# Patient Record
Sex: MALE | Race: WHITE | NOT HISPANIC OR LATINO | URBAN - METROPOLITAN AREA
[De-identification: names, ages, dates, MRNs, and addresses within clinical notes are randomized per-mention and may not be internally consistent; named-entity substitution may affect disease eponyms.]

---

## 2017-07-18 ENCOUNTER — EMERGENCY (EMERGENCY)
Facility: HOSPITAL | Age: 82
LOS: 1 days | Discharge: HOME | End: 2017-07-18
Attending: EMERGENCY MEDICINE
Payer: MEDICARE

## 2017-07-18 VITALS
HEIGHT: 64 IN | HEART RATE: 71 BPM | TEMPERATURE: 99 F | DIASTOLIC BLOOD PRESSURE: 83 MMHG | RESPIRATION RATE: 18 BRPM | WEIGHT: 145.06 LBS | SYSTOLIC BLOOD PRESSURE: 153 MMHG

## 2017-07-18 VITALS — TEMPERATURE: 100 F

## 2017-07-18 DIAGNOSIS — F17.210 NICOTINE DEPENDENCE, CIGARETTES, UNCOMPLICATED: ICD-10-CM

## 2017-07-18 DIAGNOSIS — R06.2 WHEEZING: ICD-10-CM

## 2017-07-18 DIAGNOSIS — M17.12 UNILATERAL PRIMARY OSTEOARTHRITIS, LEFT KNEE: ICD-10-CM

## 2017-07-18 LAB
ALBUMIN SERPL ELPH-MCNC: 3.3 G/DL — LOW (ref 3.5–5)
ALP SERPL-CCNC: 82 U/L — SIGNIFICANT CHANGE UP (ref 40–120)
ALT FLD-CCNC: 21 U/L DA — SIGNIFICANT CHANGE UP (ref 10–60)
ANION GAP SERPL CALC-SCNC: 10 MMOL/L — SIGNIFICANT CHANGE UP (ref 5–17)
APPEARANCE UR: CLEAR — SIGNIFICANT CHANGE UP
AST SERPL-CCNC: 22 U/L — SIGNIFICANT CHANGE UP (ref 10–40)
BILIRUB SERPL-MCNC: 0.7 MG/DL — SIGNIFICANT CHANGE UP (ref 0.2–1.2)
BILIRUB UR-MCNC: NEGATIVE — SIGNIFICANT CHANGE UP
BUN SERPL-MCNC: 33 MG/DL — HIGH (ref 7–18)
CALCIUM SERPL-MCNC: 8.4 MG/DL — SIGNIFICANT CHANGE UP (ref 8.4–10.5)
CHLORIDE SERPL-SCNC: 102 MMOL/L — SIGNIFICANT CHANGE UP (ref 96–108)
CO2 SERPL-SCNC: 25 MMOL/L — SIGNIFICANT CHANGE UP (ref 22–31)
COLOR SPEC: YELLOW — SIGNIFICANT CHANGE UP
CREAT SERPL-MCNC: 1.47 MG/DL — HIGH (ref 0.5–1.3)
CRP SERPL-MCNC: 14.6 MG/DL — HIGH (ref 0–0.4)
DIFF PNL FLD: NEGATIVE — SIGNIFICANT CHANGE UP
ERYTHROCYTE [SEDIMENTATION RATE] IN BLOOD: 76 MM/HR — HIGH (ref 0–20)
GLUCOSE SERPL-MCNC: 106 MG/DL — HIGH (ref 70–99)
GLUCOSE UR QL: NEGATIVE — SIGNIFICANT CHANGE UP
HCT VFR BLD CALC: 38.1 % — LOW (ref 39–50)
HGB BLD-MCNC: 12.3 G/DL — LOW (ref 13–17)
KETONES UR-MCNC: NEGATIVE — SIGNIFICANT CHANGE UP
LEUKOCYTE ESTERASE UR-ACNC: NEGATIVE — SIGNIFICANT CHANGE UP
LYMPHOCYTES # BLD AUTO: 11 % — LOW (ref 13–44)
MCHC RBC-ENTMCNC: 29.8 PG — SIGNIFICANT CHANGE UP (ref 27–34)
MCHC RBC-ENTMCNC: 32.2 GM/DL — SIGNIFICANT CHANGE UP (ref 32–36)
MCV RBC AUTO: 92.8 FL — SIGNIFICANT CHANGE UP (ref 80–100)
MONOCYTES NFR BLD AUTO: 3 % — SIGNIFICANT CHANGE UP (ref 2–14)
NEUTROPHILS NFR BLD AUTO: 78 % — HIGH (ref 43–77)
NITRITE UR-MCNC: NEGATIVE — SIGNIFICANT CHANGE UP
PH UR: 5 — SIGNIFICANT CHANGE UP (ref 5–8)
PLATELET # BLD AUTO: 195 K/UL — SIGNIFICANT CHANGE UP (ref 150–400)
POTASSIUM SERPL-MCNC: 4.4 MMOL/L — SIGNIFICANT CHANGE UP (ref 3.5–5.3)
POTASSIUM SERPL-SCNC: 4.4 MMOL/L — SIGNIFICANT CHANGE UP (ref 3.5–5.3)
PROT SERPL-MCNC: 7.8 G/DL — SIGNIFICANT CHANGE UP (ref 6–8.3)
PROT UR-MCNC: 100
RBC # BLD: 4.11 M/UL — LOW (ref 4.2–5.8)
RBC # FLD: 13.1 % — SIGNIFICANT CHANGE UP (ref 10.3–14.5)
SODIUM SERPL-SCNC: 137 MMOL/L — SIGNIFICANT CHANGE UP (ref 135–145)
SP GR SPEC: 1.02 — SIGNIFICANT CHANGE UP (ref 1.01–1.02)
UROBILINOGEN FLD QL: NEGATIVE — SIGNIFICANT CHANGE UP
WBC # BLD: 10.2 K/UL — SIGNIFICANT CHANGE UP (ref 3.8–10.5)
WBC # FLD AUTO: 10.2 K/UL — SIGNIFICANT CHANGE UP (ref 3.8–10.5)

## 2017-07-18 PROCEDURE — 85027 COMPLETE CBC AUTOMATED: CPT

## 2017-07-18 PROCEDURE — 73562 X-RAY EXAM OF KNEE 3: CPT

## 2017-07-18 PROCEDURE — 20610 DRAIN/INJ JOINT/BURSA W/O US: CPT | Mod: LT

## 2017-07-18 PROCEDURE — 99284 EMERGENCY DEPT VISIT MOD MDM: CPT | Mod: 25

## 2017-07-18 PROCEDURE — 73562 X-RAY EXAM OF KNEE 3: CPT | Mod: 26,LT

## 2017-07-18 PROCEDURE — 86140 C-REACTIVE PROTEIN: CPT

## 2017-07-18 PROCEDURE — 85652 RBC SED RATE AUTOMATED: CPT

## 2017-07-18 PROCEDURE — 81001 URINALYSIS AUTO W/SCOPE: CPT

## 2017-07-18 PROCEDURE — 71010: CPT | Mod: 26

## 2017-07-18 PROCEDURE — 99285 EMERGENCY DEPT VISIT HI MDM: CPT

## 2017-07-18 PROCEDURE — 94640 AIRWAY INHALATION TREATMENT: CPT

## 2017-07-18 PROCEDURE — 80053 COMPREHEN METABOLIC PANEL: CPT

## 2017-07-18 PROCEDURE — 93005 ELECTROCARDIOGRAM TRACING: CPT

## 2017-07-18 PROCEDURE — 71045 X-RAY EXAM CHEST 1 VIEW: CPT

## 2017-07-18 RX ORDER — IBUPROFEN 200 MG
600 TABLET ORAL ONCE
Qty: 0 | Refills: 0 | Status: COMPLETED | OUTPATIENT
Start: 2017-07-18 | End: 2017-07-18

## 2017-07-18 RX ORDER — OXYCODONE AND ACETAMINOPHEN 5; 325 MG/1; MG/1
1 TABLET ORAL ONCE
Qty: 0 | Refills: 0 | Status: DISCONTINUED | OUTPATIENT
Start: 2017-07-18 | End: 2017-07-18

## 2017-07-18 RX ORDER — ACETAMINOPHEN 500 MG
650 TABLET ORAL ONCE
Qty: 0 | Refills: 0 | Status: DISCONTINUED | OUTPATIENT
Start: 2017-07-18 | End: 2017-07-18

## 2017-07-18 RX ORDER — IPRATROPIUM/ALBUTEROL SULFATE 18-103MCG
3 AEROSOL WITH ADAPTER (GRAM) INHALATION ONCE
Qty: 0 | Refills: 0 | Status: COMPLETED | OUTPATIENT
Start: 2017-07-18 | End: 2017-07-18

## 2017-07-18 RX ORDER — MEDROXYPROGESTERONE ACETATE 150 MG/ML
150 INJECTION, SUSPENSION, EXTENDED RELEASE INTRAMUSCULAR ONCE
Qty: 0 | Refills: 0 | Status: DISCONTINUED | OUTPATIENT
Start: 2017-07-18 | End: 2017-07-18

## 2017-07-18 RX ADMIN — Medication 3 MILLILITER(S): at 12:19

## 2017-07-18 RX ADMIN — OXYCODONE AND ACETAMINOPHEN 1 TABLET(S): 5; 325 TABLET ORAL at 13:11

## 2017-07-18 RX ADMIN — Medication 600 MILLIGRAM(S): at 13:11

## 2017-07-18 RX ADMIN — OXYCODONE AND ACETAMINOPHEN 1 TABLET(S): 5; 325 TABLET ORAL at 12:19

## 2017-07-18 RX ADMIN — Medication 600 MILLIGRAM(S): at 12:19

## 2017-07-18 NOTE — ED PROVIDER NOTE - MEDICAL DECISION MAKING DETAILS
89 y/o M pt w/ multiple joint pain. Likely acute on chronic. Will treat pain, check labs, reassess. 89 y/o M pt w/ multiple joint pain. Likely acute on chronic, pt seen previously for joint pain in the past, seen by ortho who did an arthrocentesis, rec f/u as outpatient. Will treat pain, check labs, reassess. Pt on reassessment at 3:50pm requesting to go home, happy to leave, instructed to f/u with orthopedics. Pt noted to be borderline febrile, no leukocytosis, normal wbc, pt did not give urine, rectal temp <38.0, will send urine, and send prescription to pharmacy if needed.

## 2017-07-18 NOTE — ED PROVIDER NOTE - CARE PLAN
Principal Discharge DX:	Arthritis  Secondary Diagnosis:	Osteoarthritis of multiple joints, unspecified osteoarthritis type

## 2017-07-18 NOTE — ED PROVIDER NOTE - OBJECTIVE STATEMENT
89 y/o M pt w/ PMHx of degenerative joint disease and arthritis presents to ED c/o L knee pain, R elbow pain, and R shoulder pain for several days. Pt notes his pain is worse w/ movement. Pt denies fever, chills, or any other complaints. Pt reports no history of endocarditis or rheumatic heart disease. NKDA.

## 2017-07-18 NOTE — ED PROVIDER NOTE - PROGRESS NOTE DETAILS
pt seen by ortho who did steroid injection of knee and removed fluid consistent w/ gout pt feeling much better, discussed with patient regarding inpatient vs outpatient given improvement in pain.

## 2017-07-18 NOTE — ED PROVIDER NOTE - MUSCULOSKELETAL, MLM
swelling L knee, pain w/ active ROM of R elbow and R shoulder, no erythema but positive swelling, not warm

## 2017-07-19 NOTE — CONSULT NOTE ADULT - SUBJECTIVE AND OBJECTIVE BOX
HPI: Patient is a 89 y/o M pt w/ PMHx of degenerative joint disease and arthritis presents to ED c/o L knee pain and swelling for several days. Pt notes his pain is worse w/ movement. Pt denies fever, chills, or any other complaints. 	    PAST MEDICAL & SURGICAL HISTORY:  Arthritis  Degenerative joint disease  No significant past surgical history    Review of systems: Non Contributory    MEDICATIONS  (STANDING):  methylPREDNISolone acetate Injectable 80 milliGRAM(s) IntraArticular Once    Allergies: No known Allergies    Vital Signs Last 24 Hrs  T(C): 37.9 (2017 12:32), Max: 37.9 (2017 12:32)  T(F): 100.3 (2017 12:32), Max: 100.3 (2017 12:32)  HR: 71 (2017 10:42) (71 - 71)  BP: 153/83 (2017 10:42) (153/83 - 153/83)  BP(mean): --  RR: 18 (2017 10:42) (18 - 18)  SpO2: --    Physical Examination: Musculoskeletal: Left knee: positive for 3+ effusion, pain to palpation of joint line, decreased range of motion.     Neurovascularly Intact    LABS:                        12.3   10.2  )-----------( 195      ( 2017 12:15 )             38.1     07-18    137  |  102  |  33<H>  ----------------------------<  106<H>  4.4   |  25  |  1.47<H>    Ca    8.4      2017 12:15    TPro  7.8  /  Alb  3.3<L>  /  TBili  0.7  /  DBili  x   /  AST  22  /  ALT  21  /  AlkPhos  82  07-18      Urinalysis Basic - ( 2017 16:16 )    Color: Yellow / Appearance: Clear / S.020 / pH: x  Gluc: x / Ketone: Negative  / Bili: Negative / Urobili: Negative   Blood: x / Protein: 100 / Nitrite: Negative   Leuk Esterase: Negative / RBC: 0-2 /HPF / WBC 0-2 /HPF   Sq Epi: x / Non Sq Epi: Negative / Bacteria: Moderate /HPF        RADIOLOGY & ADDITIONAL STUDIES: Left Knee : positive for DJD    ASSESSMENT: Left knee DJD,  swelling     PLAN/RECOMMENDATION: Under sterile condition, left knee was aspirated and about 40 CC of yellowish fluid was obtained, and 80 mg of Depomedrol and 5 CC of lidocaine was injected.     FOLLOW UP: with office in 1-2 weeks

## 2017-07-24 ENCOUNTER — INPATIENT (INPATIENT)
Facility: HOSPITAL | Age: 82
LOS: 15 days | Discharge: HOME HEALTH | DRG: 291 | End: 2017-08-09
Attending: GENERAL PRACTICE | Admitting: GENERAL PRACTICE
Payer: MEDICARE

## 2017-07-24 VITALS
HEART RATE: 71 BPM | DIASTOLIC BLOOD PRESSURE: 62 MMHG | SYSTOLIC BLOOD PRESSURE: 152 MMHG | WEIGHT: 123.9 LBS | RESPIRATION RATE: 22 BRPM | OXYGEN SATURATION: 77 % | TEMPERATURE: 99 F

## 2017-07-24 DIAGNOSIS — J81.0 ACUTE PULMONARY EDEMA: ICD-10-CM

## 2017-07-24 DIAGNOSIS — C32.9 MALIGNANT NEOPLASM OF LARYNX, UNSPECIFIED: ICD-10-CM

## 2017-07-24 DIAGNOSIS — R74.8 ABNORMAL LEVELS OF OTHER SERUM ENZYMES: ICD-10-CM

## 2017-07-24 DIAGNOSIS — I25.10 ATHEROSCLEROTIC HEART DISEASE OF NATIVE CORONARY ARTERY WITHOUT ANGINA PECTORIS: ICD-10-CM

## 2017-07-24 DIAGNOSIS — J96.01 ACUTE RESPIRATORY FAILURE WITH HYPOXIA: ICD-10-CM

## 2017-07-24 DIAGNOSIS — I10 ESSENTIAL (PRIMARY) HYPERTENSION: ICD-10-CM

## 2017-07-24 LAB
ALBUMIN SERPL ELPH-MCNC: 3.2 G/DL — LOW (ref 3.5–5)
ALP SERPL-CCNC: 80 U/L — SIGNIFICANT CHANGE UP (ref 40–120)
ALT FLD-CCNC: 30 U/L DA — SIGNIFICANT CHANGE UP (ref 10–60)
ANION GAP SERPL CALC-SCNC: 11 MMOL/L — SIGNIFICANT CHANGE UP (ref 5–17)
APTT BLD: 29.5 SEC — SIGNIFICANT CHANGE UP (ref 27.5–37.4)
APTT BLD: 40 SEC — HIGH (ref 27.5–37.4)
AST SERPL-CCNC: 17 U/L — SIGNIFICANT CHANGE UP (ref 10–40)
BASE EXCESS BLDA CALC-SCNC: 0.2 MMOL/L — SIGNIFICANT CHANGE UP (ref -2–2)
BASOPHILS # BLD AUTO: 0 K/UL — SIGNIFICANT CHANGE UP (ref 0–0.2)
BASOPHILS NFR BLD AUTO: 0.4 % — SIGNIFICANT CHANGE UP (ref 0–2)
BILIRUB SERPL-MCNC: 0.9 MG/DL — SIGNIFICANT CHANGE UP (ref 0.2–1.2)
BLOOD GAS COMMENTS ARTERIAL: SIGNIFICANT CHANGE UP
BUN SERPL-MCNC: 54 MG/DL — HIGH (ref 7–18)
CALCIUM SERPL-MCNC: 8.4 MG/DL — SIGNIFICANT CHANGE UP (ref 8.4–10.5)
CHLORIDE SERPL-SCNC: 103 MMOL/L — SIGNIFICANT CHANGE UP (ref 96–108)
CK MB BLD-MCNC: 2.6 % — SIGNIFICANT CHANGE UP (ref 0–3.5)
CK MB CFR SERPL CALC: 2 NG/ML — SIGNIFICANT CHANGE UP (ref 0–3.6)
CK SERPL-CCNC: 78 U/L — SIGNIFICANT CHANGE UP (ref 35–232)
CK SERPL-CCNC: 85 U/L — SIGNIFICANT CHANGE UP (ref 35–232)
CO2 SERPL-SCNC: 24 MMOL/L — SIGNIFICANT CHANGE UP (ref 22–31)
CREAT SERPL-MCNC: 1.49 MG/DL — HIGH (ref 0.5–1.3)
EOSINOPHIL # BLD AUTO: 0 K/UL — SIGNIFICANT CHANGE UP (ref 0–0.5)
EOSINOPHIL NFR BLD AUTO: 0.1 % — SIGNIFICANT CHANGE UP (ref 0–6)
GLUCOSE SERPL-MCNC: 116 MG/DL — HIGH (ref 70–99)
HCO3 BLDA-SCNC: 23 MMOL/L — SIGNIFICANT CHANGE UP (ref 23–27)
HCT VFR BLD CALC: 36.4 % — LOW (ref 39–50)
HCT VFR BLD CALC: 36.7 % — LOW (ref 39–50)
HGB BLD-MCNC: 11.8 G/DL — LOW (ref 13–17)
HGB BLD-MCNC: 11.9 G/DL — LOW (ref 13–17)
HOROWITZ INDEX BLDA+IHG-RTO: 60 — SIGNIFICANT CHANGE UP
INR BLD: 1.2 RATIO — HIGH (ref 0.88–1.16)
LACTATE SERPL-SCNC: 2 MMOL/L — SIGNIFICANT CHANGE UP (ref 0.7–2)
LYMPHOCYTES # BLD AUTO: 1.4 K/UL — SIGNIFICANT CHANGE UP (ref 1–3.3)
LYMPHOCYTES # BLD AUTO: 11.1 % — LOW (ref 13–44)
MCHC RBC-ENTMCNC: 29.6 PG — SIGNIFICANT CHANGE UP (ref 27–34)
MCHC RBC-ENTMCNC: 29.9 PG — SIGNIFICANT CHANGE UP (ref 27–34)
MCHC RBC-ENTMCNC: 32.5 GM/DL — SIGNIFICANT CHANGE UP (ref 32–36)
MCHC RBC-ENTMCNC: 32.5 GM/DL — SIGNIFICANT CHANGE UP (ref 32–36)
MCV RBC AUTO: 91 FL — SIGNIFICANT CHANGE UP (ref 80–100)
MCV RBC AUTO: 91.8 FL — SIGNIFICANT CHANGE UP (ref 80–100)
MONOCYTES # BLD AUTO: 0.7 K/UL — SIGNIFICANT CHANGE UP (ref 0–0.9)
MONOCYTES NFR BLD AUTO: 5.4 % — SIGNIFICANT CHANGE UP (ref 2–14)
NEUTROPHILS # BLD AUTO: 10.2 K/UL — HIGH (ref 1.8–7.4)
NEUTROPHILS NFR BLD AUTO: 83.1 % — HIGH (ref 43–77)
NT-PROBNP SERPL-SCNC: HIGH PG/ML (ref 0–450)
PCO2 BLDA: 34 MMHG — SIGNIFICANT CHANGE UP (ref 32–46)
PH BLDA: 7.45 — SIGNIFICANT CHANGE UP (ref 7.35–7.45)
PLATELET # BLD AUTO: 245 K/UL — SIGNIFICANT CHANGE UP (ref 150–400)
PLATELET # BLD AUTO: 281 K/UL — SIGNIFICANT CHANGE UP (ref 150–400)
PO2 BLDA: 70 MMHG — LOW (ref 74–108)
POTASSIUM SERPL-MCNC: 4.8 MMOL/L — SIGNIFICANT CHANGE UP (ref 3.5–5.3)
POTASSIUM SERPL-SCNC: 4.8 MMOL/L — SIGNIFICANT CHANGE UP (ref 3.5–5.3)
PROT SERPL-MCNC: 7.8 G/DL — SIGNIFICANT CHANGE UP (ref 6–8.3)
PROTHROM AB SERPL-ACNC: 13.1 SEC — HIGH (ref 9.8–12.7)
RBC # BLD: 4 M/UL — LOW (ref 4.2–5.8)
RBC # BLD: 4 M/UL — LOW (ref 4.2–5.8)
RBC # FLD: 13.3 % — SIGNIFICANT CHANGE UP (ref 10.3–14.5)
RBC # FLD: 13.4 % — SIGNIFICANT CHANGE UP (ref 10.3–14.5)
SAO2 % BLDA: 93 % — SIGNIFICANT CHANGE UP (ref 92–96)
SODIUM SERPL-SCNC: 138 MMOL/L — SIGNIFICANT CHANGE UP (ref 135–145)
TROPONIN I SERPL-MCNC: 0.06 NG/ML — HIGH (ref 0–0.04)
TROPONIN I SERPL-MCNC: 0.09 NG/ML — HIGH (ref 0–0.04)
WBC # BLD: 10 K/UL — SIGNIFICANT CHANGE UP (ref 3.8–10.5)
WBC # BLD: 12.3 K/UL — HIGH (ref 3.8–10.5)
WBC # FLD AUTO: 10 K/UL — SIGNIFICANT CHANGE UP (ref 3.8–10.5)
WBC # FLD AUTO: 12.3 K/UL — HIGH (ref 3.8–10.5)

## 2017-07-24 PROCEDURE — 99291 CRITICAL CARE FIRST HOUR: CPT

## 2017-07-24 PROCEDURE — 71010: CPT | Mod: 26

## 2017-07-24 RX ORDER — HEPARIN SODIUM 5000 [USP'U]/ML
INJECTION INTRAVENOUS; SUBCUTANEOUS
Qty: 25000 | Refills: 0 | Status: DISCONTINUED | OUTPATIENT
Start: 2017-07-24 | End: 2017-07-24

## 2017-07-24 RX ORDER — IPRATROPIUM/ALBUTEROL SULFATE 18-103MCG
3 AEROSOL WITH ADAPTER (GRAM) INHALATION EVERY 6 HOURS
Qty: 0 | Refills: 0 | Status: DISCONTINUED | OUTPATIENT
Start: 2017-07-24 | End: 2017-08-09

## 2017-07-24 RX ORDER — DOXAZOSIN MESYLATE 4 MG
4 TABLET ORAL AT BEDTIME
Qty: 0 | Refills: 0 | Status: DISCONTINUED | OUTPATIENT
Start: 2017-07-24 | End: 2017-07-24

## 2017-07-24 RX ORDER — HYDRALAZINE HCL 50 MG
50 TABLET ORAL THREE TIMES A DAY
Qty: 0 | Refills: 0 | Status: DISCONTINUED | OUTPATIENT
Start: 2017-07-24 | End: 2017-07-24

## 2017-07-24 RX ORDER — IPRATROPIUM/ALBUTEROL SULFATE 18-103MCG
3 AEROSOL WITH ADAPTER (GRAM) INHALATION ONCE
Qty: 0 | Refills: 0 | Status: COMPLETED | OUTPATIENT
Start: 2017-07-24 | End: 2017-07-24

## 2017-07-24 RX ORDER — GABAPENTIN 400 MG/1
300 CAPSULE ORAL
Qty: 0 | Refills: 0 | Status: DISCONTINUED | OUTPATIENT
Start: 2017-07-24 | End: 2017-07-24

## 2017-07-24 RX ORDER — HEPARIN SODIUM 5000 [USP'U]/ML
3500 INJECTION INTRAVENOUS; SUBCUTANEOUS EVERY 6 HOURS
Qty: 0 | Refills: 0 | Status: DISCONTINUED | OUTPATIENT
Start: 2017-07-24 | End: 2017-07-24

## 2017-07-24 RX ORDER — ACETAMINOPHEN 500 MG
650 TABLET ORAL ONCE
Qty: 0 | Refills: 0 | Status: DISCONTINUED | OUTPATIENT
Start: 2017-07-24 | End: 2017-07-24

## 2017-07-24 RX ORDER — LABETALOL HCL 100 MG
200 TABLET ORAL
Qty: 0 | Refills: 0 | Status: DISCONTINUED | OUTPATIENT
Start: 2017-07-24 | End: 2017-07-27

## 2017-07-24 RX ORDER — ASPIRIN/CALCIUM CARB/MAGNESIUM 324 MG
81 TABLET ORAL DAILY
Qty: 0 | Refills: 0 | Status: DISCONTINUED | OUTPATIENT
Start: 2017-07-24 | End: 2017-08-09

## 2017-07-24 RX ORDER — DOXAZOSIN MESYLATE 4 MG
4 TABLET ORAL AT BEDTIME
Qty: 0 | Refills: 0 | Status: DISCONTINUED | OUTPATIENT
Start: 2017-07-24 | End: 2017-08-09

## 2017-07-24 RX ORDER — AMLODIPINE BESYLATE 2.5 MG/1
10 TABLET ORAL DAILY
Qty: 0 | Refills: 0 | Status: DISCONTINUED | OUTPATIENT
Start: 2017-07-24 | End: 2017-07-24

## 2017-07-24 RX ORDER — ATORVASTATIN CALCIUM 80 MG/1
40 TABLET, FILM COATED ORAL AT BEDTIME
Qty: 0 | Refills: 0 | Status: DISCONTINUED | OUTPATIENT
Start: 2017-07-24 | End: 2017-08-09

## 2017-07-24 RX ORDER — FUROSEMIDE 40 MG
40 TABLET ORAL EVERY 12 HOURS
Qty: 0 | Refills: 0 | Status: DISCONTINUED | OUTPATIENT
Start: 2017-07-24 | End: 2017-07-26

## 2017-07-24 RX ORDER — GABAPENTIN 400 MG/1
300 CAPSULE ORAL
Qty: 0 | Refills: 0 | Status: DISCONTINUED | OUTPATIENT
Start: 2017-07-24 | End: 2017-08-02

## 2017-07-24 RX ORDER — FUROSEMIDE 40 MG
80 TABLET ORAL ONCE
Qty: 0 | Refills: 0 | Status: COMPLETED | OUTPATIENT
Start: 2017-07-24 | End: 2017-07-24

## 2017-07-24 RX ORDER — FUROSEMIDE 40 MG
40 TABLET ORAL ONCE
Qty: 0 | Refills: 0 | Status: COMPLETED | OUTPATIENT
Start: 2017-07-24 | End: 2017-07-24

## 2017-07-24 RX ORDER — LABETALOL HCL 100 MG
200 TABLET ORAL
Qty: 0 | Refills: 0 | Status: DISCONTINUED | OUTPATIENT
Start: 2017-07-24 | End: 2017-07-24

## 2017-07-24 RX ORDER — PANTOPRAZOLE SODIUM 20 MG/1
40 TABLET, DELAYED RELEASE ORAL DAILY
Qty: 0 | Refills: 0 | Status: DISCONTINUED | OUTPATIENT
Start: 2017-07-24 | End: 2017-07-25

## 2017-07-24 RX ORDER — SODIUM CHLORIDE 9 MG/ML
3 INJECTION INTRAMUSCULAR; INTRAVENOUS; SUBCUTANEOUS ONCE
Qty: 0 | Refills: 0 | Status: COMPLETED | OUTPATIENT
Start: 2017-07-24 | End: 2017-07-24

## 2017-07-24 RX ORDER — AMLODIPINE BESYLATE 2.5 MG/1
10 TABLET ORAL DAILY
Qty: 0 | Refills: 0 | Status: DISCONTINUED | OUTPATIENT
Start: 2017-07-24 | End: 2017-07-27

## 2017-07-24 RX ORDER — ACETAMINOPHEN 500 MG
650 TABLET ORAL EVERY 6 HOURS
Qty: 0 | Refills: 0 | Status: DISCONTINUED | OUTPATIENT
Start: 2017-07-24 | End: 2017-08-09

## 2017-07-24 RX ORDER — HYDRALAZINE HCL 50 MG
50 TABLET ORAL THREE TIMES A DAY
Qty: 0 | Refills: 0 | Status: DISCONTINUED | OUTPATIENT
Start: 2017-07-24 | End: 2017-07-27

## 2017-07-24 RX ORDER — ASPIRIN/CALCIUM CARB/MAGNESIUM 324 MG
162 TABLET ORAL DAILY
Qty: 0 | Refills: 0 | Status: DISCONTINUED | OUTPATIENT
Start: 2017-07-24 | End: 2017-07-24

## 2017-07-24 RX ORDER — HEPARIN SODIUM 5000 [USP'U]/ML
3500 INJECTION INTRAVENOUS; SUBCUTANEOUS ONCE
Qty: 0 | Refills: 0 | Status: COMPLETED | OUTPATIENT
Start: 2017-07-24 | End: 2017-07-24

## 2017-07-24 RX ADMIN — Medication 162 MILLIGRAM(S): at 12:24

## 2017-07-24 RX ADMIN — ATORVASTATIN CALCIUM 40 MILLIGRAM(S): 80 TABLET, FILM COATED ORAL at 21:30

## 2017-07-24 RX ADMIN — Medication 40 MILLIGRAM(S): at 12:24

## 2017-07-24 RX ADMIN — Medication 4 MILLIGRAM(S): at 21:31

## 2017-07-24 RX ADMIN — Medication 40 MILLIGRAM(S): at 23:07

## 2017-07-24 RX ADMIN — Medication 125 MILLIGRAM(S): at 10:20

## 2017-07-24 RX ADMIN — Medication 50 MILLIGRAM(S): at 21:31

## 2017-07-24 RX ADMIN — AMLODIPINE BESYLATE 10 MILLIGRAM(S): 2.5 TABLET ORAL at 15:37

## 2017-07-24 RX ADMIN — HEPARIN SODIUM 3500 UNIT(S): 5000 INJECTION INTRAVENOUS; SUBCUTANEOUS at 12:28

## 2017-07-24 RX ADMIN — Medication 3 MILLILITER(S): at 20:52

## 2017-07-24 RX ADMIN — SODIUM CHLORIDE 3 MILLILITER(S): 9 INJECTION INTRAMUSCULAR; INTRAVENOUS; SUBCUTANEOUS at 10:20

## 2017-07-24 RX ADMIN — Medication 20 MILLIGRAM(S): at 18:00

## 2017-07-24 RX ADMIN — HEPARIN SODIUM 700 UNIT(S)/HR: 5000 INJECTION INTRAVENOUS; SUBCUTANEOUS at 12:28

## 2017-07-24 RX ADMIN — Medication 200 MILLIGRAM(S): at 18:00

## 2017-07-24 RX ADMIN — Medication 81 MILLIGRAM(S): at 15:37

## 2017-07-24 RX ADMIN — HEPARIN SODIUM 800 UNIT(S)/HR: 5000 INJECTION INTRAVENOUS; SUBCUTANEOUS at 18:51

## 2017-07-24 RX ADMIN — Medication 3 MILLILITER(S): at 10:20

## 2017-07-24 RX ADMIN — GABAPENTIN 300 MILLIGRAM(S): 400 CAPSULE ORAL at 18:01

## 2017-07-24 RX ADMIN — PANTOPRAZOLE SODIUM 40 MILLIGRAM(S): 20 TABLET, DELAYED RELEASE ORAL at 15:37

## 2017-07-24 NOTE — ED PROVIDER NOTE - CARE PLAN
Principal Discharge DX:	Acute pulmonary edema  Secondary Diagnosis:	NSTEMI (non-ST elevated myocardial infarction)

## 2017-07-24 NOTE — H&P ADULT - HISTORY OF PRESENT ILLNESS
89yo South African-speaking male from home, lives with wife, PMH HTN, asthma, depression p/w acute shortness of breath.  Pat 89yo Swiss-speaking male from home, former smoker, lives with wife, PMH laryngeal cancer (diagnosed 2015, s/p XRT, no evidence disease), CAD (medical management), HTN, asthma, depression p/w acute shortness of breath.  History obtained from daughter-in-law at bedside with Swiss  (daughter in law is Angela, [phone]).  Patient was in usual health when this morning ~ 9am, patient endorsed palpitations and was acutely short of breath.  911 called, EMS placed patient on 100% non-rebreather and gave nebulizer for wheezing. Denies fever, chills, chest pain, changes in vision, cough, sore throat, vomiting, abdominal pain, diarrhea, dysuria, leg swelling, rash.     On BIPAP in ED for work of breathing 89yo Malaysian-speaking male from home, former smoker, lives with wife, PMH laryngeal cancer (diagnosed 2015, s/p XRT, no evidence disease), COPD, CAD (medical management), HTN, asthma, depression, anxiety p/w acute shortness of breath.  History obtained from daughter-in-law at bedside with Malaysian  (daughter in law is Angela, [phone]).  Patient was in usual health when this morning ~ 9am, patient endorsed palpitations and was acutely short of breath.  911 called, EMS placed patient on 100% non-rebreather and gave nebulizer for wheezing. Denies fever, chills, chest pain, changes in vision, cough, sore throat, vomiting, abdominal pain, diarrhea, dysuria, leg swelling, rash.     On BIPAP in ED for hypoxia  CXR: bilateral congestion  s/p Lasix 40mg, solumedrol and duoneb  BNP 10, 750; T1 0.059, EKG sinus rhythm without acute ischemic changes 89yo [place]-speaking male from home, former smoker, lives with wife, PMH laryngeal cancer (diagnosed 2015, s/p XRT, no evidence disease), COPD, CAD s/p stents x 3, s/p left carotid stent, HTN, asthma, depression, anxiety p/w acute shortness of breath.  History obtained from daughter-in-law at bedside with [place]  (daughter in law is Angela, [phone]).  Patient was in usual health when this morning ~ 9am, patient endorsed palpitations and was acutely short of breath.  911 called, EMS placed patient on 100% non-rebreather and gave nebulizer for wheezing. Denies fever, chills, chest pain, changes in vision, cough, sore throat, vomiting, abdominal pain, diarrhea, dysuria, leg swelling, rash.     On BIPAP in ED for hypoxia  CXR: bilateral congestion  s/p Lasix 40mg, solumedrol and duoneb  BNP 10, 750; T1 0.059, EKG sinus rhythm without acute ischemic changes

## 2017-07-24 NOTE — ED PROVIDER NOTE - ATTENDING CONTRIBUTION TO CARE
Nemes - 89yo M w SOB today. Wheezes/crackles diffuse, O2 sat 78% on RA. WIll get BiPAP, will admit. COPD/asthma vs APE

## 2017-07-24 NOTE — ED PROVIDER NOTE - CRITICAL CARE PROVIDED
additional history taking/consultation with other physicians/interpretation of diagnostic studies/direct patient care (not related to procedure)/consult w/ pt's family directly relating to pts condition

## 2017-07-24 NOTE — H&P ADULT - ATTENDING COMMENTS
89 male with hx of laryngeal cancer, COPD, CAD, left carotid stent, HTN, asthma, depression, anxiety, presented to ED with acute respiratory failure requiring bipap and NSTEMI.  Given CXR findings, elevated BNP, and exam findings, seems like CHF exacerbation.  Plan for BIPAP, lasix, ASA, statin, Heparin drip.  Obtain 2D echo and cardiology consultation.  Total CC time 35 min.

## 2017-07-24 NOTE — H&P ADULT - PMH
Arthritis    Asthma    Coronary artery disease involving native coronary artery of native heart without angina pectoris    Degenerative joint disease    Essential hypertension    Laryngeal cancer

## 2017-07-24 NOTE — H&P ADULT - ASSESSMENT
89yo Austrian-speaking male from home, former smoker, lives with wife, PMH laryngeal cancer (diagnosed 2015, s/p XRT, no evidence disease), COPD, CAD (medical management), HTN, asthma, depression, anxiety p/w acute shortness of breath 2/2 new onset CHF

## 2017-07-24 NOTE — H&P ADULT - PROBLEM SELECTOR PLAN 1
2/2 new onset CHF.  wheezing and bilateral rales on exam, congestion noted on CXR, elevated BNP consistent with CHF. No evidence of hypertensive emergency. ACS less likely, no chest pain, no ischemic changes noted on EKG.  - lasix 40 q12, daily weights, strict ins and outs  - trend troponins, TTE, ASA, statin  - heparin gtt; if T2 downtrending, dc heparin gtt  - Dr Marquez consulted

## 2017-07-24 NOTE — H&P ADULT - PROBLEM SELECTOR PLAN 3
as per PMD, no stent,  medical management for cad  - c/w ASA, statin  - hold plavix as on heparin gtt

## 2017-07-24 NOTE — ED PROVIDER NOTE - MEDICAL DECISION MAKING DETAILS
89 y/o male hx asthma, BIBA respiratory distress, placed on bipap, +BNP/trop, afebrile, will admit to ICU. [place]  #[id number]

## 2017-07-24 NOTE — ED PROVIDER NOTE - OBJECTIVE STATEMENT
89 y/o male, PMhx asthma, HTN BIBA s/p dyspnea/SOB this morning. Pt used albuterol inhaler with no relief, received x2 duonebs via EMS. Denies cp, palpitations, peripheral edema, diaphoresis, blurred vision, N/V, cough, fever/chills or any other concerns. No hx CHF.

## 2017-07-24 NOTE — H&P ADULT - PROBLEM SELECTOR PLAN 2
likely 2/2 demand ischemic as no chest pain or ischemic changes noted on EKG  - admit for acs rule out - ASA, statin, trend troponins, TTE  - heparin gtt.  DC heparin gtt if T2 downtrending  - Dr Marquez consulted

## 2017-07-25 DIAGNOSIS — J44.9 CHRONIC OBSTRUCTIVE PULMONARY DISEASE, UNSPECIFIED: ICD-10-CM

## 2017-07-25 DIAGNOSIS — I27.2 OTHER SECONDARY PULMONARY HYPERTENSION: ICD-10-CM

## 2017-07-25 DIAGNOSIS — J45.909 UNSPECIFIED ASTHMA, UNCOMPLICATED: ICD-10-CM

## 2017-07-25 DIAGNOSIS — I21.4 NON-ST ELEVATION (NSTEMI) MYOCARDIAL INFARCTION: ICD-10-CM

## 2017-07-25 LAB
ALBUMIN SERPL ELPH-MCNC: 2.8 G/DL — LOW (ref 3.5–5)
ALP SERPL-CCNC: 68 U/L — SIGNIFICANT CHANGE UP (ref 40–120)
ALT FLD-CCNC: 30 U/L DA — SIGNIFICANT CHANGE UP (ref 10–60)
ANION GAP SERPL CALC-SCNC: 10 MMOL/L — SIGNIFICANT CHANGE UP (ref 5–17)
ANION GAP SERPL CALC-SCNC: 14 MMOL/L — SIGNIFICANT CHANGE UP (ref 5–17)
APTT BLD: 30.5 SEC — SIGNIFICANT CHANGE UP (ref 27.5–37.4)
AST SERPL-CCNC: 25 U/L — SIGNIFICANT CHANGE UP (ref 10–40)
BASOPHILS # BLD AUTO: 0 K/UL — SIGNIFICANT CHANGE UP (ref 0–0.2)
BASOPHILS NFR BLD AUTO: 0.2 % — SIGNIFICANT CHANGE UP (ref 0–2)
BILIRUB SERPL-MCNC: 1.2 MG/DL — SIGNIFICANT CHANGE UP (ref 0.2–1.2)
BUN SERPL-MCNC: 66 MG/DL — HIGH (ref 7–18)
BUN SERPL-MCNC: 76 MG/DL — HIGH (ref 7–18)
CALCIUM SERPL-MCNC: 8.3 MG/DL — LOW (ref 8.4–10.5)
CALCIUM SERPL-MCNC: 8.5 MG/DL — SIGNIFICANT CHANGE UP (ref 8.4–10.5)
CHLORIDE SERPL-SCNC: 101 MMOL/L — SIGNIFICANT CHANGE UP (ref 96–108)
CHLORIDE SERPL-SCNC: 99 MMOL/L — SIGNIFICANT CHANGE UP (ref 96–108)
CHOLEST SERPL-MCNC: 150 MG/DL — SIGNIFICANT CHANGE UP (ref 10–199)
CO2 SERPL-SCNC: 24 MMOL/L — SIGNIFICANT CHANGE UP (ref 22–31)
CO2 SERPL-SCNC: 25 MMOL/L — SIGNIFICANT CHANGE UP (ref 22–31)
CREAT SERPL-MCNC: 1.48 MG/DL — HIGH (ref 0.5–1.3)
CREAT SERPL-MCNC: 1.71 MG/DL — HIGH (ref 0.5–1.3)
EOSINOPHIL # BLD AUTO: 0 K/UL — SIGNIFICANT CHANGE UP (ref 0–0.5)
EOSINOPHIL NFR BLD AUTO: 0 % — SIGNIFICANT CHANGE UP (ref 0–6)
GLUCOSE SERPL-MCNC: 156 MG/DL — HIGH (ref 70–99)
GLUCOSE SERPL-MCNC: 190 MG/DL — HIGH (ref 70–99)
HBA1C BLD-MCNC: 5.7 % — HIGH (ref 4–5.6)
HCT VFR BLD CALC: 31.9 % — LOW (ref 39–50)
HDLC SERPL-MCNC: 31 MG/DL — LOW (ref 40–125)
HGB BLD-MCNC: 10.6 G/DL — LOW (ref 13–17)
LIPID PNL WITH DIRECT LDL SERPL: 102 MG/DL — SIGNIFICANT CHANGE UP
LYMPHOCYTES # BLD AUTO: 0.7 K/UL — LOW (ref 1–3.3)
LYMPHOCYTES # BLD AUTO: 6.6 % — LOW (ref 13–44)
MAGNESIUM SERPL-MCNC: 2.3 MG/DL — SIGNIFICANT CHANGE UP (ref 1.6–2.6)
MCHC RBC-ENTMCNC: 29.6 PG — SIGNIFICANT CHANGE UP (ref 27–34)
MCHC RBC-ENTMCNC: 33.1 GM/DL — SIGNIFICANT CHANGE UP (ref 32–36)
MCV RBC AUTO: 89.4 FL — SIGNIFICANT CHANGE UP (ref 80–100)
MONOCYTES # BLD AUTO: 0.3 K/UL — SIGNIFICANT CHANGE UP (ref 0–0.9)
MONOCYTES NFR BLD AUTO: 2.9 % — SIGNIFICANT CHANGE UP (ref 2–14)
NEUTROPHILS # BLD AUTO: 9.2 K/UL — HIGH (ref 1.8–7.4)
NEUTROPHILS NFR BLD AUTO: 90.3 % — HIGH (ref 43–77)
PHOSPHATE SERPL-MCNC: 5.4 MG/DL — HIGH (ref 2.5–4.5)
PLATELET # BLD AUTO: 258 K/UL — SIGNIFICANT CHANGE UP (ref 150–400)
POTASSIUM SERPL-MCNC: 3.7 MMOL/L — SIGNIFICANT CHANGE UP (ref 3.5–5.3)
POTASSIUM SERPL-MCNC: 3.8 MMOL/L — SIGNIFICANT CHANGE UP (ref 3.5–5.3)
POTASSIUM SERPL-SCNC: 3.7 MMOL/L — SIGNIFICANT CHANGE UP (ref 3.5–5.3)
POTASSIUM SERPL-SCNC: 3.8 MMOL/L — SIGNIFICANT CHANGE UP (ref 3.5–5.3)
PROT SERPL-MCNC: 6.8 G/DL — SIGNIFICANT CHANGE UP (ref 6–8.3)
RBC # BLD: 3.57 M/UL — LOW (ref 4.2–5.8)
RBC # FLD: 13 % — SIGNIFICANT CHANGE UP (ref 10.3–14.5)
SODIUM SERPL-SCNC: 136 MMOL/L — SIGNIFICANT CHANGE UP (ref 135–145)
SODIUM SERPL-SCNC: 137 MMOL/L — SIGNIFICANT CHANGE UP (ref 135–145)
TOTAL CHOLESTEROL/HDL RATIO MEASUREMENT: 4.8 RATIO — SIGNIFICANT CHANGE UP (ref 3.4–9.6)
TRIGL SERPL-MCNC: 83 MG/DL — SIGNIFICANT CHANGE UP (ref 10–149)
TSH SERPL-MCNC: 0.32 UU/ML — LOW (ref 0.34–4.82)
WBC # BLD: 10.2 K/UL — SIGNIFICANT CHANGE UP (ref 3.8–10.5)
WBC # FLD AUTO: 10.2 K/UL — SIGNIFICANT CHANGE UP (ref 3.8–10.5)

## 2017-07-25 PROCEDURE — 71010: CPT | Mod: 26

## 2017-07-25 RX ORDER — PANTOPRAZOLE SODIUM 20 MG/1
40 TABLET, DELAYED RELEASE ORAL
Qty: 0 | Refills: 0 | Status: DISCONTINUED | OUTPATIENT
Start: 2017-07-25 | End: 2017-08-02

## 2017-07-25 RX ORDER — CLOPIDOGREL BISULFATE 75 MG/1
75 TABLET, FILM COATED ORAL DAILY
Qty: 0 | Refills: 0 | Status: DISCONTINUED | OUTPATIENT
Start: 2017-07-25 | End: 2017-08-09

## 2017-07-25 RX ORDER — FUROSEMIDE 40 MG
160 TABLET ORAL ONCE
Qty: 0 | Refills: 0 | Status: COMPLETED | OUTPATIENT
Start: 2017-07-25 | End: 2017-07-25

## 2017-07-25 RX ADMIN — Medication 40 MILLIGRAM(S): at 12:27

## 2017-07-25 RX ADMIN — Medication 99 MILLIGRAM(S): at 03:10

## 2017-07-25 RX ADMIN — Medication 3 MILLILITER(S): at 14:20

## 2017-07-25 RX ADMIN — Medication 116 MILLIGRAM(S): at 00:25

## 2017-07-25 RX ADMIN — PANTOPRAZOLE SODIUM 40 MILLIGRAM(S): 20 TABLET, DELAYED RELEASE ORAL at 12:27

## 2017-07-25 RX ADMIN — Medication 20 MILLIGRAM(S): at 15:27

## 2017-07-25 RX ADMIN — GABAPENTIN 300 MILLIGRAM(S): 400 CAPSULE ORAL at 18:01

## 2017-07-25 RX ADMIN — Medication 50 MILLIGRAM(S): at 06:15

## 2017-07-25 RX ADMIN — AMLODIPINE BESYLATE 10 MILLIGRAM(S): 2.5 TABLET ORAL at 06:20

## 2017-07-25 RX ADMIN — CLOPIDOGREL BISULFATE 75 MILLIGRAM(S): 75 TABLET, FILM COATED ORAL at 12:27

## 2017-07-25 RX ADMIN — Medication 200 MILLIGRAM(S): at 18:01

## 2017-07-25 RX ADMIN — Medication 50 MILLIGRAM(S): at 15:27

## 2017-07-25 RX ADMIN — Medication 3 MILLILITER(S): at 21:09

## 2017-07-25 RX ADMIN — GABAPENTIN 300 MILLIGRAM(S): 400 CAPSULE ORAL at 06:15

## 2017-07-25 RX ADMIN — Medication 4 MILLIGRAM(S): at 21:16

## 2017-07-25 RX ADMIN — Medication 40 MILLIGRAM(S): at 21:22

## 2017-07-25 RX ADMIN — Medication 200 MILLIGRAM(S): at 06:15

## 2017-07-25 RX ADMIN — ATORVASTATIN CALCIUM 40 MILLIGRAM(S): 80 TABLET, FILM COATED ORAL at 21:16

## 2017-07-25 RX ADMIN — Medication 81 MILLIGRAM(S): at 12:27

## 2017-07-25 RX ADMIN — Medication 3 MILLILITER(S): at 08:30

## 2017-07-25 RX ADMIN — Medication 3 MILLILITER(S): at 02:02

## 2017-07-25 RX ADMIN — Medication 50 MILLIGRAM(S): at 21:16

## 2017-07-25 NOTE — PROGRESS NOTE ADULT - ASSESSMENT
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    1) Acute hypoxic respiratory failure likely d/t new onset CHF  -Lasix 40 q12, daily weights, strict ins and outs  -S/p BiPAP; C/W Nasal Cannula oxygen supplementation   -Monitor BMP for electrolyte changes due to overnight diuresis     2) Elevated troponin likely d/t demand ischemic as no chest pain or ischemic changes noted on EKG  -Resolved  -C/W ASA, statin, and plavix  -Echo results from 2015 indicate EF of 62% with mild concentric LVH and no diastolic dysfunction  -Dr Marquez consulted.     3) Coronary artery disease  -C/W ASA, Statin, and Plavix      4) Essential Hypertension  -Controlled BP  -C/W CCB, hydralazine, labetalol, doxazosin.     5) Laryngeal cancer s/p XRT   -Radiation oncology provider note from 2015, Esequiel Staples MD, indicated that the patient had no evidence of disease

## 2017-07-25 NOTE — CONSULT NOTE ADULT - SUBJECTIVE AND OBJECTIVE BOX
PULMONARY CONSULT NOTE      ORTIZ GUPTA  MRN-[id number]    CHIEF COMPLAINT: Patient is a 89y old  Male who presents with a chief complaint of shortness of breath.      HPI:89 yr old  Ecuadorean-speaking male from home, former smoker, lives with wife, PMH laryngeal cancer (diagnosed 2015, s/p XRT, no evidence disease), COPD, CAD s/p stents x 3, s/p left carotid stent, HTN, asthma, depression, anxiety p/w acute shortness of breath.  History obtained from daughter-in-law  with Ecuadorean  (daughter in law is Angela, [phone]).  Patient was in usual health when this morning ~ 9am, patient endorsed palpitations and was acutely short of breath.  911 called, EMS placed patient on 100% non-rebreather and gave nebulizer for wheezing. Denies fever, chills, chest pain, changes in vision, cough, sore throat, vomiting, abdominal pain, diarrhea, dysuria, leg swelling, rash.   On BIPAP in ED for hypoxia,CXR: bilateral congestion, s/p Lasix 40mg, solumedrol and duoneb.  His CXR shows bilateral patchy air space disease with bibasilar congestion.      HISTORY OF PRESENT ILLNESS:  As above    MEDICATIONS  (STANDING):  doxazosin 4 milliGRAM(s) Oral at bedtime  PARoxetine 20 milliGRAM(s) Oral daily  gabapentin 300 milliGRAM(s) Oral two times a day  aspirin enteric coated 81 milliGRAM(s) Oral daily  labetalol 200 milliGRAM(s) Oral two times a day  amLODIPine   Tablet 10 milliGRAM(s) Oral daily  hydrALAZINE 50 milliGRAM(s) Oral three times a day  furosemide   Injectable 40 milliGRAM(s) IV Push every 12 hours  ALBUTerol/ipratropium for Nebulization 3 milliLiter(s) Nebulizer every 6 hours  atorvastatin 40 milliGRAM(s) Oral at bedtime  clopidogrel Tablet 75 milliGRAM(s) Oral daily  pantoprazole    Tablet 40 milliGRAM(s) Oral before breakfast      MEDICATIONS  (PRN):  acetaminophen   Tablet. 650 milliGRAM(s) Oral every 6 hours PRN Mild Pain (1 - 3)      Allergies    No Known Allergies    Intolerances        PAST MEDICAL & SURGICAL HISTORY:  Coronary artery disease involving native coronary artery of native heart without angina pectoris  Laryngeal cancer  Essential hypertension  Asthma  Arthritis  Degenerative joint disease  No significant past surgical history      FAMILY HISTORY:      SOCIAL HISTORY  Smoking History:     REVIEW OF SYSTEMS:    CONSTITUTIONAL:  No fevers, chills, sweats    HEENT:  Eyes:  No diplopia or blurred vision. ENT:  No earache, sore throat or runny nose.    CARDIOVASCULAR:  No pressure, squeezing, tightness, or heaviness about the chest; no palpitations.    RESPIRATORY:  Per HPI    GASTROINTESTINAL:  No abdominal pain, nausea, vomiting or diarrhea.    GENITOURINARY:  No dysuria, frequency or urgency.    NEUROLOGIC:  No paresthesias, fasciculations, seizures or weakness.    PSYCHIATRIC:  No disorder of thought or mood.    Vital Signs Last 24 Hrs  T(C): 35.7 (25 Jul 2017 07:30), Max: 36.8 (24 Jul 2017 14:00)  T(F): 96.2 (25 Jul 2017 07:30), Max: 98.3 (24 Jul 2017 14:00)  HR: 61 (25 Jul 2017 09:00) (54 - 124)  BP: 122/52 (25 Jul 2017 09:00) (107/49 - 148/50)  BP(mean): 69 (25 Jul 2017 09:00) (60 - 80)  RR: 13 (25 Jul 2017 09:00) (10 - 24)  SpO2: 90% (25 Jul 2017 09:00) (87% - 99%)  I&O's Detail    24 Jul 2017 07:01  -  25 Jul 2017 07:00  --------------------------------------------------------  IN:    heparin  Infusion.: 49 mL    IV PiggyBack: 100 mL  Total IN: 149 mL    OUT:    Indwelling Catheter - Urethral: 2695 mL  Total OUT: 2695 mL    Total NET: -2546 mL      25 Jul 2017 07:01  -  25 Jul 2017 10:17  --------------------------------------------------------  IN:  Total IN: 0 mL    OUT:    Indwelling Catheter - Urethral: 150 mL  Total OUT: 150 mL    Total NET: -150 mL          PHYSICAL EXAMINATION:    GENERAL: The patient is a well-developed, well-nourished _____in no apparent distress.     HEENT: Head is normocephalic and atraumatic. Extraocular muscles are intact. Mucous membranes are moist.     NECK: Supple.     LUNGS: Clear to auscultation without wheezing, rales, or rhonchi. Respirations unlabored    HEART: Regular rate and rhythm without murmur.    ABDOMEN: Soft, nontender, and nondistended.  No hepatosplenomegaly is noted.    EXTREMITIES: Without any cyanosis, clubbing, rash, lesions or edema.    NEUROLOGIC: Grossly intact.      LABS:                        10.6   10.2  )-----------( 258      ( 25 Jul 2017 04:26 )             31.9     07-25    136  |  101  |  66<H>  ----------------------------<  156<H>  3.8   |  25  |  1.48<H>    Ca    8.3<L>      25 Jul 2017 04:26  Phos  5.4     07-25  Mg     2.3     07-25    TPro  6.8  /  Alb  2.8<L>  /  TBili  1.2  /  DBili  x   /  AST  25  /  ALT  30  /  AlkPhos  68  07-25    PT/INR - ( 24 Jul 2017 10:41 )   PT: 13.1 sec;   INR: 1.20 ratio         PTT - ( 25 Jul 2017 01:40 )  PTT:30.5 sec    ABG - ( 24 Jul 2017 11:28 )  pH: 7.45  /  pCO2: 34    /  pO2: 70    / HCO3: 23    / Base Excess: 0.2   /  SaO2: 93                CARDIAC MARKERS ( 24 Jul 2017 18:17 )  0.090 ng/mL / x     / 78 U/L / x     / 2.0 ng/mL  CARDIAC MARKERS ( 24 Jul 2017 10:41 )  0.059 ng/mL / x     / 85 U/L / x     / x            Serum Pro-Brain Natriuretic Peptide: 34144 pg/mL (07-24-17 @ 10:41)    Lactate, Blood: 2.0 mmol/L (07-24-17 @ 10:41)        MICROBIOLOGY:    RADIOLOGY & ADDITIONAL STUDIES:    CXR:  < from: Xray Chest 1 View AP -PORTABLE-Routine (07.25.17 @ 08:41) >    EXAM:  CHEST PORTABLE ROUTINE                            PROCEDURE DATE:  07/25/2017          INTERPRETATION:  PORTABLE CHEST X-RAY    HISTORY: CHF Exacerbation.    COMPARISON: 7/24/2017.    FINDINGS:  Patchy bilateral airspace disease in the mid to lower lungs again seen.   Bibasilar congestion. Small bilateral pleural effusions. No pneumothorax.    The cardiac silhouette is not accurately assessed by AP technique. Aortic   calcifications.    IMPRESSION:  No significant change.    < end of copied text >      Ct scan chest:    ekg;    echo:  < from: Transthoracic Echocardiogram (07.24.17 @ 14:24) >  PROCEDURE: Transthoracic echocardiogram with 2-D, M-Mode  and complete spectral and color flow Doppler.  INDICATION:Unspecified diastolic (congestive) heart  failure (I50.30)  ------------------------------------------------------------------------  DIMENSIONS:  Dimensions:     Normal Values:  LA:     4.2 cm    2.0 - 4.0 cm  Ao:     3.0 cm    2.0 - 3.8 cm  SEPTUM:1.0 cm    0.6 - 1.2 cm  PWT:    1.0 cm    0.6 - 1.1 cm  LVIDd:  4.4 cm    3.0 - 5.6 cm  LVIDs:  2.8 cm    1.8 - 4.0 cm      Derived Variables:  LVMI: 90 g/m2  RWT: 0.45  Ejection Fraction Visual Estimate: 55 %    ------------------------------------------------------------------------  OBSERVATIONS:  Mitral Valve: Normal mitral valve. Moderate to severe  mitral regurgitation.  Aortic Root: Normal aortic root.  Aortic Valve: Calcified trileaflet aortic valve with normal  opening. Mild aortic insufficiency.  Left Atrium: Moderate left atrial enlargement.  Left Ventricle: Normal Left Ventricular Systolic Function,  (EF = 55%) Normal left ventricular internal dimensions and  wall thicknesses. Grade II diastolic dysfunction  Right Heart: Normal right atrium. Normal right ventricular  size and function. There is mild tricuspid regurgitation.  There is mild pulmonic regurgitation.  Pericardium/PleuraNormal pericardium with no pericardial  effusion.  Hemodynamic: RV systolic pressure is severely increased  (PASP 65mm Hg).  ------------------------------------------------------------------------  CONCLUSIONS:  1. Normal mitral valve. Moderate to severe mitral  regurgitation.  2. Calcified trileaflet aortic valve with normal opening.  Mild aortic insufficiency.  3. Normal aortic root.  4. Moderate left atrial enlargement.  5. Normal left ventricular internal dimensions and wall  thicknesses.  6. Normal Left Ventricular Systolic Function,  (EF = 55%)  7. Grade II diastolic dysfunction  8. Normal right atrium.  9. Normal right ventricular size and function.  10. RV systolic pressure is severely increased (PASP 65mm  Hg).  11. There is mild tricuspid regurgitation.  12. There is mild pulmonic regurgitation.  13. Normal pericardium with no pericardial effusion.    ------------------------------------------------------------------------  Confirmed on  7/25/2017 - 07:04:51 by Nafisa Xiao MD  ------------------------------------------------------------------------ PULMONARY CONSULT NOTE      ORTIZ GUPTA  MRN-[id number]    CHIEF COMPLAINT: Patient is a 89y old  Male who presents with a chief complaint of shortness of breath.      HPI:89 yr old  Spanish-speaking male from home, former smoker, lives with wife, PMH laryngeal cancer (diagnosed 2015, s/p XRT, no evidence disease), COPD, CAD s/p stents x 3, s/p left carotid stent, HTN, asthma, depression, anxiety p/w acute shortness of breath.  History obtained from daughter-in-law  with Spanish  (daughter in law is Angela, [phone]).  Patient was in usual health when this morning ~ 9am, patient endorsed palpitations and was acutely short of breath.  911 called, EMS placed patient on 100% non-rebreather and gave nebulizer for wheezing. Denies fever, chills, chest pain, changes in vision, cough, sore throat, vomiting, abdominal pain, diarrhea, dysuria, leg swelling, rash.   On BIPAP in ED for hypoxia,CXR: bilateral congestion, s/p Lasix 40mg, solumedrol and duoneb.  His CXR shows bilateral patchy air space disease with bibasilar congestion.      HISTORY OF PRESENT ILLNESS:  As above    MEDICATIONS  (STANDING):  doxazosin 4 milliGRAM(s) Oral at bedtime  PARoxetine 20 milliGRAM(s) Oral daily  gabapentin 300 milliGRAM(s) Oral two times a day  aspirin enteric coated 81 milliGRAM(s) Oral daily  labetalol 200 milliGRAM(s) Oral two times a day  amLODIPine   Tablet 10 milliGRAM(s) Oral daily  hydrALAZINE 50 milliGRAM(s) Oral three times a day  furosemide   Injectable 40 milliGRAM(s) IV Push every 12 hours  ALBUTerol/ipratropium for Nebulization 3 milliLiter(s) Nebulizer every 6 hours  atorvastatin 40 milliGRAM(s) Oral at bedtime  clopidogrel Tablet 75 milliGRAM(s) Oral daily  pantoprazole    Tablet 40 milliGRAM(s) Oral before breakfast      MEDICATIONS  (PRN):  acetaminophen   Tablet. 650 milliGRAM(s) Oral every 6 hours PRN Mild Pain (1 - 3)      Allergies    No Known Allergies    Intolerances        PAST MEDICAL & SURGICAL HISTORY:  Coronary artery disease involving native coronary artery of native heart without angina pectoris  Laryngeal cancer  Essential hypertension  Asthma  Arthritis  Degenerative joint disease  No significant past surgical history      FAMILY HISTORY:      SOCIAL HISTORY  Smoking History:     REVIEW OF SYSTEMS:    CONSTITUTIONAL:  No fevers, chills, sweats    HEENT:  Eyes:  No diplopia or blurred vision. ENT:  No earache, sore throat or runny nose.    CARDIOVASCULAR:  No pressure, squeezing, tightness, or heaviness about the chest; no palpitations.    RESPIRATORY:  Per HPI    GASTROINTESTINAL:  No abdominal pain, nausea, vomiting or diarrhea.    GENITOURINARY:  No dysuria, frequency or urgency.    NEUROLOGIC:  No paresthesias, fasciculations, seizures or weakness.    PSYCHIATRIC:  No disorder of thought or mood.    Vital Signs Last 24 Hrs  T(C): 35.7 (25 Jul 2017 07:30), Max: 36.8 (24 Jul 2017 14:00)  T(F): 96.2 (25 Jul 2017 07:30), Max: 98.3 (24 Jul 2017 14:00)  HR: 61 (25 Jul 2017 09:00) (54 - 124)  BP: 122/52 (25 Jul 2017 09:00) (107/49 - 148/50)  BP(mean): 69 (25 Jul 2017 09:00) (60 - 80)  RR: 13 (25 Jul 2017 09:00) (10 - 24)  SpO2: 90% (25 Jul 2017 09:00) (87% - 99%)  I&O's Detail    24 Jul 2017 07:01  -  25 Jul 2017 07:00  --------------------------------------------------------  IN:    heparin  Infusion.: 49 mL    IV PiggyBack: 100 mL  Total IN: 149 mL    OUT:    Indwelling Catheter - Urethral: 2695 mL  Total OUT: 2695 mL    Total NET: -2546 mL      25 Jul 2017 07:01  -  25 Jul 2017 10:17  --------------------------------------------------------  IN:  Total IN: 0 mL    OUT:    Indwelling Catheter - Urethral: 150 mL  Total OUT: 150 mL    Total NET: -150 mL          PHYSICAL EXAMINATION:    GENERAL: The patient is a well-developed, well-nourished _____in no apparent distress.     HEENT: Head is normocephalic and atraumatic. Extraocular muscles are intact. Mucous membranes are moist.     NECK: Supple.     LUNGS: Rales bilaterally    HEART: Regular rate and rhythm without murmur.    ABDOMEN: Soft, nontender, and nondistended.  No hepatosplenomegaly is noted.    EXTREMITIES: Without any cyanosis, clubbing, rash, lesions or edema.    NEUROLOGIC: Grossly intact.      LABS:                        10.6   10.2  )-----------( 258      ( 25 Jul 2017 04:26 )             31.9     07-25    136  |  101  |  66<H>  ----------------------------<  156<H>  3.8   |  25  |  1.48<H>    Ca    8.3<L>      25 Jul 2017 04:26  Phos  5.4     07-25  Mg     2.3     07-25    TPro  6.8  /  Alb  2.8<L>  /  TBili  1.2  /  DBili  x   /  AST  25  /  ALT  30  /  AlkPhos  68  07-25    PT/INR - ( 24 Jul 2017 10:41 )   PT: 13.1 sec;   INR: 1.20 ratio         PTT - ( 25 Jul 2017 01:40 )  PTT:30.5 sec    ABG - ( 24 Jul 2017 11:28 )  pH: 7.45  /  pCO2: 34    /  pO2: 70    / HCO3: 23    / Base Excess: 0.2   /  SaO2: 93                CARDIAC MARKERS ( 24 Jul 2017 18:17 )  0.090 ng/mL / x     / 78 U/L / x     / 2.0 ng/mL  CARDIAC MARKERS ( 24 Jul 2017 10:41 )  0.059 ng/mL / x     / 85 U/L / x     / x            Serum Pro-Brain Natriuretic Peptide: 41247 pg/mL (07-24-17 @ 10:41)    Lactate, Blood: 2.0 mmol/L (07-24-17 @ 10:41)        MICROBIOLOGY:    RADIOLOGY & ADDITIONAL STUDIES:    CXR:  < from: Xray Chest 1 View AP -PORTABLE-Routine (07.25.17 @ 08:41) >    EXAM:  CHEST PORTABLE ROUTINE                            PROCEDURE DATE:  07/25/2017          INTERPRETATION:  PORTABLE CHEST X-RAY    HISTORY: CHF Exacerbation.    COMPARISON: 7/24/2017.    FINDINGS:  Patchy bilateral airspace disease in the mid to lower lungs again seen.   Bibasilar congestion. Small bilateral pleural effusions. No pneumothorax.    The cardiac silhouette is not accurately assessed by AP technique. Aortic   calcifications.    IMPRESSION:  No significant change.    < end of copied text >      Ct scan chest:    ekg;    echo:  < from: Transthoracic Echocardiogram (07.24.17 @ 14:24) >  PROCEDURE: Transthoracic echocardiogram with 2-D, M-Mode  and complete spectral and color flow Doppler.  INDICATION:Unspecified diastolic (congestive) heart  failure (I50.30)  ------------------------------------------------------------------------  DIMENSIONS:  Dimensions:     Normal Values:  LA:     4.2 cm    2.0 - 4.0 cm  Ao:     3.0 cm    2.0 - 3.8 cm  SEPTUM:1.0 cm    0.6 - 1.2 cm  PWT:    1.0 cm    0.6 - 1.1 cm  LVIDd:  4.4 cm    3.0 - 5.6 cm  LVIDs:  2.8 cm    1.8 - 4.0 cm      Derived Variables:  LVMI: 90 g/m2  RWT: 0.45  Ejection Fraction Visual Estimate: 55 %    ------------------------------------------------------------------------  OBSERVATIONS:  Mitral Valve: Normal mitral valve. Moderate to severe  mitral regurgitation.  Aortic Root: Normal aortic root.  Aortic Valve: Calcified trileaflet aortic valve with normal  opening. Mild aortic insufficiency.  Left Atrium: Moderate left atrial enlargement.  Left Ventricle: Normal Left Ventricular Systolic Function,  (EF = 55%) Normal left ventricular internal dimensions and  wall thicknesses. Grade II diastolic dysfunction  Right Heart: Normal right atrium. Normal right ventricular  size and function. There is mild tricuspid regurgitation.  There is mild pulmonic regurgitation.  Pericardium/PleuraNormal pericardium with no pericardial  effusion.  Hemodynamic: RV systolic pressure is severely increased  (PASP 65mm Hg).  ------------------------------------------------------------------------  CONCLUSIONS:  1. Normal mitral valve. Moderate to severe mitral  regurgitation.  2. Calcified trileaflet aortic valve with normal opening.  Mild aortic insufficiency.  3. Normal aortic root.  4. Moderate left atrial enlargement.  5. Normal left ventricular internal dimensions and wall  thicknesses.  6. Normal Left Ventricular Systolic Function,  (EF = 55%)  7. Grade II diastolic dysfunction  8. Normal right atrium.  9. Normal right ventricular size and function.  10. RV systolic pressure is severely increased (PASP 65mm  Hg).  11. There is mild tricuspid regurgitation.  12. There is mild pulmonic regurgitation.  13. Normal pericardium with no pericardial effusion.    ------------------------------------------------------------------------  Confirmed on  7/25/2017 - 07:04:51 by Nafisa Xiao MD  ------------------------------------------------------------------------

## 2017-07-25 NOTE — ADVANCED PRACTICE NURSE CONSULT - RECOMMEDATIONS
-Continue to elevate/float the patients heels using heel protectors and reposition the patient Q 2hrs using wedges or pillows

## 2017-07-25 NOTE — DIETITIAN INITIAL EVALUATION ADULT. - OTHER INFO
per daughter, appears to have lost some weight (?amount/ ?time frame). Daughter reports pt picky eater, does not want to eat, does not want food brought from home. Few food preferences obtained. Reports pt will reject usual Ensure liquid, but seemed aggeable to Ensure clears, which PGY 1 added TID.

## 2017-07-25 NOTE — CONSULT NOTE ADULT - SUBJECTIVE AND OBJECTIVE BOX
CHIEF COMPLAINT:Patient is a 89y old  Male who presents with a chief complaint of shortness of breath.      HPI:89 yr old  Lithuanian-speaking male from home, former smoker, lives with wife, PMH laryngeal cancer (diagnosed 2015, s/p XRT, no evidence disease), COPD, CAD s/p stents x 3, s/p left carotid stent, HTN, asthma, depression, anxiety p/w acute shortness of breath.  History obtained from daughter-in-law  with Lithuanian  (daughter in law is Angela, [phone]).  Patient was in usual health when this morning ~ 9am, patient endorsed palpitations and was acutely short of breath.  911 called, EMS placed patient on 100% non-rebreather and gave nebulizer for wheezing. Denies fever, chills, chest pain, changes in vision, cough, sore throat, vomiting, abdominal pain, diarrhea, dysuria, leg swelling, rash.   On BIPAP in ED for hypoxia,CXR: bilateral congestion, s/p Lasix 40mg, solumedrol and duoneb.    PAST MEDICAL & SURGICAL HISTORY:  Coronary artery disease involving native coronary artery of native heart without angina pectoris  Laryngeal cancer-s/p RT  Essential hypertension  Asthma  Arthritis  Degenerative joint disease    MEDICATIONS  (STANDING):  doxazosin 4 milliGRAM(s) Oral at bedtime  PARoxetine 20 milliGRAM(s) Oral daily  gabapentin 300 milliGRAM(s) Oral two times a day  aspirin enteric coated 81 milliGRAM(s) Oral daily  labetalol 200 milliGRAM(s) Oral two times a day  amLODIPine   Tablet 10 milliGRAM(s) Oral daily  hydrALAZINE 50 milliGRAM(s) Oral three times a day  furosemide   Injectable 40 milliGRAM(s) IV Push every 12 hours  ALBUTerol/ipratropium for Nebulization 3 milliLiter(s) Nebulizer every 6 hours  atorvastatin 40 milliGRAM(s) Oral at bedtime  clopidogrel Tablet 75 milliGRAM(s) Oral daily  pantoprazole    Tablet 40 milliGRAM(s) Oral before breakfast    MEDICATIONS  (PRN):  acetaminophen   Tablet. 650 milliGRAM(s) Oral every 6 hours PRN Mild Pain (1 - 3)      FAMILY HISTORY:Non-contributory      SOCIAL HISTORY:    [ X] Non-smoker-ex smoker    [X ] Alcohol-denies    Allergies    No Known Allergies      REVIEW OF SYSTEMS:  CONSTITUTIONAL: No fever, weight loss, or fatigue  EYES: No eye pain, visual disturbances, or discharge  ENT:  No difficulty hearing, tinnitus, vertigo; No sinus or throat pain  NECK: No pain or stiffness  RESPIRATORY: No cough, wheezing, chills or hemoptysis; + Shortness of Breath  CARDIOVASCULAR: No chest pain, palpitations, passing out, dizziness, or leg swelling  GASTROINTESTINAL: No abdominal or epigastric pain. No nausea, vomiting, or hematemesis; No diarrhea or constipation. No melena or hematochezia.  GENITOURINARY: No dysuria, frequency, hematuria, or incontinence  NEUROLOGICAL: No headaches, memory loss, loss of strength, numbness, or tremors  SKIN: No itching, burning, rashes, or lesions   LYMPH Nodes: No enlarged glands  ENDOCRINE: No heat or cold intolerance; No hair loss  MUSCULOSKELETAL: No joint pain or swelling; No muscle, back, or extremity pain  PSYCHIATRIC: No depression, anxiety, mood swings, or difficulty sleeping  HEME/LYMPH: No easy bruising, or bleeding gums  ALLERGY AND IMMUNOLOGIC: No hives or eczema	        PHYSICAL EXAM:  T(C): 35.7 (07-25-17 @ 07:30), Max: 36.8 (07-24-17 @ 14:00)  HR: 61 (07-25-17 @ 09:00) (54 - 124)  BP: 122/52 (07-25-17 @ 09:00) (107/49 - 148/50)  RR: 13 (07-25-17 @ 09:00) (10 - 24)  SpO2: 90% (07-25-17 @ 09:00) (87% - 99%)  Wt(kg): --  I&O's Summary    24 Jul 2017 07:01  -  25 Jul 2017 07:00  --------------------------------------------------------  IN: 149 mL / OUT: 2695 mL / NET: -2546 mL    25 Jul 2017 07:01  -  25 Jul 2017 10:02  --------------------------------------------------------  IN: 0 mL / OUT: 150 mL / NET: -150 mL        Appearance: Normal	  HEENT:   Normal oral mucosa, PERRL, EOMI	  Lymphatic: No lymphadenopathy  Cardiovascular: Normal S1 S2, No JVD, No murmurs, No edema  Respiratory: Lungs clear to auscultation	  Psychiatry: A & O x 3, Mood & affect appropriate  Gastrointestinal:  Soft, Non-tender, + BS	  Skin: No rashes, No ecchymoses, No cyanosis	  Neurologic: Non-focal  Extremities: Normal range of motion, No clubbing, cyanosis or edema      EKG-not in chart.	    	  LABS:	 	    CARDIAC MARKERS:  CARDIAC MARKERS ( 24 Jul 2017 18:17 )  0.090 ng/mL / x     / 78 U/L / x     / 2.0 ng/mL  CARDIAC MARKERS ( 24 Jul 2017 10:41 )  0.059 ng/mL / x     / 85 U/L / x     / x                             10.6   10.2  )-----------( 258      ( 25 Jul 2017 04:26 )             31.9     07-25    136  |  101  |  66<H>  ----------------------------<  156<H>  3.8   |  25  |  1.48<H>    Ca    8.3<L>      25 Jul 2017 04:26  Phos  5.4     07-25  Mg     2.3     07-25    TPro  6.8  /  Alb  2.8<L>  /  TBili  1.2  /  DBili  x   /  AST  25  /  ALT  30  /  AlkPhos  68  07-25    proBNP: Serum Pro-Brain Natriuretic Peptide: 78427 pg/mL (07-24 @ 10:41)    Lipid Profile: Cholesterol 150    HDL 31  TG 83       TSH: Thyroid Stimulating Hormone, Serum: 0.32 uU/mL (07-25 @ 04:26)         Echocardiogram:  CONCLUSIONS:  1. Normal mitral valve. Moderate to severe mitral  regurgitation.  2. Calcified trileaflet aortic valve with normal opening.  Mild aortic insufficiency.  3. Normal aortic root.  4. Moderate left atrial enlargement.  5. Normal left ventricular internal dimensions and wall  thicknesses.  6. Normal Left Ventricular Systolic Function,  (EF = 55%)  7. Grade II diastolic dysfunction  8. Normal right atrium.  9. Normal right ventricular size and function.  10. RV systolic pressure is severely increased (PASP 65mm  Hg).  11. There is mild tricuspid regurgitation.  12. There is mild pulmonic regurgitation.  13. Normal pericardium with no pericardial effusion.    EXAM:  CHEST PORTABLE ROUTINE                            PROCEDURE DATE:  07/25/2017          INTERPRETATION:  PORTABLE CHEST X-RAY    HISTORY: CHF Exacerbation.    COMPARISON: 7/24/2017.    FINDINGS:  Patchy bilateral airspace disease in the mid to lower lungs again seen.   Bibasilar congestion. Small bilateral pleural effusions. No pneumothorax.    The cardiac silhouette is not accurately assessed by AP technique. Aortic   calcifications.    IMPRESSION:  No significant change. CHIEF COMPLAINT:Patient is a 89y old  Male who presents with a chief complaint of shortness of breath.      HPI:89 yr old  Lao-speaking male from home, former smoker, lives with wife, PMH laryngeal cancer (diagnosed 2015, s/p XRT, no evidence disease), COPD, CAD s/p stents x 3, s/p left carotid stent, HTN, asthma, depression, anxiety p/w acute shortness of breath.  History obtained from daughter-in-law  with Lao  (daughter in law is Angela, [phone]).  Patient was in usual health when this morning ~ 9am, patient endorsed palpitations and was acutely short of breath.  911 called, EMS placed patient on 100% non-rebreather and gave nebulizer for wheezing. Denies fever, chills, chest pain, changes in vision, cough, sore throat, vomiting, abdominal pain, diarrhea, dysuria, leg swelling, rash.   On BIPAP in ED for hypoxia,CXR: bilateral congestion, s/p Lasix 40mg, solumedrol and duoneb.    PAST MEDICAL & SURGICAL HISTORY:  Coronary artery disease involving native coronary artery of native heart without angina pectoris  Laryngeal cancer-s/p RT  Essential hypertension  Asthma  Arthritis  Degenerative joint disease    MEDICATIONS  (STANDING):  doxazosin 4 milliGRAM(s) Oral at bedtime  PARoxetine 20 milliGRAM(s) Oral daily  gabapentin 300 milliGRAM(s) Oral two times a day  aspirin enteric coated 81 milliGRAM(s) Oral daily  labetalol 200 milliGRAM(s) Oral two times a day  amLODIPine   Tablet 10 milliGRAM(s) Oral daily  hydrALAZINE 50 milliGRAM(s) Oral three times a day  furosemide   Injectable 40 milliGRAM(s) IV Push every 12 hours  ALBUTerol/ipratropium for Nebulization 3 milliLiter(s) Nebulizer every 6 hours  atorvastatin 40 milliGRAM(s) Oral at bedtime  clopidogrel Tablet 75 milliGRAM(s) Oral daily  pantoprazole    Tablet 40 milliGRAM(s) Oral before breakfast    MEDICATIONS  (PRN):  acetaminophen   Tablet. 650 milliGRAM(s) Oral every 6 hours PRN Mild Pain (1 - 3)      FAMILY HISTORY:Non-contributory      SOCIAL HISTORY:    [ X] Non-smoker-ex smoker    [X ] Alcohol-denies    Allergies    No Known Allergies      REVIEW OF SYSTEMS:  CONSTITUTIONAL: No fever, weight loss, or fatigue  EYES: No eye pain, visual disturbances, or discharge  ENT:  No difficulty hearing, tinnitus, vertigo; No sinus or throat pain  NECK: No pain or stiffness  RESPIRATORY: No cough, wheezing, chills or hemoptysis; + Shortness of Breath  CARDIOVASCULAR: No chest pain, palpitations, passing out, dizziness, or leg swelling  GASTROINTESTINAL: No abdominal or epigastric pain. No nausea, vomiting, or hematemesis; No diarrhea or constipation. No melena or hematochezia.  GENITOURINARY: No dysuria, frequency, hematuria, or incontinence  NEUROLOGICAL: No headaches, memory loss, loss of strength, numbness, or tremors  SKIN: No itching, burning, rashes, or lesions   LYMPH Nodes: No enlarged glands  ENDOCRINE: No heat or cold intolerance; No hair loss  MUSCULOSKELETAL: No joint pain or swelling; No muscle, back, or extremity pain  PSYCHIATRIC: No depression, anxiety, mood swings, or difficulty sleeping  HEME/LYMPH: No easy bruising, or bleeding gums  ALLERGY AND IMMUNOLOGIC: No hives or eczema	        PHYSICAL EXAM:  T(C): 35.7 (07-25-17 @ 07:30), Max: 36.8 (07-24-17 @ 14:00)  HR: 61 (07-25-17 @ 09:00) (54 - 124)  BP: 122/52 (07-25-17 @ 09:00) (107/49 - 148/50)  RR: 13 (07-25-17 @ 09:00) (10 - 24)  SpO2: 90% (07-25-17 @ 09:00) (87% - 99%)  Wt(kg): --  I&O's Summary    24 Jul 2017 07:01  -  25 Jul 2017 07:00  --------------------------------------------------------  IN: 149 mL / OUT: 2695 mL / NET: -2546 mL    25 Jul 2017 07:01  -  25 Jul 2017 10:02  --------------------------------------------------------  IN: 0 mL / OUT: 150 mL / NET: -150 mL        Appearance: Normal	  HEENT:   Normal oral mucosa, PERRL, EOMI	  Lymphatic: No lymphadenopathy  Cardiovascular: Normal S1 S2, + JVD, 2/6 sm  Respiratory: Dec BS at bases  Psychiatry: A & O x 3, Mood & affect appropriate  Gastrointestinal:  Soft, Non-tender, + BS	  Skin: No rashes, No ecchymoses, No cyanosis	  Neurologic: Non-focal  Extremities: Normal range of motion, No clubbing, cyanosis or edema      EKG-not in chart.	    	  LABS:	 	    CARDIAC MARKERS:  CARDIAC MARKERS ( 24 Jul 2017 18:17 )  0.090 ng/mL / x     / 78 U/L / x     / 2.0 ng/mL  CARDIAC MARKERS ( 24 Jul 2017 10:41 )  0.059 ng/mL / x     / 85 U/L / x     / x                             10.6   10.2  )-----------( 258      ( 25 Jul 2017 04:26 )             31.9     07-25    136  |  101  |  66<H>  ----------------------------<  156<H>  3.8   |  25  |  1.48<H>    Ca    8.3<L>      25 Jul 2017 04:26  Phos  5.4     07-25  Mg     2.3     07-25    TPro  6.8  /  Alb  2.8<L>  /  TBili  1.2  /  DBili  x   /  AST  25  /  ALT  30  /  AlkPhos  68  07-25    proBNP: Serum Pro-Brain Natriuretic Peptide: 01568 pg/mL (07-24 @ 10:41)    Lipid Profile: Cholesterol 150    HDL 31  TG 83       TSH: Thyroid Stimulating Hormone, Serum: 0.32 uU/mL (07-25 @ 04:26)         Echocardiogram:  CONCLUSIONS:  1. Normal mitral valve. Moderate to severe mitral  regurgitation.  2. Calcified trileaflet aortic valve with normal opening.  Mild aortic insufficiency.  3. Normal aortic root.  4. Moderate left atrial enlargement.  5. Normal left ventricular internal dimensions and wall  thicknesses.  6. Normal Left Ventricular Systolic Function,  (EF = 55%)  7. Grade II diastolic dysfunction  8. Normal right atrium.  9. Normal right ventricular size and function.  10. RV systolic pressure is severely increased (PASP 65mm  Hg).  11. There is mild tricuspid regurgitation.  12. There is mild pulmonic regurgitation.  13. Normal pericardium with no pericardial effusion.    EXAM:  CHEST PORTABLE ROUTINE                            PROCEDURE DATE:  07/25/2017          INTERPRETATION:  PORTABLE CHEST X-RAY    HISTORY: CHF Exacerbation.    COMPARISON: 7/24/2017.    FINDINGS:  Patchy bilateral airspace disease in the mid to lower lungs again seen.   Bibasilar congestion. Small bilateral pleural effusions. No pneumothorax.    The cardiac silhouette is not accurately assessed by AP technique. Aortic   calcifications.    IMPRESSION:  No significant change.

## 2017-07-25 NOTE — CHART NOTE - NSCHARTNOTEFT_GEN_A_CORE
Transfer Note    HPI: 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 disease), COPD, CAD (s/p stent x 3 and ASA, Plavix medical management), HTN, asthma, depression, anxiety p/w acute shortness of breath with wheezing, bilateral crackles, and elevated BNP > 10,000. Patient found to be hypoxic, placed on BiPAP, and admitted to ICU for acute hypoxic respiratory failure secondary to new-onset CHF. Echocardiogram was 2015, performed by PMD, revealed an EF of 62% without any diastolic dysfunction.  In ICU, patient's respiratory status improved on BiPAP. He was given duoneb treatments and placed on Lasix 40mg two-times daily, and was also given a two 80mg boluses with metolazone, after which he diuresed a total of 2L of fluid. He was able to successfully remain off BiPAP during his second day in the ICU and tolerated 3L oxygen supplementation with nasal cannula well.      Patient is currently stable and ready for transfer in level of care under the supervision of  (Attending). Report given to  (Attending) and (Resident).     Problem List    1) Acute hypoxic respiratory failure likely d/t new onset CHF  -Lasix 40 q12, daily weights, strict ins and outs  -S/p BiPAP; C/W Nasal Cannula oxygen supplementation   -Monitor BMP for electrolyte changes due to overnight diuresis     2) Elevated troponin likely d/t demand ischemic as no chest pain or ischemic changes noted on EKG  -Resolved  -C/W ASA, statin, and plavix  -Echo results from 2015 indicate EF of 62% with mild concentric LVH and no diastolic dysfunction  -Dr Marquez consulted.     3) Coronary artery disease  -C/W ASA, Statin, and Plavix      4) Essential Hypertension  -Controlled BP  -C/W CCB, hydralazine, labetalol, doxazosin.     5) Laryngeal cancer s/p XRT   -Radiation oncology provider note from 2015, Esequiel Staples MD, indicated that the patient had no evidence of disease    6) Prophylaxis  -Heparin and SCDs for DVT  -Protonix for peptic ulcers Transfer Note    HPI: 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 disease), COPD, pulmonary HTN, CAD (s/p stent x 3 and ASA, Plavix medical management), HTN, asthma, depression, anxiety p/w acute shortness of breath with wheezing, bilateral crackles, and elevated BNP > 10,000. Patient found to be hypoxic, placed on BiPAP, and admitted to ICU for acute hypoxic respiratory failure secondary to new-onset CHF. Echocardiogram was 2015, performed by PMD, revealed an EF of 62% without any diastolic dysfunction.  In ICU, patient's respiratory status improved on BiPAP. He was given duoneb treatments and placed on Lasix 40mg two-times daily, and was also given a two 80mg boluses with metolazone, after which he diuresed a total of 2L of fluid. He was able to successfully remain off BiPAP during his second day in the ICU and tolerated 3L oxygen supplementation with nasal cannula well.  Nephrology was consulted for a mildly increased Creatinine, and recommended to decrease Lasix dosage to one time daily, in addition to following up with patient's PCP to see if any nephrotoxic drugs were recently taken. Most recent medication list does not have any medications that are known to cause acute tubular necrosis.       Patient is currently stable and ready for transfer in level of care under the supervision of Dr. Rodriguez. Report given to Dr. Rodriguez and (Resident).     Problem List    1) Acute hypoxic respiratory failure likely d/t new onset CHF  -Lasix 40mg daily, daily weights, strict ins and outs  -S/p BiPAP; C/W Nasal Cannula oxygen supplementation     2) Elevated troponin likely d/t demand ischemic as no chest pain or ischemic changes noted on EKG  -Resolved  -C/W ASA, statin, and plavix  -Dr Marquez consulted.     3) Acute Kidney Injury likely due to diuretics   -Baseline Creatinine from August 2016 was 0.8  -Lasix decreased to 40mg IV daily  -C/W BMP monitoring  -Avoid nephrotoxic medications    4) Pulmonary Hypertension  -Echo showed RV systolic pressure is severely increased (PASP 65mmHg).  -Continue oxygen   -C/W Bronchodilators  -C/W CCB     5) Coronary artery disease  -Echo showed Normal LV systolic function (EF 55%) and Grade II Diastolic Dysfunction  -C/W ASA, Statin, and Plavix      6) Essential Hypertension  -Controlled BP  -C/W CCB, hydralazine, labetalol, doxazosin.     7) Laryngeal cancer s/p XRT   -Radiation oncology provider note from 2015, Esequiel Staples MD, indicated that the patient had no evidence of disease    8) Prophylaxis  -Heparin and SCDs for DVT  -Protonix for peptic ulcers Transfer Note    HPI: 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 disease), COPD, pulmonary HTN, CAD (s/p stent x 3 and ASA, Plavix medical management), HTN, asthma, depression, anxiety p/w acute shortness of breath with wheezing, bilateral crackles, and elevated BNP > 10,000. Patient found to be hypoxic, placed on BiPAP, and admitted to ICU for acute hypoxic respiratory failure secondary to new-onset CHF. Echocardiogram was 2015, performed by PMD, revealed an EF of 62% without any diastolic dysfunction.  In ICU, patient's respiratory status improved on BiPAP. He was given duoneb treatments and placed on Lasix 40mg two-times daily, and was also given a two 80mg boluses with metolazone, after which he diuresed a total of 2L of fluid. He was able to successfully remain off BiPAP during his second day in the ICU and tolerated 3L oxygen supplementation with nasal cannula well.  Nephrology was consulted for a mildly increased Creatinine, and recommended to decrease Lasix dosage to one time daily, in addition to following up with patient's PCP to see if any nephrotoxic drugs were recently taken. Most recent medication list does not have any medications that are known to cause acute tubular necrosis.       Patient is currently stable and ready for transfer in level of care under the supervision of Dr. Rodriguez. Report given to Dr. Rodriguez and (Resident).     Problem List    1) Acute hypoxic respiratory failure likely d/t new onset CHF with pleural effusions  -S/p BiPAP; C/W Nasal Cannula oxygen supplementation   -CXR shows resolved pleural effusions  -Lasix 40mg IV BID switched to 40mg PO dialy  -Continue daily weights, strict ins and outs    2) Elevated troponin likely d/t demand ischemic as no chest pain or ischemic changes noted on EKG  -Resolved  -C/W ASA, statin, and plavix  -Dr Marquez consulted.     3) Acute Kidney Injury likely due to diuresis    -Baseline Creatinine from August 2016 was 0.8  -Creatinine increasing, most recently 1.82; likely pre-renal azotemia secondary to excessive diuresis   -Lasix decreased to 40mg PO  -C/W CMP monitoring  -Avoid nephrotoxic medications    4) Pulmonary Hypertension  -Echo showed RV systolic pressure is severely increased (PASP 65mmHg).  -Continue oxygen   -C/W Bronchodilators  -C/W CCB     5) Coronary artery disease  -Echo showed Normal LV systolic function (EF 55%) and Grade II Diastolic Dysfunction  -C/W ASA, Statin, and Plavix      6) Essential Hypertension  -Controlled BP  -C/W CCB, hydralazine, labetalol, doxazosin.     7) Laryngeal cancer s/p XRT   -Radiation oncology provider note from 2015, Esequiel Staples MD, indicated that the patient had no evidence of disease    8) Prophylaxis  -Heparin and SCDs for DVT  -Protonix for peptic ulcers Transfer Note    HPI: 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 disease), COPD, pulmonary HTN, CAD (s/p stent x 3 and ASA, Plavix medical management), HTN, asthma, depression, anxiety p/w acute shortness of breath with wheezing, bilateral crackles, and elevated BNP > 10,000. Patient found to be hypoxic, placed on BiPAP, and admitted to ICU for acute hypoxic respiratory failure secondary to new-onset CHF. Echocardiogram was 2015, performed by PMD, revealed an EF of 62% without any diastolic dysfunction.  In ICU, patient's respiratory status improved on BiPAP. He was given duoneb treatments and placed on Lasix 40mg two-times daily, and was also given a two 80mg boluses with metolazone, after which he diuresed a total of 2L of fluid. He was able to successfully remain off BiPAP during his second day in the ICU and tolerated 3L oxygen supplementation with nasal cannula well.  Nephrology was consulted for a mildly increased Creatinine, and recommended to decrease Lasix dosage to one time daily, in addition to following up with patient's PCP to see if any nephrotoxic drugs were recently taken. Most recent medication list does not have any medications that are known to cause acute tubular necrosis.       Patient is currently stable and ready for transfer in level of care under the supervision of Dr. Rodriguez. Report given to Dr. Rodriguez and Dr. Mann.    Problem List    1) Acute hypoxic respiratory failure likely d/t new onset CHF with pleural effusions  -S/p BiPAP; C/W Nasal Cannula oxygen supplementation   -CXR shows resolved pleural effusions  -Lasix 40mg IV BID switched to 40mg PO dialy  -Continue daily weights, strict ins and outs    2) Elevated troponin likely d/t demand ischemic as no chest pain or ischemic changes noted on EKG  -Resolved  -C/W ASA, statin, and plavix  -Dr Marquez consulted.     3) Acute Kidney Injury likely due to diuresis    -Baseline Creatinine from August 2016 was 0.8  -Creatinine increasing, most recently 1.82; likely pre-renal azotemia secondary to excessive diuresis   -Lasix decreased to 40mg PO  -C/W CMP monitoring  -Avoid nephrotoxic medications    4) Pulmonary Hypertension  -Echo showed RV systolic pressure is severely increased (PASP 65mmHg).  -Continue oxygen   -C/W Bronchodilators  -C/W CCB     5) Coronary artery disease  -Echo showed Normal LV systolic function (EF 55%) and Grade II Diastolic Dysfunction  -C/W ASA, Statin, and Plavix      6) Essential Hypertension  -Controlled BP  -C/W CCB, hydralazine, labetalol, doxazosin.     7) Laryngeal cancer s/p XRT   -Radiation oncology provider note from 2015, Esequiel Staples MD, indicated that the patient had no evidence of disease    8) Prophylaxis  -Heparin and SCDs for DVT  -Protonix for peptic ulcers

## 2017-07-25 NOTE — PHYSICAL THERAPY INITIAL EVALUATION ADULT - GENERAL OBSERVATIONS, REHAB EVAL
awake, alert, NAD; currently on O2@3L/min via NC; + indwelling urethral catheter; IV access on left antecubital area

## 2017-07-25 NOTE — ADVANCED PRACTICE NURSE CONSULT - ASSESSMENT
This is a 89yr old male patient admitted for Acute Pulmonary Edema, to which upon assessment, the patients skin is intact

## 2017-07-25 NOTE — CONSULT NOTE ADULT - PROBLEM SELECTOR RECOMMENDATION 5
Oxygen   Bronchodilators  Peak flow  Incentive spirometery Oxygen   Bronchodilators  Peak flow  inhaled steroids  Incentive spirometery

## 2017-07-25 NOTE — CONSULT NOTE ADULT - SUBJECTIVE AND OBJECTIVE BOX
to follow ---___---___---___---___---___---___ ---___---___---___---___---___---___---___---___---                  M E D I C A L   A T T E N D I N G    C O N S U L T A T I O N   N O T E  ---___---___---___---___---___---___ ---___---___---___---___---___---___---___---___---         Pt seen and examined by me approximately thirty minutes ago.  ++CHIEF COMPLAINT:   Patient is a 89y old  man who presented with a chief complaint of shortness of breath, with respiratory failure, post Bipap in ICU (24 Jul 2017 14:08)    ++HPI:  HPI:  89yo [place]-speaking man from home, former smoker, lives with wife, PMH laryngeal cancer (diagnosed 2015, s/p XRT, no evidence disease), COPD, CAD s/p stents x 3, s/p left carotid stent, HTN, asthma, depression, anxiety p/w acute shortness of breath.    Patient was reportedly in usual health when endorsed palpitations and was acutely short of breath.  911 called, EMS placed patient on 100% non-rebreather and gave nebulizer for wheezing.   Pt now post diuresis, post BIPAP doing better overall    ---___---___---___---___---___---  PAST MEDICAL / Surgical  HISTORY:  Coronary artery disease involving native coronary artery of native heart without angina pectoris  Laryngeal cancer  Essential hypertension  Asthma  Arthritis  Degenerative joint disease    No significant past surgical history  ---___---___---___---___---___---  FAMILY HISTORY:  N/C  ---___---___---___---___---___---  SOCIAL HISTORY:  Alcohol: None reported  Smoking: past smoker    ---___---___---___---___---___---  ALLERGIES:     No Known Allergies  ---___---___---___---___---___---  MEDICATIONS:    MEDICATIONS  (STANDING):  doxazosin 4 milliGRAM(s) Oral at bedtime  PARoxetine 20 milliGRAM(s) Oral daily  gabapentin 300 milliGRAM(s) Oral two times a day  aspirin enteric coated 81 milliGRAM(s) Oral daily  labetalol 200 milliGRAM(s) Oral two times a day  amLODIPine   Tablet 10 milliGRAM(s) Oral daily  hydrALAZINE 50 milliGRAM(s) Oral three times a day  furosemide   Injectable 40 milliGRAM(s) IV Push every 12 hours  ALBUTerol/ipratropium for Nebulization 3 milliLiter(s) Nebulizer every 6 hours  atorvastatin 40 milliGRAM(s) Oral at bedtime  clopidogrel Tablet 75 milliGRAM(s) Oral daily  pantoprazole    Tablet 40 milliGRAM(s) Oral before breakfast    MEDICATIONS  (PRN):  acetaminophen   Tablet. 650 milliGRAM(s) Oral every 6 hours PRN Mild Pain (1 - 3)    ---___---___---___---___---___---  REVIEW OF SYSTEM:    GEN: no fever, no chills, no pain  RESP: no SOB now, no cough, no sputum  CVS: no chest pain, no palpitations, no edema  GI: no abdominal pain, no nausea, no vomiting, no constipation, no diarrhea  Neuro: no headache, no dizziness  PSYCH: no anxiety, no depression  Derm : no itching, no rash    ---___---___---___---___---___---  VITAL SIGNS:    T(C): 35.7 (07-25-17 @ 07:30), Max: 36.5 (07-24-17 @ 16:23)  HR: 64 (07-25-17 @ 14:00) (54 - 66)  BP: 121/46 (07-25-17 @ 13:06) (107/49 - 143/49)  RR: 18 (07-25-17 @ 14:00) (10 - 20)  SpO2: 96% (07-25-17 @ 14:00) (87% - 99%)    I&O's Summary    24 Jul 2017 07:01  -  25 Jul 2017 07:00  --------------------------------------------------------  IN: 149 mL / OUT: 2695 mL / NET: -2546 mL    25 Jul 2017 07:01  -  25 Jul 2017 14:41  --------------------------------------------------------  IN: 0 mL / OUT: 150 mL / NET: -150 mL    ---___---___---___---___---___---  PHYSICAL EXAM:    GEN: Alert and oriented, pleasant, comfprtable, NAD  HEENT: NCAT, PERRL, MMM, hearing intact, + nasal canula O2  Neck: supple , no JVD  CVS: S1S2 , regular , No M/R/G appreciated  PULM: CTA B/L,  no W/R/R appreciated  ABD.: soft. non tender, non distended,  bowel sounds present  Extrem: intact pulses , no edema      + sandoval with yellow urine  Derm: No rash , no ecchymoses  PSYCH : normal mood,  no delusion not anxious     ---___---___---___---___---___---            LAB AND IMAGING:                        10.6   10.2  )-----------( 258      ( 25 Jul 2017 04:26 )             31.9        07-25    137  |  99  |  76<H>  ----------------------------<  190<H>  3.7   |  24  |  1.71<H>    Ca    8.5      25 Jul 2017 10:36  Phos  5.4     07-25  Mg     2.3     07-25    TPro  6.8  /  Alb  2.8<L>  /  TBili  1.2  /  DBili  x   /  AST  25  /  ALT  30  /  AlkPhos  68  07-25    Creatinine:  1.71   (07-25 @ 10:36)  Creatinine:  1.48   (07-25 @ 04:26)  Creatinine:  1.49   (07-24 @ 10:41)  PT/INR - ( 24 Jul 2017 10:41 )   PT: 13.1 sec;   INR: 1.20 ratio         PTT - ( 25 Jul 2017 01:40 )  PTT:30.5 sec          ABG - ( 24 Jul 2017 11:28 )  pH: 7.45  /  pCO2: 34    /  pO2: 70    / HCO3: 23    / Base Excess: 0.2   /  SaO2: 93        CARDIAC MARKERS ( 24 Jul 2017 18:17 )  0.090 ng/mL / x     / 78 U/L / x     / 2.0 ng/mL  CARDIAC MARKERS ( 24 Jul 2017 10:41 )  0.059 ng/mL / x     / 85 U/L / x     / x           TSH:      0.32   (07-25-17)           Lipid profile:  (07-25-17)     Total: 150     LDL  : 102     HDL  :31     TG   :83     HgA1C: 5.7  (07-25-17)            < from: Transthoracic Echocardiogram (07.24.17 @ 14:24) >  CONCLUSIONS:  1. Normal mitral valve. Moderate to severe mitral regurgitation.  2. Calcified trileaflet aortic valve with normal opening. Mild aortic insufficiency.  3. Normal aortic root.  4. Moderate left atrial enlargement.  5. Normal left ventricular internal dimensions and wall thicknesses.  6. Normal Left Ventricular Systolic Function,  (EF = 55%)  7. Grade II diastolic dysfunction  8. Normal right atrium.  9. Normal right ventricular size and function.  10. RV systolic pressure is severely increased (PASP 65mm Hg).  11. There is mild tricuspid regurgitation.  12. There is mild pulmonic regurgitation.  13. Normal pericardium with no pericardial effusion.  < end of copied text >     ---___---___---___---___---___---___ ---___---___---___---___---                         A S S E S S M E N T   A N D   P L A N :    **Acute hypoxemic respiratory failure, post bipap, multifactorial, given MR, PAH, COPD, and caitlin acute exacerbation of diastolic heart failure  ---cardio, pulm consult appreciated, ICU care appreciated  ---wean off O2 as able  ---nebulizer treatment  --PT/ OOB as able    **Hypertension, CAD  ---Continue with current medications as above  ---DASH diet   ---close monitoring of vitals    **BPH  --- Continue Current medications and monitor.     -GI/DVT Prophylaxis.  -trial of void when OOB  --------------------------------------------  Case discussed with pt, aid, residents, cardio.  Education given on deep breathing, plan of care  ___________________________  Will follow with you.  Thank you,  RIGO Rich D.O.  Pager: [phone]

## 2017-07-25 NOTE — PHYSICAL THERAPY INITIAL EVALUATION ADULT - LIVES WITH, PROFILE
in an apartment with ramp and elevator access; has HHA services for him and his wife 4 hours /7 days/spouse

## 2017-07-25 NOTE — PROGRESS NOTE ADULT - SUBJECTIVE AND OBJECTIVE BOX
INTERVAL HPI/OVERNIGHT EVENTS: Patient was given a total of 180mg of Lasix in addition to Metalazone and diuresed 350mL urine/hr.     PRESSORS: [] YES [X] NO    Cardiovascular:  doxazosin 4 milliGRAM(s) Oral at bedtime  labetalol 200 milliGRAM(s) Oral two times a day  amLODIPine   Tablet 10 milliGRAM(s) Oral daily  hydrALAZINE 50 milliGRAM(s) Oral three times a day  furosemide   Injectable 40 milliGRAM(s) IV Push every 12 hours    Pulmonary:  ALBUTerol/ipratropium for Nebulization 3 milliLiter(s) Nebulizer every 6 hours    Hematalogic:  aspirin enteric coated 81 milliGRAM(s) Oral daily    Other:  PARoxetine 20 milliGRAM(s) Oral daily  gabapentin 300 milliGRAM(s) Oral two times a day  pantoprazole  Injectable 40 milliGRAM(s) IV Push daily  acetaminophen   Tablet. 650 milliGRAM(s) Oral every 6 hours PRN  atorvastatin 40 milliGRAM(s) Oral at bedtime    doxazosin 4 milliGRAM(s) Oral at bedtime  PARoxetine 20 milliGRAM(s) Oral daily  gabapentin 300 milliGRAM(s) Oral two times a day  aspirin enteric coated 81 milliGRAM(s) Oral daily  labetalol 200 milliGRAM(s) Oral two times a day  amLODIPine   Tablet 10 milliGRAM(s) Oral daily  hydrALAZINE 50 milliGRAM(s) Oral three times a day  pantoprazole  Injectable 40 milliGRAM(s) IV Push daily  acetaminophen   Tablet. 650 milliGRAM(s) Oral every 6 hours PRN  furosemide   Injectable 40 milliGRAM(s) IV Push every 12 hours  ALBUTerol/ipratropium for Nebulization 3 milliLiter(s) Nebulizer every 6 hours  atorvastatin 40 milliGRAM(s) Oral at bedtime    Drug Dosing Weight  Height (cm): 162.56 (18 Jul 2017 10:42)  Weight (kg): 56.2 (24 Jul 2017 10:00)  BMI (kg/m2): 21.3 (24 Jul 2017 10:00)  BSA (m2): 1.6 (24 Jul 2017 10:00)    CENTRAL LINE: [] YES [X] NO  LOCATION:   DATE INSERTED:  REMOVE: [X] YES [X] NO  EXPLAIN:    SANDOVAL: [X] YES [] NO    DATE INSERTED: 7/24  REMOVE: [] YES [X] NO  EXPLAIN: urine output monitoring     PMH -reviewed admission note, no change since admission    ICU Vital Signs Last 24 Hrs  T(C): 35.6 (25 Jul 2017 06:00), Max: 37.3 (24 Jul 2017 10:00)  T(F): 96 (25 Jul 2017 06:00), Max: 99.2 (24 Jul 2017 10:00)  HR: 54 (25 Jul 2017 07:00) (54 - 124)  BP: 107/49 (25 Jul 2017 07:00) (107/49 - 152/62)  BP(mean): 64 (25 Jul 2017 07:00) (60 - 80)  ABP: --  ABP(mean): --  RR: 12 (25 Jul 2017 07:00) (10 - 24)  SpO2: 95% (25 Jul 2017 07:00) (77% - 99%)      ABG - ( 24 Jul 2017 11:28 )  pH: 7.45  /  pCO2: 34    /  pO2: 70    / HCO3: 23    / Base Excess: 0.2   /  SaO2: 93                    07-24 @ 07:01  -  07-25 @ 07:00  --------------------------------------------------------  IN: 149 mL / OUT: 2545 mL / NET: -2396 mL            PHYSICAL EXAM:    GENERAL: lying in bed, in no acute distress  NECK: supple, no JVD, no thyromegaly     SKIN: warm, dry   CHEST/LUNG: BiPAP in place. Bilateral breath sounds present with no rales, ronchi, or wheezing   HEART: RRR, no m/r/g   ABDOMEN: soft, nontender, nondistended; bowel sounds present.  : sandoval catheter.  EXTREMITIES: +1 non-pitting edema, no cyanosis, no clubbing.    LABS:  CBC Full  -  ( 25 Jul 2017 04:26 )  WBC Count : 10.2 K/uL  Hemoglobin : 10.6 g/dL  Hematocrit : 31.9 %  Platelet Count - Automated : 258 K/uL  Mean Cell Volume : 89.4 fl  Mean Cell Hemoglobin : 29.6 pg  Mean Cell Hemoglobin Concentration : 33.1 gm/dL  Auto Neutrophil # : 9.2 K/uL  Auto Lymphocyte # : 0.7 K/uL  Auto Monocyte # : 0.3 K/uL  Auto Eosinophil # : 0.0 K/uL  Auto Basophil # : 0.0 K/uL  Auto Neutrophil % : 90.3 %  Auto Lymphocyte % : 6.6 %  Auto Monocyte % : 2.9 %  Auto Eosinophil % : 0.0 %  Auto Basophil % : 0.2 %    07-25    136  |  101  |  66<H>  ----------------------------<  156<H>  3.8   |  25  |  1.48<H>    Ca    8.3<L>      25 Jul 2017 04:26  Phos  5.4     07-25  Mg     2.3     07-25    TPro  6.8  /  Alb  2.8<L>  /  TBili  1.2  /  DBili  x   /  AST  25  /  ALT  30  /  AlkPhos  68  07-25    PT/INR - ( 24 Jul 2017 10:41 )   PT: 13.1 sec;   INR: 1.20 ratio         PTT - ( 25 Jul 2017 01:40 )  PTT:30.5 sec    [X]GOALS OF CARE DISCUSSION WITH PATIENT/FAMILY/PROXY:    Assessment and Plan    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 disease), COPD, CAD (s/p stent x 3 and ASA, Plavix medical management), HTN, asthma, depression, anxiety p/w acute shortness of breath with wheezing, bilateral crackles, and elevated BNP.    1) Acute hypoxic respiratory failure likely d/t new onset CHF  -Lasix 40 q12, daily weights, strict ins and outs  -C/W BiPAP  -Monitor BMP for electrolyte changes due to overnight diuresis     2) Elevated troponin likely d/t demand ischemic as no chest pain or ischemic changes noted on EKG  -Resolved  -C/W ASA, statin, and plavix  -F/U TTE  -Dr Marquez consulted.     3) Coronary artery disease  -C/W ASA, Statin, and Plavix      4) Essential Hypertension  -Controlled BP  -C/W CCB, hydralazine, labetalol, doxazosin.     5) Laryngeal cancer s/p XRT with no evidence of disease  -Awaiting clinic notes to be faxed    6) Prophylaxis  -Heparin and SCDs for DVT  -Protonix for peptic ulcers INTERVAL HPI/OVERNIGHT EVENTS: Patient was given a total of 180mg of Lasix in addition to Metalazone and diuresed 350mL urine/hr.     PRESSORS: [] YES [X] NO    Cardiovascular:  doxazosin 4 milliGRAM(s) Oral at bedtime  labetalol 200 milliGRAM(s) Oral two times a day  amLODIPine   Tablet 10 milliGRAM(s) Oral daily  hydrALAZINE 50 milliGRAM(s) Oral three times a day  furosemide   Injectable 40 milliGRAM(s) IV Push every 12 hours    Pulmonary:  ALBUTerol/ipratropium for Nebulization 3 milliLiter(s) Nebulizer every 6 hours    Hematalogic:  aspirin enteric coated 81 milliGRAM(s) Oral daily    Other:  PARoxetine 20 milliGRAM(s) Oral daily  gabapentin 300 milliGRAM(s) Oral two times a day  pantoprazole  Injectable 40 milliGRAM(s) IV Push daily  acetaminophen   Tablet. 650 milliGRAM(s) Oral every 6 hours PRN  atorvastatin 40 milliGRAM(s) Oral at bedtime    doxazosin 4 milliGRAM(s) Oral at bedtime  PARoxetine 20 milliGRAM(s) Oral daily  gabapentin 300 milliGRAM(s) Oral two times a day  aspirin enteric coated 81 milliGRAM(s) Oral daily  labetalol 200 milliGRAM(s) Oral two times a day  amLODIPine   Tablet 10 milliGRAM(s) Oral daily  hydrALAZINE 50 milliGRAM(s) Oral three times a day  pantoprazole  Injectable 40 milliGRAM(s) IV Push daily  acetaminophen   Tablet. 650 milliGRAM(s) Oral every 6 hours PRN  furosemide   Injectable 40 milliGRAM(s) IV Push every 12 hours  ALBUTerol/ipratropium for Nebulization 3 milliLiter(s) Nebulizer every 6 hours  atorvastatin 40 milliGRAM(s) Oral at bedtime    Drug Dosing Weight  Height (cm): 162.56 (18 Jul 2017 10:42)  Weight (kg): 56.2 (24 Jul 2017 10:00)  BMI (kg/m2): 21.3 (24 Jul 2017 10:00)  BSA (m2): 1.6 (24 Jul 2017 10:00)    CENTRAL LINE: [] YES [X] NO  LOCATION:   DATE INSERTED:  REMOVE: [X] YES [X] NO  EXPLAIN:    SANDOVAL: [X] YES [] NO    DATE INSERTED: 7/24  REMOVE: [] YES [X] NO  EXPLAIN: urine output monitoring     PMH -reviewed admission note, no change since admission    ICU Vital Signs Last 24 Hrs  T(C): 35.6 (25 Jul 2017 06:00), Max: 37.3 (24 Jul 2017 10:00)  T(F): 96 (25 Jul 2017 06:00), Max: 99.2 (24 Jul 2017 10:00)  HR: 54 (25 Jul 2017 07:00) (54 - 124)  BP: 107/49 (25 Jul 2017 07:00) (107/49 - 152/62)  BP(mean): 64 (25 Jul 2017 07:00) (60 - 80)  ABP: --  ABP(mean): --  RR: 12 (25 Jul 2017 07:00) (10 - 24)  SpO2: 95% (25 Jul 2017 07:00) (77% - 99%)      ABG - ( 24 Jul 2017 11:28 )  pH: 7.45  /  pCO2: 34    /  pO2: 70    / HCO3: 23    / Base Excess: 0.2   /  SaO2: 93                    07-24 @ 07:01  -  07-25 @ 07:00  --------------------------------------------------------  IN: 149 mL / OUT: 2545 mL / NET: -2396 mL            PHYSICAL EXAM:    GENERAL: lying in bed, in no acute distress  NECK: supple, no JVD, no thyromegaly     SKIN: warm, dry   CHEST/LUNG: Nasal cannula in place. Bilateral breath sounds present with no rales, ronchi, or wheezing   HEART: RRR, no m/r/g   ABDOMEN: soft, nontender, nondistended; bowel sounds present.  : sandoval catheter.  EXTREMITIES: +1 non-pitting edema, no cyanosis, no clubbing.    LABS:  CBC Full  -  ( 25 Jul 2017 04:26 )  WBC Count : 10.2 K/uL  Hemoglobin : 10.6 g/dL  Hematocrit : 31.9 %  Platelet Count - Automated : 258 K/uL  Mean Cell Volume : 89.4 fl  Mean Cell Hemoglobin : 29.6 pg  Mean Cell Hemoglobin Concentration : 33.1 gm/dL  Auto Neutrophil # : 9.2 K/uL  Auto Lymphocyte # : 0.7 K/uL  Auto Monocyte # : 0.3 K/uL  Auto Eosinophil # : 0.0 K/uL  Auto Basophil # : 0.0 K/uL  Auto Neutrophil % : 90.3 %  Auto Lymphocyte % : 6.6 %  Auto Monocyte % : 2.9 %  Auto Eosinophil % : 0.0 %  Auto Basophil % : 0.2 %    07-25    136  |  101  |  66<H>  ----------------------------<  156<H>  3.8   |  25  |  1.48<H>    Ca    8.3<L>      25 Jul 2017 04:26  Phos  5.4     07-25  Mg     2.3     07-25    TPro  6.8  /  Alb  2.8<L>  /  TBili  1.2  /  DBili  x   /  AST  25  /  ALT  30  /  AlkPhos  68  07-25    PT/INR - ( 24 Jul 2017 10:41 )   PT: 13.1 sec;   INR: 1.20 ratio         PTT - ( 25 Jul 2017 01:40 )  PTT:30.5 sec    [X]GOALS OF CARE DISCUSSION WITH PATIENT/FAMILY/PROXY:    Assessment and Plan    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 disease), COPD, CAD (s/p stent x 3 and ASA, Plavix medical management), HTN, asthma, depression, anxiety p/w acute shortness of breath with wheezing, bilateral crackles, and elevated BNP.    1) Acute hypoxic respiratory failure likely d/t new onset CHF  -Lasix 40 q12, daily weights, strict ins and outs  -C/W BiPAP  -Monitor BMP for electrolyte changes due to overnight diuresis     2) Elevated troponin likely d/t demand ischemic as no chest pain or ischemic changes noted on EKG  -Resolved  -C/W ASA, statin, and plavix  -F/U TTE  -Dr Marquez consulted.     3) Coronary artery disease  -C/W ASA, Statin, and Plavix      4) Essential Hypertension  -Controlled BP  -C/W CCB, hydralazine, labetalol, doxazosin.     5) Laryngeal cancer s/p XRT with no evidence of disease  -Awaiting clinic notes to be faxed    6) Prophylaxis  -Heparin and SCDs for DVT  -Protonix for peptic ulcers INTERVAL HPI/OVERNIGHT EVENTS: Patient was given a total of 180mg of Lasix in addition to Metalazone and diuresed 350mL urine/hr.     PRESSORS: [] YES [X] NO    Cardiovascular:  doxazosin 4 milliGRAM(s) Oral at bedtime  labetalol 200 milliGRAM(s) Oral two times a day  amLODIPine   Tablet 10 milliGRAM(s) Oral daily  hydrALAZINE 50 milliGRAM(s) Oral three times a day  furosemide   Injectable 40 milliGRAM(s) IV Push every 12 hours    Pulmonary:  ALBUTerol/ipratropium for Nebulization 3 milliLiter(s) Nebulizer every 6 hours    Hematalogic:  aspirin enteric coated 81 milliGRAM(s) Oral daily    Other:  PARoxetine 20 milliGRAM(s) Oral daily  gabapentin 300 milliGRAM(s) Oral two times a day  pantoprazole  Injectable 40 milliGRAM(s) IV Push daily  acetaminophen   Tablet. 650 milliGRAM(s) Oral every 6 hours PRN  atorvastatin 40 milliGRAM(s) Oral at bedtime    doxazosin 4 milliGRAM(s) Oral at bedtime  PARoxetine 20 milliGRAM(s) Oral daily  gabapentin 300 milliGRAM(s) Oral two times a day  aspirin enteric coated 81 milliGRAM(s) Oral daily  labetalol 200 milliGRAM(s) Oral two times a day  amLODIPine   Tablet 10 milliGRAM(s) Oral daily  hydrALAZINE 50 milliGRAM(s) Oral three times a day  pantoprazole  Injectable 40 milliGRAM(s) IV Push daily  acetaminophen   Tablet. 650 milliGRAM(s) Oral every 6 hours PRN  furosemide   Injectable 40 milliGRAM(s) IV Push every 12 hours  ALBUTerol/ipratropium for Nebulization 3 milliLiter(s) Nebulizer every 6 hours  atorvastatin 40 milliGRAM(s) Oral at bedtime    Drug Dosing Weight  Height (cm): 162.56 (18 Jul 2017 10:42)  Weight (kg): 56.2 (24 Jul 2017 10:00)  BMI (kg/m2): 21.3 (24 Jul 2017 10:00)  BSA (m2): 1.6 (24 Jul 2017 10:00)    CENTRAL LINE: [] YES [X] NO  LOCATION:   DATE INSERTED:  REMOVE: [X] YES [X] NO  EXPLAIN:    SANDOVAL: [X] YES [] NO    DATE INSERTED: 7/24  REMOVE: [] YES [X] NO  EXPLAIN: urine output monitoring     PMH -reviewed admission note, no change since admission    ICU Vital Signs Last 24 Hrs  T(C): 35.6 (25 Jul 2017 06:00), Max: 37.3 (24 Jul 2017 10:00)  T(F): 96 (25 Jul 2017 06:00), Max: 99.2 (24 Jul 2017 10:00)  HR: 54 (25 Jul 2017 07:00) (54 - 124)  BP: 107/49 (25 Jul 2017 07:00) (107/49 - 152/62)  BP(mean): 64 (25 Jul 2017 07:00) (60 - 80)  ABP: --  ABP(mean): --  RR: 12 (25 Jul 2017 07:00) (10 - 24)  SpO2: 95% (25 Jul 2017 07:00) (77% - 99%)      ABG - ( 24 Jul 2017 11:28 )  pH: 7.45  /  pCO2: 34    /  pO2: 70    / HCO3: 23    / Base Excess: 0.2   /  SaO2: 93                    07-24 @ 07:01  -  07-25 @ 07:00  --------------------------------------------------------  IN: 149 mL / OUT: 2545 mL / NET: -2396 mL            PHYSICAL EXAM:    GENERAL: lying in bed, in no acute distress  NECK: supple, no JVD, no thyromegaly     SKIN: warm, dry   CHEST/LUNG: Nasal cannula in place. Bilateral breath sounds present with no rales, ronchi, or wheezing   HEART: RRR, no m/r/g   ABDOMEN: soft, nontender, nondistended; bowel sounds present.  : sandoval catheter.  EXTREMITIES: +1 non-pitting edema, no cyanosis, no clubbing.    LABS:  CBC Full  -  ( 25 Jul 2017 04:26 )  WBC Count : 10.2 K/uL  Hemoglobin : 10.6 g/dL  Hematocrit : 31.9 %  Platelet Count - Automated : 258 K/uL  Mean Cell Volume : 89.4 fl  Mean Cell Hemoglobin : 29.6 pg  Mean Cell Hemoglobin Concentration : 33.1 gm/dL  Auto Neutrophil # : 9.2 K/uL  Auto Lymphocyte # : 0.7 K/uL  Auto Monocyte # : 0.3 K/uL  Auto Eosinophil # : 0.0 K/uL  Auto Basophil # : 0.0 K/uL  Auto Neutrophil % : 90.3 %  Auto Lymphocyte % : 6.6 %  Auto Monocyte % : 2.9 %  Auto Eosinophil % : 0.0 %  Auto Basophil % : 0.2 %    07-25    136  |  101  |  66<H>  ----------------------------<  156<H>  3.8   |  25  |  1.48<H>    Ca    8.3<L>      25 Jul 2017 04:26  Phos  5.4     07-25  Mg     2.3     07-25    TPro  6.8  /  Alb  2.8<L>  /  TBili  1.2  /  DBili  x   /  AST  25  /  ALT  30  /  AlkPhos  68  07-25    PT/INR - ( 24 Jul 2017 10:41 )   PT: 13.1 sec;   INR: 1.20 ratio         PTT - ( 25 Jul 2017 01:40 )  PTT:30.5 sec    [X]GOALS OF CARE DISCUSSION WITH PATIENT/FAMILY/PROXY:    Assessment and Plan    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 disease), COPD, CAD (s/p stent x 3 and ASA, Plavix medical management), HTN, asthma, depression, anxiety p/w acute shortness of breath with wheezing, bilateral crackles, and elevated BNP.    1) Acute hypoxic respiratory failure likely d/t new onset CHF  -Lasix 40 q12, daily weights, strict ins and outs  -S/p BiPAP; C/W Nasal Cannula oxygen supplementation   -Monitor BMP for electrolyte changes due to overnight diuresis     2) Elevated troponin likely d/t demand ischemic as no chest pain or ischemic changes noted on EKG  -Resolved  -C/W ASA, statin, and plavix  -Echo results from 2015 indicate EF of 62% with mild concentric LVH and no diastolic dysfunction  -Dr Marquez consulted.     3) Coronary artery disease  -C/W ASA, Statin, and Plavix      4) Essential Hypertension  -Controlled BP  -C/W CCB, hydralazine, labetalol, doxazosin.     5) Laryngeal cancer s/p XRT   -Radiation oncology provider note from 2015, Esequiel Staples MD, indicated that the patient had no evidence of disease    6) Prophylaxis  -Heparin and SCDs for DVT  -Protonix for peptic ulcers INTERVAL HPI/OVERNIGHT EVENTS: Patient was given a total of 180mg of Lasix in addition to Metalazone and diuresed 350mL urine/hr.     PRESSORS: [] YES [X] NO    Cardiovascular:  doxazosin 4 milliGRAM(s) Oral at bedtime  labetalol 200 milliGRAM(s) Oral two times a day  amLODIPine   Tablet 10 milliGRAM(s) Oral daily  hydrALAZINE 50 milliGRAM(s) Oral three times a day  furosemide   Injectable 40 milliGRAM(s) IV Push every 12 hours    Pulmonary:  ALBUTerol/ipratropium for Nebulization 3 milliLiter(s) Nebulizer every 6 hours    Hematalogic:  aspirin enteric coated 81 milliGRAM(s) Oral daily    Other:  PARoxetine 20 milliGRAM(s) Oral daily  gabapentin 300 milliGRAM(s) Oral two times a day  pantoprazole  Injectable 40 milliGRAM(s) IV Push daily  acetaminophen   Tablet. 650 milliGRAM(s) Oral every 6 hours PRN  atorvastatin 40 milliGRAM(s) Oral at bedtime    doxazosin 4 milliGRAM(s) Oral at bedtime  PARoxetine 20 milliGRAM(s) Oral daily  gabapentin 300 milliGRAM(s) Oral two times a day  aspirin enteric coated 81 milliGRAM(s) Oral daily  labetalol 200 milliGRAM(s) Oral two times a day  amLODIPine   Tablet 10 milliGRAM(s) Oral daily  hydrALAZINE 50 milliGRAM(s) Oral three times a day  pantoprazole  Injectable 40 milliGRAM(s) IV Push daily  acetaminophen   Tablet. 650 milliGRAM(s) Oral every 6 hours PRN  furosemide   Injectable 40 milliGRAM(s) IV Push every 12 hours  ALBUTerol/ipratropium for Nebulization 3 milliLiter(s) Nebulizer every 6 hours  atorvastatin 40 milliGRAM(s) Oral at bedtime    Drug Dosing Weight  Height (cm): 162.56 (18 Jul 2017 10:42)  Weight (kg): 56.2 (24 Jul 2017 10:00)  BMI (kg/m2): 21.3 (24 Jul 2017 10:00)  BSA (m2): 1.6 (24 Jul 2017 10:00)    CENTRAL LINE: [] YES [X] NO  LOCATION:   DATE INSERTED:  REMOVE: [X] YES [X] NO  EXPLAIN:    SANDOVAL: [X] YES [] NO    DATE INSERTED: 7/24  REMOVE: [] YES [X] NO  EXPLAIN: urine output monitoring     PMH -reviewed admission note, no change since admission    ICU Vital Signs Last 24 Hrs  T(C): 35.6 (25 Jul 2017 06:00), Max: 37.3 (24 Jul 2017 10:00)  T(F): 96 (25 Jul 2017 06:00), Max: 99.2 (24 Jul 2017 10:00)  HR: 54 (25 Jul 2017 07:00) (54 - 124)  BP: 107/49 (25 Jul 2017 07:00) (107/49 - 152/62)  BP(mean): 64 (25 Jul 2017 07:00) (60 - 80)  ABP: --  ABP(mean): --  RR: 12 (25 Jul 2017 07:00) (10 - 24)  SpO2: 95% (25 Jul 2017 07:00) (77% - 99%)      ABG - ( 24 Jul 2017 11:28 )  pH: 7.45  /  pCO2: 34    /  pO2: 70    / HCO3: 23    / Base Excess: 0.2   /  SaO2: 93                    07-24 @ 07:01  -  07-25 @ 07:00  --------------------------------------------------------  IN: 149 mL / OUT: 2545 mL / NET: -2396 mL            PHYSICAL EXAM:    GENERAL: lying in bed, in no acute distress  NECK: supple, no JVD, no thyromegaly     SKIN: warm, dry   CHEST/LUNG: Nasal cannula in place. Bilateral breath sounds present with no rales, ronchi, or wheezing   HEART: RRR, no m/r/g   ABDOMEN: soft, nontender, nondistended; bowel sounds present.  : sandoval catheter.  EXTREMITIES: +1 non-pitting edema, no cyanosis, no clubbing.    LABS:  CBC Full  -  ( 25 Jul 2017 04:26 )  WBC Count : 10.2 K/uL  Hemoglobin : 10.6 g/dL  Hematocrit : 31.9 %  Platelet Count - Automated : 258 K/uL  Mean Cell Volume : 89.4 fl  Mean Cell Hemoglobin : 29.6 pg  Mean Cell Hemoglobin Concentration : 33.1 gm/dL  Auto Neutrophil # : 9.2 K/uL  Auto Lymphocyte # : 0.7 K/uL  Auto Monocyte # : 0.3 K/uL  Auto Eosinophil # : 0.0 K/uL  Auto Basophil # : 0.0 K/uL  Auto Neutrophil % : 90.3 %  Auto Lymphocyte % : 6.6 %  Auto Monocyte % : 2.9 %  Auto Eosinophil % : 0.0 %  Auto Basophil % : 0.2 %    07-25    136  |  101  |  66<H>  ----------------------------<  156<H>  3.8   |  25  |  1.48<H>    Ca    8.3<L>      25 Jul 2017 04:26  Phos  5.4     07-25  Mg     2.3     07-25    TPro  6.8  /  Alb  2.8<L>  /  TBili  1.2  /  DBili  x   /  AST  25  /  ALT  30  /  AlkPhos  68  07-25    PT/INR - ( 24 Jul 2017 10:41 )   PT: 13.1 sec;   INR: 1.20 ratio         PTT - ( 25 Jul 2017 01:40 )  PTT:30.5 sec    [X]GOALS OF CARE DISCUSSION WITH PATIENT/FAMILY/PROXY:    Assessment and Plan    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 disease), COPD, CAD (s/p stent x 3 and ASA, Plavix medical management), HTN, asthma, depression, anxiety p/w acute shortness of breath with wheezing, bilateral crackles, and elevated BNP.    1) Acute hypoxic respiratory failure likely d/t new onset CHF  -Lasix 40 q12, daily weights, strict ins and outs  -S/p BiPAP; C/W Nasal Cannula oxygen supplementation   -Monitor BMP for electrolyte changes due to overnight diuresis     2) Elevated troponin likely d/t demand ischemic as no chest pain or ischemic changes noted on EKG  -Resolved  -C/W ASA, statin, and plavix  -Echo results from 2015 indicate EF of 62% with mild concentric LVH and no diastolic dysfunction  -Dr Marquez consulted.     3) Coronary artery disease  -C/W ASA, Statin, and Plavix      4) Essential Hypertension  -Controlled BP  -C/W CCB, hydralazine, labetalol, doxazosin.     5) Laryngeal cancer s/p XRT   -Radiation oncology provider note from 2015, Esequiel Staples MD, indicated that the patient had no evidence of disease    6) Prophylaxis  -SCDs for DVT  -Protonix for peptic ulcers

## 2017-07-26 DIAGNOSIS — J90 PLEURAL EFFUSION, NOT ELSEWHERE CLASSIFIED: ICD-10-CM

## 2017-07-26 LAB
ALBUMIN SERPL ELPH-MCNC: 3 G/DL — LOW (ref 3.5–5)
ALP SERPL-CCNC: 74 U/L — SIGNIFICANT CHANGE UP (ref 40–120)
ALT FLD-CCNC: 40 U/L DA — SIGNIFICANT CHANGE UP (ref 10–60)
ANION GAP SERPL CALC-SCNC: 12 MMOL/L — SIGNIFICANT CHANGE UP (ref 5–17)
AST SERPL-CCNC: 39 U/L — SIGNIFICANT CHANGE UP (ref 10–40)
BASOPHILS # BLD AUTO: 0.1 K/UL — SIGNIFICANT CHANGE UP (ref 0–0.2)
BASOPHILS NFR BLD AUTO: 0.4 % — SIGNIFICANT CHANGE UP (ref 0–2)
BILIRUB SERPL-MCNC: 0.8 MG/DL — SIGNIFICANT CHANGE UP (ref 0.2–1.2)
BUN SERPL-MCNC: 86 MG/DL — HIGH (ref 7–18)
CALCIUM SERPL-MCNC: 8.7 MG/DL — SIGNIFICANT CHANGE UP (ref 8.4–10.5)
CHLORIDE SERPL-SCNC: 96 MMOL/L — SIGNIFICANT CHANGE UP (ref 96–108)
CO2 SERPL-SCNC: 28 MMOL/L — SIGNIFICANT CHANGE UP (ref 22–31)
CREAT SERPL-MCNC: 1.82 MG/DL — HIGH (ref 0.5–1.3)
EOSINOPHIL # BLD AUTO: 0 K/UL — SIGNIFICANT CHANGE UP (ref 0–0.5)
EOSINOPHIL NFR BLD AUTO: 0 % — SIGNIFICANT CHANGE UP (ref 0–6)
GLUCOSE SERPL-MCNC: 124 MG/DL — HIGH (ref 70–99)
HCT VFR BLD CALC: 36.1 % — LOW (ref 39–50)
HGB BLD-MCNC: 12 G/DL — LOW (ref 13–17)
LYMPHOCYTES # BLD AUTO: 0.9 K/UL — LOW (ref 1–3.3)
LYMPHOCYTES # BLD AUTO: 7.5 % — LOW (ref 13–44)
MAGNESIUM SERPL-MCNC: 2.2 MG/DL — SIGNIFICANT CHANGE UP (ref 1.6–2.6)
MCHC RBC-ENTMCNC: 29.8 PG — SIGNIFICANT CHANGE UP (ref 27–34)
MCHC RBC-ENTMCNC: 33.2 GM/DL — SIGNIFICANT CHANGE UP (ref 32–36)
MCV RBC AUTO: 89.8 FL — SIGNIFICANT CHANGE UP (ref 80–100)
MONOCYTES # BLD AUTO: 0.7 K/UL — SIGNIFICANT CHANGE UP (ref 0–0.9)
MONOCYTES NFR BLD AUTO: 5.7 % — SIGNIFICANT CHANGE UP (ref 2–14)
NEUTROPHILS # BLD AUTO: 10.8 K/UL — HIGH (ref 1.8–7.4)
NEUTROPHILS NFR BLD AUTO: 86.3 % — HIGH (ref 43–77)
PHOSPHATE SERPL-MCNC: 5.4 MG/DL — HIGH (ref 2.5–4.5)
PLATELET # BLD AUTO: 247 K/UL — SIGNIFICANT CHANGE UP (ref 150–400)
POTASSIUM SERPL-MCNC: 3.5 MMOL/L — SIGNIFICANT CHANGE UP (ref 3.5–5.3)
POTASSIUM SERPL-SCNC: 3.5 MMOL/L — SIGNIFICANT CHANGE UP (ref 3.5–5.3)
PROT SERPL-MCNC: 7.2 G/DL — SIGNIFICANT CHANGE UP (ref 6–8.3)
RBC # BLD: 4.02 M/UL — LOW (ref 4.2–5.8)
RBC # FLD: 12.8 % — SIGNIFICANT CHANGE UP (ref 10.3–14.5)
SODIUM SERPL-SCNC: 136 MMOL/L — SIGNIFICANT CHANGE UP (ref 135–145)
T3FREE SERPL-MCNC: <0.98 PG/ML — LOW (ref 1.8–4.6)
T4 FREE SERPL-MCNC: 1.2 NG/DL — SIGNIFICANT CHANGE UP (ref 0.9–1.8)
WBC # BLD: 12.5 K/UL — HIGH (ref 3.8–10.5)
WBC # FLD AUTO: 12.5 K/UL — HIGH (ref 3.8–10.5)

## 2017-07-26 RX ORDER — FUROSEMIDE 40 MG
40 TABLET ORAL
Qty: 0 | Refills: 0 | Status: DISCONTINUED | OUTPATIENT
Start: 2017-07-26 | End: 2017-07-26

## 2017-07-26 RX ORDER — FUROSEMIDE 40 MG
40 TABLET ORAL DAILY
Qty: 0 | Refills: 0 | Status: DISCONTINUED | OUTPATIENT
Start: 2017-07-26 | End: 2017-07-27

## 2017-07-26 RX ORDER — FUROSEMIDE 40 MG
40 TABLET ORAL DAILY
Qty: 0 | Refills: 0 | Status: DISCONTINUED | OUTPATIENT
Start: 2017-07-26 | End: 2017-07-26

## 2017-07-26 RX ADMIN — Medication 50 MILLIGRAM(S): at 06:19

## 2017-07-26 RX ADMIN — GABAPENTIN 300 MILLIGRAM(S): 400 CAPSULE ORAL at 06:19

## 2017-07-26 RX ADMIN — Medication 3 MILLILITER(S): at 21:18

## 2017-07-26 RX ADMIN — Medication 3 MILLILITER(S): at 14:07

## 2017-07-26 RX ADMIN — PANTOPRAZOLE SODIUM 40 MILLIGRAM(S): 20 TABLET, DELAYED RELEASE ORAL at 06:19

## 2017-07-26 RX ADMIN — Medication 20 MILLIGRAM(S): at 12:59

## 2017-07-26 RX ADMIN — Medication 50 MILLIGRAM(S): at 21:44

## 2017-07-26 RX ADMIN — Medication 4 MILLIGRAM(S): at 21:44

## 2017-07-26 RX ADMIN — Medication 50 MILLIGRAM(S): at 13:44

## 2017-07-26 RX ADMIN — GABAPENTIN 300 MILLIGRAM(S): 400 CAPSULE ORAL at 18:26

## 2017-07-26 RX ADMIN — Medication 3 MILLILITER(S): at 08:46

## 2017-07-26 RX ADMIN — Medication 200 MILLIGRAM(S): at 18:26

## 2017-07-26 RX ADMIN — CLOPIDOGREL BISULFATE 75 MILLIGRAM(S): 75 TABLET, FILM COATED ORAL at 12:59

## 2017-07-26 RX ADMIN — Medication 81 MILLIGRAM(S): at 12:59

## 2017-07-26 RX ADMIN — ATORVASTATIN CALCIUM 40 MILLIGRAM(S): 80 TABLET, FILM COATED ORAL at 21:44

## 2017-07-26 RX ADMIN — Medication 200 MILLIGRAM(S): at 06:19

## 2017-07-26 RX ADMIN — AMLODIPINE BESYLATE 10 MILLIGRAM(S): 2.5 TABLET ORAL at 06:19

## 2017-07-26 RX ADMIN — Medication 3 MILLILITER(S): at 02:02

## 2017-07-26 RX ADMIN — Medication 40 MILLIGRAM(S): at 15:54

## 2017-07-26 NOTE — PROGRESS NOTE ADULT - SUBJECTIVE AND OBJECTIVE BOX
CHIEF COMPLAINT: Patient is a 89y old  Male who presents with a chief complaint of shortness of breath. Pt appears comfortable    	  REVIEW OF SYSTEMS:  CONSTITUTIONAL: No fever, weight loss, or fatigue  EYES: No eye pain, visual disturbances, or discharge  ENT:  No difficulty hearing, tinnitus, vertigo; No sinus or throat pain  NECK: No pain or stiffness  RESPIRATORY: No cough, wheezing, chills or hemoptysis; No Shortness of Breath  CARDIOVASCULAR: No chest pain, palpitations, passing out, dizziness, or leg swelling  GASTROINTESTINAL: No abdominal or epigastric pain. No nausea, vomiting, or hematemesis; No diarrhea or constipation. No melena or hematochezia.  GENITOURINARY: No dysuria, frequency, hematuria, or incontinence  NEUROLOGICAL: No headaches, memory loss, loss of strength, numbness, or tremors  SKIN: No itching, burning, rashes, or lesions   LYMPH Nodes: No enlarged glands  ENDOCRINE: No heat or cold intolerance; No hair loss  MUSCULOSKELETAL: No joint pain or swelling; No muscle, back, or extremity pain  PSYCHIATRIC: No depression, anxiety, mood swings, or difficulty sleeping  HEME/LYMPH: No easy bruising, or bleeding gums  ALLERGY AND IMMUNOLOGIC: No hives or eczema	      PHYSICAL EXAM:  T(C): 36.4 (07-26-17 @ 04:00), Max: 36.4 (07-26-17 @ 04:00)  HR: 65 (07-26-17 @ 12:00) (55 - 65)  BP: 117/45 (07-26-17 @ 12:00) (101/44 - 135/52)  RR: 16 (07-26-17 @ 12:00) (9 - 19)  SpO2: 88% (07-26-17 @ 12:00) (88% - 96%)    I&O's Summary    25 Jul 2017 07:01  -  26 Jul 2017 07:00  --------------------------------------------------------  IN: 440 mL / OUT: 3175 mL / NET: -2735 mL        Appearance: Normal	  HEENT:   Normal oral mucosa, PERRL, EOMI	  Lymphatic: No lymphadenopathy  Cardiovascular: Normal S1 S2, No JVD, No murmurs, No edema  Respiratory: Lungs clear to auscultation	  Psychiatry: A & O x 3, Mood & affect appropriate  Gastrointestinal:  Soft, Non-tender, + BS	  Skin: No rashes, No ecchymoses, No cyanosis	  Neurologic: Non-focal  Extremities: Normal range of motion, No clubbing, cyanosis or edema  Vascular: Peripheral pulses palpable 2+ bilaterally    MEDICATIONS  (STANDING):  doxazosin 4 milliGRAM(s) Oral at bedtime  PARoxetine 20 milliGRAM(s) Oral daily  gabapentin 300 milliGRAM(s) Oral two times a day  aspirin enteric coated 81 milliGRAM(s) Oral daily  labetalol 200 milliGRAM(s) Oral two times a day  amLODIPine   Tablet 10 milliGRAM(s) Oral daily  hydrALAZINE 50 milliGRAM(s) Oral three times a day  ALBUTerol/ipratropium for Nebulization 3 milliLiter(s) Nebulizer every 6 hours  atorvastatin 40 milliGRAM(s) Oral at bedtime  clopidogrel Tablet 75 milliGRAM(s) Oral daily  pantoprazole    Tablet 40 milliGRAM(s) Oral before breakfast  furosemide    Tablet 40 milliGRAM(s) Oral two times a day        	  LABS:	 	    CARDIAC MARKERS:  CARDIAC MARKERS ( 24 Jul 2017 18:17 )  0.090 ng/mL / x     / 78 U/L / x     / 2.0 ng/mL                          12.0   12.5  )-----------( 247      ( 26 Jul 2017 03:57 )             36.1     07-26    136  |  96  |  86<H>  ----------------------------<  124<H>  3.5   |  28  |  1.82<H>    Ca    8.7      26 Jul 2017 03:57  Phos  5.4     07-26  Mg     2.2     07-26    TPro  7.2  /  Alb  3.0<L>  /  TBili  0.8  /  DBili  x   /  AST  39  /  ALT  40  /  AlkPhos  74  07-26    proBNP: Serum Pro-Brain Natriuretic Peptide: 92063 pg/mL (07-24 @ 10:41)    Lipid Profile: Cholesterol 150    HDL 31  TG 83    HgA1c: Hemoglobin A1C, Whole Blood: 5.7 % (07-25 @ 09:08)    TSH: Thyroid Stimulating Hormone, Serum: 0.32 uU/mL (07-25 @ 04:26)      Echocardiogram 	  CONCLUSIONS:  1. Normal mitral valve. Moderate to severe mitral  regurgitation.  2. Calcified trileaflet aortic valve with normal opening.  Mild aortic insufficiency.  3. Normal aortic root.  4. Moderate left atrial enlargement.  5. Normal left ventricular internal dimensions and wall  thicknesses.  6. Normal Left Ventricular Systolic Function,  (EF = 55%)  7. Grade II diastolic dysfunction  8. Normal right atrium.  9. Normal right ventricular size and function.  10. RV systolic pressure is severely increased (PASP 65mm  Hg).  11. There is mild tricuspid regurgitation.  12. There is mild pulmonic regurgitation.  13. Normal pericardium with no pericardial effusion. CHIEF COMPLAINT: Patient is a 89y old  Male who presents with a chief complaint of shortness of breath. Pt appears comfortable    	  REVIEW OF SYSTEMS:  CONSTITUTIONAL: No fever, weight loss, or fatigue  EYES: No eye pain, visual disturbances, or discharge  ENT:  No difficulty hearing, tinnitus, vertigo; No sinus or throat pain  NECK: No pain or stiffness  RESPIRATORY: No cough, wheezing, chills or hemoptysis; No Shortness of Breath  CARDIOVASCULAR: No chest pain, palpitations, passing out, dizziness, or leg swelling  GASTROINTESTINAL: No abdominal or epigastric pain. No nausea, vomiting, or hematemesis; No diarrhea or constipation. No melena or hematochezia.  GENITOURINARY: No dysuria, frequency, hematuria, or incontinence  NEUROLOGICAL: No headaches, memory loss, loss of strength, numbness, or tremors  SKIN: No itching, burning, rashes, or lesions   LYMPH Nodes: No enlarged glands  ENDOCRINE: No heat or cold intolerance; No hair loss  MUSCULOSKELETAL: No joint pain or swelling; No muscle, back, or extremity pain  PSYCHIATRIC: No depression, anxiety, mood swings, or difficulty sleeping  HEME/LYMPH: No easy bruising, or bleeding gums  ALLERGY AND IMMUNOLOGIC: No hives or eczema	      PHYSICAL EXAM:  T(C): 36.4 (07-26-17 @ 04:00), Max: 36.4 (07-26-17 @ 04:00)  HR: 65 (07-26-17 @ 12:00) (55 - 65)  BP: 117/45 (07-26-17 @ 12:00) (101/44 - 135/52)  RR: 16 (07-26-17 @ 12:00) (9 - 19)  SpO2: 88% (07-26-17 @ 12:00) (88% - 96%)    I&O's Summary    25 Jul 2017 07:01  -  26 Jul 2017 07:00  --------------------------------------------------------  IN: 440 mL / OUT: 3175 mL / NET: -2735 mL        Appearance: Normal	  HEENT:   Normal oral mucosa, PERRL, EOMI	  Lymphatic: No lymphadenopathy  Cardiovascular: Normal S1 S2, + JVD  Respiratory: Dec BS at bases  Psychiatry: A & O x 3, Mood & affect appropriate  Gastrointestinal:  Soft, Non-tender, + BS	  Skin: No rashes, No ecchymoses, No cyanosis	  Neurologic: Non-focal  Extremities: Normal range of motion, No clubbing, cyanosis or edema  Vascular: Peripheral pulses palpable 2+ bilaterally    MEDICATIONS  (STANDING):  doxazosin 4 milliGRAM(s) Oral at bedtime  PARoxetine 20 milliGRAM(s) Oral daily  gabapentin 300 milliGRAM(s) Oral two times a day  aspirin enteric coated 81 milliGRAM(s) Oral daily  labetalol 200 milliGRAM(s) Oral two times a day  amLODIPine   Tablet 10 milliGRAM(s) Oral daily  hydrALAZINE 50 milliGRAM(s) Oral three times a day  ALBUTerol/ipratropium for Nebulization 3 milliLiter(s) Nebulizer every 6 hours  atorvastatin 40 milliGRAM(s) Oral at bedtime  clopidogrel Tablet 75 milliGRAM(s) Oral daily  pantoprazole    Tablet 40 milliGRAM(s) Oral before breakfast  furosemide    Tablet 40 milliGRAM(s) Oral two times a day        	  LABS:	 	    CARDIAC MARKERS:  CARDIAC MARKERS ( 24 Jul 2017 18:17 )  0.090 ng/mL / x     / 78 U/L / x     / 2.0 ng/mL                          12.0   12.5  )-----------( 247      ( 26 Jul 2017 03:57 )             36.1     07-26    136  |  96  |  86<H>  ----------------------------<  124<H>  3.5   |  28  |  1.82<H>    Ca    8.7      26 Jul 2017 03:57  Phos  5.4     07-26  Mg     2.2     07-26    TPro  7.2  /  Alb  3.0<L>  /  TBili  0.8  /  DBili  x   /  AST  39  /  ALT  40  /  AlkPhos  74  07-26    proBNP: Serum Pro-Brain Natriuretic Peptide: 32414 pg/mL (07-24 @ 10:41)    Lipid Profile: Cholesterol 150    HDL 31  TG 83    HgA1c: Hemoglobin A1C, Whole Blood: 5.7 % (07-25 @ 09:08)    TSH: Thyroid Stimulating Hormone, Serum: 0.32 uU/mL (07-25 @ 04:26)      Echocardiogram 	  CONCLUSIONS:  1. Normal mitral valve. Moderate to severe mitral  regurgitation.  2. Calcified trileaflet aortic valve with normal opening.  Mild aortic insufficiency.  3. Normal aortic root.  4. Moderate left atrial enlargement.  5. Normal left ventricular internal dimensions and wall  thicknesses.  6. Normal Left Ventricular Systolic Function,  (EF = 55%)  7. Grade II diastolic dysfunction  8. Normal right atrium.  9. Normal right ventricular size and function.  10. RV systolic pressure is severely increased (PASP 65mm  Hg).  11. There is mild tricuspid regurgitation.  12. There is mild pulmonic regurgitation.  13. Normal pericardium with no pericardial effusion.

## 2017-07-26 NOTE — PROGRESS NOTE ADULT - PROBLEM SELECTOR PLAN 3
Oxygen   Bronchodilators  Peak flow  inhaled steroids  Incentive spirometery Oxygen   Bronchodilators  Peak flow  inhaled steroids  Incentive spirometery  [facility] as outpatient

## 2017-07-26 NOTE — CONSULT NOTE ADULT - SUBJECTIVE AND OBJECTIVE BOX
Patient is a 89y Male whom presented   89yo [place]-speaking man from home, former smoker, lives with wife, PMH laryngeal cancer (diagnosed 2015, s/p XRT, no evidence disease), COPD, CAD s/p stents x 3, s/p left carotid stent, HTN, asthma, depression, anxiety p/w acute shortness of breath.    Patient was reportedly in usual health when endorsed palpitations and was acutely short of breath.  911 called, EMS placed patient on 100% non-rebreather and gave nebulizer for wheezing.   Pt now post diuresis, post BIPAP doing better overall. In the ER he was found to be in CHF and was treated for it with diuretics with clinical improvement.  He was in the ER for shoulder pain on 07/17/2017 the and xr showed no CHF.   Patient was placed in the ICU on diuretics with good response - urine output.    Admission /62.HR 71    Renal consult was called for Increased in BUN/creatinine .  Non oliguria    Patient's family denies the use of NSAID  They are not aware of Renal failure.    Creatinine, Serum: 1.82 mg/dL (07.26.17 @ 03:57)  Blood Urea Nitrogen, Serum: 86 mg/dL (07.26.17 @ 03:57)    Creatinine, Serum: 1.47 mg/dL (07.18.17 @ 12:15)  Blood Urea Nitrogen, Serum: 33 mg/dL (07.18.17 @ 12:15)      PAST MEDICAL & SURGICAL HISTORY:  Coronary artery disease involving native coronary artery of native heart without angina pectoris  Laryngeal cancer  Essential hypertension  Asthma  Arthritis  Degenerative joint disease  No significant past surgical history      Home Medications Reviewed    Hospital Medications:   MEDICATIONS  (STANDING):  doxazosin 4 milliGRAM(s) Oral at bedtime  PARoxetine 20 milliGRAM(s) Oral daily  gabapentin 300 milliGRAM(s) Oral two times a day  aspirin enteric coated 81 milliGRAM(s) Oral daily  labetalol 200 milliGRAM(s) Oral two times a day  amLODIPine   Tablet 10 milliGRAM(s) Oral daily  hydrALAZINE 50 milliGRAM(s) Oral three times a day  ALBUTerol/ipratropium for Nebulization 3 milliLiter(s) Nebulizer every 6 hours  atorvastatin 40 milliGRAM(s) Oral at bedtime  clopidogrel Tablet 75 milliGRAM(s) Oral daily  pantoprazole    Tablet 40 milliGRAM(s) Oral before breakfast  furosemide    Tablet 40 milliGRAM(s) Oral daily    MEDICATIONS  (PRN):  acetaminophen   Tablet. 650 milliGRAM(s) Oral every 6 hours PRN Mild Pain (1 - 3)      Allergies    No Known Allergies    Intolerances    INDINGS:  Patchy bilateral airspace disease in the mid to lower lungs again seen.   Bibasilar congestion. Small bilateral pleural effusions. No pneumothorax.    The cardiac silhouette is not accurately assessed by AP technique. Aortic   calcifications.    IMPRESSION:  No significant change.    Echo:  Mitral Valve: Normal mitral valve. Moderate to severe  mitral regurgitation.  Aortic Root: Normal aortic root.  Aortic Valve: Calcified trileaflet aortic valve with normal  opening. Mild aortic insufficiency.  Left Atrium: Moderate left atrial enlargement.  Left Ventricle: Normal Left Ventricular Systolic Function,  (EF = 55%) Normal left ventricular internal dimensions and  wall thicknesses. Grade II diastolic dysfunction  Right Heart: Normal right atrium. Normal right ventricular  size and function. There is mild tricuspid regurgitation.  There is mild pulmonic regurgitation.  Pericardium/PleuraNormal pericardium with no pericardial  effusion.  Hemodynamic: RV systolic pressure is severely increased  (PASP 65mm Hg).  ------------------------------------------------------------------------  CONCLUSIONS:  1. Normal mitral valve. Moderate to severe mitral  regurgitation.  2. Calcified trileaflet aortic valve with normal opening.  Mild aortic insufficiency.  3. Normal aortic root.  4. Moderate left atrial enlargement.  5. Normal left ventricular internal dimensions and wall                            12.0   12.5  )-----------( 247      ( 26 Jul 2017 03:57 )             36.1     07-26    136  |  96  |  86<H>  ----------------------------<  124<H>  3.5   |  28  |  1.82<H>    Ca    8.7      26 Jul 2017 03:57  Phos  5.4     07-26  Mg     2.2     07-26    TPro  7.2  /  Alb  3.0<L>  /  TBili  0.8  /  DBili  x   /  AST  39  /  ALT  40  /  AlkPhos  74  07-26    PTT - ( 25 Jul 2017 01:40 )  PTT:30.5 sec          RADIOLOGY & ADDITIONAL STUDIES:    SOCIAL HISTORY: Denies ETOh,Smoking,     FAMILY HISTORY:      REVIEW OF SYSTEMS:  CONSTITUTIONAL:  No fatigue, No fevers or chill   NECK: No pain or stiffness  RESPIRATORY: No shortness of breath  CARDIOVASCULAR:  No edema  GASTROINTESTINAL: No abdominal or epigastric pain. No nausea, vomiting, or hematemesis; No diarrhea.  GENITOURINARY: sandoval cathetr  NEUROLOGICAL: No numbness or weakness, No tremor  VASCULAR: No claudication  Musculoskeletal: no arthralgia, no myalgia      VITALS:  Vital Signs Last 24 Hrs  T(C): 36.7 (26 Jul 2017 13:00), Max: 36.7 (26 Jul 2017 13:00)  T(F): 98.1 (26 Jul 2017 13:00), Max: 98.1 (26 Jul 2017 13:00)  HR: 62 (26 Jul 2017 13:00) (55 - 65)  BP: 122/48 (26 Jul 2017 13:00) (101/44 - 135/52)  BP(mean): 67 (26 Jul 2017 13:00) (56 - 74)  RR: 14 (26 Jul 2017 13:00) (9 - 19)  SpO2: 93% (26 Jul 2017 13:00) (88% - 96%)    07-25 @ 07:01 - 07-26 @ 07:00  --------------------------------------------------------  IN: 440 mL / OUT: 3225 mL / NET: -2785 mL    07-26 @ 07:01 - 07-26 @ 13:49  --------------------------------------------------------  IN: 0 mL / OUT: 300 mL / NET: -300 mL      PHYSICAL EXAM:  Constitutional: NAD  HEENT: anicteric sclera, oropharynx clear, MMM  Neck: No JVD  Respiratory: bilateral crepitations in lower 1/3 bas bilateral   Cardiovascular: S1, S2, RRR, no pericardial rub, no murmur  Gastrointestinal: BS+, soft, no tenderness, no distension, no bruit  Pelvis: bladder non-distended, no CVA tenderness sandoval catheter  Extremities: No cyanosis or clubbing. No peripheral edema  Neurological: A/O x 3, no focal deficits  Psychiatric: Normal mood, normal affect  : No CVA tenderness. No sandoval.

## 2017-07-26 NOTE — PROGRESS NOTE ADULT - ASSESSMENT
89 yr old  [place]-speaking male from home, former smoker, lives with wife, PMH laryngeal cancer (diagnosed 2015, s/p XRT, no evidence disease), COPD, CAD s/p stents x 3, s/p left carotid stent, HTN, asthma, depression, anxiety p/w acute shortness of breath due to diastolic HF and mod-severe MR.  1.IV lasix.  2.Severe pulm HTN-Norvasc.  3.CAD-asa,b blocker,statin.  4.COPD-nebs.  5.HTN-Cont BP medication.  6.GI and DVT prophylaxis.  7.Renal eval.

## 2017-07-26 NOTE — PROGRESS NOTE ADULT - SUBJECTIVE AND OBJECTIVE BOX
Patient is a 89y old  Male who presents with a chief complaint of shortness of breath (24 Jul 2017 14:08)  HPI:89 yr old  [place]-speaking male from home, former smoker, lives with wife, PMH laryngeal cancer (diagnosed 2015, s/p XRT, no evidence disease), COPD, CAD s/p stents x 3, s/p left carotid stent, HTN, asthma, depression, anxiety p/w acute shortness of breath.  History obtained from daughter-in-law  with [place]  (daughter in law is Angela, [phone]).  Patient was in usual health when this morning ~ 9am, patient endorsed palpitations and was acutely short of breath.  911 called, EMS placed patient on 100% non-rebreather and gave nebulizer for wheezing. Denies fever, chills, chest pain, changes in vision, cough, sore throat, vomiting, abdominal pain, diarrhea, dysuria, leg swelling, rash.   On BIPAP in ED for hypoxia,CXR: bilateral congestion, s/p Lasix 40mg, solumedrol and duoneb.      Patient is being followed for SOB, COPD, bibasilar congestion and bilateral patchy air space on CXR.  Patient today is awake, alert and comfortable.     INTERVAL HPI/OVERNIGHT EVENTS:      VITAL SIGNS:  T(F): 97.5 (07-26-17 @ 04:00)  HR: 57 (07-26-17 @ 08:00)  BP: 112/40 (07-26-17 @ 08:00)  RR: 11 (07-26-17 @ 08:00)  SpO2: 94% (07-26-17 @ 08:00)  Wt(kg): --  I&O's Detail    25 Jul 2017 07:01  -  26 Jul 2017 07:00  --------------------------------------------------------  IN:    Oral Fluid: 440 mL  Total IN: 440 mL    OUT:    Indwelling Catheter - Urethral: 3175 mL  Total OUT: 3175 mL    Total NET: -2735 mL              REVIEW OF SYSTEMS:    CONSTITUTIONAL:  No fevers, chills, sweats    HEENT:  Eyes:  No diplopia or blurred vision. ENT:  No earache, sore throat or runny nose.    CARDIOVASCULAR:  No pressure, squeezing, tightness, or heaviness about the chest; no palpitations.    RESPIRATORY:  Per HPI    GASTROINTESTINAL:  No abdominal pain, nausea, vomiting or diarrhea.    GENITOURINARY:  No dysuria, frequency or urgency.    NEUROLOGIC:  No paresthesias, fasciculations, seizures or weakness.    PSYCHIATRIC:  No disorder of thought or mood.      PHYSICAL EXAM:    Constitutional: Well developed and nourished  Eyes:Perrla  ENMT: normal  Neck:supple  Respiratory: good air entry  Cardiovascular: S1 S2 regular  Gastrointestinal: Soft, Non tender  Extremities: No edema  Vascular:normal  Neurological:Awake, alert,Ox3  Musculoskeletal:Normal      MEDICATIONS  (STANDING):  doxazosin 4 milliGRAM(s) Oral at bedtime  PARoxetine 20 milliGRAM(s) Oral daily  gabapentin 300 milliGRAM(s) Oral two times a day  aspirin enteric coated 81 milliGRAM(s) Oral daily  labetalol 200 milliGRAM(s) Oral two times a day  amLODIPine   Tablet 10 milliGRAM(s) Oral daily  hydrALAZINE 50 milliGRAM(s) Oral three times a day  furosemide   Injectable 40 milliGRAM(s) IV Push every 12 hours  ALBUTerol/ipratropium for Nebulization 3 milliLiter(s) Nebulizer every 6 hours  atorvastatin 40 milliGRAM(s) Oral at bedtime  clopidogrel Tablet 75 milliGRAM(s) Oral daily  pantoprazole    Tablet 40 milliGRAM(s) Oral before breakfast    MEDICATIONS  (PRN):  acetaminophen   Tablet. 650 milliGRAM(s) Oral every 6 hours PRN Mild Pain (1 - 3)      Allergies    No Known Allergies    Intolerances        LABS:                        12.0   12.5  )-----------( 247      ( 26 Jul 2017 03:57 )             36.1     07-26    136  |  96  |  86<H>  ----------------------------<  124<H>  3.5   |  28  |  1.82<H>    Ca    8.7      26 Jul 2017 03:57  Phos  5.4     07-26  Mg     2.2     07-26    TPro  7.2  /  Alb  3.0<L>  /  TBili  0.8  /  DBili  x   /  AST  39  /  ALT  40  /  AlkPhos  74  07-26    PT/INR - ( 24 Jul 2017 10:41 )   PT: 13.1 sec;   INR: 1.20 ratio         PTT - ( 25 Jul 2017 01:40 )  PTT:30.5 sec    ABG - ( 24 Jul 2017 11:28 )  pH: 7.45  /  pCO2: 34    /  pO2: 70    / HCO3: 23    / Base Excess: 0.2   /  SaO2: 93                CARDIAC MARKERS ( 24 Jul 2017 18:17 )  0.090 ng/mL / x     / 78 U/L / x     / 2.0 ng/mL  CARDIAC MARKERS ( 24 Jul 2017 10:41 )  0.059 ng/mL / x     / 85 U/L / x     / x          CAPILLARY BLOOD GLUCOSE        pro-bnp [id number] 07-24 @ 10:41     d-dimer --  07-24 @ 10:41      RADIOLOGY & ADDITIONAL TESTS:    CXR:  Xray Chest 1 View AP -PORTABLE-Routine (07.25.17 @ 08:41)   FINDINGS:  Patchy bilateral airspace disease in the mid to lower lungs again seen.   Bibasilar congestion. Small bilateral pleural effusions. No pneumothorax.    The cardiac silhouette is not accurately assessed by AP technique. Aortic   calcifications.    Ct scan chest:    ekg;    echo: Patient is a 89y old  Male who presents with a chief complaint of shortness of breath (24 Jul 2017 14:08)  HPI:89 yr old  [place]-speaking male from home, former smoker, lives with wife, PMH laryngeal cancer (diagnosed 2015, s/p XRT, no evidence disease), COPD, CAD s/p stents x 3, s/p left carotid stent, HTN, asthma, depression, anxiety p/w acute shortness of breath.  History obtained from daughter-in-law  with [place]  (daughter in law is Angela, [phone]).  Patient was in usual health when this morning ~ 9am, patient endorsed palpitations and was acutely short of breath.  911 called, EMS placed patient on 100% non-rebreather and gave nebulizer for wheezing. Denies fever, chills, chest pain, changes in vision, cough, sore throat, vomiting, abdominal pain, diarrhea, dysuria, leg swelling, rash.   On BIPAP in ED for hypoxia,CXR: bilateral congestion, s/p Lasix 40mg, solumedrol and duoneb.      Patient is being followed for SOB, COPD, bibasilar congestion and bilateral patchy air space on CXR.  Patient today is resting comfortable in bed in NAD     INTERVAL HPI/OVERNIGHT EVENTS:      VITAL SIGNS:  T(F): 97.5 (07-26-17 @ 04:00)  HR: 57 (07-26-17 @ 08:00)  BP: 112/40 (07-26-17 @ 08:00)  RR: 11 (07-26-17 @ 08:00)  SpO2: 94% (07-26-17 @ 08:00)  Wt(kg): --  I&O's Detail    25 Jul 2017 07:01  -  26 Jul 2017 07:00  --------------------------------------------------------  IN:    Oral Fluid: 440 mL  Total IN: 440 mL    OUT:    Indwelling Catheter - Urethral: 3175 mL  Total OUT: 3175 mL    Total NET: -2735 mL              REVIEW OF SYSTEMS:    CONSTITUTIONAL:  No fevers, chills, sweats    HEENT:  Eyes:  No diplopia or blurred vision. ENT:  No earache, sore throat or runny nose.    CARDIOVASCULAR:  No pressure, squeezing, tightness, or heaviness about the chest; no palpitations.    RESPIRATORY:  Per HPI    GASTROINTESTINAL:  No abdominal pain, nausea, vomiting or diarrhea.    GENITOURINARY:  No dysuria, frequency or urgency.    NEUROLOGIC:  No paresthesias, fasciculations, seizures or weakness.    PSYCHIATRIC:  No disorder of thought or mood.      PHYSICAL EXAM:    Constitutional: Well developed and nourished  Eyes:Perrla  ENMT: normal  Neck:supple  Respiratory: good air entry  Cardiovascular: S1 S2 regular  Gastrointestinal: Soft, Non tender  Extremities: No edema  Vascular:normal  Neurological:Awake, alert,Ox3  Musculoskeletal:Normal      MEDICATIONS  (STANDING):  doxazosin 4 milliGRAM(s) Oral at bedtime  PARoxetine 20 milliGRAM(s) Oral daily  gabapentin 300 milliGRAM(s) Oral two times a day  aspirin enteric coated 81 milliGRAM(s) Oral daily  labetalol 200 milliGRAM(s) Oral two times a day  amLODIPine   Tablet 10 milliGRAM(s) Oral daily  hydrALAZINE 50 milliGRAM(s) Oral three times a day  furosemide   Injectable 40 milliGRAM(s) IV Push every 12 hours  ALBUTerol/ipratropium for Nebulization 3 milliLiter(s) Nebulizer every 6 hours  atorvastatin 40 milliGRAM(s) Oral at bedtime  clopidogrel Tablet 75 milliGRAM(s) Oral daily  pantoprazole    Tablet 40 milliGRAM(s) Oral before breakfast    MEDICATIONS  (PRN):  acetaminophen   Tablet. 650 milliGRAM(s) Oral every 6 hours PRN Mild Pain (1 - 3)      Allergies    No Known Allergies    Intolerances        LABS:                        12.0   12.5  )-----------( 247      ( 26 Jul 2017 03:57 )             36.1     07-26    136  |  96  |  86<H>  ----------------------------<  124<H>  3.5   |  28  |  1.82<H>    Ca    8.7      26 Jul 2017 03:57  Phos  5.4     07-26  Mg     2.2     07-26    TPro  7.2  /  Alb  3.0<L>  /  TBili  0.8  /  DBili  x   /  AST  39  /  ALT  40  /  AlkPhos  74  07-26    PT/INR - ( 24 Jul 2017 10:41 )   PT: 13.1 sec;   INR: 1.20 ratio         PTT - ( 25 Jul 2017 01:40 )  PTT:30.5 sec    ABG - ( 24 Jul 2017 11:28 )  pH: 7.45  /  pCO2: 34    /  pO2: 70    / HCO3: 23    / Base Excess: 0.2   /  SaO2: 93                CARDIAC MARKERS ( 24 Jul 2017 18:17 )  0.090 ng/mL / x     / 78 U/L / x     / 2.0 ng/mL  CARDIAC MARKERS ( 24 Jul 2017 10:41 )  0.059 ng/mL / x     / 85 U/L / x     / x          CAPILLARY BLOOD GLUCOSE        pro-bnp [id number] 07-24 @ 10:41     d-dimer --  07-24 @ 10:41      RADIOLOGY & ADDITIONAL TESTS:    CXR:  Xray Chest 1 View AP -PORTABLE-Routine (07.25.17 @ 08:41)   FINDINGS:  Patchy bilateral airspace disease in the mid to lower lungs again seen.   Bibasilar congestion. Small bilateral pleural effusions. No pneumothorax.    The cardiac silhouette is not accurately assessed by AP technique. Aortic   calcifications.    Ct scan chest:    ekg;    echo:

## 2017-07-26 NOTE — PROGRESS NOTE ADULT - PROBLEM SELECTOR PLAN 1
-IV Lasix ,   daily weights, strict ins and outs  -S/p BiPAP; C/W Nasal Cannula oxygen supplementation   -Monitor BMP for electrolyte changes.

## 2017-07-26 NOTE — PROGRESS NOTE ADULT - SUBJECTIVE AND OBJECTIVE BOX
INTERVAL HPI/OVERNIGHT EVENTS: No overnight events; patient has been off BiPAP for past two days and saturating well on nasal cannula oxygen supplementation.    PRESSORS: [] YES [X] NO    Cardiovascular:  doxazosin 4 milliGRAM(s) Oral at bedtime  labetalol 200 milliGRAM(s) Oral two times a day  amLODIPine   Tablet 10 milliGRAM(s) Oral daily  hydrALAZINE 50 milliGRAM(s) Oral three times a day  furosemide   Injectable 40 milliGRAM(s) IV Push every 12 hours    Pulmonary:  ALBUTerol/ipratropium for Nebulization 3 milliLiter(s) Nebulizer every 6 hours    Hematalogic:  aspirin enteric coated 81 milliGRAM(s) Oral daily  clopidogrel Tablet 75 milliGRAM(s) Oral daily    Other:  PARoxetine 20 milliGRAM(s) Oral daily  gabapentin 300 milliGRAM(s) Oral two times a day  acetaminophen   Tablet. 650 milliGRAM(s) Oral every 6 hours PRN  atorvastatin 40 milliGRAM(s) Oral at bedtime  pantoprazole    Tablet 40 milliGRAM(s) Oral before breakfast    doxazosin 4 milliGRAM(s) Oral at bedtime  PARoxetine 20 milliGRAM(s) Oral daily  gabapentin 300 milliGRAM(s) Oral two times a day  aspirin enteric coated 81 milliGRAM(s) Oral daily  labetalol 200 milliGRAM(s) Oral two times a day  amLODIPine   Tablet 10 milliGRAM(s) Oral daily  hydrALAZINE 50 milliGRAM(s) Oral three times a day  acetaminophen   Tablet. 650 milliGRAM(s) Oral every 6 hours PRN  furosemide   Injectable 40 milliGRAM(s) IV Push every 12 hours  ALBUTerol/ipratropium for Nebulization 3 milliLiter(s) Nebulizer every 6 hours  atorvastatin 40 milliGRAM(s) Oral at bedtime  clopidogrel Tablet 75 milliGRAM(s) Oral daily  pantoprazole    Tablet 40 milliGRAM(s) Oral before breakfast    Drug Dosing Weight  Height (cm): 162.56 (18 Jul 2017 10:42)  Weight (kg): 56.2 (24 Jul 2017 10:00)  BMI (kg/m2): 21.3 (24 Jul 2017 10:00)  BSA (m2): 1.6 (24 Jul 2017 10:00)    CENTRAL LINE: [] YES [X] NO      SANDOVAL: [X] YES [] NO    DATE INSERTED: 7/24  REMOVE: [] YES [X] NO  EXPLAIN: urine output monitoring     A-Line: [] YES [X] NO      PMH -reviewed admission note, no change since admission    ICU Vital Signs Last 24 Hrs  T(C): 36.4 (26 Jul 2017 04:00), Max: 36.4 (26 Jul 2017 04:00)  T(F): 97.5 (26 Jul 2017 04:00), Max: 97.5 (26 Jul 2017 04:00)  HR: 55 (26 Jul 2017 07:00) (55 - 66)  BP: 114/45 (26 Jul 2017 07:00) (101/44 - 135/52)  BP(mean): 62 (26 Jul 2017 07:00) (56 - 75)  ABP: --  ABP(mean): --  RR: 13 (26 Jul 2017 07:00) (9 - 26)  SpO2: 90% (26 Jul 2017 07:00) (88% - 96%)      ABG - ( 24 Jul 2017 11:28 )  pH: 7.45  /  pCO2: 34    /  pO2: 70    / HCO3: 23    / Base Excess: 0.2   /  SaO2: 93                    07-25 @ 07:01  -  07-26 @ 07:00  --------------------------------------------------------  IN: 440 mL / OUT: 3175 mL / NET: -2735 mL            PHYSICAL EXAM:    GENERAL: sitting in bed, upright, in no acute distress  NECK: supple, no JVD, no thyromegaly     SKIN: warm, dry   CHEST/LUNG: Bilateral breath sounds present with no rales, ronchi, or wheezing   HEART: RRR, no m/r/g   ABDOMEN: soft, nontender, nondistended; bowel sounds present.  : sandoval catheter.  EXTREMITIES: +1 non-pitting edema, no cyanosis, no clubbing.    LABS:  CBC Full  -  ( 26 Jul 2017 03:57 )  WBC Count : 12.5 K/uL  Hemoglobin : 12.0 g/dL  Hematocrit : 36.1 %  Platelet Count - Automated : 247 K/uL  Mean Cell Volume : 89.8 fl  Mean Cell Hemoglobin : 29.8 pg  Mean Cell Hemoglobin Concentration : 33.2 gm/dL  Auto Neutrophil # : 10.8 K/uL  Auto Lymphocyte # : 0.9 K/uL  Auto Monocyte # : 0.7 K/uL  Auto Eosinophil # : 0.0 K/uL  Auto Basophil # : 0.1 K/uL  Auto Neutrophil % : 86.3 %  Auto Lymphocyte % : 7.5 %  Auto Monocyte % : 5.7 %  Auto Eosinophil % : 0.0 %  Auto Basophil % : 0.4 %    07-26    136  |  96  |  86<H>  ----------------------------<  124<H>  3.5   |  28  |  1.82<H>    Ca    8.7      26 Jul 2017 03:57  Phos  5.4     07-26  Mg     2.2     07-26    TPro  7.2  /  Alb  3.0<L>  /  TBili  0.8  /  DBili  x   /  AST  39  /  ALT  40  /  AlkPhos  74  07-26    PT/INR - ( 24 Jul 2017 10:41 )   PT: 13.1 sec;   INR: 1.20 ratio         PTT - ( 25 Jul 2017 01:40 )  PTT:30.5 sec    RADIOLOGY & ADDITIONAL STUDIES REVIEWED:      [X]GOALS OF CARE DISCUSSION WITH PATIENT/FAMILY/PROXY:    Assessment and Plan    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 disease), COPD, CAD (s/p stent x 3 and ASA, Plavix medical management), HTN, asthma, depression, anxiety p/w acute shortness of breath with wheezing, bilateral crackles, and elevated BNP.    1) Acute hypoxic respiratory failure likely d/t new onset CHF  -Lasix 40mg q12, daily weights, strict ins and outs  -C/W Nasal Cannula oxygen supplementation      2) Elevated troponin likely d/t demand ischemic as no chest pain or ischemic changes noted on EKG  -Resolved  -C/W ASA, statin, and plavix  -Echo results from 2015 indicate EF of 62% with mild concentric LVH and no diastolic dysfunction    3) Coronary artery disease  -C/W ASA, Statin, and Plavix      4) Essential Hypertension  -Controlled BP  -C/W CCB, hydralazine, labetalol, doxazosin.     5) Laryngeal cancer s/p XRT   -Radiation oncology provider note from 2015, Esequiel Staples MD, indicated that the patient had no evidence of disease    6) Prophylaxis  -SCDs for DVT  -Protonix for peptic ulcers INTERVAL HPI/OVERNIGHT EVENTS: No overnight events; patient has been off BiPAP for past two days and saturating well on nasal cannula oxygen supplementation.    PRESSORS: [] YES [X] NO    Cardiovascular:  doxazosin 4 milliGRAM(s) Oral at bedtime  labetalol 200 milliGRAM(s) Oral two times a day  amLODIPine   Tablet 10 milliGRAM(s) Oral daily  hydrALAZINE 50 milliGRAM(s) Oral three times a day  furosemide   Injectable 40 milliGRAM(s) IV Push every 12 hours    Pulmonary:  ALBUTerol/ipratropium for Nebulization 3 milliLiter(s) Nebulizer every 6 hours    Hematalogic:  aspirin enteric coated 81 milliGRAM(s) Oral daily  clopidogrel Tablet 75 milliGRAM(s) Oral daily    Other:  PARoxetine 20 milliGRAM(s) Oral daily  gabapentin 300 milliGRAM(s) Oral two times a day  acetaminophen   Tablet. 650 milliGRAM(s) Oral every 6 hours PRN  atorvastatin 40 milliGRAM(s) Oral at bedtime  pantoprazole    Tablet 40 milliGRAM(s) Oral before breakfast    doxazosin 4 milliGRAM(s) Oral at bedtime  PARoxetine 20 milliGRAM(s) Oral daily  gabapentin 300 milliGRAM(s) Oral two times a day  aspirin enteric coated 81 milliGRAM(s) Oral daily  labetalol 200 milliGRAM(s) Oral two times a day  amLODIPine   Tablet 10 milliGRAM(s) Oral daily  hydrALAZINE 50 milliGRAM(s) Oral three times a day  acetaminophen   Tablet. 650 milliGRAM(s) Oral every 6 hours PRN  furosemide   Injectable 40 milliGRAM(s) IV Push every 12 hours  ALBUTerol/ipratropium for Nebulization 3 milliLiter(s) Nebulizer every 6 hours  atorvastatin 40 milliGRAM(s) Oral at bedtime  clopidogrel Tablet 75 milliGRAM(s) Oral daily  pantoprazole    Tablet 40 milliGRAM(s) Oral before breakfast    Drug Dosing Weight  Height (cm): 162.56 (18 Jul 2017 10:42)  Weight (kg): 56.2 (24 Jul 2017 10:00)  BMI (kg/m2): 21.3 (24 Jul 2017 10:00)  BSA (m2): 1.6 (24 Jul 2017 10:00)    CENTRAL LINE: [] YES [X] NO      SANDOVAL: [X] YES [] NO    DATE INSERTED: 7/24  REMOVE: [] YES [X] NO  EXPLAIN: urine output monitoring     A-Line: [] YES [X] NO      PMH -reviewed admission note, no change since admission    ICU Vital Signs Last 24 Hrs  T(C): 36.4 (26 Jul 2017 04:00), Max: 36.4 (26 Jul 2017 04:00)  T(F): 97.5 (26 Jul 2017 04:00), Max: 97.5 (26 Jul 2017 04:00)  HR: 55 (26 Jul 2017 07:00) (55 - 66)  BP: 114/45 (26 Jul 2017 07:00) (101/44 - 135/52)  BP(mean): 62 (26 Jul 2017 07:00) (56 - 75)  ABP: --  ABP(mean): --  RR: 13 (26 Jul 2017 07:00) (9 - 26)  SpO2: 90% (26 Jul 2017 07:00) (88% - 96%)      ABG - ( 24 Jul 2017 11:28 )  pH: 7.45  /  pCO2: 34    /  pO2: 70    / HCO3: 23    / Base Excess: 0.2   /  SaO2: 93                    07-25 @ 07:01  -  07-26 @ 07:00  --------------------------------------------------------  IN: 440 mL / OUT: 3175 mL / NET: -2735 mL            PHYSICAL EXAM:    GENERAL: sitting in bed, upright, in no acute distress  NECK: supple, no JVD, no thyromegaly     SKIN: warm, dry   CHEST/LUNG: Bilateral breath sounds present with no rales, ronchi, or wheezing   HEART: RRR, no m/r/g   ABDOMEN: soft, nontender, nondistended; bowel sounds present.  : snadoval catheter.  EXTREMITIES: +1 non-pitting edema, no cyanosis, no clubbing.    LABS:  CBC Full  -  ( 26 Jul 2017 03:57 )  WBC Count : 12.5 K/uL  Hemoglobin : 12.0 g/dL  Hematocrit : 36.1 %  Platelet Count - Automated : 247 K/uL  Mean Cell Volume : 89.8 fl  Mean Cell Hemoglobin : 29.8 pg  Mean Cell Hemoglobin Concentration : 33.2 gm/dL  Auto Neutrophil # : 10.8 K/uL  Auto Lymphocyte # : 0.9 K/uL  Auto Monocyte # : 0.7 K/uL  Auto Eosinophil # : 0.0 K/uL  Auto Basophil # : 0.1 K/uL  Auto Neutrophil % : 86.3 %  Auto Lymphocyte % : 7.5 %  Auto Monocyte % : 5.7 %  Auto Eosinophil % : 0.0 %  Auto Basophil % : 0.4 %    07-26    136  |  96  |  86<H>  ----------------------------<  124<H>  3.5   |  28  |  1.82<H>    Ca    8.7      26 Jul 2017 03:57  Phos  5.4     07-26  Mg     2.2     07-26    TPro  7.2  /  Alb  3.0<L>  /  TBili  0.8  /  DBili  x   /  AST  39  /  ALT  40  /  AlkPhos  74  07-26    PT/INR - ( 24 Jul 2017 10:41 )   PT: 13.1 sec;   INR: 1.20 ratio         PTT - ( 25 Jul 2017 01:40 )  PTT:30.5 sec    RADIOLOGY & ADDITIONAL STUDIES REVIEWED:      [X]GOALS OF CARE DISCUSSION WITH PATIENT/FAMILY/PROXY:    Assessment and Plan    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 disease), COPD, Pulmonary HTN, CAD (s/p stent x 3 and ASA, Plavix medical management), HTN, asthma, depression, anxiety p/w acute shortness of breath with wheezing, bilateral crackles, and elevated BNP.    1) Acute hypoxic respiratory failure likely d/t new onset CHF  -Lasix 40mg daily, daily weights, strict ins and outs  -S/p BiPAP; C/W Nasal Cannula oxygen supplementation     2) Elevated troponin likely d/t demand ischemic as no chest pain or ischemic changes noted on EKG  -Resolved  -C/W ASA, statin, and plavix  -Dr Marquez consulted.     3) Acute Kidney Injury likely due to diuretics   -Baseline Creatinine from August 2016 was 0.8  -Lasix decreased to 40mg IV daily  -C/W CMP monitoring  -Avoid nephrotoxic medications    4) Pulmonary Hypertension  -Echo showed RV systolic pressure is severely increased (PASP 65mmHg).  -Continue oxygen   -C/W Bronchodilators  -C/W CCB     5) Coronary artery disease  -Echo showed Normal LV systolic function (EF 55%) and Grade II Diastolic Dysfunction  -C/W ASA, Statin, and Plavix      6) Essential Hypertension  -Controlled BP  -C/W CCB, hydralazine, labetalol, doxazosin.     7) Laryngeal cancer s/p XRT   -Radiation oncology provider note from 2015, Esequiel Staples MD, indicated that the patient had no evidence of disease    8) Prophylaxis  -Heparin and SCDs for DVT  -Protonix for peptic ulcersTransfer Note

## 2017-07-26 NOTE — PROGRESS NOTE ADULT - PROBLEM SELECTOR PLAN 6
Lasix prn  Oxygen supp  Cardia follow up  Ace vs ARBs  F/u CXR Lasix prn  Oxygen supp  Cardiac follow up  Ace vs ARBs  F/u CXR

## 2017-07-26 NOTE — PROGRESS NOTE ADULT - SUBJECTIVE AND OBJECTIVE BOX
_____________________________________________________________________________  ========>>  M E D I C A L   A T T E N D I N G    F O L L O W  U P  N O T E  <<=========  ---------------------------------------------------------------------------------------------------------------------------------------    - Patient seen and examined by me approximately thirty minutes ago.  - In summary, patient is a 89y year old man who originally presented with respiratory failure, post Bipap.  - Patient today overall doing ok, comfortable, eating OK. reamins stable in the ICU    ==================>> MEDICATIONS <<====================    MEDICATIONS  (STANDING):  doxazosin 4 milliGRAM(s) Oral at bedtime  PARoxetine 20 milliGRAM(s) Oral daily  gabapentin 300 milliGRAM(s) Oral two times a day  aspirin enteric coated 81 milliGRAM(s) Oral daily  labetalol 200 milliGRAM(s) Oral two times a day  amLODIPine   Tablet 10 milliGRAM(s) Oral daily  hydrALAZINE 50 milliGRAM(s) Oral three times a day  ALBUTerol/ipratropium for Nebulization 3 milliLiter(s) Nebulizer every 6 hours  atorvastatin 40 milliGRAM(s) Oral at bedtime  clopidogrel Tablet 75 milliGRAM(s) Oral daily  pantoprazole    Tablet 40 milliGRAM(s) Oral before breakfast  furosemide   Injectable 40 milliGRAM(s) IV Push daily    MEDICATIONS  (PRN):  acetaminophen   Tablet. 650 milliGRAM(s) Oral every 6 hours PRN Mild Pain (1 - 3)      ==================>> REVIEW OF SYSTEM <<=================    GEN: no fever, no chills, no pain  RESP: no SOB now, no cough, no sputum  CVS: no chest pain, no palpitations, no edema  GI: no abdominal pain, no nausea, no vomiting, no constipation, no diarrhea  Neuro: no headache, no dizziness  PSYCH: no anxiety, no depression  Derm : no itching, no rash    ==================>> VITAL SIGNS <<==================    T(C): 36.7 (07-26-17 @ 13:00), Max: 36.7 (07-26-17 @ 13:00)  HR: 62 (07-26-17 @ 18:00) (55 - 65)  BP: 127/42 (07-26-17 @ 18:00) (101/44 - 127/42)  RR: 18 (07-26-17 @ 18:00) (9 - 19)  SpO2: 94% (07-26-17 @ 18:00) (88% - 97%)  Wt(kg): --   CAPILLARY BLOOD GLUCOSE      I&O's Summary    25 Jul 2017 07:01  -  26 Jul 2017 07:00  --------------------------------------------------------  IN: 440 mL / OUT: 3225 mL / NET: -2785 mL    26 Jul 2017 07:01  -  26 Jul 2017 18:41  --------------------------------------------------------  IN: 0 mL / OUT: 450 mL / NET: -450 mL    ==================>> PHYSICAL EXAM <<=================    GEN: Alert and oriented, pleasant, comfprtable, NAD  HEENT: NCAT, PERRL, MMM, hearing intact, + nasal canula O2  Neck: supple , no JVD  CVS: S1S2 , regular , No M/R/G appreciated  PULM: CTA B/L,  no W/R/R appreciated  ABD.: soft. non tender, non distended,  bowel sounds present  Extrem: intact pulses , no edema      + sandoval with yellow urine  Derm: No rash , no ecchymoses  PSYCH : normal mood,  no delusion not anxious     ==================>> LAB AND IMAGING <<==================                        12.0   12.5  )-----------( 247      ( 26 Jul 2017 03:57 )             36.1        WBC:  12.5    (07-26-17 @ 03:57)  WBC:  10.2    (07-25-17 @ 04:26)  WBC:  10.0    (07-24-17 @ 18:17)  WBC:  12.3    (07-24-17 @ 10:41)      136  |  96  |  86<H>  ----------------------------<  124<H>  3.5   |  28  |  1.82<H>    Ca    8.7      26 Jul 2017 03:57  Phos  5.4     07-26  Mg     2.2     07-26    TPro  7.2  /  Alb  3.0<L>  /  TBili  0.8  /  DBili  x   /  AST  39  /  ALT  40  /  AlkPhos  74  07-26    Creatinine:  1.82   (07-26 @ 03:57)  Creatinine:  1.71   (07-25 @ 10:36)  Creatinine:  1.48   (07-25 @ 04:26)  Creatinine:  1.49   (07-24 @ 10:41)  PTT - ( 25 Jul 2017 01:40 )  PTT:30.5 sec                 TSH:      0.32   (07-25-17)           Lipid profile:  (07-25-17)     Total: 150     LDL  : 102     HDL  :31     TG   :83     HgA1C: 5.7  (07-25-17)            < from: Transthoracic Echocardiogram (07.24.17 @ 14:24) >  CONCLUSIONS:  1. Normal mitral valve. Moderate to severe mitral  regurgitation.  2. Calcified trileaflet aortic valve with normal opening.  Mild aortic insufficiency.  3. Normal aortic root.  4. Moderate left atrial enlargement.  5. Normal left ventricular internal dimensions and wall  thicknesses.  6. Normal Left Ventricular Systolic Function,  (EF = 55%)  7. Grade II diastolic dysfunction  8. Normal right atrium.  9. Normal right ventricular size and function.  10. RV systolic pressure is severely increased (PASP 65mm  Hg).  11. There is mild tricuspid regurgitation.  12. There is mild pulmonic regurgitation.  13. Normal pericardium with no pericardial effusion.  < end of copied text >    ________________________________________________________________________  ===============>>  A S S E S S M E N T   A N D   P L A N <<===============  ------------------------------------------------------------------------------------------------------------------------------    **Acute hypoxemic respiratory failure, post bipap, multifactorial, given MR, PAH, COPD, and likley acute exacerbation of diastolic heart failure with pleural effusion  ---cardio, pulm consult appreciated, ICU care appreciated  ---wean off O2 as able  ---nebulizer treatment  ---gentle diuresis as able  ---monitor vitals, creatinine electrolytes  --PT/ OOB as able    **DAVID likely on CKD?  ---renal consult appreciated  ---renal sono  monitor creat closely post diuresis    **Hypertension, CAD  ---Continue with current medications as above  ---DASH diet   ---close monitoring of vitals    **BPH  --- Continue Current medications and monitor.     -GI/DVT Prophylaxis.  -trial of void when OOB  --------------------------------------------  Case discussed with pt, aid, residents, renal  Education given on deep breathing, plan of care  ___________________________  Will follow with you.  Thank you,  RIGO Rich D.O.  Pager: [phone]

## 2017-07-27 LAB
ALBUMIN SERPL ELPH-MCNC: 3.2 G/DL — LOW (ref 3.5–5)
ALP SERPL-CCNC: 79 U/L — SIGNIFICANT CHANGE UP (ref 40–120)
ALT FLD-CCNC: 42 U/L DA — SIGNIFICANT CHANGE UP (ref 10–60)
ANION GAP SERPL CALC-SCNC: 9 MMOL/L — SIGNIFICANT CHANGE UP (ref 5–17)
AST SERPL-CCNC: 28 U/L — SIGNIFICANT CHANGE UP (ref 10–40)
BASOPHILS # BLD AUTO: 0.1 K/UL — SIGNIFICANT CHANGE UP (ref 0–0.2)
BASOPHILS NFR BLD AUTO: 0.6 % — SIGNIFICANT CHANGE UP (ref 0–2)
BILIRUB SERPL-MCNC: 0.8 MG/DL — SIGNIFICANT CHANGE UP (ref 0.2–1.2)
BUN SERPL-MCNC: 100 MG/DL — HIGH (ref 7–18)
CALCIUM SERPL-MCNC: 8.6 MG/DL — SIGNIFICANT CHANGE UP (ref 8.4–10.5)
CHLORIDE SERPL-SCNC: 93 MMOL/L — LOW (ref 96–108)
CO2 SERPL-SCNC: 31 MMOL/L — SIGNIFICANT CHANGE UP (ref 22–31)
CREAT SERPL-MCNC: 1.91 MG/DL — HIGH (ref 0.5–1.3)
EOSINOPHIL # BLD AUTO: 0 K/UL — SIGNIFICANT CHANGE UP (ref 0–0.5)
EOSINOPHIL NFR BLD AUTO: 0.3 % — SIGNIFICANT CHANGE UP (ref 0–6)
GLUCOSE SERPL-MCNC: 112 MG/DL — HIGH (ref 70–99)
HCT VFR BLD CALC: 37.7 % — LOW (ref 39–50)
HGB BLD-MCNC: 12.8 G/DL — LOW (ref 13–17)
LYMPHOCYTES # BLD AUTO: 1.4 K/UL — SIGNIFICANT CHANGE UP (ref 1–3.3)
LYMPHOCYTES # BLD AUTO: 11.4 % — LOW (ref 13–44)
MAGNESIUM SERPL-MCNC: 2.3 MG/DL — SIGNIFICANT CHANGE UP (ref 1.6–2.6)
MCHC RBC-ENTMCNC: 30.3 PG — SIGNIFICANT CHANGE UP (ref 27–34)
MCHC RBC-ENTMCNC: 34 GM/DL — SIGNIFICANT CHANGE UP (ref 32–36)
MCV RBC AUTO: 89.2 FL — SIGNIFICANT CHANGE UP (ref 80–100)
MONOCYTES # BLD AUTO: 0.6 K/UL — SIGNIFICANT CHANGE UP (ref 0–0.9)
MONOCYTES NFR BLD AUTO: 5.2 % — SIGNIFICANT CHANGE UP (ref 2–14)
NEUTROPHILS # BLD AUTO: 9.7 K/UL — HIGH (ref 1.8–7.4)
NEUTROPHILS NFR BLD AUTO: 82.4 % — HIGH (ref 43–77)
PHOSPHATE SERPL-MCNC: 4.8 MG/DL — HIGH (ref 2.5–4.5)
PLATELET # BLD AUTO: 284 K/UL — SIGNIFICANT CHANGE UP (ref 150–400)
POTASSIUM SERPL-MCNC: 3.2 MMOL/L — LOW (ref 3.5–5.3)
POTASSIUM SERPL-SCNC: 3.2 MMOL/L — LOW (ref 3.5–5.3)
PROT SERPL-MCNC: 7.4 G/DL — SIGNIFICANT CHANGE UP (ref 6–8.3)
RBC # BLD: 4.23 M/UL — SIGNIFICANT CHANGE UP (ref 4.2–5.8)
RBC # FLD: 12.9 % — SIGNIFICANT CHANGE UP (ref 10.3–14.5)
SODIUM SERPL-SCNC: 133 MMOL/L — LOW (ref 135–145)
WBC # BLD: 11.8 K/UL — HIGH (ref 3.8–10.5)
WBC # FLD AUTO: 11.8 K/UL — HIGH (ref 3.8–10.5)

## 2017-07-27 PROCEDURE — 71010: CPT | Mod: 26

## 2017-07-27 RX ORDER — FUROSEMIDE 40 MG
40 TABLET ORAL DAILY
Qty: 0 | Refills: 0 | Status: DISCONTINUED | OUTPATIENT
Start: 2017-07-27 | End: 2017-07-28

## 2017-07-27 RX ORDER — POTASSIUM CHLORIDE 20 MEQ
40 PACKET (EA) ORAL ONCE
Qty: 0 | Refills: 0 | Status: COMPLETED | OUTPATIENT
Start: 2017-07-27 | End: 2017-07-27

## 2017-07-27 RX ORDER — HYDRALAZINE HCL 50 MG
25 TABLET ORAL THREE TIMES A DAY
Qty: 0 | Refills: 0 | Status: DISCONTINUED | OUTPATIENT
Start: 2017-07-27 | End: 2017-08-09

## 2017-07-27 RX ORDER — SPIRONOLACTONE 25 MG/1
25 TABLET, FILM COATED ORAL DAILY
Qty: 0 | Refills: 0 | Status: DISCONTINUED | OUTPATIENT
Start: 2017-07-27 | End: 2017-08-02

## 2017-07-27 RX ORDER — POTASSIUM CHLORIDE 20 MEQ
40 PACKET (EA) ORAL ONCE
Qty: 0 | Refills: 0 | Status: DISCONTINUED | OUTPATIENT
Start: 2017-07-27 | End: 2017-07-27

## 2017-07-27 RX ORDER — LABETALOL HCL 100 MG
100 TABLET ORAL
Qty: 0 | Refills: 0 | Status: DISCONTINUED | OUTPATIENT
Start: 2017-07-27 | End: 2017-07-30

## 2017-07-27 RX ORDER — AMLODIPINE BESYLATE 2.5 MG/1
2.5 TABLET ORAL DAILY
Qty: 0 | Refills: 0 | Status: DISCONTINUED | OUTPATIENT
Start: 2017-07-27 | End: 2017-08-09

## 2017-07-27 RX ADMIN — Medication 81 MILLIGRAM(S): at 14:39

## 2017-07-27 RX ADMIN — ATORVASTATIN CALCIUM 40 MILLIGRAM(S): 80 TABLET, FILM COATED ORAL at 21:33

## 2017-07-27 RX ADMIN — Medication 4 MILLIGRAM(S): at 21:33

## 2017-07-27 RX ADMIN — PANTOPRAZOLE SODIUM 40 MILLIGRAM(S): 20 TABLET, DELAYED RELEASE ORAL at 05:30

## 2017-07-27 RX ADMIN — AMLODIPINE BESYLATE 10 MILLIGRAM(S): 2.5 TABLET ORAL at 05:30

## 2017-07-27 RX ADMIN — Medication 40 MILLIGRAM(S): at 05:31

## 2017-07-27 RX ADMIN — CLOPIDOGREL BISULFATE 75 MILLIGRAM(S): 75 TABLET, FILM COATED ORAL at 14:39

## 2017-07-27 RX ADMIN — Medication 3 MILLILITER(S): at 09:22

## 2017-07-27 RX ADMIN — Medication 25 MILLIGRAM(S): at 21:33

## 2017-07-27 RX ADMIN — Medication 3 MILLILITER(S): at 21:46

## 2017-07-27 RX ADMIN — Medication 50 MILLIGRAM(S): at 05:30

## 2017-07-27 RX ADMIN — GABAPENTIN 300 MILLIGRAM(S): 400 CAPSULE ORAL at 05:30

## 2017-07-27 RX ADMIN — GABAPENTIN 300 MILLIGRAM(S): 400 CAPSULE ORAL at 18:52

## 2017-07-27 RX ADMIN — Medication 3 MILLILITER(S): at 02:35

## 2017-07-27 RX ADMIN — Medication 40 MILLIEQUIVALENT(S): at 08:48

## 2017-07-27 RX ADMIN — Medication 3 MILLILITER(S): at 15:00

## 2017-07-27 RX ADMIN — Medication 200 MILLIGRAM(S): at 05:30

## 2017-07-27 RX ADMIN — Medication 20 MILLIGRAM(S): at 14:39

## 2017-07-27 NOTE — PROGRESS NOTE ADULT - SUBJECTIVE AND OBJECTIVE BOX
_____________________________________________________________________________  ========>>  M E D I C A L   A T T E N D I N G    F O L L O W  U P  N O T E  <<=========  ---------------------------------------------------------------------------------------------------------------------------------------    - Patient seen and examined by me approximately thirty minutes ago.  - In summary, patient is a 89y year old man who originally presented with respiratory failure, post Bipap.  - Patient today overall doing ok, comfortable, eating OK. now on the medical floor    ==================>> MEDICATIONS <<====================    doxazosin 4 milliGRAM(s) Oral at bedtime  PARoxetine 20 milliGRAM(s) Oral daily  gabapentin 300 milliGRAM(s) Oral two times a day  aspirin enteric coated 81 milliGRAM(s) Oral daily  ALBUTerol/ipratropium for Nebulization 3 milliLiter(s) Nebulizer every 6 hours  atorvastatin 40 milliGRAM(s) Oral at bedtime  clopidogrel Tablet 75 milliGRAM(s) Oral daily  pantoprazole    Tablet 40 milliGRAM(s) Oral before breakfast  furosemide   Injectable 40 milliGRAM(s) IV Push daily  spironolactone 25 milliGRAM(s) Oral daily  amLODIPine   Tablet 2.5 milliGRAM(s) Oral daily  hydrALAZINE 25 milliGRAM(s) Oral three times a day  labetalol 100 milliGRAM(s) Oral two times a day    MEDICATIONS  (PRN):  acetaminophen   Tablet. 650 milliGRAM(s) Oral every 6 hours PRN Mild Pain (1 - 3)    ==================>> REVIEW OF SYSTEM <<=================    GEN: no fever, no chills, no pain  RESP: no SOB now, no cough, no sputum  CVS: no chest pain, no palpitations, no edema  GI: no abdominal pain, no nausea, no vomiting, no constipation, no diarrhea  Neuro: no headache, no dizziness  PSYCH: no anxiety, no depression  Derm : no itching, no rash    ==================>> VITAL SIGNS <<==================    Vital Signs Last 24 Hrs  T(C): 36.4 (07-27-17 @ 14:15)  T(F): 97.6 (07-27-17 @ 14:15), Max: 97.7 (07-27-17 @ 05:00)  HR: 62 (07-27-17 @ 14:15) (56 - 71)  BP: 124/46 (07-27-17 @ 14:15)  BP(mean): 64 (07-27-17 @ 08:00) (59 - 84)  RR: 18 (07-27-17 @ 14:15) (10 - 26)  SpO2: 95% (07-27-17 @ 14:15) (88% - 99%)    ==================>> PHYSICAL EXAM <<=================    GEN: Alert and oriented, pleasant, comfortable NAD  HEENT: NCAT, PERRL, MMM, hearing intact, + nasal canula O2  Neck: supple , no JVD  CVS: S1S2 , regular , No M/R/G appreciated  PULM: CTA B/L,  no W/R/R appreciated  ABD.: soft. non tender, non distended,  bowel sounds present  Extrem: intact pulses , no edema      sandoval out, not making much urine per family  Derm: No rash , no ecchymoses  PSYCH : normal mood,  no delusion not anxious     ==================>> LAB AND IMAGING <<==================                                   12.8   11.8  )-----------( 284      ( 27 Jul 2017 03:57 )             37.7        07-27    133<L>  |  93<L>  |  100<H>  ----------------------------<  112<H>  3.2<L>   |  31  |  1.91<H>    Ca    8.6      27 Jul 2017 03:57  Phos  4.8     07-27  Mg     2.3     07-27    TPro  7.4  /  Alb  3.2<L>  /  TBili  0.8  /  DBili  x   /  AST  28  /  ALT  42  /  AlkPhos  79  07-27    Creatinine:  1.91   (07-27 @ 03:57)  Creatinine:  1.82   (07-26 @ 03:57)  Creatinine:  1.71   (07-25 @ 10:36)  Creatinine:  1.48   (07-25 @ 04:26)              TSH:      0.32   (07-25-17)           Lipid profile:  (07-25-17)     Total: 150     LDL  : 102     HDL  :31     TG   :83     HgA1C: 5.7  (07-25-17)            < from: Transthoracic Echocardiogram (07.24.17 @ 14:24) >  CONCLUSIONS:  1. Normal mitral valve. Moderate to severe mitral  regurgitation.  2. Calcified trileaflet aortic valve with normal opening.  Mild aortic insufficiency.  3. Normal aortic root.  4. Moderate left atrial enlargement.  5. Normal left ventricular internal dimensions and wall  thicknesses.  6. Normal Left Ventricular Systolic Function,  (EF = 55%)  7. Grade II diastolic dysfunction  8. Normal right atrium.  9. Normal right ventricular size and function.  10. RV systolic pressure is severely increased (PASP 65mm  Hg).  11. There is mild tricuspid regurgitation.  12. There is mild pulmonic regurgitation.  13. Normal pericardium with no pericardial effusion.  < end of copied text >    ________________________________________________________________________  ===============>>  A S S E S S M E N T   A N D   P L A N <<===============  ------------------------------------------------------------------------------------------------------------------------------    **Acute hypoxemic respiratory failure, post bipap, multifactorial, given MR, severe PAH, COPD, and likely acute exacerbation of diastolic heart failure with pleural effusion  ---cardio, pulm f/u appreciated  ---wean off O2 as able  ---nebulizer treatment  ---gentle diuresis as ordered  ---monitor vitals, creatinine electrolytes  --PT/ OOB as able    **DAVID likely on CKD?, Hyponatremia  ---renal consult appreciated  monitor creat closely on diuretics    **Hypertension, CAD, MR, PAH  ---Continue with current medications as above  ---DASH diet   ---close monitoring of vitals    **BPH  --- Continue Current medications and monitor.     -GI/DVT Prophylaxis.  PT  --------------------------------------------  Case discussed with pt, family, aid, residents, renal  Education given on deep breathing, plan of care  ___________________________    RIGO Rich D.O.  Pager: [phone]

## 2017-07-27 NOTE — PROGRESS NOTE ADULT - ATTENDING COMMENTS
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    1) Acute hypoxic respiratory failure likely d/t new onset CHF  -Lasix 40mg q12, daily weights, strict ins and outs  -C/W Nasal Cannula oxygen supplementation    - off BiLevel vent  -continue diuresis    2) Elevated troponin likely d/t demand ischemic as no chest pain or ischemic changes noted on EKG  -Resolved  -C/W ASA, statin, and plavix  -Echo results from 2015 indicate EF of 62% with mild concentric LVH and no diastolic dysfunction    3) Coronary artery disease  -C/W ASA, Statin, and Plavix
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    1) Acute hypoxic respiratory failure likely d/t new onset CHF with pleural effusions  -S/p BiPAP; C/W Nasal Cannula oxygen supplementation   -CXR shows resolved pleural effusions  -Lasix 40mg IV BID switched to 40mg PO dialy  -Continue daily weights, strict ins and outs  - pat is off Bipap
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 disease), COPD, CAD (s/p stent x 3 and ASA, Plavix medical management), HTN, asthma, depression, anxiety p/w acute shortness of breath with wheezing,     1) Acute hypoxic respiratory failure likely d/t new onset CHF, pulmonary edema  -Lasix 40 q12, daily weights, strict ins and outs  -S/p BiPAP; C/W Nasal Cannula oxygen supplementation   -Monitor BMP for electrolyte changes due to overnight diuresis     2) Elevated troponin likely d/t demand ischemic as no chest pain or ischemic changes noted on EKG  -Resolved  -C/W ASA, statin, and plavix  -Echo results from 2015 indicate EF of 62% with mild concentric LVH and no diastolic dysfunction  -Dr Marquez consulted.     3) Coronary artery disease  -C/W ASA, Statin, and Plavix      4) Essential Hypertension  -Controlled BP  -C/W CCB, hydralazine, labetalol, doxazosin.     5) Laryngeal cancer s/p XRT   -Radiation oncology provider note from 2015, Esequiel Staples MD, indicated that the patient had no evidence of disease

## 2017-07-27 NOTE — PROGRESS NOTE ADULT - SUBJECTIVE AND OBJECTIVE BOX
INTERVAL HPI/OVERNIGHT EVENTS: No overnight events     PRESSORS: [] YES [X] NO    Cardiovascular:  doxazosin 4 milliGRAM(s) Oral at bedtime  labetalol 200 milliGRAM(s) Oral two times a day  amLODIPine   Tablet 10 milliGRAM(s) Oral daily  hydrALAZINE 50 milliGRAM(s) Oral three times a day    Pulmonary:  ALBUTerol/ipratropium for Nebulization 3 milliLiter(s) Nebulizer every 6 hours    Hematalogic:  aspirin enteric coated 81 milliGRAM(s) Oral daily  clopidogrel Tablet 75 milliGRAM(s) Oral daily    Other:  PARoxetine 20 milliGRAM(s) Oral daily  gabapentin 300 milliGRAM(s) Oral two times a day  acetaminophen   Tablet. 650 milliGRAM(s) Oral every 6 hours PRN  atorvastatin 40 milliGRAM(s) Oral at bedtime  pantoprazole    Tablet 40 milliGRAM(s) Oral before breakfast  potassium chloride    Tablet ER 40 milliEquivalent(s) Oral once    doxazosin 4 milliGRAM(s) Oral at bedtime  PARoxetine 20 milliGRAM(s) Oral daily  gabapentin 300 milliGRAM(s) Oral two times a day  aspirin enteric coated 81 milliGRAM(s) Oral daily  labetalol 200 milliGRAM(s) Oral two times a day  amLODIPine   Tablet 10 milliGRAM(s) Oral daily  hydrALAZINE 50 milliGRAM(s) Oral three times a day  acetaminophen   Tablet. 650 milliGRAM(s) Oral every 6 hours PRN  ALBUTerol/ipratropium for Nebulization 3 milliLiter(s) Nebulizer every 6 hours  atorvastatin 40 milliGRAM(s) Oral at bedtime  clopidogrel Tablet 75 milliGRAM(s) Oral daily  pantoprazole    Tablet 40 milliGRAM(s) Oral before breakfast  potassium chloride    Tablet ER 40 milliEquivalent(s) Oral once    Drug Dosing Weight  Height (cm): 162.56 (18 Jul 2017 10:42)  Weight (kg): 56.2 (24 Jul 2017 10:00)  BMI (kg/m2): 21.3 (24 Jul 2017 10:00)  BSA (m2): 1.6 (24 Jul 2017 10:00)    CENTRAL LINE: [] YES [X] NO      SANDOVAL: [X] YES [] NO    DATE INSERTED: 7/24  REMOVE: [] YES [X] NO  EXPLAIN: urine output monitoring     A-Line: [] YES [X] NO        PMH -reviewed admission note, no change since admission    ICU Vital Signs Last 24 Hrs  T(C): 36.5 (27 Jul 2017 05:00), Max: 36.7 (26 Jul 2017 13:00)  T(F): 97.7 (27 Jul 2017 05:00), Max: 98.1 (26 Jul 2017 13:00)  HR: 59 (27 Jul 2017 06:00) (56 - 71)  BP: 132/49 (27 Jul 2017 06:00) (110/45 - 146/56)  BP(mean): 68 (27 Jul 2017 06:00) (58 - 84)  ABP: --  ABP(mean): --  RR: 10 (27 Jul 2017 06:00) (10 - 26)  SpO2: 94% (27 Jul 2017 06:00) (88% - 99%)            07-26 @ 07:01  -  07-27 @ 07:00  --------------------------------------------------------  IN: 960 mL / OUT: 3000 mL / NET: -2040 mL            PHYSICAL EXAM:    GENERAL: sitting in bed, upright, in no acute distress  NECK: supple, no JVD, no thyromegaly     SKIN: warm, dry   CHEST/LUNG: Bilateral breath sounds present with no rales, ronchi, or wheezing   HEART: RRR, no m/r/g   ABDOMEN: soft, nontender, nondistended; bowel sounds present.  : sandoval catheter.  EXTREMITIES: +1 non-pitting edema, no cyanosis, no clubbing.    LABS:  CBC Full  -  ( 27 Jul 2017 03:57 )  WBC Count : 11.8 K/uL  Hemoglobin : 12.8 g/dL  Hematocrit : 37.7 %  Platelet Count - Automated : 284 K/uL  Mean Cell Volume : 89.2 fl  Mean Cell Hemoglobin : 30.3 pg  Mean Cell Hemoglobin Concentration : 34.0 gm/dL  Auto Neutrophil # : 9.7 K/uL  Auto Lymphocyte # : 1.4 K/uL  Auto Monocyte # : 0.6 K/uL  Auto Eosinophil # : 0.0 K/uL  Auto Basophil # : 0.1 K/uL  Auto Neutrophil % : 82.4 %  Auto Lymphocyte % : 11.4 %  Auto Monocyte % : 5.2 %  Auto Eosinophil % : 0.3 %  Auto Basophil % : 0.6 %    07-27    133<L>  |  93<L>  |  100<H>  ----------------------------<  112<H>  3.2<L>   |  31  |  1.91<H>    Ca    8.6      27 Jul 2017 03:57  Phos  4.8     07-27  Mg     2.3     07-27    TPro  7.4  /  Alb  3.2<L>  /  TBili  0.8  /  DBili  x   /  AST  28  /  ALT  42  /  AlkPhos  79  07-27      RADIOLOGY & ADDITIONAL STUDIES REVIEWED: CXR is improved relative to last radiograph. Less interstitial markings in right lung field.      [X]GOALS OF CARE DISCUSSION WITH PATIENT/FAMILY/PROXY:    Assessment and Plan    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 disease), COPD, Pulmonary HTN, CAD (s/p stent x 3 and ASA, Plavix medical management), HTN, asthma, depression, anxiety p/w acute shortness of breath with wheezing, bilateral crackles, and elevated BNP.    1) Acute hypoxic respiratory failure likely d/t new onset CHF with pleural effusions  -S/p BiPAP; C/W Nasal Cannula oxygen supplementation   -CXR shows resolved pleural effusions  -Lasix 40mg IV BID switched to 40mg PO dialy  -Continue daily weights, strict ins and outs      2) Elevated troponin likely d/t demand ischemic as no chest pain or ischemic changes noted on EKG  -Resolved  -C/W ASA, statin, and plavix  -Dr Marquez consulted.     3) Acute Kidney Injury likely due to diuresis    -Baseline Creatinine from August 2016 was 0.8  -Creatinine increasing, most recently 1.82; likely pre-renal azotemia secondary to excessive diuresis   -Lasix decreased to 40mg PO  -C/W CMP monitoring  -Avoid nephrotoxic medications    4) Pulmonary Hypertension  -Echo showed RV systolic pressure is severely increased (PASP 65mmHg).  -Continue oxygen   -C/W Bronchodilators  -C/W CCB     5) Coronary artery disease  -Echo showed Normal LV systolic function (EF 55%) and Grade II Diastolic Dysfunction  -C/W ASA, Statin, and Plavix      6) Essential Hypertension  -Controlled BP  -C/W CCB, hydralazine, labetalol, doxazosin.     7) Laryngeal cancer s/p XRT   -Radiation oncology provider note from 2015, Esequiel Staples MD, indicated that the patient had no evidence of disease    8) Prophylaxis  -Heparin and SCDs for DVT  -Protonix for peptic ulcersTransfer Note INTERVAL HPI/OVERNIGHT EVENTS: No overnight events     PRESSORS: [] YES [X] NO    Cardiovascular:  doxazosin 4 milliGRAM(s) Oral at bedtime  labetalol 200 milliGRAM(s) Oral two times a day  amLODIPine   Tablet 10 milliGRAM(s) Oral daily  hydrALAZINE 50 milliGRAM(s) Oral three times a day    Pulmonary:  ALBUTerol/ipratropium for Nebulization 3 milliLiter(s) Nebulizer every 6 hours    Hematalogic:  aspirin enteric coated 81 milliGRAM(s) Oral daily  clopidogrel Tablet 75 milliGRAM(s) Oral daily    Other:  PARoxetine 20 milliGRAM(s) Oral daily  gabapentin 300 milliGRAM(s) Oral two times a day  acetaminophen   Tablet. 650 milliGRAM(s) Oral every 6 hours PRN  atorvastatin 40 milliGRAM(s) Oral at bedtime  pantoprazole    Tablet 40 milliGRAM(s) Oral before breakfast  potassium chloride    Tablet ER 40 milliEquivalent(s) Oral once    doxazosin 4 milliGRAM(s) Oral at bedtime  PARoxetine 20 milliGRAM(s) Oral daily  gabapentin 300 milliGRAM(s) Oral two times a day  aspirin enteric coated 81 milliGRAM(s) Oral daily  labetalol 200 milliGRAM(s) Oral two times a day  amLODIPine   Tablet 10 milliGRAM(s) Oral daily  hydrALAZINE 50 milliGRAM(s) Oral three times a day  acetaminophen   Tablet. 650 milliGRAM(s) Oral every 6 hours PRN  ALBUTerol/ipratropium for Nebulization 3 milliLiter(s) Nebulizer every 6 hours  atorvastatin 40 milliGRAM(s) Oral at bedtime  clopidogrel Tablet 75 milliGRAM(s) Oral daily  pantoprazole    Tablet 40 milliGRAM(s) Oral before breakfast  potassium chloride    Tablet ER 40 milliEquivalent(s) Oral once    Drug Dosing Weight  Height (cm): 162.56 (18 Jul 2017 10:42)  Weight (kg): 56.2 (24 Jul 2017 10:00)  BMI (kg/m2): 21.3 (24 Jul 2017 10:00)  BSA (m2): 1.6 (24 Jul 2017 10:00)    CENTRAL LINE: [] YES [X] NO      SANDOVAL: [X] YES [] NO    DATE INSERTED: 7/24  REMOVE: [X] YES [] NO      A-Line: [] YES [X] NO        PMH -reviewed admission note, no change since admission    ICU Vital Signs Last 24 Hrs  T(C): 36.5 (27 Jul 2017 05:00), Max: 36.7 (26 Jul 2017 13:00)  T(F): 97.7 (27 Jul 2017 05:00), Max: 98.1 (26 Jul 2017 13:00)  HR: 59 (27 Jul 2017 06:00) (56 - 71)  BP: 132/49 (27 Jul 2017 06:00) (110/45 - 146/56)  BP(mean): 68 (27 Jul 2017 06:00) (58 - 84)  ABP: --  ABP(mean): --  RR: 10 (27 Jul 2017 06:00) (10 - 26)  SpO2: 94% (27 Jul 2017 06:00) (88% - 99%)            07-26 @ 07:01  -  07-27 @ 07:00  --------------------------------------------------------  IN: 960 mL / OUT: 3000 mL / NET: -2040 mL            PHYSICAL EXAM:    GENERAL: sitting in bed, upright, in no acute distress  NECK: supple, no JVD, no thyromegaly     SKIN: warm, dry   CHEST/LUNG: Bilateral breath sounds present with no rales, ronchi, or wheezing   HEART: RRR, no m/r/g   ABDOMEN: soft, nontender, nondistended; bowel sounds present.  : sandoval catheter.  EXTREMITIES: +1 non-pitting edema, no cyanosis, no clubbing.    LABS:  CBC Full  -  ( 27 Jul 2017 03:57 )  WBC Count : 11.8 K/uL  Hemoglobin : 12.8 g/dL  Hematocrit : 37.7 %  Platelet Count - Automated : 284 K/uL  Mean Cell Volume : 89.2 fl  Mean Cell Hemoglobin : 30.3 pg  Mean Cell Hemoglobin Concentration : 34.0 gm/dL  Auto Neutrophil # : 9.7 K/uL  Auto Lymphocyte # : 1.4 K/uL  Auto Monocyte # : 0.6 K/uL  Auto Eosinophil # : 0.0 K/uL  Auto Basophil # : 0.1 K/uL  Auto Neutrophil % : 82.4 %  Auto Lymphocyte % : 11.4 %  Auto Monocyte % : 5.2 %  Auto Eosinophil % : 0.3 %  Auto Basophil % : 0.6 %    07-27    133<L>  |  93<L>  |  100<H>  ----------------------------<  112<H>  3.2<L>   |  31  |  1.91<H>    Ca    8.6      27 Jul 2017 03:57  Phos  4.8     07-27  Mg     2.3     07-27    TPro  7.4  /  Alb  3.2<L>  /  TBili  0.8  /  DBili  x   /  AST  28  /  ALT  42  /  AlkPhos  79  07-27      RADIOLOGY & ADDITIONAL STUDIES REVIEWED: CXR is improved relative to last radiograph. Less interstitial markings in right lung field.      [X]GOALS OF CARE DISCUSSION WITH PATIENT/FAMILY/PROXY:    Assessment and Plan    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 disease), COPD, Pulmonary HTN, CAD (s/p stent x 3 and ASA, Plavix medical management), HTN, asthma, depression, anxiety p/w acute shortness of breath with wheezing, bilateral crackles, and elevated BNP.    1) Acute hypoxic respiratory failure likely d/t new onset CHF with pleural effusions  -S/p BiPAP; C/W Nasal Cannula oxygen supplementation   -CXR shows resolved pleural effusions  -Lasix 40mg IV BID switched to 40mg PO dialy  -Continue daily weights, strict ins and outs      2) Elevated troponin likely d/t demand ischemic as no chest pain or ischemic changes noted on EKG  -Resolved  -C/W ASA, statin, and plavix  -Dr Marquez consulted.     3) Acute Kidney Injury likely due to diuresis    -Baseline Creatinine from August 2016 was 0.8  -Creatinine increasing, most recently 1.82; likely pre-renal azotemia secondary to excessive diuresis   -Lasix decreased to 40mg PO  -C/W CMP monitoring  -Avoid nephrotoxic medications    4) Pulmonary Hypertension  -Echo showed RV systolic pressure is severely increased (PASP 65mmHg).  -Continue oxygen   -C/W Bronchodilators  -C/W CCB     5) Coronary artery disease  -Echo showed Normal LV systolic function (EF 55%) and Grade II Diastolic Dysfunction  -C/W ASA, Statin, and Plavix      6) Essential Hypertension  -Controlled BP  -C/W CCB, hydralazine, labetalol, doxazosin.     7) Laryngeal cancer s/p XRT   -Radiation oncology provider note from 2015, Esequiel Staples MD, indicated that the patient had no evidence of disease    8) Prophylaxis  -Heparin and SCDs for DVT  -Protonix for peptic ulcersTransfer Note

## 2017-07-27 NOTE — PROGRESS NOTE ADULT - SUBJECTIVE AND OBJECTIVE BOX
Patient is a 89y old  Male who presents with a chief complaint of shortness of breath (24 Jul 2017 14:08)  HPI:89 yr old  [place]ese-speaking male from home, former smoker, lives with wife, PMH laryngeal cancer (diagnosed 2015, s/p XRT, no evidence disease), COPD, CAD s/p stents x 3, s/p left carotid stent, HTN, asthma, depression, anxiety p/w acute shortness of breath.  History obtained from daughter-in-law  with [place]  (daughter in law is Angela, [phone]).  Patient was in usual health when this morning ~ 9am, patient endorsed palpitations and was acutely short of breath.  911 called, EMS placed patient on 100% non-rebreather and gave nebulizer for wheezing. Denies fever, chills, chest pain, changes in vision, cough, sore throat, vomiting, abdominal pain, diarrhea, dysuria, leg swelling, rash.   On BIPAP in ED for hypoxia,CXR: bilateral congestion, s/p Lasix 40mg, solumedrol and duoneb.      Patient is being followed for SOB, COPD, bibasilar congestion and bilateral patchy air space on CXR.  Patient today is resting comfortable in bed in NAD       INTERVAL HPI/OVERNIGHT EVENTS:      VITAL SIGNS:  T(F): 97.7 (07-27-17 @ 05:00)  HR: 56 (07-27-17 @ 08:00)  BP: 109/47 (07-27-17 @ 08:00)  RR: 17 (07-27-17 @ 08:00)  SpO2: 92% (07-27-17 @ 08:00)  Wt(kg): --  I&O's Detail    26 Jul 2017 07:01  -  27 Jul 2017 07:00  --------------------------------------------------------  IN:    Oral Fluid: 960 mL  Total IN: 960 mL    OUT:    Indwelling Catheter - Urethral: 3000 mL  Total OUT: 3000 mL    Total NET: -2040 mL      27 Jul 2017 07:01  -  27 Jul 2017 09:48  --------------------------------------------------------  IN:    Oral Fluid: 350 mL  Total IN: 350 mL    OUT:    Indwelling Catheter - Urethral: 265 mL  Total OUT: 265 mL    Total NET: 85 mL              REVIEW OF SYSTEMS:    CONSTITUTIONAL:  No fevers, chills, sweats    HEENT:  Eyes:  No diplopia or blurred vision. ENT:  No earache, sore throat or runny nose.    CARDIOVASCULAR:  No pressure, squeezing, tightness, or heaviness about the chest; no palpitations.    RESPIRATORY:  Per HPI    GASTROINTESTINAL:  No abdominal pain, nausea, vomiting or diarrhea.    GENITOURINARY:  No dysuria, frequency or urgency.    NEUROLOGIC:  No paresthesias, fasciculations, seizures or weakness.    PSYCHIATRIC:  No disorder of thought or mood.      PHYSICAL EXAM:    Constitutional: Well developed and nourished  Eyes:Perrla  ENMT: normal  Neck:supple  Respiratory: good air entry  Cardiovascular: S1 S2 regular  Gastrointestinal: Soft, Non tender  Extremities: No edema  Vascular:normal  Neurological:Awake, alert,Ox3  Musculoskeletal:Normal      MEDICATIONS  (STANDING):  doxazosin 4 milliGRAM(s) Oral at bedtime  PARoxetine 20 milliGRAM(s) Oral daily  gabapentin 300 milliGRAM(s) Oral two times a day  aspirin enteric coated 81 milliGRAM(s) Oral daily  labetalol 200 milliGRAM(s) Oral two times a day  amLODIPine   Tablet 10 milliGRAM(s) Oral daily  hydrALAZINE 50 milliGRAM(s) Oral three times a day  ALBUTerol/ipratropium for Nebulization 3 milliLiter(s) Nebulizer every 6 hours  atorvastatin 40 milliGRAM(s) Oral at bedtime  clopidogrel Tablet 75 milliGRAM(s) Oral daily  pantoprazole    Tablet 40 milliGRAM(s) Oral before breakfast    MEDICATIONS  (PRN):  acetaminophen   Tablet. 650 milliGRAM(s) Oral every 6 hours PRN Mild Pain (1 - 3)      Allergies    No Known Allergies    Intolerances        LABS:                        12.8   11.8  )-----------( 284      ( 27 Jul 2017 03:57 )             37.7     07-27    133<L>  |  93<L>  |  100<H>  ----------------------------<  112<H>  3.2<L>   |  31  |  1.91<H>    Ca    8.6      27 Jul 2017 03:57  Phos  4.8     07-27  Mg     2.3     07-27    TPro  7.4  /  Alb  3.2<L>  /  TBili  0.8  /  DBili  x   /  AST  28  /  ALT  42  /  AlkPhos  79  07-27              CAPILLARY BLOOD GLUCOSE        pro-bnp [id number] 07-24 @ 10:41     d-dimer --  07-24 @ 10:41      RADIOLOGY & ADDITIONAL TESTS:    CXR:    Xray Chest 1 View AP -PORTABLE-Routine (07.27.17 @ 06:37)   FINDINGS/  IMPRESSION:  Increased interstitial lung markings without significant change.   Superimposed mild airspace opacities lung bases overall improving.    No significant pleural effusion.    Heart size cannot be accurately assessed in this projection, but appear   enlarged.    Ct scan chest:    ekg;    echo: Patient is a 89y old  Male who presents with a chief complaint of shortness of breath (24 Jul 2017 14:08)        Patient is being followed for SOB, COPD, bibasilar congestion and bilateral patchy air space on CXR.  Patient today is resting comfortable in bed in NAD       INTERVAL HPI/OVERNIGHT EVENTS:Alert ,awake and stable       VITAL SIGNS:  T(F): 97.7 (07-27-17 @ 05:00)  HR: 56 (07-27-17 @ 08:00)  BP: 109/47 (07-27-17 @ 08:00)  RR: 17 (07-27-17 @ 08:00)  SpO2: 92% (07-27-17 @ 08:00)  Wt(kg): --  I&O's Detail    26 Jul 2017 07:01  -  27 Jul 2017 07:00  --------------------------------------------------------  IN:    Oral Fluid: 960 mL  Total IN: 960 mL    OUT:    Indwelling Catheter - Urethral: 3000 mL  Total OUT: 3000 mL    Total NET: -2040 mL      27 Jul 2017 07:01  -  27 Jul 2017 09:48  --------------------------------------------------------  IN:    Oral Fluid: 350 mL  Total IN: 350 mL    OUT:    Indwelling Catheter - Urethral: 265 mL  Total OUT: 265 mL    Total NET: 85 mL              REVIEW OF SYSTEMS:    CONSTITUTIONAL:  No fevers, chills, sweats    HEENT:  Eyes:  No diplopia or blurred vision. ENT:  No earache, sore throat or runny nose.    CARDIOVASCULAR:  No pressure, squeezing, tightness, or heaviness about the chest; no palpitations.    RESPIRATORY:  Per HPI    GASTROINTESTINAL:  No abdominal pain, nausea, vomiting or diarrhea.    GENITOURINARY:  No dysuria, frequency or urgency.    NEUROLOGIC:  No paresthesias, fasciculations, seizures or weakness.    PSYCHIATRIC:  No disorder of thought or mood.      PHYSICAL EXAM:    Constitutional: Well developed and nourished  Eyes:Perrla  ENMT: normal  Neck:supple  Respiratory: good air entry  Cardiovascular: S1 S2 regular  Gastrointestinal: Soft, Non tender  Extremities: No edema  Vascular:normal  Neurological:Awake, alert,Ox3  Musculoskeletal:Normal      MEDICATIONS  (STANDING):  doxazosin 4 milliGRAM(s) Oral at bedtime  PARoxetine 20 milliGRAM(s) Oral daily  gabapentin 300 milliGRAM(s) Oral two times a day  aspirin enteric coated 81 milliGRAM(s) Oral daily  labetalol 200 milliGRAM(s) Oral two times a day  amLODIPine   Tablet 10 milliGRAM(s) Oral daily  hydrALAZINE 50 milliGRAM(s) Oral three times a day  ALBUTerol/ipratropium for Nebulization 3 milliLiter(s) Nebulizer every 6 hours  atorvastatin 40 milliGRAM(s) Oral at bedtime  clopidogrel Tablet 75 milliGRAM(s) Oral daily  pantoprazole    Tablet 40 milliGRAM(s) Oral before breakfast    MEDICATIONS  (PRN):  acetaminophen   Tablet. 650 milliGRAM(s) Oral every 6 hours PRN Mild Pain (1 - 3)      Allergies    No Known Allergies    Intolerances        LABS:                        12.8   11.8  )-----------( 284      ( 27 Jul 2017 03:57 )             37.7     07-27    133<L>  |  93<L>  |  100<H>  ----------------------------<  112<H>  3.2<L>   |  31  |  1.91<H>    Ca    8.6      27 Jul 2017 03:57  Phos  4.8     07-27  Mg     2.3     07-27    TPro  7.4  /  Alb  3.2<L>  /  TBili  0.8  /  DBili  x   /  AST  28  /  ALT  42  /  AlkPhos  79  07-27              CAPILLARY BLOOD GLUCOSE        pro-bnp [id number] 07-24 @ 10:41     d-dimer --  07-24 @ 10:41      RADIOLOGY & ADDITIONAL TESTS:    CXR:    Xray Chest 1 View AP -PORTABLE-Routine (07.27.17 @ 06:37)   FINDINGS/  IMPRESSION:  Increased interstitial lung markings without significant change.   Superimposed mild airspace opacities lung bases overall improving.    No significant pleural effusion.    Heart size cannot be accurately assessed in this projection, but appear   enlarged.    Ct scan chest:    ekg;    echo: Patient is a 89y old  Male who presents with a chief complaint of shortness of breath (24 Jul 2017 14:08)        Patient is being followed for SOB, COPD, bibasilar congestion and bilateral patchy air space on CXR.  Patient today is resting comfortable in bed in NAD       INTERVAL HPI/OVERNIGHT EVENTS:Alert ,awake and stable in bed while on nasal cannula oxygen in NAD.      VITAL SIGNS:  T(F): 97.7 (07-27-17 @ 05:00)  HR: 56 (07-27-17 @ 08:00)  BP: 109/47 (07-27-17 @ 08:00)  RR: 17 (07-27-17 @ 08:00)  SpO2: 92% (07-27-17 @ 08:00)  Wt(kg): --  I&O's Detail    26 Jul 2017 07:01  -  27 Jul 2017 07:00  --------------------------------------------------------  IN:    Oral Fluid: 960 mL  Total IN: 960 mL    OUT:    Indwelling Catheter - Urethral: 3000 mL  Total OUT: 3000 mL    Total NET: -2040 mL      27 Jul 2017 07:01  -  27 Jul 2017 09:48  --------------------------------------------------------  IN:    Oral Fluid: 350 mL  Total IN: 350 mL    OUT:    Indwelling Catheter - Urethral: 265 mL  Total OUT: 265 mL    Total NET: 85 mL              REVIEW OF SYSTEMS:    CONSTITUTIONAL:  No fevers, chills, sweats    HEENT:  Eyes:  No diplopia or blurred vision. ENT:  No earache, sore throat or runny nose.    CARDIOVASCULAR:  No pressure, squeezing, tightness, or heaviness about the chest; no palpitations.    RESPIRATORY:  Per HPI    GASTROINTESTINAL:  No abdominal pain, nausea, vomiting or diarrhea.    GENITOURINARY:  No dysuria, frequency or urgency.    NEUROLOGIC:  No paresthesias, fasciculations, seizures or weakness.    PSYCHIATRIC:  No disorder of thought or mood.      PHYSICAL EXAM:    Constitutional: Well developed and nourished  Eyes:Perrla  ENMT: normal  Neck:supple  Respiratory: good air entry  Cardiovascular: S1 S2 regular  Gastrointestinal: Soft, Non tender  Extremities: No edema  Vascular:normal  Neurological:Awake, alert,Ox3  Musculoskeletal:Normal      MEDICATIONS  (STANDING):  doxazosin 4 milliGRAM(s) Oral at bedtime  PARoxetine 20 milliGRAM(s) Oral daily  gabapentin 300 milliGRAM(s) Oral two times a day  aspirin enteric coated 81 milliGRAM(s) Oral daily  labetalol 200 milliGRAM(s) Oral two times a day  amLODIPine   Tablet 10 milliGRAM(s) Oral daily  hydrALAZINE 50 milliGRAM(s) Oral three times a day  ALBUTerol/ipratropium for Nebulization 3 milliLiter(s) Nebulizer every 6 hours  atorvastatin 40 milliGRAM(s) Oral at bedtime  clopidogrel Tablet 75 milliGRAM(s) Oral daily  pantoprazole    Tablet 40 milliGRAM(s) Oral before breakfast    MEDICATIONS  (PRN):  acetaminophen   Tablet. 650 milliGRAM(s) Oral every 6 hours PRN Mild Pain (1 - 3)      Allergies    No Known Allergies    Intolerances        LABS:                        12.8   11.8  )-----------( 284      ( 27 Jul 2017 03:57 )             37.7     07-27    133<L>  |  93<L>  |  100<H>  ----------------------------<  112<H>  3.2<L>   |  31  |  1.91<H>    Ca    8.6      27 Jul 2017 03:57  Phos  4.8     07-27  Mg     2.3     07-27    TPro  7.4  /  Alb  3.2<L>  /  TBili  0.8  /  DBili  x   /  AST  28  /  ALT  42  /  AlkPhos  79  07-27              CAPILLARY BLOOD GLUCOSE        pro-bnp [id number] 07-24 @ 10:41     d-dimer --  07-24 @ 10:41      RADIOLOGY & ADDITIONAL TESTS:    CXR:    Xray Chest 1 View AP -PORTABLE-Routine (07.27.17 @ 06:37)   FINDINGS/  IMPRESSION:  Increased interstitial lung markings without significant change.   Superimposed mild airspace opacities lung bases overall improving.    No significant pleural effusion.    Heart size cannot be accurately assessed in this projection, but appear   enlarged.    Ct scan chest:    ekg;    echo:

## 2017-07-27 NOTE — PROGRESS NOTE ADULT - SUBJECTIVE AND OBJECTIVE BOX
CHIEF COMPLAINT: Patient is a 89y old  Male who presents with a chief complaint of shortness of breath. Pt appears comfortable.    	  REVIEW OF SYSTEMS:  [ X] Unable to obtain    PHYSICAL EXAM:  T(C): 36.5 (07-27-17 @ 05:00), Max: 36.7 (07-26-17 @ 13:00)  HR: 56 (07-27-17 @ 08:00) (56 - 71)  BP: 109/47 (07-27-17 @ 08:00) (109/47 - 146/56)  RR: 17 (07-27-17 @ 08:00) (10 - 26)  SpO2: 92% (07-27-17 @ 08:00) (88% - 99%)  Wt(kg): --  I&O's Summary    26 Jul 2017 07:01  -  27 Jul 2017 07:00  --------------------------------------------------------  IN: 960 mL / OUT: 3000 mL / NET: -2040 mL    27 Jul 2017 07:01  -  27 Jul 2017 11:32  --------------------------------------------------------  IN: 350 mL / OUT: 265 mL / NET: 85 mL        Appearance: Normal	  HEENT:   Normal oral mucosa, PERRL, EOMI	  Lymphatic: No lymphadenopathy  Cardiovascular: Normal S1 S2, + JVD, 2/6 sm  Respiratory: Lungs clear to auscultation	  Gastrointestinal:  Soft, Non-tender, + BS	  Skin: No rashes, No ecchymoses, No cyanosis	  Extremities: Normal range of motion, No clubbing, cyanosis or edema  Vascular: Peripheral pulses palpable 2+ bilaterally    MEDICATIONS  (STANDING):  doxazosin 4 milliGRAM(s) Oral at bedtime  PARoxetine 20 milliGRAM(s) Oral daily  gabapentin 300 milliGRAM(s) Oral two times a day  aspirin enteric coated 81 milliGRAM(s) Oral daily  labetalol 200 milliGRAM(s) Oral two times a day  amLODIPine   Tablet 10 milliGRAM(s) Oral daily  hydrALAZINE 50 milliGRAM(s) Oral three times a day  ALBUTerol/ipratropium for Nebulization 3 milliLiter(s) Nebulizer every 6 hours  atorvastatin 40 milliGRAM(s) Oral at bedtime  clopidogrel Tablet 75 milliGRAM(s) Oral daily  pantoprazole    Tablet 40 milliGRAM(s) Oral before breakfast  furosemide   Injectable 40 milliGRAM(s) IV Push daily  spironolactone 25 milliGRAM(s) Oral daily      LABS:	 	                        12.8   11.8  )-----------( 284      ( 27 Jul 2017 03:57 )             37.7     07-27    133<L>  |  93<L>  |  100<H>  ----------------------------<  112<H>  3.2<L>   |  31  |  1.91<H>    Ca    8.6      27 Jul 2017 03:57  Phos  4.8     07-27  Mg     2.3     07-27    TPro  7.4  /  Alb  3.2<L>  /  TBili  0.8  /  DBili  x   /  AST  28  /  ALT  42  /  AlkPhos  79  07-27    proBNP: Serum Pro-Brain Natriuretic Peptide: 03567 pg/mL (07-24 @ 10:41)    Lipid Profile: Cholesterol 150    HDL 31  TG 83    HgA1c: Hemoglobin A1C, Whole Blood: 5.7 % (07-25 @ 09:08)    TSH: Thyroid Stimulating Hormone, Serum: 0.32 uU/mL (07-25 @ 04:26)

## 2017-07-27 NOTE — PROGRESS NOTE ADULT - ASSESSMENT
89 yr old  [place]-speaking male from home, former smoker, lives with wife, PMH laryngeal cancer (diagnosed 2015, s/p XRT, no evidence disease), COPD, CAD s/p stents x 3, s/p left carotid stent, HTN, asthma, depression, anxiety p/w acute shortness of breath due to diastolic HF and mod-severe MR.  1.IV lasix.  2.Severe pulm HTN-Norvasc 2.5mg qd.  3.CAD-asa,statin, dec labetalol 100mg bid..  4.COPD-nebs.  5.HTN-Cont BP medication.  6.GI and DVT prophylaxis.  7.Replace K=,add aldactone 25mg qd.  8.Low BP will reduce hydralazine 25mg q8 and add isordil 10mg tid for afterload reduction.

## 2017-07-27 NOTE — PROGRESS NOTE ADULT - SUBJECTIVE AND OBJECTIVE BOX
Problem List:  ATN due CHF on diuretics  89yo [place]an-speaking man from home, former smoker, lives with wife, PMH laryngeal cancer (diagnosed 2015, s/p XRT, no evidence disease), COPD, CAD s/p stents x 3, s/p left carotid stent, HTN, asthma, depression, anxiety p/w acute shortness of breath.    Patient was reportedly in usual health when endorsed palpitations and was acutely short of breath.  911 called, EMS placed patient on 100% non-rebreather and gave nebulizer for wheezing.   Pt now post diuresis, post BIPAP doing better overall. In the ER he was found to be in CHF and was treated for it with diuretics with clinical improvement.  He was in the ER for shoulder pain on 07/17/2017 the and xr showed no CHF.   Patient was placed in the ICU on diuretics with good response - urine output.    Admission /62.HR 71  XR CHEST TODAY:  MPRESSION:  Increased interstitial lung markings without significant change.   Superimposed mild airspace opacities lung bases overall improving.    No significant pleural effusion.    Heart size cannot be accurately assessed in this projection, but appear   enlarged.      PAST MEDICAL & SURGICAL HISTORY:  Coronary artery disease involving native coronary artery of native heart without angina pectoris  Laryngeal cancer  Essential hypertension  Asthma  Arthritis  Degenerative joint disease  No significant past surgical history      No Known Allergies      MEDICATIONS  (STANDING):  doxazosin 4 milliGRAM(s) Oral at bedtime  PARoxetine 20 milliGRAM(s) Oral daily  gabapentin 300 milliGRAM(s) Oral two times a day  aspirin enteric coated 81 milliGRAM(s) Oral daily  labetalol 200 milliGRAM(s) Oral two times a day  amLODIPine   Tablet 10 milliGRAM(s) Oral daily  hydrALAZINE 50 milliGRAM(s) Oral three times a day  ALBUTerol/ipratropium for Nebulization 3 milliLiter(s) Nebulizer every 6 hours  atorvastatin 40 milliGRAM(s) Oral at bedtime  clopidogrel Tablet 75 milliGRAM(s) Oral daily  pantoprazole    Tablet 40 milliGRAM(s) Oral before breakfast    MEDICATIONS  (PRN):  acetaminophen   Tablet. 650 milliGRAM(s) Oral every 6 hours PRN Mild Pain (1 - 3)                            12.8   11.8  )-----------( 284      ( 27 Jul 2017 03:57 )             37.7     07-27    133<L>  |  93<L>  |  100<H>  ----------------------------<  112<H>  3.2<L>   |  31  |  1.91<H>    Ca    8.6      27 Jul 2017 03:57  Phos  4.8     07-27  Mg     2.3     07-27    TPro  7.4  /  Alb  3.2<L>  /  TBili  0.8  /  DBili  x   /  AST  28  /  ALT  42  /  AlkPhos  79  07-27            REVIEW OF SYSTEMS:  General: no fever no chills, no weight loss.  EYES/ENT: No visual changes;  No vertigo, no headache.  NECK: No pain or stiffness  RESPIRATORY: No cough, wheezing, hemoptysis; No shortness of breath  CARDIOVASCULAR: No chest pain or palpitations. No Edema  GASTROINTESTINAL: No abdominal or epigastric pain. Appetite good.  GENITOURINARY: lori was dc in am          VITALS:  T(F): 97.7 (07-27-17 @ 05:00), Max: 98.1 (07-26-17 @ 13:00)  HR: 56 (07-27-17 @ 08:00)  BP: 109/47 (07-27-17 @ 08:00)  RR: 17 (07-27-17 @ 08:00)  SpO2: 92% (07-27-17 @ 08:00)  Wt(kg): --    07-26 @ 07:01 - 07-27 @ 07:00  --------------------------------------------------------  IN: 960 mL / OUT: 3000 mL / NET: -2040 mL    07-27 @ 07:01  -  07-27 @ 10:23  --------------------------------------------------------  IN: 350 mL / OUT: 265 mL / NET: 85 mL        PHYSICAL EXAM:  Constitutional: well developed, no diaphoresis, no distress.  Neck: No JVD, no carotid bruit, supple, no adenopathy  cardiology: s1s2 with systolic m 2/6 base and apex  respiratory: expiratory ronchi no inspiratory rales  Abdomen: BS+, soft, no tenderness, no bruit, liver not enlarged  Pelvis: bladder nondistended  Extremities: No cyanosis or clubbing. No peripheral edema.   Neurological: A/O x 3, no focal deficits  Psychiatric: Normal mood, normal affect

## 2017-07-27 NOTE — PROGRESS NOTE ADULT - ASSESSMENT
DAVID possible cardiorenal due to CHF.  xr with mild improvement but still in CHF.  Continue IV diuretics. Follow intake and output.  Increased in BUN and creatinine from diuretics.   CHF due to diastolic CHF and MR.  Increased in BUN due CHF , diuretics effect and use of high dose of steroids on admission.  Its not clear if there is a history of CKD.  No clear if he took NSAID.    Suggest to follow xr chest.  US of kidneys.

## 2017-07-28 DIAGNOSIS — I50.31 ACUTE DIASTOLIC (CONGESTIVE) HEART FAILURE: ICD-10-CM

## 2017-07-28 DIAGNOSIS — N17.9 ACUTE KIDNEY FAILURE, UNSPECIFIED: ICD-10-CM

## 2017-07-28 DIAGNOSIS — Z29.9 ENCOUNTER FOR PROPHYLACTIC MEASURES, UNSPECIFIED: ICD-10-CM

## 2017-07-28 LAB
ALBUMIN SERPL ELPH-MCNC: 3.2 G/DL — LOW (ref 3.5–5)
ALP SERPL-CCNC: 90 U/L — SIGNIFICANT CHANGE UP (ref 40–120)
ALT FLD-CCNC: 34 U/L DA — SIGNIFICANT CHANGE UP (ref 10–60)
ANION GAP SERPL CALC-SCNC: 11 MMOL/L — SIGNIFICANT CHANGE UP (ref 5–17)
AST SERPL-CCNC: 24 U/L — SIGNIFICANT CHANGE UP (ref 10–40)
BILIRUB SERPL-MCNC: 0.8 MG/DL — SIGNIFICANT CHANGE UP (ref 0.2–1.2)
BUN SERPL-MCNC: 95 MG/DL — HIGH (ref 7–18)
CALCIUM SERPL-MCNC: 8.7 MG/DL — SIGNIFICANT CHANGE UP (ref 8.4–10.5)
CHLORIDE SERPL-SCNC: 90 MMOL/L — LOW (ref 96–108)
CO2 SERPL-SCNC: 32 MMOL/L — HIGH (ref 22–31)
CREAT SERPL-MCNC: 1.81 MG/DL — HIGH (ref 0.5–1.3)
GLUCOSE SERPL-MCNC: 112 MG/DL — HIGH (ref 70–99)
HCT VFR BLD CALC: 40.5 % — SIGNIFICANT CHANGE UP (ref 39–50)
HGB BLD-MCNC: 13.2 G/DL — SIGNIFICANT CHANGE UP (ref 13–17)
MAGNESIUM SERPL-MCNC: 2.3 MG/DL — SIGNIFICANT CHANGE UP (ref 1.6–2.6)
MCHC RBC-ENTMCNC: 29.8 PG — SIGNIFICANT CHANGE UP (ref 27–34)
MCHC RBC-ENTMCNC: 32.5 GM/DL — SIGNIFICANT CHANGE UP (ref 32–36)
MCV RBC AUTO: 91.5 FL — SIGNIFICANT CHANGE UP (ref 80–100)
PHOSPHATE SERPL-MCNC: 3.5 MG/DL — SIGNIFICANT CHANGE UP (ref 2.5–4.5)
PLATELET # BLD AUTO: 257 K/UL — SIGNIFICANT CHANGE UP (ref 150–400)
POTASSIUM SERPL-MCNC: 3.6 MMOL/L — SIGNIFICANT CHANGE UP (ref 3.5–5.3)
POTASSIUM SERPL-SCNC: 3.6 MMOL/L — SIGNIFICANT CHANGE UP (ref 3.5–5.3)
PROT SERPL-MCNC: 7.8 G/DL — SIGNIFICANT CHANGE UP (ref 6–8.3)
RBC # BLD: 4.43 M/UL — SIGNIFICANT CHANGE UP (ref 4.2–5.8)
RBC # FLD: 12.8 % — SIGNIFICANT CHANGE UP (ref 10.3–14.5)
SODIUM SERPL-SCNC: 133 MMOL/L — LOW (ref 135–145)
WBC # BLD: 11.7 K/UL — HIGH (ref 3.8–10.5)
WBC # FLD AUTO: 11.7 K/UL — HIGH (ref 3.8–10.5)

## 2017-07-28 RX ORDER — FUROSEMIDE 40 MG
20 TABLET ORAL DAILY
Qty: 0 | Refills: 0 | Status: DISCONTINUED | OUTPATIENT
Start: 2017-07-28 | End: 2017-07-29

## 2017-07-28 RX ORDER — SENNA PLUS 8.6 MG/1
2 TABLET ORAL AT BEDTIME
Qty: 0 | Refills: 0 | Status: DISCONTINUED | OUTPATIENT
Start: 2017-07-28 | End: 2017-08-09

## 2017-07-28 RX ORDER — POLYETHYLENE GLYCOL 3350 17 G/17G
17 POWDER, FOR SOLUTION ORAL DAILY
Qty: 0 | Refills: 0 | Status: DISCONTINUED | OUTPATIENT
Start: 2017-07-28 | End: 2017-08-02

## 2017-07-28 RX ORDER — DOCUSATE SODIUM 100 MG
100 CAPSULE ORAL DAILY
Qty: 0 | Refills: 0 | Status: DISCONTINUED | OUTPATIENT
Start: 2017-07-28 | End: 2017-08-09

## 2017-07-28 RX ADMIN — POLYETHYLENE GLYCOL 3350 17 GRAM(S): 17 POWDER, FOR SOLUTION ORAL at 17:44

## 2017-07-28 RX ADMIN — Medication 4 MILLIGRAM(S): at 21:35

## 2017-07-28 RX ADMIN — GABAPENTIN 300 MILLIGRAM(S): 400 CAPSULE ORAL at 05:32

## 2017-07-28 RX ADMIN — Medication 100 MILLIGRAM(S): at 17:44

## 2017-07-28 RX ADMIN — Medication 25 MILLIGRAM(S): at 21:35

## 2017-07-28 RX ADMIN — PANTOPRAZOLE SODIUM 40 MILLIGRAM(S): 20 TABLET, DELAYED RELEASE ORAL at 05:32

## 2017-07-28 RX ADMIN — Medication 3 MILLILITER(S): at 15:02

## 2017-07-28 RX ADMIN — AMLODIPINE BESYLATE 2.5 MILLIGRAM(S): 2.5 TABLET ORAL at 05:32

## 2017-07-28 RX ADMIN — Medication 100 MILLIGRAM(S): at 05:32

## 2017-07-28 RX ADMIN — ATORVASTATIN CALCIUM 40 MILLIGRAM(S): 80 TABLET, FILM COATED ORAL at 21:35

## 2017-07-28 RX ADMIN — Medication 20 MILLIGRAM(S): at 14:16

## 2017-07-28 RX ADMIN — Medication 25 MILLIGRAM(S): at 14:17

## 2017-07-28 RX ADMIN — GABAPENTIN 300 MILLIGRAM(S): 400 CAPSULE ORAL at 17:44

## 2017-07-28 RX ADMIN — CLOPIDOGREL BISULFATE 75 MILLIGRAM(S): 75 TABLET, FILM COATED ORAL at 14:16

## 2017-07-28 RX ADMIN — Medication 25 MILLIGRAM(S): at 05:32

## 2017-07-28 RX ADMIN — SPIRONOLACTONE 25 MILLIGRAM(S): 25 TABLET, FILM COATED ORAL at 05:32

## 2017-07-28 RX ADMIN — Medication 3 MILLILITER(S): at 08:18

## 2017-07-28 RX ADMIN — Medication 81 MILLIGRAM(S): at 14:16

## 2017-07-28 RX ADMIN — Medication 40 MILLIGRAM(S): at 05:32

## 2017-07-28 RX ADMIN — Medication 3 MILLILITER(S): at 20:46

## 2017-07-28 NOTE — PROGRESS NOTE ADULT - PROBLEM SELECTOR PLAN 1
/-new onset CHF with pleural effusions  -s/p acute hypoxic respiratory failure secondary to chf   -S/p BiPAP; C/W Nasal Cannula oxygen supplementation   -CXR shows resolved pleural effusions  -Lasix 40mg IV BID switched to 40mg PO dialy  -Continue daily weights, strict ins and outs  -ECHO -shows moderate to severe MR and EF-55 % and grade 11 diastolic dysfunction

## 2017-07-28 NOTE — PROGRESS NOTE ADULT - ASSESSMENT
89 yr old  [place]ese-speaking male from home, former smoker, lives with wife, PMH laryngeal cancer (diagnosed 2015, s/p XRT, no evidence disease), COPD, CAD s/p stents x 3, s/p left carotid stent, HTN, asthma, depression, anxiety p/w acute shortness of breath due to diastolic HF and mod-severe MR.  1.Change IV lasix to 20mg qd.  2.Severe pulm HTN-Norvasc 2.5mg qd.  3.CAD-asa,statin, labetalol 100mg bid..  4.COPD-nebs.  5.HTN-Cont BP medication.  6.GI and DVT prophylaxis.  7.Continue aldactone 25mg qd.  8.Continue  hydralazine 25mg q8 and isordil 10mg tid for afterload reduction.

## 2017-07-28 NOTE — PROGRESS NOTE ADULT - ASSESSMENT
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 disease), COPD, pulmonary HTN, CAD (s/p stent x 3 and ASA, Plavix medical management), HTN, asthma, depression, anxiety p/w acute shortness of breath with wheezing, bilateral crackles, and elevated BNP > 10,000. Patient found to be hypoxic, placed on BiPAP, and admitted to ICU for acute hypoxic respiratory failure secondary to new-onset CHF. patient was later downgraded to the floor and patient off the BIPAP and saturating well on nasal cannula

## 2017-07-28 NOTE — CHART NOTE - NSCHARTNOTEFT_GEN_A_CORE
patient oxygen saturation off nasal cannula and while ambulation is 80 %{ dropped from 95 %  to 80%}   will continue to monitor

## 2017-07-28 NOTE — PROGRESS NOTE ADULT - SUBJECTIVE AND OBJECTIVE BOX
CHIEF COMPLAINT:Patient is a 89y old  Male who presents with a chief complaint of shortness of breath.    	  REVIEW OF SYSTEMS:  [ X] Unable to obtain    PHYSICAL EXAM:  T(C): 37 (07-28-17 @ 04:54), Max: 37 (07-28-17 @ 04:54)  HR: 72 (07-28-17 @ 11:06) (60 - 72)  BP: 112/56 (07-28-17 @ 11:06) (111/51 - 136/77)  RR: 16 (07-28-17 @ 04:54) (16 - 18)  SpO2: 91% (07-28-17 @ 11:06) (91% - 97%)    I&O's Summary    27 Jul 2017 07:01  -  28 Jul 2017 07:00  --------------------------------------------------------  IN: 350 mL / OUT: 265 mL / NET: 85 mL        Appearance: Normal	  HEENT:   Normal oral mucosa, PERRL, EOMI	  Lymphatic: No lymphadenopathy  Cardiovascular: Normal S1 S2, + JVD, 2/6sm  Respiratory: Lungs clear to auscultation	  Gastrointestinal:  Soft, Non-tender, + BS	  Skin: No rashes, No ecchymoses, No cyanosis	  Neurologic: Non-focal  Extremities: Normal range of motion, No clubbing, cyanosis or edema  Vascular: Peripheral pulses palpable 2+ bilaterally    MEDICATIONS  (STANDING):  doxazosin 4 milliGRAM(s) Oral at bedtime  PARoxetine 20 milliGRAM(s) Oral daily  gabapentin 300 milliGRAM(s) Oral two times a day  aspirin enteric coated 81 milliGRAM(s) Oral daily  ALBUTerol/ipratropium for Nebulization 3 milliLiter(s) Nebulizer every 6 hours  atorvastatin 40 milliGRAM(s) Oral at bedtime  clopidogrel Tablet 75 milliGRAM(s) Oral daily  pantoprazole    Tablet 40 milliGRAM(s) Oral before breakfast  furosemide   Injectable 40 milliGRAM(s) IV Push daily  spironolactone 25 milliGRAM(s) Oral daily  amLODIPine   Tablet 2.5 milliGRAM(s) Oral daily  hydrALAZINE 25 milliGRAM(s) Oral three times a day  labetalol 100 milliGRAM(s) Oral two times a day    	  LABS:	 	                      13.2   11.7  )-----------( 257      ( 28 Jul 2017 06:54 )             40.5     07-28    133<L>  |  90<L>  |  95<H>  ----------------------------<  112<H>  3.6   |  32<H>  |  1.81<H>    Ca    8.7      28 Jul 2017 06:54  Phos  3.5     07-28  Mg     2.3     07-28    TPro  7.8  /  Alb  3.2<L>  /  TBili  0.8  /  DBili  x   /  AST  24  /  ALT  34  /  AlkPhos  90  07-28    proBNP: Serum Pro-Brain Natriuretic Peptide: 70448 pg/mL (07-24 @ 10:41)    Lipid Profile: Cholesterol 150    HDL 31  TG 83    HgA1c: Hemoglobin A1C, Whole Blood: 5.7 % (07-25 @ 09:08)    TSH: Thyroid Stimulating Hormone, Serum: 0.32 uU/mL (07-25 @ 04:26)

## 2017-07-28 NOTE — PROGRESS NOTE ADULT - SUBJECTIVE AND OBJECTIVE BOX
Problem List:  DAVID cardiorenal syndrome  Severe MR and grade 3 diastolic failure  CHF improved with diuretics.      PAST MEDICAL & SURGICAL HISTORY:  Coronary artery disease involving native coronary artery of native heart without angina pectoris  Laryngeal cancer  Essential hypertension  Asthma  Arthritis  Degenerative joint disease  No significant past surgical history    No Known Allergies    MEDICATIONS  (STANDING):  doxazosin 4 milliGRAM(s) Oral at bedtime  PARoxetine 20 milliGRAM(s) Oral daily  gabapentin 300 milliGRAM(s) Oral two times a day  aspirin enteric coated 81 milliGRAM(s) Oral daily  ALBUTerol/ipratropium for Nebulization 3 milliLiter(s) Nebulizer every 6 hours  atorvastatin 40 milliGRAM(s) Oral at bedtime  clopidogrel Tablet 75 milliGRAM(s) Oral daily  pantoprazole    Tablet 40 milliGRAM(s) Oral before breakfast  spironolactone 25 milliGRAM(s) Oral daily  amLODIPine   Tablet 2.5 milliGRAM(s) Oral daily  hydrALAZINE 25 milliGRAM(s) Oral three times a day  labetalol 100 milliGRAM(s) Oral two times a day  furosemide   Injectable 20 milliGRAM(s) IV Push daily  MEDICATIONS  (PRN):  acetaminophen   Tablet. 650 milliGRAM(s) Oral every 6 hours PRN Mild Pain (1 - 3)                        13.2   11.7  )-----------( 257      ( 28 Jul 2017 06:54 )           40.5     07-28    133<L>  |  90<L>  |  95<H>  ----------------------------<  112<H>  3.6   |  32<H>  |  1.81<H>    Ca    8.7      28 Jul 2017 06:54  Phos  3.5     07-28  Mg     2.3     07-28    TPro  7.8  /  Alb  3.2<L>  /  TBili  0.8  /  DBili  x   /  AST  24  /  ALT  34  /  AlkPhos  90  07-28    REVIEW OF SYSTEMS:  General: no fever no chills, no weight loss.  RESPIRATORY: No cough, wheezing, hemoptysis; No shortness of breath  CARDIOVASCULAR: No chest pain or palpitations. No Edema  GASTROINTESTINAL: No abdominal or epigastric pain. No nausea, vomiting. No diarrhea or constipation. No melena.  GENITOURINARY: No dysuria, frequency, foamy urine, urinary urgency, incontinence or hematuria  NEUROLOGICAL: No numbness or weakness, no tremor   VITALS:  T(F): 98.6 (07-28-17 @ 04:54), Max: 98.6 (07-28-17 @ 04:54)  HR: 72 (07-28-17 @ 11:06)  BP: 112/56 (07-28-17 @ 11:06)  RR: 16 (07-28-17 @ 04:54)  SpO2: 91% (07-28-17 @ 11:06)  Wt(kg): --    07-27 @ 07:01  -  07-28 @ 07:00  --------------------------------------------------------  IN: 350 mL / OUT: 265 mL / NET: 85 mL    PHYSICAL EXAM:  Constitutional: well developed, no diaphoresis, no distress.  Neck: No JVD, no carotid bruit, supple, no adenopathy  Respiratory: Good air entrance B/L, no wheezes, rales or rhonchi  Cardiovascular: S1, S2, RRR, no pericardial rub, no murmur  Abdomen: BS+, soft, no tenderness, no bruit  Pelvis: bladder nondistended  Extremities: No cyanosis or clubbing. No peripheral edema.   Neurological: A/O x 3, no focal deficits

## 2017-07-28 NOTE — PROGRESS NOTE ADULT - SUBJECTIVE AND OBJECTIVE BOX
Patient is a 89y old  Male who presents with a chief complaint of shortness of breath (24 Jul 2017 14:08)      INTERVAL HPI/OVERNIGHT EVENTS:    MEDICATIONS  (STANDING):  doxazosin 4 milliGRAM(s) Oral at bedtime  PARoxetine 20 milliGRAM(s) Oral daily  gabapentin 300 milliGRAM(s) Oral two times a day  aspirin enteric coated 81 milliGRAM(s) Oral daily  ALBUTerol/ipratropium for Nebulization 3 milliLiter(s) Nebulizer every 6 hours  atorvastatin 40 milliGRAM(s) Oral at bedtime  clopidogrel Tablet 75 milliGRAM(s) Oral daily  pantoprazole    Tablet 40 milliGRAM(s) Oral before breakfast  spironolactone 25 milliGRAM(s) Oral daily  amLODIPine   Tablet 2.5 milliGRAM(s) Oral daily  hydrALAZINE 25 milliGRAM(s) Oral three times a day  labetalol 100 milliGRAM(s) Oral two times a day  furosemide   Injectable 20 milliGRAM(s) IV Push daily  polyethylene glycol 3350 17 Gram(s) Oral daily  docusate sodium 100 milliGRAM(s) Oral daily  senna 2 Tablet(s) Oral at bedtime    MEDICATIONS  (PRN):  acetaminophen   Tablet. 650 milliGRAM(s) Oral every 6 hours PRN Mild Pain (1 - 3)      Allergies    No Known Allergies    Intolerances        REVIEW OF SYSTEMS:  CONSTITUTIONAL: No fever, weight loss, or fatigue  RESPIRATORY: No cough, wheezing, chills or hemoptysis; No shortness of breath  CARDIOVASCULAR: No chest pain, palpitations, dizziness, or leg swelling  GASTROINTESTINAL: No abdominal or epigastric pain. No nausea, vomiting, or hematemesis; No diarrhea or constipation. No melena or hematochezia.  NEUROLOGICAL: No headaches, memory loss, loss of strength, numbness, or tremors  SKIN: No itching, burning, rashes, or lesions     Vital Signs Last 24 Hrs  T(C): 36.8 (28 Jul 2017 14:05), Max: 37 (28 Jul 2017 04:54)  T(F): 98.2 (28 Jul 2017 14:05), Max: 98.6 (28 Jul 2017 04:54)  HR: 62 (28 Jul 2017 14:05) (60 - 72)  BP: 132/52 (28 Jul 2017 14:05) (111/51 - 136/77)  BP(mean): --  RR: 18 (28 Jul 2017 14:05) (16 - 18)  SpO2: 95% (28 Jul 2017 14:05) (91% - 97%)    PHYSICAL EXAM:  GENERAL: NAD, well-groomed, well-developed  HEAD:  Atraumatic, Normocephalic  EYES: EOMI, PERRLA, conjunctiva and sclera clear  NECK: Supple, No JVD, Normal thyroid  CHEST/LUNG: Clear to percussion bilaterally; No rales, rhonchi, wheezing, or rubs  HEART: Regular rate and rhythm; No murmurs, rubs, or gallops  ABDOMEN: Soft, Nontender, Nondistended; Bowel sounds present  NERVOUS SYSTEM:  Alert & Oriented X3, Good concentration; Motor Strength 5/5 B/L   EXTREMITIES:  2+ Peripheral Pulses, No clubbing, cyanosis, or edema  SKIN;    LABS:                        13.2   11.7  )-----------( 257      ( 28 Jul 2017 06:54 )             40.5     07-28    133<L>  |  90<L>  |  95<H>  ----------------------------<  112<H>  3.6   |  32<H>  |  1.81<H>    Ca    8.7      28 Jul 2017 06:54  Phos  3.5     07-28  Mg     2.3     07-28    TPro  7.8  /  Alb  3.2<L>  /  TBili  0.8  /  DBili  x   /  AST  24  /  ALT  34  /  AlkPhos  90  07-28        CAPILLARY BLOOD GLUCOSE          RADIOLOGY & ADDITIONAL TESTS:    Imaging Personally Reviewed:  [ ] YES  [ ] NO    Consultant(s) Notes Reviewed:  [ ] YES  [ ] NO Patient is a 89y old  Male who presents with a chief complaint of shortness of breath (24 Jul 2017 14:08)      INTERVAL HPI/OVERNIGHT EVENTS:    MEDICATIONS  (STANDING):  doxazosin 4 milliGRAM(s) Oral at bedtime  PARoxetine 20 milliGRAM(s) Oral daily  gabapentin 300 milliGRAM(s) Oral two times a day  aspirin enteric coated 81 milliGRAM(s) Oral daily  ALBUTerol/ipratropium for Nebulization 3 milliLiter(s) Nebulizer every 6 hours  atorvastatin 40 milliGRAM(s) Oral at bedtime  clopidogrel Tablet 75 milliGRAM(s) Oral daily  pantoprazole    Tablet 40 milliGRAM(s) Oral before breakfast  spironolactone 25 milliGRAM(s) Oral daily  amLODIPine   Tablet 2.5 milliGRAM(s) Oral daily  hydrALAZINE 25 milliGRAM(s) Oral three times a day  labetalol 100 milliGRAM(s) Oral two times a day  furosemide   Injectable 20 milliGRAM(s) IV Push daily  polyethylene glycol 3350 17 Gram(s) Oral daily  docusate sodium 100 milliGRAM(s) Oral daily  senna 2 Tablet(s) Oral at bedtime    MEDICATIONS  (PRN):  acetaminophen   Tablet. 650 milliGRAM(s) Oral every 6 hours PRN Mild Pain (1 - 3)      Allergies  Vital Signs Last 24 Hrs  T(C): 36.8 (28 Jul 2017 14:05), Max: 37 (28 Jul 2017 04:54)  T(F): 98.2 (28 Jul 2017 14:05), Max: 98.6 (28 Jul 2017 04:54)  HR: 62 (28 Jul 2017 14:05) (60 - 72)  BP: 132/52 (28 Jul 2017 14:05) (111/51 - 136/77)  BP(mean): --  RR: 18 (28 Jul 2017 14:05) (16 - 18)  SpO2: 95% (28 Jul 2017 14:05) (91% - 97%)        GEN: Alert and oriented, pleasant, comfortable NAD  HEENT: NCAT, PERRL, MMM, hearing intact, + nasal canula O2  Neck: supple , no JVD  CVS: S1S2 , regular , No M/R/G appreciated  PULM: CTA B/L,  no W/R/R appreciated  ABD.: soft. non tender, non distended,  bowel sounds present  Extrem: intact pulses , no edema       LABS:                        13.2   11.7  )-----------( 257      ( 28 Jul 2017 06:54 )             40.5     07-28    133<L>  |  90<L>  |  95<H>  ----------------------------<  112<H>  3.6   |  32<H>  |  1.81<H>    Ca    8.7      28 Jul 2017 06:54  Phos  3.5     07-28  Mg     2.3     07-28    TPro  7.8  /  Alb  3.2<L>  /  TBili  0.8  /  DBili  x   /  AST  24  /  ALT  34  /  AlkPhos  90  07-28

## 2017-07-28 NOTE — PROGRESS NOTE ADULT - PROBLEM SELECTOR PLAN 5
--Heparin and SCDs for DVT  -Protonix for GI prophylaxsis    DISPO: patient oxygen saturation drops to 80 % on ambulation and casemanager working to arrange home oxygen and papers submitted and physical therapy recommends home physical therapy and patient needs two more days of I.V diuresis and d/c planning

## 2017-07-28 NOTE — PROGRESS NOTE ADULT - SUBJECTIVE AND OBJECTIVE BOX
_____________________________________________________________________________  ========>>  M E D I C A L   A T T E N D I N G    F O L L O W  U P  N O T E  <<=========  ---------------------------------------------------------------------------------------------------------------------------------------    - Patient seen and examined by me approximately thirty minutes ago.  - In summary, patient is a 89y year old man who originally presented with respiratory failure, post Bipap.  - Patient today overall doing ok, comfortable, eating OK. urinating well per son    ==================>> MEDICATIONS <<====================    doxazosin 4 milliGRAM(s) Oral at bedtime  PARoxetine 20 milliGRAM(s) Oral daily  gabapentin 300 milliGRAM(s) Oral two times a day  aspirin enteric coated 81 milliGRAM(s) Oral daily  ALBUTerol/ipratropium for Nebulization 3 milliLiter(s) Nebulizer every 6 hours  atorvastatin 40 milliGRAM(s) Oral at bedtime  clopidogrel Tablet 75 milliGRAM(s) Oral daily  pantoprazole    Tablet 40 milliGRAM(s) Oral before breakfast  furosemide   Injectable 40 milliGRAM(s) IV Push daily  spironolactone 25 milliGRAM(s) Oral daily  amLODIPine   Tablet 2.5 milliGRAM(s) Oral daily  hydrALAZINE 25 milliGRAM(s) Oral three times a day  labetalol 100 milliGRAM(s) Oral two times a day    MEDICATIONS  (PRN):  acetaminophen   Tablet. 650 milliGRAM(s) Oral every 6 hours PRN Mild Pain (1 - 3)    ==================>> REVIEW OF SYSTEM <<=================    GEN: no fever, no chills, no pain  RESP: no SOB now, no cough, no sputum  CVS: no chest pain, no palpitations, no edema  GI: no abdominal pain, no nausea, no vomiting, no constipation, no diarrhea  Neuro: no headache, no dizziness  PSYCH: no anxiety, no depression  Derm : no itching, no rash    ==================>> VITAL SIGNS <<==================    Vital Signs Last 24 Hrs  T(C): 37 (07-28-17 @ 04:54)  T(F): 98.6 (07-28-17 @ 04:54), Max: 98.6 (07-28-17 @ 04:54)  HR: 71 (07-28-17 @ 04:54) (60 - 71)  BP: 136/77 (07-28-17 @ 04:54)  BP(mean): --  RR: 16 (07-28-17 @ 04:54) (16 - 18)  SpO2: 92% (07-28-17 @ 04:54) (92% - 97%)  CAPILLARY BLOOD GLUCOSE    ==================>> PHYSICAL EXAM <<=================    GEN: Alert and oriented, pleasant, comfortable NAD  HEENT: NCAT, PERRL, MMM, hearing intact, + nasal canula O2  Neck: supple , no JVD  CVS: S1S2 , regular , No M/R/G appreciated  PULM: CTA B/L,  no W/R/R appreciated  ABD.: soft. non tender, non distended,  bowel sounds present  Extrem: intact pulses , no edema      sandoval out, not making much urine per family  Derm: No rash , no ecchymoses  PSYCH : normal mood,  no delusion not anxious     ==================>> LAB AND IMAGING <<==================                                            13.2   11.7  )-----------( 257      ( 28 Jul 2017 06:54 )             40.5        07-28    133<L>  |  90<L>  |  95<H>  ----------------------------<  112<H>  3.6   |  32<H>  |  1.81<H>    Ca    8.7      28 Jul 2017 06:54  Phos  3.5     07-28  Mg     2.3     07-28    TPro  7.8  /  Alb  3.2<L>  /  TBili  0.8  /  DBili  x   /  AST  24  /  ALT  34  /  AlkPhos  90  07-28    < from: Transthoracic Echocardiogram (07.24.17 @ 14:24) >  CONCLUSIONS:  1. Normal mitral valve. Moderate to severe mitral  regurgitation.  2. Calcified trileaflet aortic valve with normal opening.  Mild aortic insufficiency.  3. Normal aortic root.  4. Moderate left atrial enlargement.  5. Normal left ventricular internal dimensions and wall  thicknesses.  6. Normal Left Ventricular Systolic Function,  (EF = 55%)  7. Grade II diastolic dysfunction  8. Normal right atrium.  9. Normal right ventricular size and function.  10. RV systolic pressure is severely increased (PASP 65mm  Hg).  11. There is mild tricuspid regurgitation.  12. There is mild pulmonic regurgitation.  13. Normal pericardium with no pericardial effusion.  < end of copied text >    ________________________________________________________________________  ===============>>  A S S E S S M E N T   A N D   P L A N <<===============  ------------------------------------------------------------------------------------------------------------------------------    **Acute hypoxemic respiratory failure, post bipap, multifactorial, given MR, severe PAH, COPD, and likely acute exacerbation of diastolic heart failure with pleural effusion, overall improved  ---cardio, pulm f/u appreciated  ---wean off O2 as able >>DC  ---nebulizer treatment  ---gentle diuresis as ordered  ---monitor vitals, creatinine electrolytes  --PT/ OOB as able    **DAVID likely on CKD?, Hyponatremia, stable - improved  ---renal f/u  monitor creat closely on diuretics    **Hypertension, CAD, MR, PAH  ---Continue with current medications as above  ---DASH diet   ---close monitoring of vitals    **BPH  --- Continue Current medications and monitor.     -GI/DVT Prophylaxis.  PT>>Family wants to take pt home >>Likely Monday with ome care  --------------------------------------------  Case discussed with pt, son, HS  Education given on deep breathing, plan of care  ___________________________    H. KARLA Rich.  Pager: [phone]

## 2017-07-28 NOTE — PROGRESS NOTE ADULT - SUBJECTIVE AND OBJECTIVE BOX
Patient is a 89y old  Male who presents with a chief complaint of shortness of breath (24 Jul 2017 14:08)        Patient is being followed for SOB, COPD, bibasilar congestion and bilateral patchy air space on CXR.     INTERVAL HPI/OVERNIGHT EVENTS:CXR from yesterday shows same increased intersitial lung marking and slight improved airspace opacities    Patient today is resting comfortable in bed in NAD     VITAL SIGNS:  T(F): 98.6 (07-28-17 @ 04:54)  HR: 71 (07-28-17 @ 04:54)  BP: 136/77 (07-28-17 @ 04:54)  RR: 16 (07-28-17 @ 04:54)  SpO2: 92% (07-28-17 @ 04:54)  Wt(kg): --  I&O's Detail    27 Jul 2017 07:01  -  28 Jul 2017 07:00  --------------------------------------------------------  IN:    Oral Fluid: 350 mL  Total IN: 350 mL    OUT:    Indwelling Catheter - Urethral: 265 mL  Total OUT: 265 mL    Total NET: 85 mL              REVIEW OF SYSTEMS:    CONSTITUTIONAL:  No fevers, chills, sweats    HEENT:  Eyes:  No diplopia or blurred vision. ENT:  No earache, sore throat or runny nose.    CARDIOVASCULAR:  No pressure, squeezing, tightness, or heaviness about the chest; no palpitations.    RESPIRATORY:  Per HPI    GASTROINTESTINAL:  No abdominal pain, nausea, vomiting or diarrhea.    GENITOURINARY:  No dysuria, frequency or urgency.    NEUROLOGIC:  No paresthesias, fasciculations, seizures or weakness.    PSYCHIATRIC:  No disorder of thought or mood.      PHYSICAL EXAM:    Constitutional: Well developed and nourished  Eyes:Perrla  ENMT: normal  Neck:supple  Respiratory: good air entry  Cardiovascular: S1 S2 regular  Gastrointestinal: Soft, Non tender  Extremities: No edema  Vascular:normal  Neurological:Awake, alert,Ox3  Musculoskeletal:Normal      MEDICATIONS  (STANDING):  doxazosin 4 milliGRAM(s) Oral at bedtime  PARoxetine 20 milliGRAM(s) Oral daily  gabapentin 300 milliGRAM(s) Oral two times a day  aspirin enteric coated 81 milliGRAM(s) Oral daily  ALBUTerol/ipratropium for Nebulization 3 milliLiter(s) Nebulizer every 6 hours  atorvastatin 40 milliGRAM(s) Oral at bedtime  clopidogrel Tablet 75 milliGRAM(s) Oral daily  pantoprazole    Tablet 40 milliGRAM(s) Oral before breakfast  furosemide   Injectable 40 milliGRAM(s) IV Push daily  spironolactone 25 milliGRAM(s) Oral daily  amLODIPine   Tablet 2.5 milliGRAM(s) Oral daily  hydrALAZINE 25 milliGRAM(s) Oral three times a day  labetalol 100 milliGRAM(s) Oral two times a day    MEDICATIONS  (PRN):  acetaminophen   Tablet. 650 milliGRAM(s) Oral every 6 hours PRN Mild Pain (1 - 3)      Allergies    No Known Allergies    Intolerances        LABS:                        13.2   11.7  )-----------( 257      ( 28 Jul 2017 06:54 )             40.5     07-28    133<L>  |  90<L>  |  95<H>  ----------------------------<  112<H>  3.6   |  32<H>  |  1.81<H>    Ca    8.7      28 Jul 2017 06:54  Phos  3.5     07-28  Mg     2.3     07-28    TPro  7.8  /  Alb  3.2<L>  /  TBili  0.8  /  DBili  x   /  AST  24  /  ALT  34  /  AlkPhos  90  07-28              CAPILLARY BLOOD GLUCOSE        pro-bnp [id number] 07-24 @ 10:41     d-dimer --  07-24 @ 10:41      RADIOLOGY & ADDITIONAL TESTS:    CXR:  < from: Xray Chest 1 View AP -PORTABLE-Routine (07.27.17 @ 06:37) >    EXAM:  CHEST PORTABLE ROUTINE                            PROCEDURE DATE:  07/27/2017          INTERPRETATION:  CLINICAL STATEMENT: Follow-up chest pain.    TECHNIQUE: AP view of the chest.    COMPARISON: 7/25/2017    FINDINGS/  IMPRESSION:  Increased interstitial lung markings without significant change.   Superimposed mild airspace opacities lung bases overall improving.    No significant pleural effusion.    Heart size cannot be accurately assessed in this projection, but appear   enlarged.        Ct scan chest:    ekg;    echo: Patient is a 89y old  Male who presents with a chief complaint of shortness of breath (24 Jul 2017 14:08)        Patient is being followed for SOB, COPD, bibasilar congestion and bilateral patchy air space on CXR.     INTERVAL HPI/OVERNIGHT EVENTS:CXR from a2 days ago shows same increased intersitial lung marking and slight improved airspace opacities    Patient today is resting comfortable in bed in NAD     VITAL SIGNS:  T(F): 98.6 (07-28-17 @ 04:54)  HR: 71 (07-28-17 @ 04:54)  BP: 136/77 (07-28-17 @ 04:54)  RR: 16 (07-28-17 @ 04:54)  SpO2: 92% (07-28-17 @ 04:54)  Wt(kg): --  I&O's Detail    27 Jul 2017 07:01  -  28 Jul 2017 07:00  --------------------------------------------------------  IN:    Oral Fluid: 350 mL  Total IN: 350 mL    OUT:    Indwelling Catheter - Urethral: 265 mL  Total OUT: 265 mL    Total NET: 85 mL              REVIEW OF SYSTEMS:    CONSTITUTIONAL:  No fevers, chills, sweats    HEENT:  Eyes:  No diplopia or blurred vision. ENT:  No earache, sore throat or runny nose.    CARDIOVASCULAR:  No pressure, squeezing, tightness, or heaviness about the chest; no palpitations.    RESPIRATORY:  Per HPI    GASTROINTESTINAL:  No abdominal pain, nausea, vomiting or diarrhea.    GENITOURINARY:  No dysuria, frequency or urgency.    NEUROLOGIC:  No paresthesias, fasciculations, seizures or weakness.    PSYCHIATRIC:  No disorder of thought or mood.      PHYSICAL EXAM:    Constitutional: Well developed and nourished  Eyes:Perrla  ENMT: normal  Neck:supple  Respiratory: rales posteriorly  Cardiovascular: S1 S2 regular  Gastrointestinal: Soft, Non tender  Extremities: No edema  Vascular:normal  Neurological:Awake, alert,Ox3  Musculoskeletal:Normal      MEDICATIONS  (STANDING):  doxazosin 4 milliGRAM(s) Oral at bedtime  PARoxetine 20 milliGRAM(s) Oral daily  gabapentin 300 milliGRAM(s) Oral two times a day  aspirin enteric coated 81 milliGRAM(s) Oral daily  ALBUTerol/ipratropium for Nebulization 3 milliLiter(s) Nebulizer every 6 hours  atorvastatin 40 milliGRAM(s) Oral at bedtime  clopidogrel Tablet 75 milliGRAM(s) Oral daily  pantoprazole    Tablet 40 milliGRAM(s) Oral before breakfast  furosemide   Injectable 40 milliGRAM(s) IV Push daily  spironolactone 25 milliGRAM(s) Oral daily  amLODIPine   Tablet 2.5 milliGRAM(s) Oral daily  hydrALAZINE 25 milliGRAM(s) Oral three times a day  labetalol 100 milliGRAM(s) Oral two times a day    MEDICATIONS  (PRN):  acetaminophen   Tablet. 650 milliGRAM(s) Oral every 6 hours PRN Mild Pain (1 - 3)      Allergies    No Known Allergies    Intolerances        LABS:                        13.2   11.7  )-----------( 257      ( 28 Jul 2017 06:54 )             40.5     07-28    133<L>  |  90<L>  |  95<H>  ----------------------------<  112<H>  3.6   |  32<H>  |  1.81<H>    Ca    8.7      28 Jul 2017 06:54  Phos  3.5     07-28  Mg     2.3     07-28    TPro  7.8  /  Alb  3.2<L>  /  TBili  0.8  /  DBili  x   /  AST  24  /  ALT  34  /  AlkPhos  90  07-28              CAPILLARY BLOOD GLUCOSE        pro-bnp [id number] 07-24 @ 10:41     d-dimer --  07-24 @ 10:41      RADIOLOGY & ADDITIONAL TESTS:    CXR:  < from: Xray Chest 1 View AP -PORTABLE-Routine (07.27.17 @ 06:37) >    EXAM:  CHEST PORTABLE ROUTINE                            PROCEDURE DATE:  07/27/2017          INTERPRETATION:  CLINICAL STATEMENT: Follow-up chest pain.    TECHNIQUE: AP view of the chest.    COMPARISON: 7/25/2017    FINDINGS/  IMPRESSION:  Increased interstitial lung markings without significant change.   Superimposed mild airspace opacities lung bases overall improving.    No significant pleural effusion.    Heart size cannot be accurately assessed in this projection, but appear   enlarged.        Ct scan chest:    ekg;    echo:

## 2017-07-28 NOTE — PROGRESS NOTE ADULT - ASSESSMENT
DAVID possible cardiorenal due to CHF.  xr with mild improvement but still in CHF.  Continue IV diuretics. Follow intake and output.  Increased in BUN and creatinine from diuretics.   Reduced sodium to follow  CHF due to diastolic CHF and MR.    Its not clear if there is a history of CKD follow US DAVID possible cardiorenal due to CHF.  xr with mild improvement but still in CHF.  Continue IV diuretics. Follow intake and output.  Increased in BUN and creatinine from diuretics.   Reduced sodium to follow with urine lytes and osmolality.  CHF due to diastolic CHF and MR.    Its not clear if there is a history of CKD follow US

## 2017-07-29 ENCOUNTER — TRANSCRIPTION ENCOUNTER (OUTPATIENT)
Age: 82
End: 2017-07-29

## 2017-07-29 LAB
ANION GAP SERPL CALC-SCNC: 12 MMOL/L — SIGNIFICANT CHANGE UP (ref 5–17)
APPEARANCE UR: CLEAR — SIGNIFICANT CHANGE UP
BACTERIA # UR AUTO: ABNORMAL /HPF
BILIRUB UR-MCNC: NEGATIVE — SIGNIFICANT CHANGE UP
BUN SERPL-MCNC: 92 MG/DL — HIGH (ref 7–18)
CALCIUM SERPL-MCNC: 8.7 MG/DL — SIGNIFICANT CHANGE UP (ref 8.4–10.5)
CHLORIDE SERPL-SCNC: 87 MMOL/L — LOW (ref 96–108)
CO2 SERPL-SCNC: 31 MMOL/L — SIGNIFICANT CHANGE UP (ref 22–31)
COLOR SPEC: YELLOW — SIGNIFICANT CHANGE UP
COMMENT - URINE: SIGNIFICANT CHANGE UP
CREAT SERPL-MCNC: 1.76 MG/DL — HIGH (ref 0.5–1.3)
CULTURE RESULTS: SIGNIFICANT CHANGE UP
CULTURE RESULTS: SIGNIFICANT CHANGE UP
DIFF PNL FLD: NEGATIVE — SIGNIFICANT CHANGE UP
EPI CELLS # UR: ABNORMAL (ref 0–10)
GLUCOSE SERPL-MCNC: 107 MG/DL — HIGH (ref 70–99)
GLUCOSE UR QL: NEGATIVE — SIGNIFICANT CHANGE UP
HCT VFR BLD CALC: 39.9 % — SIGNIFICANT CHANGE UP (ref 39–50)
HGB BLD-MCNC: 13.3 G/DL — SIGNIFICANT CHANGE UP (ref 13–17)
KETONES UR-MCNC: NEGATIVE — SIGNIFICANT CHANGE UP
LEUKOCYTE ESTERASE UR-ACNC: NEGATIVE — SIGNIFICANT CHANGE UP
MCHC RBC-ENTMCNC: 29.9 PG — SIGNIFICANT CHANGE UP (ref 27–34)
MCHC RBC-ENTMCNC: 33.2 GM/DL — SIGNIFICANT CHANGE UP (ref 32–36)
MCV RBC AUTO: 90 FL — SIGNIFICANT CHANGE UP (ref 80–100)
NITRITE UR-MCNC: NEGATIVE — SIGNIFICANT CHANGE UP
OSMOLALITY UR: 330 MOS/KG — SIGNIFICANT CHANGE UP (ref 50–1200)
PH UR: 6 — SIGNIFICANT CHANGE UP (ref 5–8)
PLATELET # BLD AUTO: 262 K/UL — SIGNIFICANT CHANGE UP (ref 150–400)
POTASSIUM SERPL-MCNC: 3.4 MMOL/L — LOW (ref 3.5–5.3)
POTASSIUM SERPL-SCNC: 3.4 MMOL/L — LOW (ref 3.5–5.3)
POTASSIUM UR-SCNC: 26 MMOL/L — SIGNIFICANT CHANGE UP (ref 25–125)
PROT UR-MCNC: 30 MG/DL
RBC # BLD: 4.43 M/UL — SIGNIFICANT CHANGE UP (ref 4.2–5.8)
RBC # FLD: 12.6 % — SIGNIFICANT CHANGE UP (ref 10.3–14.5)
RBC CASTS # UR COMP ASSIST: SIGNIFICANT CHANGE UP /HPF (ref 0–2)
SODIUM SERPL-SCNC: 130 MMOL/L — LOW (ref 135–145)
SODIUM UR-SCNC: 44 MMOL/L — SIGNIFICANT CHANGE UP (ref 40–220)
SP GR SPEC: 1.01 — SIGNIFICANT CHANGE UP (ref 1.01–1.02)
SPECIMEN SOURCE: SIGNIFICANT CHANGE UP
SPECIMEN SOURCE: SIGNIFICANT CHANGE UP
UROBILINOGEN FLD QL: NEGATIVE — SIGNIFICANT CHANGE UP
WBC # BLD: 15.8 K/UL — HIGH (ref 3.8–10.5)
WBC # FLD AUTO: 15.8 K/UL — HIGH (ref 3.8–10.5)
WBC UR QL: SIGNIFICANT CHANGE UP /HPF (ref 0–5)

## 2017-07-29 RX ORDER — FUROSEMIDE 40 MG
20 TABLET ORAL EVERY 12 HOURS
Qty: 0 | Refills: 0 | Status: DISCONTINUED | OUTPATIENT
Start: 2017-07-29 | End: 2017-07-30

## 2017-07-29 RX ORDER — POTASSIUM CHLORIDE 20 MEQ
20 PACKET (EA) ORAL ONCE
Qty: 0 | Refills: 0 | Status: COMPLETED | OUTPATIENT
Start: 2017-07-29 | End: 2017-07-30

## 2017-07-29 RX ADMIN — AMLODIPINE BESYLATE 2.5 MILLIGRAM(S): 2.5 TABLET ORAL at 05:09

## 2017-07-29 RX ADMIN — Medication 3 MILLILITER(S): at 02:40

## 2017-07-29 RX ADMIN — Medication 3 MILLILITER(S): at 09:13

## 2017-07-29 RX ADMIN — GABAPENTIN 300 MILLIGRAM(S): 400 CAPSULE ORAL at 05:09

## 2017-07-29 RX ADMIN — GABAPENTIN 300 MILLIGRAM(S): 400 CAPSULE ORAL at 17:19

## 2017-07-29 RX ADMIN — Medication 20 MILLIGRAM(S): at 05:09

## 2017-07-29 RX ADMIN — Medication 25 MILLIGRAM(S): at 12:10

## 2017-07-29 RX ADMIN — Medication 25 MILLIGRAM(S): at 05:09

## 2017-07-29 RX ADMIN — POLYETHYLENE GLYCOL 3350 17 GRAM(S): 17 POWDER, FOR SOLUTION ORAL at 12:10

## 2017-07-29 RX ADMIN — Medication 100 MILLIGRAM(S): at 17:19

## 2017-07-29 RX ADMIN — PANTOPRAZOLE SODIUM 40 MILLIGRAM(S): 20 TABLET, DELAYED RELEASE ORAL at 05:09

## 2017-07-29 RX ADMIN — Medication 100 MILLIGRAM(S): at 05:09

## 2017-07-29 RX ADMIN — Medication 20 MILLIGRAM(S): at 12:11

## 2017-07-29 RX ADMIN — Medication 3 MILLILITER(S): at 14:42

## 2017-07-29 RX ADMIN — Medication 25 MILLIGRAM(S): at 21:42

## 2017-07-29 RX ADMIN — CLOPIDOGREL BISULFATE 75 MILLIGRAM(S): 75 TABLET, FILM COATED ORAL at 12:10

## 2017-07-29 RX ADMIN — Medication 100 MILLIGRAM(S): at 12:10

## 2017-07-29 RX ADMIN — Medication 81 MILLIGRAM(S): at 12:10

## 2017-07-29 RX ADMIN — ATORVASTATIN CALCIUM 40 MILLIGRAM(S): 80 TABLET, FILM COATED ORAL at 21:42

## 2017-07-29 RX ADMIN — Medication 20 MILLIGRAM(S): at 17:18

## 2017-07-29 RX ADMIN — Medication 4 MILLIGRAM(S): at 21:42

## 2017-07-29 RX ADMIN — Medication 3 MILLILITER(S): at 21:05

## 2017-07-29 RX ADMIN — SPIRONOLACTONE 25 MILLIGRAM(S): 25 TABLET, FILM COATED ORAL at 05:09

## 2017-07-29 NOTE — DISCHARGE NOTE ADULT - CARE PLAN
Principal Discharge DX:	Mitral valve insufficiency, unspecified etiology  Goal:	control symptoms  Instructions for follow-up, activity and diet:	likely cause of heart failure  nephrology and cardiology recommend that you continue on diuretic to prevent recurrent pulmonary edema  please follow up with your medical doctor and cardiology as soon as you can to review your medications  Secondary Diagnosis:	Hyponatremia  Goal:	keep stable  Instructions for follow-up, activity and diet:	the hyponatremia is due to the diuretic you must continue to prevent recurrent pulmonary edema. make sure you eat well and do not drink more than 1.5 liters of fluid per day to prevent worsening low sodium. follow up with your medical doctor in 3 days to repeat labs  Secondary Diagnosis:	Stage 3 chronic kidney disease  Goal:	keep stable  Instructions for follow-up, activity and diet:	your chronic kidney disease is due to continued diuretic (lasix) which you need to continue. make sure you eat well and drink about 1.5L per day  follow up with your medical doctor and kidney doctor (you have Dr Andrews's contact information above) in 3 days to check your kidney function  Secondary Diagnosis:	Coronary artery disease involving native coronary artery of native heart without angina pectoris  Goal:	prevent recurrent thrombus  Instructions for follow-up, activity and diet:	continue with medications and follow up with your cardiologist in 3 days to review your medications  Secondary Diagnosis:	Pseudogout  Goal:	control symptoms  Instructions for follow-up, activity and diet:	you had your right knee drained and injected with steroids   continue colchicine for 7 days and follow up with your medical doctor in 3 days to review  Secondary Diagnosis:	Essential hypertension  Goal:	keep bp<150/90  Instructions for follow-up, activity and diet:	continue your blood pressure medications and follow up with your medical doctor and cardiologist in 3 days Principal Discharge DX:	Mitral valve insufficiency, unspecified etiology  Goal:	control symptoms  Instructions for follow-up, activity and diet:	likely cause of heart failure  nephrology and cardiology recommend that you continue on diuretic to prevent recurrent pulmonary edema  make sure you take deep breaths routinely and use your oxygen at home  please follow up with your medical doctor and cardiology as soon as you can to review your medications  Secondary Diagnosis:	Hyponatremia  Goal:	keep stable  Instructions for follow-up, activity and diet:	the hyponatremia is due to the diuretic you must continue to prevent recurrent pulmonary edema. make sure you eat well and do not drink more than 1.5 liters of fluid per day to prevent worsening low sodium. follow up with your medical doctor in 3 days to repeat labs  Secondary Diagnosis:	Stage 3 chronic kidney disease  Goal:	keep stable  Instructions for follow-up, activity and diet:	your chronic kidney disease is due to continued diuretic (lasix) which you need to continue. make sure you eat well and drink about 1.5L per day  follow up with your medical doctor and kidney doctor (you have Dr Andrews's contact information above) in 3 days to check your kidney function  Secondary Diagnosis:	Coronary artery disease involving native coronary artery of native heart without angina pectoris  Goal:	prevent recurrent thrombus  Instructions for follow-up, activity and diet:	continue with medications and follow up with your cardiologist in 3 days to review your medications  Secondary Diagnosis:	Pseudogout  Goal:	control symptoms  Instructions for follow-up, activity and diet:	you had your right knee drained and injected with steroids   continue colchicine for 7 days and follow up with your medical doctor in 3 days to review  Secondary Diagnosis:	Essential hypertension  Goal:	keep bp<150/90  Instructions for follow-up, activity and diet:	continue your blood pressure medications and follow up with your medical doctor and cardiologist in 3 days

## 2017-07-29 NOTE — DISCHARGE NOTE ADULT - ADDITIONAL INSTRUCTIONS
follow up with your medical doctor, cardiologist, and kidney doctor in 3-7 days to repeat labs and review your medications

## 2017-07-29 NOTE — PROGRESS NOTE ADULT - ASSESSMENT
DAVID possible cardiorenal due to CHF.  xr with mild improvement but still in CHF.  Hyponatremia suggest to increase diuretics   Follow intake and output.   CHF due to diastolic CHF and MR.    Its not clear if there is a history of CKD follow US

## 2017-07-29 NOTE — DISCHARGE NOTE ADULT - MEDICATION SUMMARY - MEDICATIONS TO CHANGE
I will SWITCH the dose or number of times a day I take the medications listed below when I get home from the hospital:    hydrALAZINE 50 mg oral tablet  -- 1 tab(s) by mouth 3 times a day

## 2017-07-29 NOTE — DISCHARGE NOTE ADULT - MEDICATION SUMMARY - MEDICATIONS TO TAKE
I will START or STAY ON the medications listed below when I get home from the hospital:    aspirin 81 mg oral delayed release tablet  -- 1 tab(s) by mouth once a day  -- Indication: For Cad    doxazosin 4 mg oral tablet  -- 1 tab(s) by mouth once a day (at bedtime)  -- Indication: For Hypertension    mirtazapine 7.5 mg oral tablet  -- 1 tab(s) by mouth once a day (at bedtime)  -- Indication: For depression    PARoxetine 20 mg oral tablet  -- 1 tab(s) by mouth once a day  -- Indication: For depression    colchicine 0.6 mg oral tablet  -- 1 tab(s) by mouth once a day x 7 days  -- Indication: For Pseudogout    atorvastatin 40 mg oral tablet  -- 1 tab(s) by mouth once a day (at bedtime)  -- Indication: For Cad    clopidogrel 75 mg oral tablet  -- 1 tab(s) by mouth once a day  -- Indication: For Cad    amLODIPine 2.5 mg oral tablet  -- 1 tab(s) by mouth once a day  -- Indication: For Hypertension    furosemide 40 mg oral tablet  -- 1 tab(s) by mouth once a day  -- Indication: For Chf    hydrALAZINE 25 mg oral tablet  -- 1 tab(s) by mouth 3 times a day  -- Indication: For Hypertension I will START or STAY ON the medications listed below when I get home from the hospital:    spironolactone 25 mg oral tablet  -- 1 tab(s) by mouth 3 times a week  -- Indication: For Acute diastolic congestive heart failure    aspirin 81 mg oral delayed release tablet  -- 1 tab(s) by mouth once a day  -- Indication: For Cad    doxazosin 4 mg oral tablet  -- 1 tab(s) by mouth once a day (at bedtime)  -- Indication: For Hypertension    mirtazapine 7.5 mg oral tablet  -- 1 tab(s) by mouth once a day (at bedtime)  -- Indication: For depression    PARoxetine 20 mg oral tablet  -- 1 tab(s) by mouth once a day  -- Indication: For depression    colchicine 0.6 mg oral tablet  -- 1 tab(s) by mouth once a day x 2 days  -- Indication: For Pseudogout    atorvastatin 40 mg oral tablet  -- 1 tab(s) by mouth once a day (at bedtime)  -- Indication: For Cad    clopidogrel 75 mg oral tablet  -- 1 tab(s) by mouth once a day  -- Indication: For Cad    amLODIPine 2.5 mg oral tablet  -- 1 tab(s) by mouth once a day  -- Indication: For Hypertension    furosemide 40 mg oral tablet  -- 1 tab(s) by mouth once a day  -- Indication: For Chf    hydrALAZINE 25 mg oral tablet  -- 1 tab(s) by mouth 3 times a day  -- Indication: For Hypertension I will START or STAY ON the medications listed below when I get home from the hospital:    ensure enlive1  -- 1 3 times a day  -- Indication: For Malnutrition    spironolactone 25 mg oral tablet  -- 1 tab(s) by mouth 3 times a week  -- Indication: For Acute diastolic congestive heart failure    aspirin 81 mg oral delayed release tablet  -- 1 tab(s) by mouth once a day  -- Indication: For Cad    doxazosin 4 mg oral tablet  -- 1 tab(s) by mouth once a day (at bedtime)  -- Indication: For Hypertension    mirtazapine 7.5 mg oral tablet  -- 1 tab(s) by mouth once a day (at bedtime)  -- Indication: For depression    PARoxetine 20 mg oral tablet  -- 1 tab(s) by mouth once a day  -- Indication: For depression    colchicine 0.6 mg oral tablet  -- 1 tab(s) by mouth once a day x 2 days  -- Indication: For Pseudogout    atorvastatin 40 mg oral tablet  -- 1 tab(s) by mouth once a day (at bedtime)  -- Indication: For Cad    clopidogrel 75 mg oral tablet  -- 1 tab(s) by mouth once a day  -- Indication: For Cad    amLODIPine 2.5 mg oral tablet  -- 1 tab(s) by mouth once a day  -- Indication: For Hypertension    furosemide 40 mg oral tablet  -- 1 tab(s) by mouth once a day  -- Indication: For Chf    hydrALAZINE 25 mg oral tablet  -- 1 tab(s) by mouth 3 times a day  -- Indication: For Hypertension I will START or STAY ON the medications listed below when I get home from the hospital:    ensure enlive1  -- 1 3 times a day  -- Indication: For Malnutrition    spironolactone 25 mg oral tablet  -- 1 tab(s) by mouth 3 times a week  -- Indication: For Acute diastolic congestive heart failure    aspirin 81 mg oral delayed release tablet  -- 1 tab(s) by mouth once a day  -- Indication: For Cad    doxazosin 4 mg oral tablet  -- 1 tab(s) by mouth once a day (at bedtime)  -- Indication: For Hypertension    mirtazapine 7.5 mg oral tablet  -- 1 tab(s) by mouth once a day (at bedtime)  -- Indication: For depression    PARoxetine 20 mg oral tablet  -- 1 tab(s) by mouth once a day  -- Indication: For depression    Diflucan 100 mg oral tablet  -- 1 tab(s) by mouth 2 times a day  -- Do not take this drug if you are pregnant.  Finish all this medication unless otherwise directed by prescriber.    -- Indication: For Candida Esophagitis     colchicine 0.6 mg oral tablet  -- 1 tab(s) by mouth once a day x 2 days  -- Indication: For Pseudogout    atorvastatin 40 mg oral tablet  -- 1 tab(s) by mouth once a day (at bedtime)  -- Indication: For Cad    doxycycline hyclate 100 mg oral delayed release tablet  -- 1 tab(s) by mouth 2 times a day  -- Avoid prolonged or excessive exposure to direct and/or artificial sunlight while taking this medication.  Do not chew, break, or crush.  Do not take dairy products, antacids, or iron preparations within one hour of this medication.  Do not take this drug if you are pregnant.  Finish all this medication unless otherwise directed by prescriber.  Medication should be taken with plenty of water.    -- Indication: For Thrombophlebitis    clopidogrel 75 mg oral tablet  -- 1 tab(s) by mouth once a day  -- Indication: For Cad    amLODIPine 2.5 mg oral tablet  -- 1 tab(s) by mouth once a day  -- Indication: For Hypertension    Keflex 500 mg oral capsule  -- 500 cap(s) by mouth 2 times a day  -- Finish all this medication unless otherwise directed by prescriber.    -- Indication: For Thrombophlebitis    furosemide 40 mg oral tablet  -- 1 tab(s) by mouth once a day  -- Indication: For Chf    hydrALAZINE 25 mg oral tablet  -- 1 tab(s) by mouth 3 times a day  -- Indication: For Hypertension I will START or STAY ON the medications listed below when I get home from the hospital:    ensure enlive1  -- 1 3 times a day  -- Indication: For Malnutrition    spironolactone 25 mg oral tablet  -- 1 tab(s) by mouth 3 times a week  -- Indication: For Acute diastolic congestive heart failure    aspirin 81 mg oral delayed release tablet  -- 1 tab(s) by mouth once a day  -- Indication: For Cad    doxazosin 4 mg oral tablet  -- 1 tab(s) by mouth once a day (at bedtime)  -- Indication: For Hypertension    mirtazapine 7.5 mg oral tablet  -- 1 tab(s) by mouth once a day (at bedtime)  -- Indication: For depression    PARoxetine 20 mg oral tablet  -- 1 tab(s) by mouth once a day  -- Indication: For depression    Diflucan 100 mg oral tablet  -- 1 tab(s) by mouth 2 times a day  -- Do not take this drug if you are pregnant.  Finish all this medication unless otherwise directed by prescriber.    -- Indication: For Candida esophgitis    colchicine 0.6 mg oral tablet  -- 1 tab(s) by mouth once a day x 2 days  -- Indication: For Pseudogout    atorvastatin 40 mg oral tablet  -- 1 tab(s) by mouth once a day (at bedtime)  -- Indication: For Cad    doxycycline hyclate 100 mg oral delayed release tablet  -- 1 tab(s) by mouth 2 times a day  -- Avoid prolonged or excessive exposure to direct and/or artificial sunlight while taking this medication.  Do not chew, break, or crush.  Do not take dairy products, antacids, or iron preparations within one hour of this medication.  Do not take this drug if you are pregnant.  Finish all this medication unless otherwise directed by prescriber.  Medication should be taken with plenty of water.    -- Indication: For Thrombophlebitis    clopidogrel 75 mg oral tablet  -- 1 tab(s) by mouth once a day  -- Indication: For Cad    amLODIPine 2.5 mg oral tablet  -- 1 tab(s) by mouth once a day  -- Indication: For Hypertension    Keflex 500 mg oral capsule  -- 500 cap(s) by mouth 2 times a day  -- Finish all this medication unless otherwise directed by prescriber.    -- Indication: For Thrombophlebitis    furosemide 40 mg oral tablet  -- 1 tab(s) by mouth once a day  -- Indication: For Chf    hydrALAZINE 25 mg oral tablet  -- 1 tab(s) by mouth 3 times a day  -- Indication: For Hypertension I will START or STAY ON the medications listed below when I get home from the hospital:    ensure enlive1  -- 1 3 times a day  -- Indication: For Malnutrition    spironolactone 25 mg oral tablet  -- 1 tab(s) by mouth 3 times a week  -- Indication: For Acute diastolic congestive heart failure    aspirin 81 mg oral delayed release tablet  -- 1 tab(s) by mouth once a day  -- Indication: For Cad    doxazosin 4 mg oral tablet  -- 1 tab(s) by mouth once a day (at bedtime)  -- Indication: For Hypertension    mirtazapine 7.5 mg oral tablet  -- 1 tab(s) by mouth once a day (at bedtime)  -- Indication: For depression    PARoxetine 20 mg oral tablet  -- 1 tab(s) by mouth once a day  -- Indication: For depression    Diflucan 100 mg oral tablet  -- 1 tab(s) by mouth 2 times a day  -- Do not take this drug if you are pregnant.  Finish all this medication unless otherwise directed by prescriber.    -- Indication: For Candida esophagitis    colchicine 0.6 mg oral tablet  -- 1 tab(s) by mouth once a day x 2 days  -- Indication: For Pseudogout    atorvastatin 40 mg oral tablet  -- 1 tab(s) by mouth once a day (at bedtime)  -- Indication: For Cad    doxycycline hyclate 100 mg oral delayed release tablet  -- 1 tab(s) by mouth 2 times a day  -- Avoid prolonged or excessive exposure to direct and/or artificial sunlight while taking this medication.  Do not chew, break, or crush.  Do not take dairy products, antacids, or iron preparations within one hour of this medication.  Do not take this drug if you are pregnant.  Finish all this medication unless otherwise directed by prescriber.  Medication should be taken with plenty of water.    -- Indication: For Thrombophlebitis    clopidogrel 75 mg oral tablet  -- 1 tab(s) by mouth once a day  -- Indication: For Cad    amLODIPine 2.5 mg oral tablet  -- 1 tab(s) by mouth once a day  -- Indication: For Hypertension    Keflex 500 mg oral capsule  -- 500 cap(s) by mouth 2 times a day  -- Finish all this medication unless otherwise directed by prescriber.    -- Indication: For Thrombophlebitis    furosemide 40 mg oral tablet  -- 1 tab(s) by mouth once a day  -- Indication: For Chf    hydrALAZINE 25 mg oral tablet  -- 1 tab(s) by mouth 3 times a day  -- Indication: For Hypertension I will START or STAY ON the medications listed below when I get home from the hospital:    ensure enlive1  -- 1 3 times a day  -- Indication: For Malnutrition    spironolactone 25 mg oral tablet  -- 1 tab(s) by mouth 3 times a week  -- Indication: For Acute diastolic congestive heart failure    aspirin 81 mg oral delayed release tablet  -- 1 tab(s) by mouth once a day  -- Indication: For Cad    doxazosin 4 mg oral tablet  -- 1 tab(s) by mouth once a day (at bedtime)  -- Indication: For Hypertension    mirtazapine 7.5 mg oral tablet  -- 1 tab(s) by mouth once a day (at bedtime)  -- Indication: For depression    PARoxetine 20 mg oral tablet  -- 1 tab(s) by mouth once a day  -- Indication: For depression    Diflucan 100 mg oral tablet  -- 1 tab(s) by mouth 2 times a day  -- Do not take this drug if you are pregnant.  Finish all this medication unless otherwise directed by prescriber.    -- Indication: For Candida esophagitis    colchicine 0.6 mg oral tablet  -- 1 tab(s) by mouth once a day x 2 days  -- Indication: For Pseudogout    atorvastatin 40 mg oral tablet  -- 1 tab(s) by mouth once a day (at bedtime)  -- Indication: For Cad    doxycycline hyclate 100 mg oral delayed release tablet  -- 1 tab(s) by mouth 2 times a day  -- Avoid prolonged or excessive exposure to direct and/or artificial sunlight while taking this medication.  Do not chew, break, or crush.  Do not take dairy products, antacids, or iron preparations within one hour of this medication.  Do not take this drug if you are pregnant.  Finish all this medication unless otherwise directed by prescriber.  Medication should be taken with plenty of water.    -- Indication: For Thrombophlebitis    clopidogrel 75 mg oral tablet  -- 1 tab(s) by mouth once a day  -- Indication: For Cad    amLODIPine 2.5 mg oral tablet  -- 1 tab(s) by mouth once a day  -- Indication: For Hypertension    Keflex 500 mg oral capsule  -- 1 cap(s) by mouth 2 times a day  -- Finish all this medication unless otherwise directed by prescriber.    -- Indication: For infection    furosemide 40 mg oral tablet  -- 1 tab(s) by mouth once a day  -- Indication: For Chf    hydrALAZINE 25 mg oral tablet  -- 1 tab(s) by mouth 3 times a day  -- Indication: For Hypertension I will START or STAY ON the medications listed below when I get home from the hospital:    ensure enlive1  -- 1 3 times a day  -- Indication: For Malnutrition    spironolactone 25 mg oral tablet  -- 1 tab(s) by mouth 3 times a week  -- Indication: For Acute diastolic congestive heart failure    aspirin 81 mg oral delayed release tablet  -- 1 tab(s) by mouth once a day  -- Indication: For Cad    doxazosin 4 mg oral tablet  -- 1 tab(s) by mouth once a day (at bedtime)  -- Indication: For infection    mirtazapine 7.5 mg oral tablet  -- 1 tab(s) by mouth once a day (at bedtime)  -- Indication: For depression    PARoxetine 20 mg oral tablet  -- 1 tab(s) by mouth once a day  -- Indication: For depression    Diflucan 100 mg oral tablet  -- 1 tab(s) by mouth 2 times a day  -- Do not take this drug if you are pregnant.  Finish all this medication unless otherwise directed by prescriber.    -- Indication: For infection    colchicine 0.6 mg oral tablet  -- 1 tab(s) by mouth once a day x 2 days  -- Indication: For Pseudogout    atorvastatin 40 mg oral tablet  -- 1 tab(s) by mouth once a day (at bedtime)  -- Indication: For Cad    doxycycline hyclate 100 mg oral delayed release tablet  -- 1 tab(s) by mouth 2 times a day  -- Avoid prolonged or excessive exposure to direct and/or artificial sunlight while taking this medication.  Do not chew, break, or crush.  Do not take dairy products, antacids, or iron preparations within one hour of this medication.  Do not take this drug if you are pregnant.  Finish all this medication unless otherwise directed by prescriber.  Medication should be taken with plenty of water.    -- Indication: For infection    clopidogrel 75 mg oral tablet  -- 1 tab(s) by mouth once a day  -- Indication: For Cad    amLODIPine 2.5 mg oral tablet  -- 1 tab(s) by mouth once a day  -- Indication: For Hypertension    Keflex 500 mg oral capsule  -- 1 cap(s) by mouth 2 times a day  -- Finish all this medication unless otherwise directed by prescriber.    -- Indication: For infection    furosemide 40 mg oral tablet  -- 1 tab(s) by mouth once a day  -- Indication: For Chf    hydrALAZINE 25 mg oral tablet  -- 1 tab(s) by mouth 3 times a day  -- Indication: For Hypertension I will START or STAY ON the medications listed below when I get home from the hospital:    ensure enlive1  -- 1 3 times a day  -- Indication: For Malnutrition    aspirin 81 mg oral delayed release tablet  -- 1 tab(s) by mouth once a day  -- Indication: For Cad    doxazosin 4 mg oral tablet  -- 1 tab(s) by mouth once a day (at bedtime)  -- Indication: For Hypertension    mirtazapine 7.5 mg oral tablet  -- 1 tab(s) by mouth once a day (at bedtime)  -- Indication: For Depression    PARoxetine 20 mg oral tablet  -- 1 tab(s) by mouth once a day  -- Indication: For Depression    Diflucan 100 mg oral tablet  -- 1 tab(s) by mouth 2 times a day  -- Do not take this drug if you are pregnant.  Finish all this medication unless otherwise directed by prescriber.    -- Indication: For Candida infection of mouth - only take 5 days    atorvastatin 40 mg oral tablet  -- 1 tab(s) by mouth once a day (at bedtime)  -- Indication: For Cad    doxycycline hyclate 100 mg oral delayed release tablet  -- 1 tab(s) by mouth 2 times a day  -- Avoid prolonged or excessive exposure to direct and/or artificial sunlight while taking this medication.  Do not chew, break, or crush.  Do not take dairy products, antacids, or iron preparations within one hour of this medication.  Do not take this drug if you are pregnant.  Finish all this medication unless otherwise directed by prescriber.  Medication should be taken with plenty of water.    -- Indication: For Thrombophlebitis    clopidogrel 75 mg oral tablet  -- 1 tab(s) by mouth once a day  -- Indication: For Cad    amLODIPine 2.5 mg oral tablet  -- 1 tab(s) by mouth once a day  -- Indication: For Hypertension    Keflex 500 mg oral capsule  -- 1 cap(s) by mouth 2 times a day  -- Finish all this medication unless otherwise directed by prescriber.    -- Indication: For Thrombophlebitis    hydrALAZINE 25 mg oral tablet  -- 1 tab(s) by mouth 3 times a day  -- Indication: For Hypertension I will START or STAY ON the medications listed below when I get home from the hospital:    ensure enlive1  -- 1 3 times a day  -- Indication: For Malnutrition    aspirin 81 mg oral delayed release tablet  -- 1 tab(s) by mouth once a day  -- Indication: For Cad    doxazosin 4 mg oral tablet  -- 1 tab(s) by mouth once a day (at bedtime)  -- Indication: For Hypertension    mirtazapine 7.5 mg oral tablet  -- 1 tab(s) by mouth once a day (at bedtime)  -- Indication: For Depression    PARoxetine 20 mg oral tablet  -- 1 tab(s) by mouth once a day  -- Indication: For Depression    Diflucan 100 mg oral tablet  -- 1 tab(s) by mouth 2 times a day  -- Do not take this drug if you are pregnant.  Finish all this medication unless otherwise directed by prescriber.    -- Indication: For Candida infection of mouth    atorvastatin 40 mg oral tablet  -- 1 tab(s) by mouth once a day (at bedtime)  -- Indication: For Cad    doxycycline hyclate 100 mg oral delayed release tablet  -- 1 tab(s) by mouth 2 times a day  -- Avoid prolonged or excessive exposure to direct and/or artificial sunlight while taking this medication.  Do not chew, break, or crush.  Do not take dairy products, antacids, or iron preparations within one hour of this medication.  Do not take this drug if you are pregnant.  Finish all this medication unless otherwise directed by prescriber.  Medication should be taken with plenty of water.    -- Indication: For Thrombophlebitis    clopidogrel 75 mg oral tablet  -- 1 tab(s) by mouth once a day  -- Indication: For Cad    amLODIPine 2.5 mg oral tablet  -- 1 tab(s) by mouth once a day  -- Indication: For Hypertension    Keflex 500 mg oral capsule  -- 1 cap(s) by mouth 2 times a day  -- Finish all this medication unless otherwise directed by prescriber.    -- Indication: For Thrombophlebitis    hydrALAZINE 25 mg oral tablet  -- 1 tab(s) by mouth 3 times a day  -- Indication: For Hypertension

## 2017-07-29 NOTE — DISCHARGE NOTE ADULT - MEDICATION SUMMARY - MEDICATIONS TO STOP TAKING
I will STOP taking the medications listed below when I get home from the hospital:    gabapentin 300 mg oral tablet  -- 1 tab(s) by mouth 2 times a day    labetalol 200 mg oral tablet  -- 1 tab(s) by mouth 2 times a day I will STOP taking the medications listed below when I get home from the hospital:    gabapentin 300 mg oral tablet  -- 1 tab(s) by mouth 2 times a day    labetalol 200 mg oral tablet  -- 1 tab(s) by mouth 2 times a day    furosemide 40 mg oral tablet  -- 1 tab(s) by mouth once a day    colchicine 0.6 mg oral tablet  -- 1 tab(s) by mouth once a day x 2 days    spironolactone 25 mg oral tablet  -- 1 tab(s) by mouth 3 times a week

## 2017-07-29 NOTE — DISCHARGE NOTE ADULT - HOSPITAL COURSE
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 disease), COPD, pulmonary HTN, CAD (s/p stent x 3 and ASA, Plavix medical management), HTN, asthma, depression, anxiety p/w acute shortness of breath with wheezing, bilateral crackles, and elevated BNP > 10,000. Patient found to be hypoxic, placed on BiPAP, and admitted to ICU for acute hypoxic respiratory failure secondary to new-onset CHF. patient was later downgraded to the floor and patient off the BIPAP and saturating well on nasal cannula    : Acute diastolic congestive heart failure.  Plan: /-new onset CHF with pleural effusions  -s/p acute hypoxic respiratory failure secondary to chf   -S/p BiPAP; C/W Nasal Cannula oxygen supplementation   -CXR shows resolved pleural effusions  -Lasix 40mg IV BID switched to 40mg PO dialy  -Continue daily weights, strict ins and outs  -ECHO -shows moderate to severe MR and EF-55 % and grade 11 diastolic dysfunction.   -patient  has oxygen saturation dropped 80% upon ambulation { patient needs home oxygen      : Pulmonary HTN.  Plan: RV systolic pressure is severely increased (PASP 65mmHg).  -C/W Bronchodilators  -c/w calcium channel blockers.       : Essential hypertension.  Plan: -Controlled BP  -C/W CCB, hydralazine, labetalol, doxazosin..     physical therapy recommends home physical therapy and follow with cardiology as out-patient 89yo [place]-speaking male from home, former smoker, lives with wife, PMH laryngeal cancer (diagnosed 2015, s/p XRT, no evidence disease), COPD, CAD s/p stents x 3, s/p left carotid stent, HTN, asthma, depression, anxiety p/w acute shortness of breath 2/2 new onset congestive heart failure.    Patient was admitted to ICU for new BIPAP, diuresed and BIPAP discontinued.    TTE showed severe MR, started on longstanding lasix  nephrology consult obtained for possible cardiorenal syndrome and recommended continuation of lasix despite hyponatremia.  Serum sodium has remained stable    While on diuretic, developed right knee pain.  Xray showed chondrocalcinosis, orthopedic surgery consulted and performed arthrocentesis and steroid injection with symptomatic improvement.  Physical therapy recommended outpatient PT    Patient qualified for home 02 as 02 sat on room was 80%.   arranged for home 02.    Given debility, patient will need home care from 4hrs x 4 days to 8 hours everyday (8 hours x 7 days).    Patient is stable for discharge, discussed with attending 89yo [place]-speaking male from home, former smoker, lives with wife, PMH laryngeal cancer (diagnosed 2015, s/p XRT, no evidence disease), COPD, CAD s/p stents x 3, s/p left carotid stent, HTN, asthma, depression, anxiety p/w acute shortness of breath 2/2 new onset congestive heart failure.    Patient was admitted to ICU for new BIPAP, diuresed and BIPAP discontinued.    TTE showed severe MR, started on longstanding lasix  nephrology consult obtained for possible cardiorenal syndrome and recommended continuation of lasix despite hyponatremia.  Serum sodium has remained stable    While on diuretic, developed right knee pain.  Xray showed chondrocalcinosis, orthopedic surgery consulted and performed arthrocentesis and steroid injection with symptomatic improvement.  Physical therapy recommended outpatient PT    Patient qualified for home 02 as 02 sat on room was 80%.   arranged for home 02.    Given debility, patient will need home care from 4hrs x 4 days to 8 hours everyday (8 hours x 7 days).    Patient is stable for discharge, discussed with attending    Patient qualifies for hospital bed given history of CHF, orthopnea and needs head of bed elevation.  Patient also with history of laryngeal cancer and needs head of bed elevation to prevent aspiration.

## 2017-07-29 NOTE — DISCHARGE NOTE ADULT - CARE PROVIDER_API CALL
Luciano Andrews), Internal Medicine; Nephrology  [address] [place] [address]  Phone: [phone]  Fax: [phone]    Vibha Rich (), Internal Medicine  [address] [address]  Phone: [phone]  Fax: [phone]

## 2017-07-29 NOTE — PROGRESS NOTE ADULT - SUBJECTIVE AND OBJECTIVE BOX
Patient is a 89y old  Male who presents with a chief complaint of shortness of breath (2017 08:37). Awake, comfortable in bed in NAD. S/p respiratory failure. Admitted to ICU for tx but now stable on medical floor. Doing well on NC oxygen despite poor appetite.      INTERVAL HPI/OVERNIGHT EVENTS:      VITAL SIGNS:  T(F): 97.8 (17 @ 05:00)  HR: 65 (17 @ 05:00)  BP: 129/69 (17 @ 05:00)  RR: 16 (17 @ 10:35)  SpO2: 90% (17 @ 10:35)  Wt(kg): --  I&O's Detail          REVIEW OF SYSTEMS:    CONSTITUTIONAL:  No fevers, chills, sweats    HEENT:  Eyes:  No diplopia or blurred vision. ENT:  No earache, sore throat or runny nose.    CARDIOVASCULAR:  No pressure, squeezing, tightness, or heaviness about the chest; no palpitations.    RESPIRATORY:  Per HPI    GASTROINTESTINAL:  No abdominal pain, nausea, vomiting or diarrhea.    GENITOURINARY:  No dysuria, frequency or urgency.    NEUROLOGIC:  No paresthesias, fasciculations, seizures or weakness.    PSYCHIATRIC:  No disorder of thought or mood.      PHYSICAL EXAM:    Constitutional: Well developed and nourished  Eyes:Perrla  ENMT: normal  Neck:supple  Respiratory: Few rales bilaterally  Cardiovascular: S1 S2 regular  Gastrointestinal: Soft, Non tender  Extremities: No edema  Vascular:normal  Neurological:Awake, alert,Ox3  Musculoskeletal:Normal      MEDICATIONS  (STANDING):  doxazosin 4 milliGRAM(s) Oral at bedtime  PARoxetine 20 milliGRAM(s) Oral daily  gabapentin 300 milliGRAM(s) Oral two times a day  aspirin enteric coated 81 milliGRAM(s) Oral daily  ALBUTerol/ipratropium for Nebulization 3 milliLiter(s) Nebulizer every 6 hours  atorvastatin 40 milliGRAM(s) Oral at bedtime  clopidogrel Tablet 75 milliGRAM(s) Oral daily  pantoprazole    Tablet 40 milliGRAM(s) Oral before breakfast  spironolactone 25 milliGRAM(s) Oral daily  amLODIPine   Tablet 2.5 milliGRAM(s) Oral daily  hydrALAZINE 25 milliGRAM(s) Oral three times a day  labetalol 100 milliGRAM(s) Oral two times a day  polyethylene glycol 3350 17 Gram(s) Oral daily  docusate sodium 100 milliGRAM(s) Oral daily  senna 2 Tablet(s) Oral at bedtime  furosemide   Injectable 20 milliGRAM(s) IV Push every 12 hours    MEDICATIONS  (PRN):  acetaminophen   Tablet. 650 milliGRAM(s) Oral every 6 hours PRN Mild Pain (1 - 3)      Allergies    No Known Allergies    Intolerances        LABS:                        13.3   15.8  )-----------( 262      ( 2017 06:21 )             39.9     -    130<L>  |  87<L>  |  92<H>  ----------------------------<  107<H>  3.4<L>   |  31  |  1.76<H>    Ca    8.7      2017 06:21  Phos  3.5       Mg     2.3         TPro  7.8  /  Alb  3.2<L>  /  TBili  0.8  /  DBili  x   /  AST  24  /  ALT  34  /  AlkPhos  90        Urinalysis Basic - ( 2017 10:35 )    Color: Yellow / Appearance: Clear / S.015 / pH: x  Gluc: x / Ketone: Negative  / Bili: Negative / Urobili: Negative   Blood: x / Protein: 30 mg/dL / Nitrite: Negative   Leuk Esterase: Negative / RBC: 0-2 /HPF / WBC 0-2 /HPF   Sq Epi: x / Non Sq Epi: x / Bacteria: Few /HPF            CAPILLARY BLOOD GLUCOSE        pro-bnp [id number]  @ 10:41     d-dimer --   @ 10:41      RADIOLOGY & ADDITIONAL TESTS:    CXR:    Ct scan chest:    ekg;    echo:

## 2017-07-29 NOTE — DISCHARGE NOTE ADULT - PATIENT PORTAL LINK FT
“You can access the [employer] Patient Portal, offered by [facility], by registering with the following website: [email or web address]”

## 2017-07-29 NOTE — PROGRESS NOTE ADULT - SUBJECTIVE AND OBJECTIVE BOX
_____________________________________________________________________________  ========>>  M E D I C A L   A T T E N D I N G    F O L L O W  U P  N O T E  <<=========  ---------------------------------------------------------------------------------------------------------------------------------------    - Patient seen and examined by me approximately thirty minutes ago.  - In summary, patient is a 89y year old man who originally presented with respiratory failure, post Bipap.  - Patient today overall doing ok, comfortable, eating OK. pt desaturated with ambulation yesterday : may need home O2    ==================>> MEDICATIONS <<====================    doxazosin 4 milliGRAM(s) Oral at bedtime  PARoxetine 20 milliGRAM(s) Oral daily  gabapentin 300 milliGRAM(s) Oral two times a day  aspirin enteric coated 81 milliGRAM(s) Oral daily  ALBUTerol/ipratropium for Nebulization 3 milliLiter(s) Nebulizer every 6 hours  atorvastatin 40 milliGRAM(s) Oral at bedtime  clopidogrel Tablet 75 milliGRAM(s) Oral daily  pantoprazole    Tablet 40 milliGRAM(s) Oral before breakfast  spironolactone 25 milliGRAM(s) Oral daily  amLODIPine   Tablet 2.5 milliGRAM(s) Oral daily  hydrALAZINE 25 milliGRAM(s) Oral three times a day  labetalol 100 milliGRAM(s) Oral two times a day  polyethylene glycol 3350 17 Gram(s) Oral daily  docusate sodium 100 milliGRAM(s) Oral daily  senna 2 Tablet(s) Oral at bedtime  furosemide   Injectable 20 milliGRAM(s) IV Push every 12 hours    MEDICATIONS  (PRN):  acetaminophen   Tablet. 650 milliGRAM(s) Oral every 6 hours PRN Mild Pain (1 - 3)    ==================>> REVIEW OF SYSTEM <<=================    GEN: no fever, no chills, no pain  RESP: no SOB now, no cough, no sputum  CVS: no chest pain, no palpitations, no edema  GI: no abdominal pain, no nausea, no vomiting, no constipation, no diarrhea  Neuro: no headache, no dizziness  PSYCH: no anxiety, no depression  Derm : no itching, no rash    ==================>> VITAL SIGNS <<==================    Vital Signs Last 24 Hrs  T(C): 36.6 (17 @ 05:00)  T(F): 97.8 (17 @ 05:00), Max: 98.2 (17 @ 14:05)  HR: 65 (17 @ 05:00) (62 - 66)  BP: 129/69 (17 @ 05:00)  BP(mean): --  RR: 16 (17 @ 10:35) (16 - 18)  SpO2: 90% (17 @ 10:35) (80% - 95%)      ==================>> PHYSICAL EXAM <<=================    GEN: Alert and oriented, pleasant, comfortable NAD  HEENT: NCAT, PERRL, MMM, hearing intact, + nasal canula O2  Neck: supple , no JVD  CVS: S1S2 , regular , No M/R/G appreciated  PULM: CTA B/L,  no W/R/R appreciated  ABD.: soft. non tender, non distended,  bowel sounds present  Extrem: intact pulses , no edema   Derm: No rash , no ecchymoses  PSYCH : normal mood,  no delusion not anxious     ==================>> LAB AND IMAGING <<==================                                        13.3   15.8  )-----------( 262      ( 2017 06:21 )             39.9        WBC:  15.8    (17 @ 06:21)  WBC:  11.7    (17 @ 06:54)  WBC:  11.8    (17 @ 03:57)  WBC:  12.5    (17 @ 03:57)  WBC:  10.2    (17 @ 04:26)      130<L>  |  87<L>  |  92<H>  ----------------------------<  107<H>  3.4<L>   |  31  |  1.76<H>    Ca    8.7      2017 06:21  Phos  3.5       Mg     2.3         TPro  7.8  /  Alb  3.2<L>  /  TBili  0.8  /  DBili  x   /  AST  24  /  ALT  34  /  AlkPhos  90      Creatinine:  1.76   ( @ 06:21)  Creatinine:  1.81   ( @ 06:54)  Creatinine:  1.91   ( @ 03:57)                   Urinalysis Basic - ( 2017 10:35 )    Color: Yellow / Appearance: Clear / S.015 / pH: x  Gluc: x / Ketone: Negative  / Bili: Negative / Urobili: Negative   Blood: x / Protein: 30 mg/dL / Nitrite: Negative   Leuk Esterase: Negative / RBC: 0-2 /HPF / WBC 0-2 /HPF   Sq Epi: x / Non Sq Epi: x / Bacteria: Few /HPF    TSH:      0.32   (17)           Lipid profile:  (17)     Total: 150     LDL  : 102     HDL  :31     TG   :83     HgA1C: 5.7  (17)          < from: Transthoracic Echocardiogram (17 @ 14:24) >  CONCLUSIONS:  1. Normal mitral valve. Moderate to severe mitral  regurgitation.  2. Calcified trileaflet aortic valve with normal opening.  Mild aortic insufficiency.  3. Normal aortic root.  4. Moderate left atrial enlargement.  5. Normal left ventricular internal dimensions and wall  thicknesses.  6. Normal Left Ventricular Systolic Function,  (EF = 55%)  7. Grade II diastolic dysfunction  8. Normal right atrium.  9. Normal right ventricular size and function.  10. RV systolic pressure is severely increased (PASP 65mm  Hg).  11. There is mild tricuspid regurgitation.  12. There is mild pulmonic regurgitation.  13. Normal pericardium with no pericardial effusion.  < end of copied text >    ________________________________________________________________________  ===============>>  A S S E S S M E N T   A N D   P L A N <<===============  ------------------------------------------------------------------------------------------------------------------------------    **Acute hypoxemic respiratory failure, post bipap, multifactorial, given MR, severe PAH, COPD, and likely acute exacerbation of diastolic heart failure with pleural effusion, overall improved  ---cardio, pulm f/u appreciated  ---wean off O2 as able >>DC  ---nebulizer treatment  ---gentle diuresis as ordered  ---monitor vitals, creatinine electrolytes  --PT/ OOB as able  ---recheck O2 withambulation monday to decide on need for Home O2    **DAVID likely on CKD?, Hyponatremia, stable - improved  ---renal f/u  monitor creat closely on diuretics    **Hypertension, CAD, MR, PAH  ---Continue with current medications as above  ---DASH diet   ---close monitoring of vitals    **BPH  --- Continue Current medications and monitor.     -GI/DVT Prophylaxis.  PT>>Family wants to take pt home >>Likely Monday with ome care  --------------------------------------------  Case discussed with pt, son, HS  Education given on deep breathing, plan of care  ___________________________    HNazanin Rich D.O.  Pager: [phone]

## 2017-07-29 NOTE — DISCHARGE NOTE ADULT - SECONDARY DIAGNOSIS.
Hyponatremia Stage 3 chronic kidney disease Coronary artery disease involving native coronary artery of native heart without angina pectoris Pseudogout Essential hypertension

## 2017-07-29 NOTE — DISCHARGE NOTE ADULT - PLAN OF CARE
control symptoms likely cause of heart failure  nephrology and cardiology recommend that you continue on diuretic to prevent recurrent pulmonary edema  please follow up with your medical doctor and cardiology as soon as you can to review your medications keep stable the hyponatremia is due to the diuretic you must continue to prevent recurrent pulmonary edema. make sure you eat well and do not drink more than 1.5 liters of fluid per day to prevent worsening low sodium. follow up with your medical doctor in 3 days to repeat labs your chronic kidney disease is due to continued diuretic (lasix) which you need to continue. make sure you eat well and drink about 1.5L per day  follow up with your medical doctor and kidney doctor (you have Dr Andrews's contact information above) in 3 days to check your kidney function prevent recurrent thrombus continue with medications and follow up with your cardiologist in 3 days to review your medications you had your right knee drained and injected with steroids   continue colchicine for 7 days and follow up with your medical doctor in 3 days to review keep bp<150/90 continue your blood pressure medications and follow up with your medical doctor and cardiologist in 3 days likely cause of heart failure  nephrology and cardiology recommend that you continue on diuretic to prevent recurrent pulmonary edema  make sure you take deep breaths routinely and use your oxygen at home  please follow up with your medical doctor and cardiology as soon as you can to review your medications

## 2017-07-29 NOTE — PROGRESS NOTE ADULT - SUBJECTIVE AND OBJECTIVE BOX
CARDIOLOGY     PROGRESS  NOTE   ________________________________________________    CHIEF COMPLAINT:Patient is a 89y old  Male who presents with a chief complaint of shortness of breath (29 Jul 2017 08:37)  doing much better.  	  REVIEW OF SYSTEMS:  CONSTITUTIONAL: No fever, weight loss, or fatigue  EYES: No eye pain, visual disturbances, or discharge  ENT:  No difficulty hearing, tinnitus, vertigo; No sinus or throat pain  NECK: No pain or stiffness  RESPIRATORY: No cough, wheezing, chills or hemoptysis; No Shortness of Breath  CARDIOVASCULAR: No chest pain, palpitations, passing out, dizziness, or leg swelling  GASTROINTESTINAL: No abdominal or epigastric pain. No nausea, vomiting, or hematemesis; No diarrhea or constipation. No melena or hematochezia.  GENITOURINARY: No dysuria, frequency, hematuria, or incontinence  NEUROLOGICAL: No headaches, memory loss, loss of strength, numbness, or tremors  SKIN: No itching, burning, rashes, or lesions   LYMPH Nodes: No enlarged glands  ENDOCRINE: No heat or cold intolerance; No hair loss  MUSCULOSKELETAL: No joint pain or swelling; No muscle, back, or extremity pain  PSYCHIATRIC: No depression, anxiety, mood swings, or difficulty sleeping  HEME/LYMPH: No easy bruising, or bleeding gums  ALLERGY AND IMMUNOLOGIC: No hives or eczema	    [ ] All others negative	  [ ] Unable to obtain    PHYSICAL EXAM:  T(C): 36.6 (07-29-17 @ 05:00), Max: 36.8 (07-28-17 @ 14:05)  HR: 65 (07-29-17 @ 05:00) (62 - 72)  BP: 129/69 (07-29-17 @ 05:00) (112/56 - 132/52)  RR: 16 (07-29-17 @ 05:00) (16 - 18)  SpO2: 94% (07-29-17 @ 05:00) (91% - 95%)  Wt(kg): --  I&O's Summary      Appearance: Normal	  HEENT:   Normal oral mucosa, PERRL, EOMI	  Lymphatic: No lymphadenopathy  Cardiovascular: Normal S1 S2, No JVD, No murmurs, No edema  Respiratory: Lungs clear to auscultation	  Psychiatry: A & O x 3, Mood & affect appropriate  Gastrointestinal:  Soft, Non-tender, + BS	  Skin: No rashes, No ecchymoses, No cyanosis	  Neurologic: Non-focal  Extremities: Normal range of motion, No clubbing, cyanosis or edema  Vascular: Peripheral pulses palpable 2+ bilaterally    MEDICATIONS  (STANDING):  doxazosin 4 milliGRAM(s) Oral at bedtime  PARoxetine 20 milliGRAM(s) Oral daily  gabapentin 300 milliGRAM(s) Oral two times a day  aspirin enteric coated 81 milliGRAM(s) Oral daily  ALBUTerol/ipratropium for Nebulization 3 milliLiter(s) Nebulizer every 6 hours  atorvastatin 40 milliGRAM(s) Oral at bedtime  clopidogrel Tablet 75 milliGRAM(s) Oral daily  pantoprazole    Tablet 40 milliGRAM(s) Oral before breakfast  spironolactone 25 milliGRAM(s) Oral daily  amLODIPine   Tablet 2.5 milliGRAM(s) Oral daily  hydrALAZINE 25 milliGRAM(s) Oral three times a day  labetalol 100 milliGRAM(s) Oral two times a day  polyethylene glycol 3350 17 Gram(s) Oral daily  docusate sodium 100 milliGRAM(s) Oral daily  senna 2 Tablet(s) Oral at bedtime  furosemide   Injectable 20 milliGRAM(s) IV Push every 12 hours      TELEMETRY: 	    ECG:  	  RADIOLOGY:  OTHER: 	  	  LABS:	 	    CARDIAC MARKERS:                                13.3   15.8  )-----------( 262      ( 29 Jul 2017 06:21 )             39.9     07-29    130<L>  |  87<L>  |  92<H>  ----------------------------<  107<H>  3.4<L>   |  31  |  1.76<H>    Ca    8.7      29 Jul 2017 06:21  Phos  3.5     07-28  Mg     2.3     07-28    TPro  7.8  /  Alb  3.2<L>  /  TBili  0.8  /  DBili  x   /  AST  24  /  ALT  34  /  AlkPhos  90  07-28    proBNP: Serum Pro-Brain Natriuretic Peptide: 08233 pg/mL (07-24 @ 10:41)    Lipid Profile: Cholesterol 150    HDL 31  TG 83    HgA1c: Hemoglobin A1C, Whole Blood: 5.7 % (07-25 @ 09:08)    TSH: Thyroid Stimulating Hormone, Serum: 0.32 uU/mL (07-25 @ 04:26)          Assessment and plan  ---------------------------  89 yr old  Gambian-speaking male from home, former smoker, lives with wife, PMH laryngeal cancer (diagnosed 2015, s/p XRT, no evidence disease), COPD, CAD s/p stents x 3, s/p left carotid stent, HTN, asthma, depression, anxiety p/w acute shortness of breath due to diastolic HF and mod-severe MR.  1.on lasix to 20mg qd.  2.Severe pulm HTN-Norvasc 2.5mg qd.  3.CAD-asa,statin, labetalol 100mg bid..  4.COPD-nebs.  5.HTN-Cont BP medication.  6.GI and DVT prophylaxis.

## 2017-07-29 NOTE — PROGRESS NOTE ADULT - SUBJECTIVE AND OBJECTIVE BOX
Problem List:  DAVID cardiorenal syndrome  Severe MR and diastolic failure, pulmonary hypertension.      PAST MEDICAL & SURGICAL HISTORY:  Coronary artery disease involving native coronary artery of native heart without angina pectoris  Laryngeal cancer  Essential hypertension  Asthma  Arthritis  Degenerative joint disease  No significant past surgical history      No Known Allergies      MEDICATIONS  (STANDING):  doxazosin 4 milliGRAM(s) Oral at bedtime  PARoxetine 20 milliGRAM(s) Oral daily  gabapentin 300 milliGRAM(s) Oral two times a day  aspirin enteric coated 81 milliGRAM(s) Oral daily  ALBUTerol/ipratropium for Nebulization 3 milliLiter(s) Nebulizer every 6 hours  atorvastatin 40 milliGRAM(s) Oral at bedtime  clopidogrel Tablet 75 milliGRAM(s) Oral daily  pantoprazole    Tablet 40 milliGRAM(s) Oral before breakfast  spironolactone 25 milliGRAM(s) Oral daily  amLODIPine   Tablet 2.5 milliGRAM(s) Oral daily  hydrALAZINE 25 milliGRAM(s) Oral three times a day  labetalol 100 milliGRAM(s) Oral two times a day  furosemide   Injectable 20 milliGRAM(s) IV Push daily  polyethylene glycol 3350 17 Gram(s) Oral daily  docusate sodium 100 milliGRAM(s) Oral daily  senna 2 Tablet(s) Oral at bedtime    MEDICATIONS  (PRN):  acetaminophen   Tablet. 650 milliGRAM(s) Oral every 6 hours PRN Mild Pain (1 - 3)                            13.3   15.8  )-----------( 262      ( 29 Jul 2017 06:21 )             39.9     07-29    130<L>  |  87<L>  |  92<H>  ----------------------------<  107<H>  3.4<L>   |  31  |  1.76<H>    Ca    8.7      29 Jul 2017 06:21  Phos  3.5     07-28  Mg     2.3     07-28    TPro  7.8  /  Alb  3.2<L>  /  TBili  0.8  /  DBili  x   /  AST  24  /  ALT  34  /  AlkPhos  90  07-28    xr 07/27/17  INTERPRETATION:  CLINICAL STATEMENT: Follow-up chest pain.    TECHNIQUE: AP view of the chest.    COMPARISON: 7/25/2017    FINDINGS/  IMPRESSION:  Increased interstitial lung markings without significant change.   Superimposed mild airspace opacities lung bases overall improving.    No significant pleural effusion.    Heart size cannot be accurately assessed in this projection, but appear   enlarged.      REVIEW OF SYSTEMS:  General: no fever no chills, no weight loss.  EYES/ENT: No visual changes;  No vertigo, no headache.  NECK: No pain or stiffness  RESPIRATORY: No cough, wheezing, hemoptysis; No shortness of breath  CARDIOVASCULAR: No chest pain or palpitations. No Edema  GASTROINTESTINAL: No abdominal or epigastric pain. No nausea, vomiting. No diarrhea or constipation. No melena.ENITOURINARY: No dysuria.        VITALS:  T(F): 97.8 (07-29-17 @ 05:00), Max: 98.2 (07-28-17 @ 14:05)  HR: 65 (07-29-17 @ 05:00)  BP: 129/69 (07-29-17 @ 05:00)  RR: 16 (07-29-17 @ 05:00)  SpO2: 94% (07-29-17 @ 05:00)  Wt(kg): --      PHYSICAL EXAM:  Constitutional: well developed, no diaphoresis, no distress.  Neck: No JVD, no carotid bruit, supple, no adenopathy  Respiratory: Good air entrance expiratory ronchi.  Cardiovascular: S1, S2, RRR, no pericardial rub, no murmur  Abdomen: BS+, soft, no tenderness, no bruit  Pelvis: bladder nondistended  Extremities: No cyanosis or clubbing. No peripheral edema.   Pulses: All present  Neurological: A/O x 3, no focal deficits  Psychiatric: Normal mood, normal affect

## 2017-07-30 LAB
ALBUMIN SERPL ELPH-MCNC: 3 G/DL — LOW (ref 3.5–5)
ALP SERPL-CCNC: 103 U/L — SIGNIFICANT CHANGE UP (ref 40–120)
ALT FLD-CCNC: 35 U/L DA — SIGNIFICANT CHANGE UP (ref 10–60)
ANION GAP SERPL CALC-SCNC: 10 MMOL/L — SIGNIFICANT CHANGE UP (ref 5–17)
ANION GAP SERPL CALC-SCNC: 7 MMOL/L — SIGNIFICANT CHANGE UP (ref 5–17)
AST SERPL-CCNC: 25 U/L — SIGNIFICANT CHANGE UP (ref 10–40)
BILIRUB SERPL-MCNC: 0.7 MG/DL — SIGNIFICANT CHANGE UP (ref 0.2–1.2)
BUN SERPL-MCNC: 94 MG/DL — HIGH (ref 7–18)
BUN SERPL-MCNC: 94 MG/DL — HIGH (ref 7–18)
CALCIUM SERPL-MCNC: 8.7 MG/DL — SIGNIFICANT CHANGE UP (ref 8.4–10.5)
CALCIUM SERPL-MCNC: 9 MG/DL — SIGNIFICANT CHANGE UP (ref 8.4–10.5)
CHLORIDE SERPL-SCNC: 88 MMOL/L — LOW (ref 96–108)
CHLORIDE SERPL-SCNC: 88 MMOL/L — LOW (ref 96–108)
CO2 SERPL-SCNC: 31 MMOL/L — SIGNIFICANT CHANGE UP (ref 22–31)
CO2 SERPL-SCNC: 33 MMOL/L — HIGH (ref 22–31)
CREAT SERPL-MCNC: 1.76 MG/DL — HIGH (ref 0.5–1.3)
CREAT SERPL-MCNC: 1.83 MG/DL — HIGH (ref 0.5–1.3)
GLUCOSE SERPL-MCNC: 120 MG/DL — HIGH (ref 70–99)
GLUCOSE SERPL-MCNC: 156 MG/DL — HIGH (ref 70–99)
HCT VFR BLD CALC: 38.8 % — LOW (ref 39–50)
HGB BLD-MCNC: 13.2 G/DL — SIGNIFICANT CHANGE UP (ref 13–17)
MAGNESIUM SERPL-MCNC: 2.5 MG/DL — SIGNIFICANT CHANGE UP (ref 1.6–2.6)
MCHC RBC-ENTMCNC: 30 PG — SIGNIFICANT CHANGE UP (ref 27–34)
MCHC RBC-ENTMCNC: 34 GM/DL — SIGNIFICANT CHANGE UP (ref 32–36)
MCV RBC AUTO: 88.2 FL — SIGNIFICANT CHANGE UP (ref 80–100)
PLATELET # BLD AUTO: 292 K/UL — SIGNIFICANT CHANGE UP (ref 150–400)
POTASSIUM SERPL-MCNC: 3.6 MMOL/L — SIGNIFICANT CHANGE UP (ref 3.5–5.3)
POTASSIUM SERPL-MCNC: 3.8 MMOL/L — SIGNIFICANT CHANGE UP (ref 3.5–5.3)
POTASSIUM SERPL-SCNC: 3.6 MMOL/L — SIGNIFICANT CHANGE UP (ref 3.5–5.3)
POTASSIUM SERPL-SCNC: 3.8 MMOL/L — SIGNIFICANT CHANGE UP (ref 3.5–5.3)
PROT SERPL-MCNC: 7.6 G/DL — SIGNIFICANT CHANGE UP (ref 6–8.3)
RBC # BLD: 4.4 M/UL — SIGNIFICANT CHANGE UP (ref 4.2–5.8)
RBC # FLD: 12.6 % — SIGNIFICANT CHANGE UP (ref 10.3–14.5)
SODIUM SERPL-SCNC: 128 MMOL/L — LOW (ref 135–145)
SODIUM SERPL-SCNC: 129 MMOL/L — LOW (ref 135–145)
WBC # BLD: 15 K/UL — HIGH (ref 3.8–10.5)
WBC # FLD AUTO: 15 K/UL — HIGH (ref 3.8–10.5)

## 2017-07-30 PROCEDURE — 76775 US EXAM ABDO BACK WALL LIM: CPT | Mod: 26

## 2017-07-30 RX ORDER — FUROSEMIDE 40 MG
40 TABLET ORAL ONCE
Qty: 0 | Refills: 0 | Status: COMPLETED | OUTPATIENT
Start: 2017-07-30 | End: 2017-07-30

## 2017-07-30 RX ORDER — FUROSEMIDE 40 MG
40 TABLET ORAL DAILY
Qty: 0 | Refills: 0 | Status: DISCONTINUED | OUTPATIENT
Start: 2017-07-30 | End: 2017-07-31

## 2017-07-30 RX ADMIN — Medication 25 MILLIGRAM(S): at 13:03

## 2017-07-30 RX ADMIN — Medication 3 MILLILITER(S): at 02:45

## 2017-07-30 RX ADMIN — Medication 20 MILLIEQUIVALENT(S): at 05:18

## 2017-07-30 RX ADMIN — Medication 4 MILLIGRAM(S): at 21:08

## 2017-07-30 RX ADMIN — Medication 25 MILLIGRAM(S): at 05:18

## 2017-07-30 RX ADMIN — Medication 81 MILLIGRAM(S): at 12:46

## 2017-07-30 RX ADMIN — GABAPENTIN 300 MILLIGRAM(S): 400 CAPSULE ORAL at 05:18

## 2017-07-30 RX ADMIN — ATORVASTATIN CALCIUM 40 MILLIGRAM(S): 80 TABLET, FILM COATED ORAL at 21:08

## 2017-07-30 RX ADMIN — Medication 100 MILLIGRAM(S): at 12:46

## 2017-07-30 RX ADMIN — Medication 100 MILLIGRAM(S): at 05:18

## 2017-07-30 RX ADMIN — Medication 20 MILLIGRAM(S): at 05:18

## 2017-07-30 RX ADMIN — Medication 3 MILLILITER(S): at 15:01

## 2017-07-30 RX ADMIN — SPIRONOLACTONE 25 MILLIGRAM(S): 25 TABLET, FILM COATED ORAL at 05:18

## 2017-07-30 RX ADMIN — POLYETHYLENE GLYCOL 3350 17 GRAM(S): 17 POWDER, FOR SOLUTION ORAL at 12:46

## 2017-07-30 RX ADMIN — Medication 25 MILLIGRAM(S): at 21:08

## 2017-07-30 RX ADMIN — AMLODIPINE BESYLATE 2.5 MILLIGRAM(S): 2.5 TABLET ORAL at 05:18

## 2017-07-30 RX ADMIN — Medication 3 MILLILITER(S): at 09:52

## 2017-07-30 RX ADMIN — Medication 40 MILLIGRAM(S): at 12:49

## 2017-07-30 RX ADMIN — Medication 3 MILLILITER(S): at 22:16

## 2017-07-30 RX ADMIN — CLOPIDOGREL BISULFATE 75 MILLIGRAM(S): 75 TABLET, FILM COATED ORAL at 12:46

## 2017-07-30 RX ADMIN — PANTOPRAZOLE SODIUM 40 MILLIGRAM(S): 20 TABLET, DELAYED RELEASE ORAL at 05:18

## 2017-07-30 RX ADMIN — Medication 20 MILLIGRAM(S): at 12:46

## 2017-07-30 RX ADMIN — GABAPENTIN 300 MILLIGRAM(S): 400 CAPSULE ORAL at 17:47

## 2017-07-30 NOTE — PROGRESS NOTE ADULT - SUBJECTIVE AND OBJECTIVE BOX
Problem List:  DAVID cardiorenal syndrome  Severe MR and diastolic failure, pulmonary hypertension.  Hyponatremia.    urine output from am 400 ml      PAST MEDICAL & SURGICAL HISTORY:  Coronary artery disease involving native coronary artery of native heart without angina pectoris  Laryngeal cancer  Essential hypertension  Asthma  Arthritis  Degenerative joint disease  No significant past surgical history    No Known Allergies      MEDICATIONS  (STANDING):  doxazosin 4 milliGRAM(s) Oral at bedtime  PARoxetine 20 milliGRAM(s) Oral daily  gabapentin 300 milliGRAM(s) Oral two times a day  aspirin enteric coated 81 milliGRAM(s) Oral daily  ALBUTerol/ipratropium for Nebulization 3 milliLiter(s) Nebulizer every 6 hours  atorvastatin 40 milliGRAM(s) Oral at bedtime  clopidogrel Tablet 75 milliGRAM(s) Oral daily  pantoprazole    Tablet 40 milliGRAM(s) Oral before breakfast  spironolactone 25 milliGRAM(s) Oral daily  amLODIPine   Tablet 2.5 milliGRAM(s) Oral daily  hydrALAZINE 25 milliGRAM(s) Oral three times a day  polyethylene glycol 3350 17 Gram(s) Oral daily  docusate sodium 100 milliGRAM(s) Oral daily  senna 2 Tablet(s) Oral at bedtime  furosemide    Tablet 40 milliGRAM(s) Oral daily    MEDICATIONS  (PRN):  acetaminophen   Tablet. 650 milliGRAM(s) Oral every 6 hours PRN Mild Pain (1 - 3)                            13.2   15.0  )-----------( 292      ( 30 Jul 2017 08:11 )             38.8     07-30    128<L>  |  88<L>  |  94<H>  ----------------------------<  120<H>  3.6   |  33<H>  |  1.76<H>    Ca    9.0      30 Jul 2017 08:11  Mg     2.5     07-30    TPro  7.6  /  Alb  3.0<L>  /  TBili  0.7  /  DBili  x   /  AST  25  /  ALT  35  /  AlkPhos  103  07-30    PROCEDURE DATE:  07/30/2017    INTERPRETATION:  EXAM: US KIDNEY(S)   INDICATION: DAVID.  COMPARISON: None.    TECHNIQUE: Grayscale ultrasound of the kidneys was performed.    FINDINGS:    Right kidney: Normal size, measures 11.8 x 5.5 x 5.4 cm. Increased   echogenicity. No hydronephrosis, shadowing calculus, or perinephric fluid   collection.    Left kidney: Normal size, measures 11.0 x 6.1 x 5.7 cm. Increased   echogenicity. No hydronephrosis,shadowing calculus, or perinephric fluid   collection.    Moderately distended urinary bladder is grossly unremarkable.    Visualized proximal abdominal aorta measures 1.6 cm in AP dimension.   Visualized IVC is grossly unremarkable.    IMPRESSION:   No hydronephrosis.        Potassium, Random Urine: 26 mmol/L (07-29 @ 10:35)  Osmolality, Random Urine: 330 mos/kg (07-29 @ 10:35)  Sodium, Random Urine: 44 mmol/L (07-29 @ 10:35)      REVIEW OF SYSTEMS:  General: tires and sleepy  EYES/ENT: No visual changes;  No vertigo, no headache.  NECK: No pain or stiffness  RESPIRATORY: No cough, wheezing, hemoptysis; No shortness of breath  CARDIOVASCULAR: No chest pain or palpitations. No Edema  GASTROINTESTINAL: No abdominal or epigastric pain. No nausea, vomiting. No diarrhea or constipation. No melena.  GENITOURINARY: No dysuria, frequency, foamy urine, urinary urgency, incontinence or hematuria        VITALS:  T(F): 97.5 (07-30-17 @ 05:04), Max: 98.1 (07-29-17 @ 12:45)  HR: 62 (07-30-17 @ 05:04)  BP: 115/53 (07-30-17 @ 05:04)  RR: 16 (07-30-17 @ 05:04)  SpO2: 96% (07-30-17 @ 05:04)  Wt(kg): --      PHYSICAL EXAM:  Constitutional: well developed, no diaphoresis, no distress.  Neck: No JVD, no carotid bruit, supple, no adenopathy  Respiratory: expiratory ronchi  Cardiovascular: S1, S2, RRR, no pericardial rub, no murmur  Abdomen: BS+, soft, no tenderness, no bruit  Pelvis: bladder nondistended  Extremities: No cyanosis or clubbing. No peripheral edema.   Neurological: A/O x 3, no focal deficits  Psychiatric: Normal mood, normal affect

## 2017-07-30 NOTE — PROGRESS NOTE ADULT - SUBJECTIVE AND OBJECTIVE BOX
_____________________________________________________________________________  ========>>  M E D I C A L   A T T E N D I N G    F O L L O W  U P  N O T E  <<=========  ---------------------------------------------------------------------------------------------------------------------------------------    - Patient seen and examined by me approximately thirty minutes ago.  - In summary, patient is a 89y year old man who originally presented with respiratory failure, post Bipap.  - Patient today overall doing ok, comfortable, eating OK. pt desaturated with ambulation : may need home O2    ==================>> MEDICATIONS <<====================    doxazosin 4 milliGRAM(s) Oral at bedtime  PARoxetine 20 milliGRAM(s) Oral daily  gabapentin 300 milliGRAM(s) Oral two times a day  aspirin enteric coated 81 milliGRAM(s) Oral daily  ALBUTerol/ipratropium for Nebulization 3 milliLiter(s) Nebulizer every 6 hours  atorvastatin 40 milliGRAM(s) Oral at bedtime  clopidogrel Tablet 75 milliGRAM(s) Oral daily  pantoprazole    Tablet 40 milliGRAM(s) Oral before breakfast  spironolactone 25 milliGRAM(s) Oral daily  amLODIPine   Tablet 2.5 milliGRAM(s) Oral daily  hydrALAZINE 25 milliGRAM(s) Oral three times a day  polyethylene glycol 3350 17 Gram(s) Oral daily  docusate sodium 100 milliGRAM(s) Oral daily  senna 2 Tablet(s) Oral at bedtime  furosemide    Tablet 40 milliGRAM(s) Oral daily    MEDICATIONS  (PRN):  acetaminophen   Tablet. 650 milliGRAM(s) Oral every 6 hours PRN Mild Pain (1 - 3)    ==================>> REVIEW OF SYSTEM <<=================    GEN: no fever, no chills, no pain  RESP: no SOB now, no cough, no sputum  CVS: no chest pain, no palpitations, no edema  GI: no abdominal pain, no nausea, no vomiting, no constipation, no diarrhea  Neuro: no headache, no dizziness  PSYCH: no anxiety, no depression  Derm : no itching, no rash    ==================>> VITAL SIGNS <<==================    Vital Signs Last 24 Hrs  T(C): 36.4 (17 @ 05:04)  T(F): 97.5 (17 @ 05:04), Max: 97.7 (17 @ 21:23)  HR: 62 (17 @ 05:04) (62 - 66)  BP: 115/53 (17 @ 05:04)  BP(mean): --  RR: 16 (17 @ 05:04) (16 - 18)  SpO2: 96% (17 @ 05:04) (94% - 96%)      ==================>> PHYSICAL EXAM <<=================    GEN: Alert and oriented, pleasant, comfortable NAD  HEENT: NCAT, PERRL, MMM, hearing intact, + nasal canula O2  Neck: supple , no JVD  CVS: S1S2 , regular , No M/R/G appreciated  PULM: mild bilateral ronchi  ABD.: soft. non tender, non distended,  bowel sounds present  Extrem: intact pulses , no edema   Derm: No rash , no ecchymoses  PSYCH : normal mood,  no delusion not anxious     ==================>> LAB AND IMAGING <<==================                                      13.2   15.0  )-----------( 292      ( 2017 08:11 )             38.8            128<L>  |  88<L>  |  94<H>  ----------------------------<  120<H>  3.6   |  33<H>  |  1.76<H>    Ca    9.0      2017 08:11  Mg     2.5         TPro  7.6  /  Alb  3.0<L>  /  TBili  0.7  /  DBili  x   /  AST  25  /  ALT  35  /  AlkPhos  103      Creatinine:  1.76   ( @ 08:11)  Creatinine:  1.76   ( @ 06:21)  Creatinine:  1.81   ( @ 06:54)                   Urinalysis Basic - ( 2017 10:35 )    Color: Yellow / Appearance: Clear / S.015 / pH: x  Gluc: x / Ketone: Negative  / Bili: Negative / Urobili: Negative   Blood: x / Protein: 30 mg/dL / Nitrite: Negative   Leuk Esterase: Negative / RBC: 0-2 /HPF / WBC 0-2 /HPF   Sq Epi: x / Non Sq Epi: x / Bacteria: Few /HPF    TSH:      0.32   (17)           Lipid profile:  (17)     Total: 150     LDL  : 102     HDL  :31     TG   :83     HgA1C: 5.7  (17)            < from: Transthoracic Echocardiogram (17 @ 14:24) >  CONCLUSIONS:  1. Normal mitral valve. Moderate to severe mitral  regurgitation.  2. Calcified trileaflet aortic valve with normal opening.  Mild aortic insufficiency.  3. Normal aortic root.  4. Moderate left atrial enlargement.  5. Normal left ventricular internal dimensions and wall  thicknesses.  6. Normal Left Ventricular Systolic Function,  (EF = 55%)  7. Grade II diastolic dysfunction  8. Normal right atrium.  9. Normal right ventricular size and function.  10. RV systolic pressure is severely increased (PASP 65mm  Hg).  11. There is mild tricuspid regurgitation.  12. There is mild pulmonic regurgitation.  13. Normal pericardium with no pericardial effusion.  < end of copied text >    ________________________________________________________________________  ===============>>  A S S E S S M E N T   A N D   P L A N <<===============  ------------------------------------------------------------------------------------------------------------------------------    **Acute hypoxemic respiratory failure, post bipap, multifactorial, given MR, severe PAH, COPD, and likely acute exacerbation of diastolic heart failure with pleural effusion, overall improved  ---cardio, pulm f/u appreciated  ---wean off O2 as able   ---nebulizer treatment  --- diuresis : back on IV lasix per renal today  ---monitor vitals, creatinine electrolytes  --PT/ OOB as able  ---recheck O2 with ambulation monday to decide on need for Home O2    **DAVID likely on CKD?, Hyponatremia  ---renal f/u  monitor BMP closely on diuretics    **Hypertension, CAD, MR, PAH  ---Continue with current medications as above  ---DASH diet   ---close monitoring of vitals    **BPH  --- Continue Current medications and monitor.     -GI/DVT Prophylaxis.  DC planing home when pt more stable  --------------------------------------------  Case discussed with pt, son, HS  Education given on deep breathing, plan of care  ___________________________    H. KARLA Rich.  Pager: [phone]

## 2017-07-30 NOTE — CHART NOTE - NSCHARTNOTEFT_GEN_A_CORE
Patient;s serum Na 128 as of 8:00 am. Patient vitally stable. Repeated BMP at 2:15 pm. Discussed with rn and Dr Macario.

## 2017-07-30 NOTE — PROGRESS NOTE ADULT - SUBJECTIVE AND OBJECTIVE BOX
Patient is a 89y old  Male who presents with a chief complaint of shortness of breath (2017 08:37). Awake, alert, comfortable in bed in NAD. Doing well on oxygen via NC      INTERVAL HPI/OVERNIGHT EVENTS:      VITAL SIGNS:  T(F): 97.5 (17 @ 05:04)  HR: 62 (17 @ 05:04)  BP: 115/53 (17 @ 05:04)  RR: 16 (17 @ 05:04)  SpO2: 96% (17 @ 05:04)  Wt(kg): --  I&O's Detail          REVIEW OF SYSTEMS:    CONSTITUTIONAL:  No fevers, chills, sweats    HEENT:  Eyes:  No diplopia or blurred vision. ENT:  No earache, sore throat or runny nose.    CARDIOVASCULAR:  No pressure, squeezing, tightness, or heaviness about the chest; no palpitations.    RESPIRATORY:  Per HPI    GASTROINTESTINAL:  No abdominal pain, nausea, vomiting or diarrhea.    GENITOURINARY:  No dysuria, frequency or urgency.    NEUROLOGIC:  No paresthesias, fasciculations, seizures or weakness.    PSYCHIATRIC:  No disorder of thought or mood.      PHYSICAL EXAM:    Constitutional: Well developed and nourished  Eyes:Perrla  ENMT: normal  Neck:supple  Respiratory: Rales posteriorly  Cardiovascular: S1 S2 regular  Gastrointestinal: Soft, Non tender  Extremities: No edema  Vascular:normal  Neurological:Awake, alert,Ox3  Musculoskeletal:Normal      MEDICATIONS  (STANDING):  doxazosin 4 milliGRAM(s) Oral at bedtime  PARoxetine 20 milliGRAM(s) Oral daily  gabapentin 300 milliGRAM(s) Oral two times a day  aspirin enteric coated 81 milliGRAM(s) Oral daily  ALBUTerol/ipratropium for Nebulization 3 milliLiter(s) Nebulizer every 6 hours  atorvastatin 40 milliGRAM(s) Oral at bedtime  clopidogrel Tablet 75 milliGRAM(s) Oral daily  pantoprazole    Tablet 40 milliGRAM(s) Oral before breakfast  spironolactone 25 milliGRAM(s) Oral daily  amLODIPine   Tablet 2.5 milliGRAM(s) Oral daily  hydrALAZINE 25 milliGRAM(s) Oral three times a day  labetalol 100 milliGRAM(s) Oral two times a day  polyethylene glycol 3350 17 Gram(s) Oral daily  docusate sodium 100 milliGRAM(s) Oral daily  senna 2 Tablet(s) Oral at bedtime  furosemide   Injectable 20 milliGRAM(s) IV Push every 12 hours    MEDICATIONS  (PRN):  acetaminophen   Tablet. 650 milliGRAM(s) Oral every 6 hours PRN Mild Pain (1 - 3)      Allergies    No Known Allergies    Intolerances        LABS:                        13.2   15.0  )-----------( 292      ( 2017 08:11 )             38.8         128<L>  |  88<L>  |  94<H>  ----------------------------<  120<H>  3.6   |  33<H>  |  1.76<H>    Ca    9.0      2017 08:11  Mg     2.5         TPro  7.6  /  Alb  3.0<L>  /  TBili  0.7  /  DBili  x   /  AST  25  /  ALT  35  /  AlkPhos  103        Urinalysis Basic - ( 2017 10:35 )    Color: Yellow / Appearance: Clear / S.015 / pH: x  Gluc: x / Ketone: Negative  / Bili: Negative / Urobili: Negative   Blood: x / Protein: 30 mg/dL / Nitrite: Negative   Leuk Esterase: Negative / RBC: 0-2 /HPF / WBC 0-2 /HPF   Sq Epi: x / Non Sq Epi: x / Bacteria: Few /HPF            CAPILLARY BLOOD GLUCOSE        pro-bnp [id number]  @ 10:41     d-dimer --   @ 10:41      RADIOLOGY & ADDITIONAL TESTS:    CXR:    Ct scan chest:    ekg;    echo:

## 2017-07-30 NOTE — PROGRESS NOTE ADULT - ASSESSMENT
DAVID  cardiorenal due to CHF.  US normal renal size and echo.    Hyponatremia due to CHF IN CREASED LASIX TO 40 MG DAILY.  Stat IV 40 mg . Follow xr chest.    Strict fluid intake to 1 liter a day discussed with the family.

## 2017-07-30 NOTE — PROGRESS NOTE ADULT - SUBJECTIVE AND OBJECTIVE BOX
CARDIOLOGY     PROGRESS  NOTE   ________________________________________________    CHIEF COMPLAINT:Patient is a 89y old  Male who presents with a chief complaint of shortness of breath (29 Jul 2017 08:37)  doing much better, no sob.  	  REVIEW OF SYSTEMS:  CONSTITUTIONAL: No fever, weight loss, or fatigue  EYES: No eye pain, visual disturbances, or discharge  ENT:  No difficulty hearing, tinnitus, vertigo; No sinus or throat pain  NECK: No pain or stiffness  RESPIRATORY: No cough, wheezing, chills or hemoptysis; No Shortness of Breath  CARDIOVASCULAR: No chest pain, palpitations, passing out, dizziness, or leg swelling  GASTROINTESTINAL: No abdominal or epigastric pain. No nausea, vomiting, or hematemesis; No diarrhea or constipation. No melena or hematochezia.  GENITOURINARY: No dysuria, frequency, hematuria, or incontinence  NEUROLOGICAL: No headaches, memory loss, loss of strength, numbness, or tremors  SKIN: No itching, burning, rashes, or lesions   LYMPH Nodes: No enlarged glands  ENDOCRINE: No heat or cold intolerance; No hair loss  MUSCULOSKELETAL: No joint pain or swelling; No muscle, back, or extremity pain  PSYCHIATRIC: No depression, anxiety, mood swings, or difficulty sleeping  HEME/LYMPH: No easy bruising, or bleeding gums  ALLERGY AND IMMUNOLOGIC: No hives or eczema	    [ ] All others negative	  [ ] Unable to obtain    PHYSICAL EXAM:  T(C): 36.4 (07-30-17 @ 05:04), Max: 36.7 (07-29-17 @ 12:45)  HR: 62 (07-30-17 @ 05:04) (62 - 66)  BP: 115/53 (07-30-17 @ 05:04) (115/53 - 150/53)  RR: 16 (07-30-17 @ 05:04) (16 - 19)  SpO2: 96% (07-30-17 @ 05:04) (93% - 96%)  Wt(kg): --  I&O's Summary      Appearance: Normal	  HEENT:   Normal oral mucosa, PERRL, EOMI	  Lymphatic: No lymphadenopathy  Cardiovascular: Normal S1 S2, No JVD, No murmurs, No edema  Respiratory: Lungs clear to auscultation	  Psychiatry: A & O x 3, Mood & affect appropriate  Gastrointestinal:  Soft, Non-tender, + BS	  Skin: No rashes, No ecchymoses, No cyanosis	  Neurologic: Non-focal  Extremities: Normal range of motion, No clubbing, cyanosis or edema  Vascular: Peripheral pulses palpable 2+ bilaterally    MEDICATIONS  (STANDING):  doxazosin 4 milliGRAM(s) Oral at bedtime  PARoxetine 20 milliGRAM(s) Oral daily  gabapentin 300 milliGRAM(s) Oral two times a day  aspirin enteric coated 81 milliGRAM(s) Oral daily  ALBUTerol/ipratropium for Nebulization 3 milliLiter(s) Nebulizer every 6 hours  atorvastatin 40 milliGRAM(s) Oral at bedtime  clopidogrel Tablet 75 milliGRAM(s) Oral daily  pantoprazole    Tablet 40 milliGRAM(s) Oral before breakfast  spironolactone 25 milliGRAM(s) Oral daily  amLODIPine   Tablet 2.5 milliGRAM(s) Oral daily  hydrALAZINE 25 milliGRAM(s) Oral three times a day  labetalol 100 milliGRAM(s) Oral two times a day  polyethylene glycol 3350 17 Gram(s) Oral daily  docusate sodium 100 milliGRAM(s) Oral daily  senna 2 Tablet(s) Oral at bedtime  furosemide   Injectable 20 milliGRAM(s) IV Push every 12 hours      TELEMETRY: 	    ECG:  	  RADIOLOGY:  OTHER: 	  	  LABS:	 	    CARDIAC MARKERS:                                13.2   15.0  )-----------( 292      ( 30 Jul 2017 08:11 )             38.8     07-30    128<L>  |  88<L>  |  94<H>  ----------------------------<  120<H>  3.6   |  33<H>  |  1.76<H>    Ca    9.0      30 Jul 2017 08:11  Mg     2.5     07-30    TPro  7.6  /  Alb  3.0<L>  /  TBili  0.7  /  DBili  x   /  AST  25  /  ALT  35  /  AlkPhos  103  07-30    proBNP: Serum Pro-Brain Natriuretic Peptide: 46896 pg/mL (07-24 @ 10:41)    Lipid Profile: Cholesterol 150    HDL 31  TG 83    HgA1c: Hemoglobin A1C, Whole Blood: 5.7 % (07-25 @ 09:08)    TSH: Thyroid Stimulating Hormone, Serum: 0.32 uU/mL (07-25 @ 04:26)          Assessment and plan  ---------------------------  89 yr old  Canadian-speaking male from home, former smoker, lives with wife, PMH laryngeal cancer (diagnosed 2015, s/p XRT, no evidence disease), COPD, CAD s/p stents x 3, s/p left carotid stent, HTN, asthma, depression, anxiety p/w acute shortness of breath due to diastolic HF and mod-severe MR.  1.on lasix to 20mg qd.  2.Severe pulm HTN-Norvasc 2.5mg qd.  3.CAD-asa,statin, labetalol 100mg bid..  4.COPD-nebs.  5.HTN-Cont BP medication.  6.GI and DVT prophylaxis.  7. change lasix to po

## 2017-07-31 DIAGNOSIS — E87.1 HYPO-OSMOLALITY AND HYPONATREMIA: ICD-10-CM

## 2017-07-31 RX ORDER — HEPARIN SODIUM 5000 [USP'U]/ML
5000 INJECTION INTRAVENOUS; SUBCUTANEOUS EVERY 12 HOURS
Qty: 0 | Refills: 0 | Status: DISCONTINUED | OUTPATIENT
Start: 2017-07-31 | End: 2017-08-09

## 2017-07-31 RX ORDER — FUROSEMIDE 40 MG
40 TABLET ORAL DAILY
Qty: 0 | Refills: 0 | Status: DISCONTINUED | OUTPATIENT
Start: 2017-07-31 | End: 2017-08-01

## 2017-07-31 RX ORDER — MIRTAZAPINE 45 MG/1
7.5 TABLET, ORALLY DISINTEGRATING ORAL AT BEDTIME
Qty: 0 | Refills: 0 | Status: DISCONTINUED | OUTPATIENT
Start: 2017-07-31 | End: 2017-08-03

## 2017-07-31 RX ADMIN — CLOPIDOGREL BISULFATE 75 MILLIGRAM(S): 75 TABLET, FILM COATED ORAL at 13:19

## 2017-07-31 RX ADMIN — Medication 25 MILLIGRAM(S): at 05:10

## 2017-07-31 RX ADMIN — ATORVASTATIN CALCIUM 40 MILLIGRAM(S): 80 TABLET, FILM COATED ORAL at 21:13

## 2017-07-31 RX ADMIN — Medication 4 MILLIGRAM(S): at 21:13

## 2017-07-31 RX ADMIN — SENNA PLUS 2 TABLET(S): 8.6 TABLET ORAL at 21:13

## 2017-07-31 RX ADMIN — Medication 650 MILLIGRAM(S): at 20:25

## 2017-07-31 RX ADMIN — AMLODIPINE BESYLATE 2.5 MILLIGRAM(S): 2.5 TABLET ORAL at 05:10

## 2017-07-31 RX ADMIN — Medication 650 MILLIGRAM(S): at 20:50

## 2017-07-31 RX ADMIN — Medication 3 MILLILITER(S): at 15:19

## 2017-07-31 RX ADMIN — Medication 81 MILLIGRAM(S): at 13:18

## 2017-07-31 RX ADMIN — Medication 3 MILLILITER(S): at 20:30

## 2017-07-31 RX ADMIN — Medication 40 MILLIGRAM(S): at 05:10

## 2017-07-31 RX ADMIN — Medication 650 MILLIGRAM(S): at 13:18

## 2017-07-31 RX ADMIN — POLYETHYLENE GLYCOL 3350 17 GRAM(S): 17 POWDER, FOR SOLUTION ORAL at 13:18

## 2017-07-31 RX ADMIN — Medication 650 MILLIGRAM(S): at 14:00

## 2017-07-31 RX ADMIN — SPIRONOLACTONE 25 MILLIGRAM(S): 25 TABLET, FILM COATED ORAL at 05:10

## 2017-07-31 RX ADMIN — Medication 100 MILLIGRAM(S): at 13:19

## 2017-07-31 RX ADMIN — Medication 3 MILLILITER(S): at 09:13

## 2017-07-31 RX ADMIN — PANTOPRAZOLE SODIUM 40 MILLIGRAM(S): 20 TABLET, DELAYED RELEASE ORAL at 05:10

## 2017-07-31 RX ADMIN — Medication 20 MILLIGRAM(S): at 13:18

## 2017-07-31 RX ADMIN — Medication 25 MILLIGRAM(S): at 13:18

## 2017-07-31 RX ADMIN — HEPARIN SODIUM 5000 UNIT(S): 5000 INJECTION INTRAVENOUS; SUBCUTANEOUS at 20:25

## 2017-07-31 RX ADMIN — MIRTAZAPINE 7.5 MILLIGRAM(S): 45 TABLET, ORALLY DISINTEGRATING ORAL at 21:13

## 2017-07-31 RX ADMIN — GABAPENTIN 300 MILLIGRAM(S): 400 CAPSULE ORAL at 20:24

## 2017-07-31 RX ADMIN — Medication 25 MILLIGRAM(S): at 21:13

## 2017-07-31 RX ADMIN — Medication 3 MILLILITER(S): at 03:52

## 2017-07-31 RX ADMIN — GABAPENTIN 300 MILLIGRAM(S): 400 CAPSULE ORAL at 05:10

## 2017-07-31 NOTE — PROGRESS NOTE ADULT - SUBJECTIVE AND OBJECTIVE BOX
Patient is a 89y old  Male who presents with a chief complaint of shortness of breath (29 Jul 2017 08:37)      INTERVAL HPI/OVERNIGHT EVENTS:   no events overnight  endorses headache    T(C): 36.6 (07-31-17 @ 13:24), Max: 36.6 (07-31-17 @ 13:24)  HR: 79 (07-31-17 @ 13:24) (63 - 79)  BP: 134/66 (07-31-17 @ 13:24) (128/61 - 139/69)  RR: 16 (07-31-17 @ 13:24) (16 - 16)  SpO2: 96% (07-31-17 @ 13:24) (92% - 96%)  Wt(kg): --  I&O's Summary    30 Jul 2017 07:01  -  31 Jul 2017 07:00  --------------------------------------------------------  IN: 0 mL / OUT: 270 mL / NET: -270 mL        LABS:                        13.2   15.0  )-----------( 292      ( 30 Jul 2017 08:11 )             38.8     07-30    129<L>  |  88<L>  |  94<H>  ----------------------------<  156<H>  3.8   |  31  |  1.83<H>    Ca    8.7      30 Jul 2017 15:32  Mg     2.5     07-30    TPro  7.6  /  Alb  3.0<L>  /  TBili  0.7  /  DBili  x   /  AST  25  /  ALT  35  /  AlkPhos  103  07-30        CAPILLARY BLOOD GLUCOSE        RADIOLOGY & ADDITIONAL TESTS:    Imaging Personally Reviewed:  [ ] YES  [ ] NO    Consultant(s) Notes Reviewed:  [ ] YES  [ ] NO    PHYSICAL EXAM:  GENERAL: NAD, well-groomed, well-developed  NERVOUS SYSTEM:  Alert & Oriented X3, Good concentration; Motor Strength 5/5 B/L upper and lower extremities; DTRs 2+ intact and symmetric  CHEST/LUNG: Clear to percussion bilaterally  HEART: Regular rate and rhythm  ABDOMEN: Soft, Nontender, Nondistended; Bowel sounds present  EXTREMITIES:  no edema    Care Discussed with Consultants/Other Providers [ ] YES  [ ] NO Patient is a 89y old  Male who presents with a chief complaint of shortness of breath (29 Jul 2017 08:37)      INTERVAL HPI/OVERNIGHT EVENTS:   no events overnight  endorses right knee pain    T(C): 36.6 (07-31-17 @ 13:24), Max: 36.6 (07-31-17 @ 13:24)  HR: 79 (07-31-17 @ 13:24) (63 - 79)  BP: 134/66 (07-31-17 @ 13:24) (128/61 - 139/69)  RR: 16 (07-31-17 @ 13:24) (16 - 16)  SpO2: 96% (07-31-17 @ 13:24) (92% - 96%)  Wt(kg): --  I&O's Summary    30 Jul 2017 07:01  -  31 Jul 2017 07:00  --------------------------------------------------------  IN: 0 mL / OUT: 270 mL / NET: -270 mL        LABS:                        13.2   15.0  )-----------( 292      ( 30 Jul 2017 08:11 )             38.8     07-30    129<L>  |  88<L>  |  94<H>  ----------------------------<  156<H>  3.8   |  31  |  1.83<H>    Ca    8.7      30 Jul 2017 15:32  Mg     2.5     07-30    TPro  7.6  /  Alb  3.0<L>  /  TBili  0.7  /  DBili  x   /  AST  25  /  ALT  35  /  AlkPhos  103  07-30        CAPILLARY BLOOD GLUCOSE        RADIOLOGY & ADDITIONAL TESTS:    Imaging Personally Reviewed:  [ ] YES  [ ] NO    Consultant(s) Notes Reviewed:  [ ] YES  [ ] NO    PHYSICAL EXAM:  GENERAL: NAD, well-groomed, well-developed  NERVOUS SYSTEM:  Alert & Oriented X3, Good concentration; Motor Strength 5/5 B/L upper and lower extremities; DTRs 2+ intact and symmetric  CHEST/LUNG: Clear to percussion bilaterally  HEART: Regular rate and rhythm  ABDOMEN: Soft, Nontender, Nondistended; Bowel sounds present  EXTREMITIES:  no edema    Care Discussed with Consultants/Other Providers [ ] YES  [ ] NO

## 2017-07-31 NOTE — PROGRESS NOTE ADULT - ASSESSMENT
89 yr old  Norwegian-speaking male from home, former smoker, lives with wife, PMH laryngeal cancer (diagnosed 2015, s/p XRT, no evidence disease), COPD, CAD s/p stents x 3, s/p left carotid stent, HTN, asthma, depression, anxiety p/w acute shortness of breath due to diastolic HF and mod-severe MR.  1.Continue po lasix.  2.Severe pulm HTN-Norvasc 2.5mg qd.  3.CAD-asa,statin, labetalol 100mg bid..  4.COPD-nebs.  5.HTN-Cont BP medication.  6.GI and DVT prophylaxis.  7.Continue aldactone 25mg qd.  8.Continue  hydralazine 25mg q8 and isordil 10mg tid for afterload reduction.  9.Add remeron 7.5mg qhs.

## 2017-07-31 NOTE — PROGRESS NOTE ADULT - SUBJECTIVE AND OBJECTIVE BOX
_____________________________________________________________________________  ========>>  M E D I C A L   A T T E N D I N G    F O L L O W  U P  N O T E  <<=========  ---------------------------------------------------------------------------------------------------------------------------------------    - Patient seen and examined by me approximately thirty minutes ago.  - In summary, patient is a 89y year old man who originally presented with respiratory failure, post Bipap.  - Patient today overall doing ok, comfortable, eating OK. having BM and urinating well.    ==================>> MEDICATIONS <<====================    doxazosin 4 milliGRAM(s) Oral at bedtime  PARoxetine 20 milliGRAM(s) Oral daily  gabapentin 300 milliGRAM(s) Oral two times a day  aspirin enteric coated 81 milliGRAM(s) Oral daily  ALBUTerol/ipratropium for Nebulization 3 milliLiter(s) Nebulizer every 6 hours  atorvastatin 40 milliGRAM(s) Oral at bedtime  clopidogrel Tablet 75 milliGRAM(s) Oral daily  pantoprazole    Tablet 40 milliGRAM(s) Oral before breakfast  spironolactone 25 milliGRAM(s) Oral daily  amLODIPine   Tablet 2.5 milliGRAM(s) Oral daily  hydrALAZINE 25 milliGRAM(s) Oral three times a day  polyethylene glycol 3350 17 Gram(s) Oral daily  docusate sodium 100 milliGRAM(s) Oral daily  senna 2 Tablet(s) Oral at bedtime  furosemide    Tablet 40 milliGRAM(s) Oral daily  mirtazapine 7.5 milliGRAM(s) Oral at bedtime    MEDICATIONS  (PRN):  acetaminophen   Tablet. 650 milliGRAM(s) Oral every 6 hours PRN Mild Pain (1 - 3)  guaiFENesin    Syrup 100 milliGRAM(s) Oral every 6 hours PRN Cough    ==================>> REVIEW OF SYSTEM <<=================    GEN: no fever, no chills, no pain  RESP: no SOB now, no cough, no sputum  CVS: no chest pain, no palpitations, no edema  GI: no abdominal pain, no nausea, no vomiting, no constipation, no diarrhea  Neuro: no headache, no dizziness  PSYCH: no anxiety, no depression  Derm : no itching, no rash    ==================>> VITAL SIGNS <<==================    Vital Signs Last 24 Hrs  T(C): 36.5 (07-31-17 @ 05:00)  T(F): 97.7 (07-31-17 @ 05:00), Max: 98.5 (07-30-17 @ 13:31)  HR: 76 (07-31-17 @ 12:21) (63 - 76)  BP: 131/71 (07-31-17 @ 12:21)  BP(mean): --  RR: 16 (07-31-17 @ 05:00) (16 - 20)  SpO2: 92% (07-31-17 @ 12:21) (92% - 96%)      ==================>> PHYSICAL EXAM <<=================    GEN: Alert and oriented, pleasant, comfortable NAD  HEENT: NCAT, PERRL, MMM, hearing intact, + nasal canula O2  Neck: supple , no JVD  CVS: S1S2 , regular , No M/R/G appreciated  PULM: mild bilateral ronchi  ABD.: soft. non tender, non distended,  bowel sounds present  Extrem: intact pulses , no edema   Derm: No rash , no ecchymoses  PSYCH : normal mood,  no delusion not anxious     ==================>> LAB AND IMAGING <<==================                                    13.2   15.0  )-----------( 292      ( 30 Jul 2017 08:11 )             38.8        07-30    129<L>  |  88<L>  |  94<H>  ----------------------------<  156<H>  3.8   |  31  |  1.83<H>    Ca    8.7      30 Jul 2017 15:32  Mg     2.5     07-30    TPro  7.6  /  Alb  3.0<L>  /  TBili  0.7  /  DBili  x   /  AST  25  /  ALT  35  /  AlkPhos  103  07-30    < from: Transthoracic Echocardiogram (07.24.17 @ 14:24) >  CONCLUSIONS:  1. Normal mitral valve. Moderate to severe mitral  regurgitation.  2. Calcified trileaflet aortic valve with normal opening.  Mild aortic insufficiency.  3. Normal aortic root.  4. Moderate left atrial enlargement.  5. Normal left ventricular internal dimensions and wall  thicknesses.  6. Normal Left Ventricular Systolic Function,  (EF = 55%)  7. Grade II diastolic dysfunction  8. Normal right atrium.  9. Normal right ventricular size and function.  10. RV systolic pressure is severely increased (PASP 65mm  Hg).  11. There is mild tricuspid regurgitation.  12. There is mild pulmonic regurgitation.  13. Normal pericardium with no pericardial effusion.  < end of copied text >    ________________________________________________________________________  ===============>>  A S S E S S M E N T   A N D   P L A N <<===============  ------------------------------------------------------------------------------------------------------------------------------    **Acute hypoxemic respiratory failure, post bipap, multifactorial, given MR, severe PAH, COPD, and likely acute exacerbation of diastolic heart failure with pleural effusion, overall improved  ---cardio, pulm f/u appreciated  ---wean off O2 as able : may need Home O2  ---nebulizer treatment  --- diuresis with lasix per renal and cardio  ---monitor vitals, creatinine electrolytes  --PT/ OOB as able ;  Incentive Spirameter  ---recheck O2 with ambulation to decide on need for Home O2    **DAVID likely on CKD?, Hyponatremia  ---renal f/u  ---monitor BMP closely on diuretics  ---free fluid restriction    **Hypertension, CAD, MR, PAH  ---Continue with current medications as above  ---DASH diet   ---close monitoring of vitals    **BPH  --- Continue Current medications and monitor.     -GI/DVT Prophylaxis.  DC planing home when pt more stable  --------------------------------------------  Case discussed with pt, HS, renal, cardio  Education given on deep breathing, plan of care  ___________________________    HNazanin Rich D.O.  Pager: [phone]

## 2017-07-31 NOTE — PROGRESS NOTE ADULT - SUBJECTIVE AND OBJECTIVE BOX
Patient is a 89y old  Male who presents with a chief complaint of shortness of breath (2017 08:37). Awake, alert, comfortable in bed in NAD. Doing well on oxygen via NC      INTERVAL HPI/OVERNIGHT EVENTS:      VITAL SIGNS:  T(F): 97.7 (17 @ 05:00)  HR: 67 (17 @ 05:00)  BP: 139/69 (17 @ 05:00)  RR: 16 (17 @ 05:00)  SpO2: 96% (17 @ 05:00)  Wt(kg): --  I&O's Detail    2017 07:  -  2017 07:00  --------------------------------------------------------  IN:  Total IN: 0 mL    OUT:    Voided: 270 mL  Total OUT: 270 mL    Total NET: -270 mL              REVIEW OF SYSTEMS:    CONSTITUTIONAL:  No fevers, chills, sweats    HEENT:  Eyes:  No diplopia or blurred vision. ENT:  No earache, sore throat or runny nose.    CARDIOVASCULAR:  No pressure, squeezing, tightness, or heaviness about the chest; no palpitations.    RESPIRATORY:  Per HPI    GASTROINTESTINAL:  No abdominal pain, nausea, vomiting or diarrhea.    GENITOURINARY:  No dysuria, frequency or urgency.    NEUROLOGIC:  No paresthesias, fasciculations, seizures or weakness.    PSYCHIATRIC:  No disorder of thought or mood.      PHYSICAL EXAM:    Constitutional: Well developed and nourished  Eyes:Perrla  ENMT: normal  Neck:supple  Respiratory: good air entry  Cardiovascular: S1 S2 regular  Gastrointestinal: Soft, Non tender  Extremities: No edema  Vascular:normal  Neurological:Awake, alert,Ox3  Musculoskeletal:Normal      MEDICATIONS  (STANDING):  doxazosin 4 milliGRAM(s) Oral at bedtime  PARoxetine 20 milliGRAM(s) Oral daily  gabapentin 300 milliGRAM(s) Oral two times a day  aspirin enteric coated 81 milliGRAM(s) Oral daily  ALBUTerol/ipratropium for Nebulization 3 milliLiter(s) Nebulizer every 6 hours  atorvastatin 40 milliGRAM(s) Oral at bedtime  clopidogrel Tablet 75 milliGRAM(s) Oral daily  pantoprazole    Tablet 40 milliGRAM(s) Oral before breakfast  spironolactone 25 milliGRAM(s) Oral daily  amLODIPine   Tablet 2.5 milliGRAM(s) Oral daily  hydrALAZINE 25 milliGRAM(s) Oral three times a day  polyethylene glycol 3350 17 Gram(s) Oral daily  docusate sodium 100 milliGRAM(s) Oral daily  senna 2 Tablet(s) Oral at bedtime  furosemide    Tablet 40 milliGRAM(s) Oral daily    MEDICATIONS  (PRN):  acetaminophen   Tablet. 650 milliGRAM(s) Oral every 6 hours PRN Mild Pain (1 - 3)  guaiFENesin    Syrup 100 milliGRAM(s) Oral every 6 hours PRN Cough      Allergies    No Known Allergies    Intolerances        LABS:                        13.2   15.0  )-----------( 292      ( 2017 08:11 )             38.8     07-30    129<L>  |  88<L>  |  94<H>  ----------------------------<  156<H>  3.8   |  31  |  1.83<H>    Ca    8.7      2017 15:32  Mg     2.5         TPro  7.6  /  Alb  3.0<L>  /  TBili  0.7  /  DBili  x   /  AST  25  /  ALT  35  /  AlkPhos  103        Urinalysis Basic - ( 2017 10:35 )    Color: Yellow / Appearance: Clear / S.015 / pH: x  Gluc: x / Ketone: Negative  / Bili: Negative / Urobili: Negative   Blood: x / Protein: 30 mg/dL / Nitrite: Negative   Leuk Esterase: Negative / RBC: 0-2 /HPF / WBC 0-2 /HPF   Sq Epi: x / Non Sq Epi: x / Bacteria: Few /HPF            CAPILLARY BLOOD GLUCOSE        pro-bnp 12742  @ 10:41     d-dimer --   @ 10:41      RADIOLOGY & ADDITIONAL TESTS:    CXR:    Ct scan chest:    ekg;    echo: Patient is a 89y old  Male who presents with a chief complaint of shortness of breath (2017 08:37). Awake, alert, comfortable in bed in NAD. Doing well on oxygen via NC      INTERVAL HPI/OVERNIGHT EVENTS:No new events      VITAL SIGNS:  T(F): 97.7 (17 @ 05:00)  HR: 67 (17 @ 05:00)  BP: 139/69 (17 @ 05:00)  RR: 16 (17 @ 05:00)  SpO2: 96% (17 @ 05:00)  Wt(kg): --  I&O's Detail    2017 07:01  -  2017 07:00  --------------------------------------------------------  IN:  Total IN: 0 mL    OUT:    Voided: 270 mL  Total OUT: 270 mL    Total NET: -270 mL              REVIEW OF SYSTEMS:    CONSTITUTIONAL:  No fevers, chills, sweats    HEENT:  Eyes:  No diplopia or blurred vision. ENT:  No earache, sore throat or runny nose.    CARDIOVASCULAR:  No pressure, squeezing, tightness, or heaviness about the chest; no palpitations.    RESPIRATORY:  Per HPI    GASTROINTESTINAL:  No abdominal pain, nausea, vomiting or diarrhea.    GENITOURINARY:  No dysuria, frequency or urgency.    NEUROLOGIC:  No paresthesias, fasciculations, seizures or weakness.    PSYCHIATRIC:  No disorder of thought or mood.      PHYSICAL EXAM:    Constitutional: Well developed and nourished  Eyes:Perrla  ENMT: normal  Neck:supple  Respiratory: Rales bilaterally  Cardiovascular: S1 S2 regular  Gastrointestinal: Soft, Non tender  Extremities: No edema  Vascular:normal  Neurological:Awake, alert,Ox3  Musculoskeletal:Normal      MEDICATIONS  (STANDING):  doxazosin 4 milliGRAM(s) Oral at bedtime  PARoxetine 20 milliGRAM(s) Oral daily  gabapentin 300 milliGRAM(s) Oral two times a day  aspirin enteric coated 81 milliGRAM(s) Oral daily  ALBUTerol/ipratropium for Nebulization 3 milliLiter(s) Nebulizer every 6 hours  atorvastatin 40 milliGRAM(s) Oral at bedtime  clopidogrel Tablet 75 milliGRAM(s) Oral daily  pantoprazole    Tablet 40 milliGRAM(s) Oral before breakfast  spironolactone 25 milliGRAM(s) Oral daily  amLODIPine   Tablet 2.5 milliGRAM(s) Oral daily  hydrALAZINE 25 milliGRAM(s) Oral three times a day  polyethylene glycol 3350 17 Gram(s) Oral daily  docusate sodium 100 milliGRAM(s) Oral daily  senna 2 Tablet(s) Oral at bedtime  furosemide    Tablet 40 milliGRAM(s) Oral daily    MEDICATIONS  (PRN):  acetaminophen   Tablet. 650 milliGRAM(s) Oral every 6 hours PRN Mild Pain (1 - 3)  guaiFENesin    Syrup 100 milliGRAM(s) Oral every 6 hours PRN Cough      Allergies    No Known Allergies    Intolerances        LABS:                        13.2   15.0  )-----------( 292      ( 2017 08:11 )             38.8     07-30    129<L>  |  88<L>  |  94<H>  ----------------------------<  156<H>  3.8   |  31  |  1.83<H>    Ca    8.7      2017 15:32  Mg     2.5         TPro  7.6  /  Alb  3.0<L>  /  TBili  0.7  /  DBili  x   /  AST  25  /  ALT  35  /  AlkPhos  103  07-30      Urinalysis Basic - ( 2017 10:35 )    Color: Yellow / Appearance: Clear / S.015 / pH: x  Gluc: x / Ketone: Negative  / Bili: Negative / Urobili: Negative   Blood: x / Protein: 30 mg/dL / Nitrite: Negative   Leuk Esterase: Negative / RBC: 0-2 /HPF / WBC 0-2 /HPF   Sq Epi: x / Non Sq Epi: x / Bacteria: Few /HPF            CAPILLARY BLOOD GLUCOSE        pro-bnp [id number]  @ 10:41     d-dimer --   @ 10:41      RADIOLOGY & ADDITIONAL TESTS:    CXR:    Ct scan chest:    ekg;    echo:

## 2017-07-31 NOTE — PROGRESS NOTE ADULT - PROBLEM SELECTOR PLAN 1
henry, 02 sat adequate on nasal canula.  Patient qualifies for home 02 as 02 sat 80% on room air. Patient is in chronic and stable condition.  All acute exacerbations and conditions have been treated and resolved henry, 02 sat adequate on nasal canula.  Patient qualifies for home 02 as 02 sat 80% on room air at rest. Patient is in chronic and stable condition.  All acute exacerbations and conditions have been treated and resolved

## 2017-07-31 NOTE — PROGRESS NOTE ADULT - SUBJECTIVE AND OBJECTIVE BOX
Problem List:  DAVID cardiorenal syndrome  Severe MR and diastolic failure, pulmonary hypertension.  Hyponatremia.    US normal size kidneys with increased echogenicity    PAST MEDICAL & SURGICAL HISTORY:  Coronary artery disease involving native coronary artery of native heart without angina pectoris  Laryngeal cancer  Essential hypertension  Asthma  Arthritis  Degenerative joint disease  No significant past surgical history    No Known Allergies      MEDICATIONS  (STANDING):  doxazosin 4 milliGRAM(s) Oral at bedtime  PARoxetine 20 milliGRAM(s) Oral daily  gabapentin 300 milliGRAM(s) Oral two times a day  aspirin enteric coated 81 milliGRAM(s) Oral daily  ALBUTerol/ipratropium for Nebulization 3 milliLiter(s) Nebulizer every 6 hours  atorvastatin 40 milliGRAM(s) Oral at bedtime  clopidogrel Tablet 75 milliGRAM(s) Oral daily  pantoprazole    Tablet 40 milliGRAM(s) Oral before breakfast  spironolactone 25 milliGRAM(s) Oral daily  amLODIPine   Tablet 2.5 milliGRAM(s) Oral daily  hydrALAZINE 25 milliGRAM(s) Oral three times a day  polyethylene glycol 3350 17 Gram(s) Oral daily  docusate sodium 100 milliGRAM(s) Oral daily  senna 2 Tablet(s) Oral at bedtime  furosemide    Tablet 40 milliGRAM(s) Oral daily    MEDICATIONS  (PRN):  acetaminophen   Tablet. 650 milliGRAM(s) Oral every 6 hours PRN Mild Pain (1 - 3)  guaiFENesin    Syrup 100 milliGRAM(s) Oral every 6 hours PRN Cough                            13.2   15.0  )-----------( 292      ( 30 Jul 2017 08:11 )             38.8     07-30    129<L>  |  88<L>  |  94<H>  ----------------------------<  156<H>  3.8   |  31  |  1.83<H>    Ca    8.7      30 Jul 2017 15:32  Mg     2.5     07-30    TPro  7.6  /  Alb  3.0<L>  /  TBili  0.7  /  DBili  x   /  AST  25  /  ALT  35  /  AlkPhos  103  07-30        Potassium, Random Urine: 26 mmol/L (07-29 @ 10:35)  Osmolality, Random Urine: 330 mos/kg (07-29 @ 10:35)  Sodium, Random Urine: 44 mmol/L (07-29 @ 10:35)      REVIEW OF SYSTEMS:  General: no fever no chills, no weight loss.  EYES/ENT: No visual changes;  No vertigo, no headache.  NECK: No pain or stiffness  RESPIRATORY: No cough, wheezing, hemoptysis; No shortness of breath  CARDIOVASCULAR: No chest pain or palpitations. No Edema  GASTROINTESTINAL: No abdominal or epigastric pain. No nausea, vomiting. No diarrhea or constipation. No melena.  GENITOURINARY: No dysuria, frequency, foamy urine, urinary urgency, incontinence or hematuria  VITALS:  T(F): 97.7 (07-31-17 @ 05:00), Max: 98.5 (07-30-17 @ 13:31)  HR: 67 (07-31-17 @ 05:00)  BP: 139/69 (07-31-17 @ 05:00)  RR: 16 (07-31-17 @ 05:00)  SpO2: 96% (07-31-17 @ 05:00)  Wt(kg): --    07-30 @ 07:01  -  07-31 @ 07:00  --------------------------------------------------------  IN: 0 mL / OUT: 270 mL / NET: -270 mL      PHYSICAL EXAM:  Constitutional: well developed, no diaphoresis, no distress.  Neck: No JVD, no carotid bruit, supple, no adenopathy  Respiratory: Good air entrance B/L, no wheezes, rales or rhonchi  Cardiovascular: S1, S2, RRR, no pericardial rub, no murmur  Abdomen: BS+, soft, no tenderness, no bruit  Pelvis: bladder nondistended  Extremities: No cyanosis or clubbing. No peripheral edema.

## 2017-07-31 NOTE — PROGRESS NOTE ADULT - ASSESSMENT
89yo Swedish-speaking male from home, former smoker, lives with wife, PMH laryngeal cancer (diagnosed 2015, s/p XRT, no evidence disease), COPD, CAD s/p stents x 3, s/p left carotid stent, HTN, asthma, depression, anxiety p/w acute shortness of breath 2/2 CHF exacerbation

## 2017-07-31 NOTE — PROGRESS NOTE ADULT - ASSESSMENT
DAVID  cardiorenal due to CHF improved  CKD due to hypertension..    Hyponatremia due to CHF on LASIX 40 mg daily  Follow renal function and xr chest.  Strict fluid intake to 1 liter a day discussed with the family.

## 2017-07-31 NOTE — PROGRESS NOTE ADULT - SUBJECTIVE AND OBJECTIVE BOX
CHIEF COMPLAINT: Patient is a 89y old  Male who presents with a chief complaint of shortness of breath. Pt appears comfortable.    	  REVIEW OF SYSTEMS:  Unable to obtain.    PHYSICAL EXAM:  T(C): 36.5 (07-31-17 @ 05:00), Max: 36.9 (07-30-17 @ 13:31)  HR: 67 (07-31-17 @ 05:00) (63 - 67)  BP: 139/69 (07-31-17 @ 05:00) (124/58 - 139/69)  RR: 16 (07-31-17 @ 05:00) (16 - 20)  SpO2: 96% (07-31-17 @ 05:00) (96% - 96%)  Wt(kg): --  I&O's Summary    30 Jul 2017 07:01  -  31 Jul 2017 07:00  --------------------------------------------------------  IN: 0 mL / OUT: 270 mL / NET: -270 mL        Appearance: Normal	  HEENT:   Normal oral mucosa, PERRL, EOMI	  Lymphatic: No lymphadenopathy  Cardiovascular: Normal S1 S2,+ JVD, 2/6 sm  Respiratory: Lungs clear to auscultation	  Gastrointestinal:  Soft, Non-tender, + BS	  Skin: No rashes, No ecchymoses, No cyanosis	  Extremities: Normal range of motion, No clubbing, cyanosis or edema  Vascular: Peripheral pulses palpable 2+ bilaterally    MEDICATIONS  (STANDING):  doxazosin 4 milliGRAM(s) Oral at bedtime  PARoxetine 20 milliGRAM(s) Oral daily  gabapentin 300 milliGRAM(s) Oral two times a day  aspirin enteric coated 81 milliGRAM(s) Oral daily  ALBUTerol/ipratropium for Nebulization 3 milliLiter(s) Nebulizer every 6 hours  atorvastatin 40 milliGRAM(s) Oral at bedtime  clopidogrel Tablet 75 milliGRAM(s) Oral daily  pantoprazole    Tablet 40 milliGRAM(s) Oral before breakfast  spironolactone 25 milliGRAM(s) Oral daily  amLODIPine   Tablet 2.5 milliGRAM(s) Oral daily  hydrALAZINE 25 milliGRAM(s) Oral three times a day  polyethylene glycol 3350 17 Gram(s) Oral daily  docusate sodium 100 milliGRAM(s) Oral daily  senna 2 Tablet(s) Oral at bedtime  furosemide    Tablet 40 milliGRAM(s) Oral daily  mirtazapine 7.5 milliGRAM(s) Oral at bedtime      	  LABS:	 	                        13.2   15.0  )-----------( 292      ( 30 Jul 2017 08:11 )             38.8     07-30    129<L>  |  88<L>  |  94<H>  ----------------------------<  156<H>  3.8   |  31  |  1.83<H>    Ca    8.7      30 Jul 2017 15:32  Mg     2.5     07-30    TPro  7.6  /  Alb  3.0<L>  /  TBili  0.7  /  DBili  x   /  AST  25  /  ALT  35  /  AlkPhos  103  07-30    proBNP: Serum Pro-Brain Natriuretic Peptide: 60803 pg/mL (07-24 @ 10:41)    Lipid Profile: Cholesterol 150    HDL 31  TG 83    HgA1c: Hemoglobin A1C, Whole Blood: 5.7 % (07-25 @ 09:08)    TSH: Thyroid Stimulating Hormone, Serum: 0.32 uU/mL (07-25 @ 04:26)

## 2017-08-01 ENCOUNTER — OUTPATIENT (OUTPATIENT)
Dept: OUTPATIENT SERVICES | Facility: HOSPITAL | Age: 82
LOS: 1 days | End: 2017-08-01
Payer: MEDICAID

## 2017-08-01 DIAGNOSIS — M11.20 OTHER CHONDROCALCINOSIS, UNSPECIFIED SITE: ICD-10-CM

## 2017-08-01 LAB
ANION GAP SERPL CALC-SCNC: 10 MMOL/L — SIGNIFICANT CHANGE UP (ref 5–17)
BASOPHILS # BLD AUTO: 0 K/UL — SIGNIFICANT CHANGE UP (ref 0–0.2)
BASOPHILS NFR BLD AUTO: 0.3 % — SIGNIFICANT CHANGE UP (ref 0–2)
BUN SERPL-MCNC: 89 MG/DL — HIGH (ref 7–18)
CALCIUM SERPL-MCNC: 9.2 MG/DL — SIGNIFICANT CHANGE UP (ref 8.4–10.5)
CHLORIDE SERPL-SCNC: 89 MMOL/L — LOW (ref 96–108)
CO2 SERPL-SCNC: 32 MMOL/L — HIGH (ref 22–31)
CREAT SERPL-MCNC: 1.61 MG/DL — HIGH (ref 0.5–1.3)
EOSINOPHIL # BLD AUTO: 0.1 K/UL — SIGNIFICANT CHANGE UP (ref 0–0.5)
EOSINOPHIL NFR BLD AUTO: 0.8 % — SIGNIFICANT CHANGE UP (ref 0–6)
GLUCOSE SERPL-MCNC: 104 MG/DL — HIGH (ref 70–99)
HCT VFR BLD CALC: 40.6 % — SIGNIFICANT CHANGE UP (ref 39–50)
HGB BLD-MCNC: 13.6 G/DL — SIGNIFICANT CHANGE UP (ref 13–17)
LYMPHOCYTES # BLD AUTO: 1.3 K/UL — SIGNIFICANT CHANGE UP (ref 1–3.3)
LYMPHOCYTES # BLD AUTO: 9.8 % — LOW (ref 13–44)
MCHC RBC-ENTMCNC: 29.7 PG — SIGNIFICANT CHANGE UP (ref 27–34)
MCHC RBC-ENTMCNC: 33.4 GM/DL — SIGNIFICANT CHANGE UP (ref 32–36)
MCV RBC AUTO: 89.1 FL — SIGNIFICANT CHANGE UP (ref 80–100)
MONOCYTES # BLD AUTO: 0.9 K/UL — SIGNIFICANT CHANGE UP (ref 0–0.9)
MONOCYTES NFR BLD AUTO: 7 % — SIGNIFICANT CHANGE UP (ref 2–14)
NEUTROPHILS # BLD AUTO: 10.9 K/UL — HIGH (ref 1.8–7.4)
NEUTROPHILS NFR BLD AUTO: 82.1 % — HIGH (ref 43–77)
PLATELET # BLD AUTO: 325 K/UL — SIGNIFICANT CHANGE UP (ref 150–400)
POTASSIUM SERPL-MCNC: 4.1 MMOL/L — SIGNIFICANT CHANGE UP (ref 3.5–5.3)
POTASSIUM SERPL-SCNC: 4.1 MMOL/L — SIGNIFICANT CHANGE UP (ref 3.5–5.3)
RBC # BLD: 4.56 M/UL — SIGNIFICANT CHANGE UP (ref 4.2–5.8)
RBC # FLD: 12.7 % — SIGNIFICANT CHANGE UP (ref 10.3–14.5)
SODIUM SERPL-SCNC: 131 MMOL/L — LOW (ref 135–145)
WBC # BLD: 13.2 K/UL — HIGH (ref 3.8–10.5)
WBC # FLD AUTO: 13.2 K/UL — HIGH (ref 3.8–10.5)

## 2017-08-01 PROCEDURE — 73562 X-RAY EXAM OF KNEE 3: CPT | Mod: 26,50

## 2017-08-01 RX ORDER — LIDOCAINE HCL 20 MG/ML
10 VIAL (ML) INJECTION ONCE
Qty: 0 | Refills: 0 | Status: COMPLETED | OUTPATIENT
Start: 2017-08-01 | End: 2017-08-01

## 2017-08-01 RX ORDER — FUROSEMIDE 40 MG
40 TABLET ORAL DAILY
Qty: 0 | Refills: 0 | Status: DISCONTINUED | OUTPATIENT
Start: 2017-08-01 | End: 2017-08-03

## 2017-08-01 RX ORDER — COLCHICINE 0.6 MG
0.6 TABLET ORAL DAILY
Qty: 0 | Refills: 0 | Status: DISCONTINUED | OUTPATIENT
Start: 2017-08-01 | End: 2017-08-02

## 2017-08-01 RX ADMIN — Medication 20 MILLIGRAM(S): at 12:35

## 2017-08-01 RX ADMIN — GABAPENTIN 300 MILLIGRAM(S): 400 CAPSULE ORAL at 05:30

## 2017-08-01 RX ADMIN — Medication 25 MILLIGRAM(S): at 21:58

## 2017-08-01 RX ADMIN — Medication 25 MILLIGRAM(S): at 05:30

## 2017-08-01 RX ADMIN — POLYETHYLENE GLYCOL 3350 17 GRAM(S): 17 POWDER, FOR SOLUTION ORAL at 12:34

## 2017-08-01 RX ADMIN — AMLODIPINE BESYLATE 2.5 MILLIGRAM(S): 2.5 TABLET ORAL at 05:30

## 2017-08-01 RX ADMIN — Medication 81 MILLIGRAM(S): at 12:34

## 2017-08-01 RX ADMIN — SPIRONOLACTONE 25 MILLIGRAM(S): 25 TABLET, FILM COATED ORAL at 05:30

## 2017-08-01 RX ADMIN — Medication 0.6 MILLIGRAM(S): at 14:28

## 2017-08-01 RX ADMIN — CLOPIDOGREL BISULFATE 75 MILLIGRAM(S): 75 TABLET, FILM COATED ORAL at 12:34

## 2017-08-01 RX ADMIN — Medication 40 MILLIGRAM(S): at 05:30

## 2017-08-01 RX ADMIN — Medication 3 MILLILITER(S): at 14:07

## 2017-08-01 RX ADMIN — HEPARIN SODIUM 5000 UNIT(S): 5000 INJECTION INTRAVENOUS; SUBCUTANEOUS at 17:45

## 2017-08-01 RX ADMIN — Medication 4 MILLIGRAM(S): at 21:58

## 2017-08-01 RX ADMIN — HEPARIN SODIUM 5000 UNIT(S): 5000 INJECTION INTRAVENOUS; SUBCUTANEOUS at 05:32

## 2017-08-01 RX ADMIN — Medication 80 MILLIGRAM(S): at 14:28

## 2017-08-01 RX ADMIN — MIRTAZAPINE 7.5 MILLIGRAM(S): 45 TABLET, ORALLY DISINTEGRATING ORAL at 21:58

## 2017-08-01 RX ADMIN — Medication 10 MILLILITER(S): at 14:28

## 2017-08-01 RX ADMIN — Medication 100 MILLIGRAM(S): at 12:34

## 2017-08-01 RX ADMIN — PANTOPRAZOLE SODIUM 40 MILLIGRAM(S): 20 TABLET, DELAYED RELEASE ORAL at 05:30

## 2017-08-01 RX ADMIN — GABAPENTIN 300 MILLIGRAM(S): 400 CAPSULE ORAL at 17:45

## 2017-08-01 NOTE — PROGRESS NOTE ADULT - ASSESSMENT
89yo Wallisian-speaking male from home, former smoker, lives with wife, PMH laryngeal cancer (diagnosed 2015, s/p XRT, no evidence disease), COPD, CAD s/p stents x 3, s/p left carotid stent, HTN, asthma, depression, anxiety p/w acute shortness of breath 2/2 CHF exacerbation

## 2017-08-01 NOTE — PROGRESS NOTE ADULT - PROBLEM SELECTOR PLAN 1
patient with severe MR. figueroa, 02 sat adequate on nasal canula.  Patient qualifies for home 02 as 02 sat 80% on room air at rest. Patient is in chronic and stable condition.  All acute exacerbations and conditions have been treated and resolved  - c/w lasix

## 2017-08-01 NOTE — PROGRESS NOTE ADULT - SUBJECTIVE AND OBJECTIVE BOX
_____________________________________________________________________________  ========>>  M E D I C A L   A T T E N D I N G    F O L L O W  U P  N O T E  <<=========  ---------------------------------------------------------------------------------------------------------------------------------------    - Patient seen and examined by me approximately thirty minutes ago.  - In summary, patient is a 89y year old man who originally presented with respiratory failure, post Bipap.  - Patient today overall doing ok, comfortable, eating OK. having BM and urinating well.    ==================>> MEDICATIONS <<====================    doxazosin 4 milliGRAM(s) Oral at bedtime  PARoxetine 20 milliGRAM(s) Oral daily  gabapentin 300 milliGRAM(s) Oral two times a day  aspirin enteric coated 81 milliGRAM(s) Oral daily  ALBUTerol/ipratropium for Nebulization 3 milliLiter(s) Nebulizer every 6 hours  atorvastatin 40 milliGRAM(s) Oral at bedtime  clopidogrel Tablet 75 milliGRAM(s) Oral daily  pantoprazole    Tablet 40 milliGRAM(s) Oral before breakfast  spironolactone 25 milliGRAM(s) Oral daily  amLODIPine   Tablet 2.5 milliGRAM(s) Oral daily  hydrALAZINE 25 milliGRAM(s) Oral three times a day  polyethylene glycol 3350 17 Gram(s) Oral daily  docusate sodium 100 milliGRAM(s) Oral daily  senna 2 Tablet(s) Oral at bedtime  mirtazapine 7.5 milliGRAM(s) Oral at bedtime  heparin  Injectable 5000 Unit(s) SubCutaneous every 12 hours  furosemide    Tablet 40 milliGRAM(s) Oral daily  colchicine 0.6 milliGRAM(s) Oral daily    MEDICATIONS  (PRN):  acetaminophen   Tablet. 650 milliGRAM(s) Oral every 6 hours PRN Mild Pain (1 - 3)    ==================>> REVIEW OF SYSTEM <<=================    GEN: no fever, no chills, no pain  RESP: no SOB now, no cough, no sputum  CVS: no chest pain, no palpitations, no edema  GI: no abdominal pain, no nausea, no vomiting, no constipation, no diarrhea  Neuro: no headache, no dizziness  PSYCH: no anxiety, no depression  Derm : no itching, no rash    ==================>> VITAL SIGNS <<==================    Vital Signs Last 24 Hrs  T(C): 36.9 (08-01-17 @ 14:13)  T(F): 98.5 (08-01-17 @ 14:13), Max: 98.5 (08-01-17 @ 14:13)  HR: 75 (08-01-17 @ 14:13) (59 - 75)  BP: 133/54 (08-01-17 @ 14:13)  BP(mean): --  RR: 18 (08-01-17 @ 14:13) (16 - 18)  SpO2: 98% (08-01-17 @ 14:13) (95% - 98%)      ==================>> PHYSICAL EXAM <<=================    GEN: Alert and oriented, pleasant, comfortable NAD  HEENT: NCAT, PERRL, MMM, hearing intact, + nasal canula O2  Neck: supple , no JVD  CVS: S1S2 , regular , No M/R/G appreciated  PULM: mild bilateral ronchi  ABD.: soft. non tender, non distended,  bowel sounds present  Extrem: intact pulses , no edema   Derm: No rash , no ecchymoses  PSYCH : normal mood,  no delusion not anxious     ==================>> LAB AND IMAGING <<==================                                   13.6   13.2  )-----------( 325      ( 01 Aug 2017 07:45 )             40.6        08-01    131<L>  |  89<L>  |  89<H>  ----------------------------<  104<H>  4.1   |  32<H>  |  1.61<H>    Ca    9.2      01 Aug 2017 07:45      Creatinine:  1.61   (08-01 @ 07:45)  Creatinine:  1.83   (07-30 @ 15:32)  Creatinine:  1.76   (07-30 @ 08:11)                   TSH:      0.32   (07-25-17)           Lipid profile:  (07-25-17)     Total: 150     LDL  : 102     HDL  :31     TG   :83     HgA1C: 5.7  (07-25-17)                        < from: Transthoracic Echocardiogram (07.24.17 @ 14:24) >  CONCLUSIONS:  1. Normal mitral valve. Moderate to severe mitral  regurgitation.  2. Calcified trileaflet aortic valve with normal opening.  Mild aortic insufficiency.  3. Normal aortic root.  4. Moderate left atrial enlargement.  5. Normal left ventricular internal dimensions and wall  thicknesses.  6. Normal Left Ventricular Systolic Function,  (EF = 55%)  7. Grade II diastolic dysfunction  8. Normal right atrium.  9. Normal right ventricular size and function.  10. RV systolic pressure is severely increased (PASP 65mm  Hg).  11. There is mild tricuspid regurgitation.  12. There is mild pulmonic regurgitation.  13. Normal pericardium with no pericardial effusion.  < end of copied text >    ________________________________________________________________________  ===============>>  A S S E S S M E N T   A N D   P L A N <<===============  ------------------------------------------------------------------------------------------------------------------------------    **Acute hypoxemic respiratory failure, post bipap, multifactorial, given MR, severe PAH, COPD, and likely acute exacerbation of diastolic heart failure with pleural effusion, overall improved  ---cardio, pulm f/u appreciated  ---need  Home O2, awaiting setup  ---nebulizer treatment  --- diuresis with lasix per renal and cardio  ---monitor vitals, creatinine electrolytes  --PT/ OOB as able ;  Incentive Spirameter    **DAVID likely on CKD?, Hyponatremia  ---renal f/u  ---monitor BMP closely on diuretics  ---free fluid restriction    **Hypertension, CAD, MR, PAH  ---Continue with current medications as above  ---DASH diet   ---close monitoring of vitals    **BPH  --- Continue Current medications and monitor.     -GI/DVT Prophylaxis.  DC planing home in 24-48 hrs  --------------------------------------------  Case discussed with pt, HS, renal, cardio  Education given on deep breathing, plan of care  ___________________________    HNazanin Rich D.O.  Pager: [phone]

## 2017-08-01 NOTE — PROGRESS NOTE ADULT - ASSESSMENT
DAVID  cardiorenal due to CHF improved  CKD due to hypertension..    Hyponatremia due to CHF on LASIX 40 mg daily improved  Follow renal function and xr chest.  Strict fluid intake to 1 liter a day discussed with the family.

## 2017-08-01 NOTE — PROGRESS NOTE ADULT - SUBJECTIVE AND OBJECTIVE BOX
Patient is a 89y old  Male who presents with a chief complaint of shortness of breath (29 Jul 2017 08:37)      INTERVAL HPI/OVERNIGHT EVENTS:  no events overnight  endorses right knee pain    T(C): 36.8 (08-01-17 @ 05:17), Max: 36.8 (08-01-17 @ 05:17)  HR: 64 (08-01-17 @ 05:17) (59 - 64)  BP: 137/60 (08-01-17 @ 05:17) (133/68 - 137/60)  RR: 16 (08-01-17 @ 05:17) (16 - 16)  SpO2: 95% (08-01-17 @ 05:17) (95% - 97%)  Wt(kg): --  I&O's Summary      LABS:                        13.6   13.2  )-----------( 325      ( 01 Aug 2017 07:45 )             40.6     08-01    131<L>  |  89<L>  |  89<H>  ----------------------------<  104<H>  4.1   |  32<H>  |  1.61<H>    Ca    9.2      01 Aug 2017 07:45          CAPILLARY BLOOD GLUCOSE              RADIOLOGY & ADDITIONAL TESTS:    Imaging Personally Reviewed:  [ ] YES  [ ] NO    Consultant(s) Notes Reviewed:  [ ] YES  [ ] NO    PHYSICAL EXAM:  GENERAL: NAD, well-groomed, well-developed  NERVOUS SYSTEM:  Alert & Oriented X3, Good concentration; Motor Strength 5/5 B/L upper and lower extremities; DTRs 2+ intact and symmetric  CHEST/LUNG: Clear to percussion bilaterally; No rales, rhonchi, wheezing, or rubs  HEART: Regular rate and rhythm; No murmurs, rubs, or gallops  ABDOMEN: Soft, Nontender, Nondistended; Bowel sounds present  EXTREMITIES:  no edema, slight swelling right knee, no redness      Care Discussed with Consultants/Other Providers [ ] YES  [ ] NO

## 2017-08-01 NOTE — PROGRESS NOTE ADULT - SUBJECTIVE AND OBJECTIVE BOX
CHIEF COMPLAINT:Patient is a 89y old  Male who presents with a chief complaint of shortness of breath. Pt appears comfortable.    	  REVIEW OF SYSTEMS:  [X ] Unable to obtain    PHYSICAL EXAM:  T(C): 36.8 (08-01-17 @ 05:17), Max: 36.8 (08-01-17 @ 05:17)  HR: 64 (08-01-17 @ 05:17) (59 - 79)  BP: 137/60 (08-01-17 @ 05:17) (131/71 - 137/60)  RR: 16 (08-01-17 @ 05:17) (16 - 16)  SpO2: 95% (08-01-17 @ 05:17) (92% - 97%)    Appearance: Normal	  HEENT:   Normal oral mucosa, PERRL, EOMI	  Lymphatic: No lymphadenopathy  Cardiovascular: Normal S1 S2, + JVD, 2/6 sm  Respiratory: Lungs clear to auscultation	  Psychiatry: A & O x 3, Mood & affect appropriate  Gastrointestinal:  Soft, Non-tender, + BS	  Skin: No rashes, No ecchymoses, No cyanosis	  Neurologic: Non-focal  Extremities: Normal range of motion, No clubbing, cyanosis or edema  Vascular: Peripheral pulses palpable 2+ bilaterally    MEDICATIONS  (STANDING):  doxazosin 4 milliGRAM(s) Oral at bedtime  PARoxetine 20 milliGRAM(s) Oral daily  gabapentin 300 milliGRAM(s) Oral two times a day  aspirin enteric coated 81 milliGRAM(s) Oral daily  ALBUTerol/ipratropium for Nebulization 3 milliLiter(s) Nebulizer every 6 hours  atorvastatin 40 milliGRAM(s) Oral at bedtime  clopidogrel Tablet 75 milliGRAM(s) Oral daily  pantoprazole    Tablet 40 milliGRAM(s) Oral before breakfast  spironolactone 25 milliGRAM(s) Oral daily  amLODIPine   Tablet 2.5 milliGRAM(s) Oral daily  hydrALAZINE 25 milliGRAM(s) Oral three times a day  polyethylene glycol 3350 17 Gram(s) Oral daily  docusate sodium 100 milliGRAM(s) Oral daily  senna 2 Tablet(s) Oral at bedtime  furosemide    Tablet 40 milliGRAM(s) Oral daily  mirtazapine 7.5 milliGRAM(s) Oral at bedtime  heparin  Injectable 5000 Unit(s) SubCutaneous every 12 hours      	  LABS:	 	                        13.6   13.2  )-----------( 325      ( 01 Aug 2017 07:45 )             40.6     08-01    131<L>  |  89<L>  |  89<H>  ----------------------------<  104<H>  4.1   |  32<H>  |  1.61<H>    Ca    9.2      01 Aug 2017 07:45      proBNP: Serum Pro-Brain Natriuretic Peptide: 46216 pg/mL (07-24 @ 10:41)    Lipid Profile: Cholesterol 150    HDL 31  TG 83    HgA1c: Hemoglobin A1C, Whole Blood: 5.7 % (07-25 @ 09:08)    TSH: Thyroid Stimulating Hormone, Serum: 0.32 uU/mL (07-25 @ 04:26)

## 2017-08-01 NOTE — PROGRESS NOTE ADULT - SUBJECTIVE AND OBJECTIVE BOX
Patient is a 89y old  Male who presents with a chief complaint of shortness of breath (29 Jul 2017 08:37). Awake, alert, comfortable in bed in NAD. Doing well on oxygen via NC.      INTERVAL HPI/OVERNIGHT EVENTS:      VITAL SIGNS:  T(F): 98.3 (08-01-17 @ 05:17)  HR: 64 (08-01-17 @ 05:17)  BP: 137/60 (08-01-17 @ 05:17)  RR: 16 (08-01-17 @ 05:17)  SpO2: 95% (08-01-17 @ 05:17)  Wt(kg): --  I&O's Detail          REVIEW OF SYSTEMS:    CONSTITUTIONAL:  No fevers, chills, sweats    HEENT:  Eyes:  No diplopia or blurred vision. ENT:  No earache, sore throat or runny nose.    CARDIOVASCULAR:  No pressure, squeezing, tightness, or heaviness about the chest; no palpitations.    RESPIRATORY:  Per HPI    GASTROINTESTINAL:  No abdominal pain, nausea, vomiting or diarrhea.    GENITOURINARY:  No dysuria, frequency or urgency.    NEUROLOGIC:  No paresthesias, fasciculations, seizures or weakness.    PSYCHIATRIC:  No disorder of thought or mood.      PHYSICAL EXAM:    Constitutional: Well developed and nourished  Eyes:Perrla  ENMT: normal  Neck:supple  Respiratory: good air entry  Cardiovascular: S1 S2 regular  Gastrointestinal: Soft, Non tender  Extremities: No edema  Vascular:normal  Neurological:Awake, alert,Ox3  Musculoskeletal:Normal      MEDICATIONS  (STANDING):  doxazosin 4 milliGRAM(s) Oral at bedtime  PARoxetine 20 milliGRAM(s) Oral daily  gabapentin 300 milliGRAM(s) Oral two times a day  aspirin enteric coated 81 milliGRAM(s) Oral daily  ALBUTerol/ipratropium for Nebulization 3 milliLiter(s) Nebulizer every 6 hours  atorvastatin 40 milliGRAM(s) Oral at bedtime  clopidogrel Tablet 75 milliGRAM(s) Oral daily  pantoprazole    Tablet 40 milliGRAM(s) Oral before breakfast  spironolactone 25 milliGRAM(s) Oral daily  amLODIPine   Tablet 2.5 milliGRAM(s) Oral daily  hydrALAZINE 25 milliGRAM(s) Oral three times a day  polyethylene glycol 3350 17 Gram(s) Oral daily  docusate sodium 100 milliGRAM(s) Oral daily  senna 2 Tablet(s) Oral at bedtime  furosemide    Tablet 40 milliGRAM(s) Oral daily  mirtazapine 7.5 milliGRAM(s) Oral at bedtime  heparin  Injectable 5000 Unit(s) SubCutaneous every 12 hours    MEDICATIONS  (PRN):  acetaminophen   Tablet. 650 milliGRAM(s) Oral every 6 hours PRN Mild Pain (1 - 3)      Allergies    No Known Allergies    Intolerances        LABS:                        13.6   13.2  )-----------( 325      ( 01 Aug 2017 07:45 )             40.6     08-01    131<L>  |  89<L>  |  89<H>  ----------------------------<  104<H>  4.1   |  32<H>  |  1.61<H>    Ca    9.2      01 Aug 2017 07:45                CAPILLARY BLOOD GLUCOSE            RADIOLOGY & ADDITIONAL TESTS:    CXR:      Ct scan chest:    ekg;    echo: Patient is a 89y old  Male who presents with a chief complaint of shortness of breath (29 Jul 2017 08:37). Awake, alert, comfortable in bed in NAD. Doing well on oxygen via NC. S/p Respiratory failure. Out of ICU on medical floor.      INTERVAL HPI/OVERNIGHT EVENTS:      VITAL SIGNS:  T(F): 98.3 (08-01-17 @ 05:17)  HR: 64 (08-01-17 @ 05:17)  BP: 137/60 (08-01-17 @ 05:17)  RR: 16 (08-01-17 @ 05:17)  SpO2: 95% (08-01-17 @ 05:17)  Wt(kg): --  I&O's Detail          REVIEW OF SYSTEMS:    CONSTITUTIONAL:  No fevers, chills, sweats    HEENT:  Eyes:  No diplopia or blurred vision. ENT:  No earache, sore throat or runny nose.    CARDIOVASCULAR:  No pressure, squeezing, tightness, or heaviness about the chest; no palpitations.    RESPIRATORY:  Per HPI    GASTROINTESTINAL:  No abdominal pain, nausea, vomiting or diarrhea.    GENITOURINARY:  No dysuria, frequency or urgency.    NEUROLOGIC:  No paresthesias, fasciculations, seizures or weakness.    PSYCHIATRIC:  No disorder of thought or mood.      PHYSICAL EXAM:    Constitutional: Well developed and nourished  Eyes:Perrla  ENMT: normal  Neck:supple  Respiratory: good air entry  Cardiovascular: S1 S2 regular  Gastrointestinal: Soft, Non tender  Extremities: No edema  Vascular:normal  Neurological:Awake, alert,Ox3  Musculoskeletal:Normal      MEDICATIONS  (STANDING):  doxazosin 4 milliGRAM(s) Oral at bedtime  PARoxetine 20 milliGRAM(s) Oral daily  gabapentin 300 milliGRAM(s) Oral two times a day  aspirin enteric coated 81 milliGRAM(s) Oral daily  ALBUTerol/ipratropium for Nebulization 3 milliLiter(s) Nebulizer every 6 hours  atorvastatin 40 milliGRAM(s) Oral at bedtime  clopidogrel Tablet 75 milliGRAM(s) Oral daily  pantoprazole    Tablet 40 milliGRAM(s) Oral before breakfast  spironolactone 25 milliGRAM(s) Oral daily  amLODIPine   Tablet 2.5 milliGRAM(s) Oral daily  hydrALAZINE 25 milliGRAM(s) Oral three times a day  polyethylene glycol 3350 17 Gram(s) Oral daily  docusate sodium 100 milliGRAM(s) Oral daily  senna 2 Tablet(s) Oral at bedtime  furosemide    Tablet 40 milliGRAM(s) Oral daily  mirtazapine 7.5 milliGRAM(s) Oral at bedtime  heparin  Injectable 5000 Unit(s) SubCutaneous every 12 hours    MEDICATIONS  (PRN):  acetaminophen   Tablet. 650 milliGRAM(s) Oral every 6 hours PRN Mild Pain (1 - 3)      Allergies    No Known Allergies    Intolerances        LABS:                        13.6   13.2  )-----------( 325      ( 01 Aug 2017 07:45 )             40.6     08-01    131<L>  |  89<L>  |  89<H>  ----------------------------<  104<H>  4.1   |  32<H>  |  1.61<H>    Ca    9.2      01 Aug 2017 07:45                CAPILLARY BLOOD GLUCOSE            RADIOLOGY & ADDITIONAL TESTS:    CXR:      Ct scan chest:    ekg;    echo:

## 2017-08-01 NOTE — CONSULT NOTE ADULT - SUBJECTIVE AND OBJECTIVE BOX
HPI: Patient is a 89yo Greek-speaking male who is admitted for medical workup. Patient was noted to complain of right knee pain and swelling.     PAST MEDICAL & SURGICAL HISTORY:  Coronary artery disease involving native coronary artery of native heart without angina pectoris  Laryngeal cancer  Essential hypertension  Asthma  Arthritis  Degenerative joint disease  No significant past surgical history    Review of systems: Non Contributory    MEDICATIONS  (STANDING):  doxazosin 4 milliGRAM(s) Oral at bedtime  PARoxetine 20 milliGRAM(s) Oral daily  gabapentin 300 milliGRAM(s) Oral two times a day  aspirin enteric coated 81 milliGRAM(s) Oral daily  ALBUTerol/ipratropium for Nebulization 3 milliLiter(s) Nebulizer every 6 hours  atorvastatin 40 milliGRAM(s) Oral at bedtime  clopidogrel Tablet 75 milliGRAM(s) Oral daily  pantoprazole    Tablet 40 milliGRAM(s) Oral before breakfast  spironolactone 25 milliGRAM(s) Oral daily  amLODIPine   Tablet 2.5 milliGRAM(s) Oral daily  hydrALAZINE 25 milliGRAM(s) Oral three times a day  polyethylene glycol 3350 17 Gram(s) Oral daily  docusate sodium 100 milliGRAM(s) Oral daily  senna 2 Tablet(s) Oral at bedtime  mirtazapine 7.5 milliGRAM(s) Oral at bedtime  heparin  Injectable 5000 Unit(s) SubCutaneous every 12 hours  furosemide    Tablet 40 milliGRAM(s) Oral daily  colchicine 0.6 milliGRAM(s) Oral daily    Allergies: No known Allergies    Vital Signs Last 24 Hrs  T(C): 36.9 (01 Aug 2017 14:13), Max: 36.9 (01 Aug 2017 14:13)  T(F): 98.5 (01 Aug 2017 14:13), Max: 98.5 (01 Aug 2017 14:13)  HR: 75 (01 Aug 2017 14:13) (59 - 75)  BP: 133/54 (01 Aug 2017 14:13) (133/54 - 137/60)  BP(mean): --  RR: 18 (01 Aug 2017 14:13) (16 - 18)  SpO2: 98% (01 Aug 2017 14:13) (95% - 98%)    Physical Examination:    Musculoskeletal:   Right knee 1+effusion. Pain to palpation of joint line, Decreased range of motion.     Neurovascularly Intact    LABS:                        13.6   13.2  )-----------( 325      ( 01 Aug 2017 07:45 )             40.6     08-01    131<L>  |  89<L>  |  89<H>  ----------------------------<  104<H>  4.1   |  32<H>  |  1.61<H>    Ca    9.2      01 Aug 2017 07:45    RADIOLOGY & ADDITIONAL STUDIES: X ray of right knee: Mild DJD    ASSESSMENT: Right knee DJD/swelling    PLAN/RECOMMENDATION: Under sterile condition, right knee was aspirated and about 10 CC of yellowish fluid was obtained, and 80 mg of Depomedrol and 5 CC of lidocaine was injected.     FOLLOW UP: With office after discharge

## 2017-08-01 NOTE — PROGRESS NOTE ADULT - PROBLEM SELECTOR PLAN 3
nephrology following.  2/2 cardiorenal syndrome, chronic.  na slowly increasing  - c/w lasix and monitor.  If continues to trend upwards, can dc tomorrow

## 2017-08-01 NOTE — PROGRESS NOTE ADULT - SUBJECTIVE AND OBJECTIVE BOX
Problem List:  DAVID cardiorenal syndrome  Severe MR and diastolic failure, pulmonary hypertension.  Hyponatremia.  CKD STAGE in the past unknown      PAST MEDICAL & SURGICAL HISTORY:  Coronary artery disease involving native coronary artery of native heart without angina pectoris  Laryngeal cancer  Essential hypertension  Asthma  Arthritis  Degenerative joint disease  No significant past surgical history      No Known Allergies      MEDICATIONS  (STANDING):  doxazosin 4 milliGRAM(s) Oral at bedtime  PARoxetine 20 milliGRAM(s) Oral daily  gabapentin 300 milliGRAM(s) Oral two times a day  aspirin enteric coated 81 milliGRAM(s) Oral daily  ALBUTerol/ipratropium for Nebulization 3 milliLiter(s) Nebulizer every 6 hours  atorvastatin 40 milliGRAM(s) Oral at bedtime  clopidogrel Tablet 75 milliGRAM(s) Oral daily  pantoprazole    Tablet 40 milliGRAM(s) Oral before breakfast  spironolactone 25 milliGRAM(s) Oral daily  amLODIPine   Tablet 2.5 milliGRAM(s) Oral daily  hydrALAZINE 25 milliGRAM(s) Oral three times a day  polyethylene glycol 3350 17 Gram(s) Oral daily  docusate sodium 100 milliGRAM(s) Oral daily  senna 2 Tablet(s) Oral at bedtime  mirtazapine 7.5 milliGRAM(s) Oral at bedtime  heparin  Injectable 5000 Unit(s) SubCutaneous every 12 hours  furosemide    Tablet 40 milliGRAM(s) Oral daily  colchicine 0.6 milliGRAM(s) Oral daily    MEDICATIONS  (PRN):  acetaminophen   Tablet. 650 milliGRAM(s) Oral every 6 hours PRN Mild Pain (1 - 3)                            13.6   13.2  )-----------( 325      ( 01 Aug 2017 07:45 )             40.6     08-01    131<L>  |  89<L>  |  89<H>  ----------------------------<  104<H>  4.1   |  32<H>  |  1.61<H>    Ca    9.2      01 Aug 2017 07:45    Potassium, Random Urine: 26 mmol/L (07-29 @ 10:35)  Osmolality, Random Urine: 330 mos/kg (07-29 @ 10:35)  Sodium, Random Urine: 44 mmol/L (07-29 @ 10:35)      REVIEW OF SYSTEMS:  General: no fever no chills, no weight loss.  EYES/ENT: No visual changes;  No vertigo, no headache.  NECK: No pain or stiffness  RESPIRATORY: No cough, wheezing, hemoptysis; No shortness of breath  CARDIOVASCULAR: No chest pain or palpitations. No Edema  GASTROINTESTINAL: No abdominal or epigastric pain. No nausea, vomiting. No diarrhea or constipation. No melena.  GENITOURINARY: No dysuria, frequency, foamy urine, urinary urgency, incontinence or hematuria  NEUROLOGICAL: No numbness or weakness, no tremor , no dizziness.   Muscle skeletal : pain in right knee  Endocrine: no sweating no cold sensation , no hair loss.  SKIN: No itching, burning, rashes, or lesions         VITALS:  T(F): 98.5 (08-01-17 @ 14:13), Max: 98.5 (08-01-17 @ 14:13)  HR: 75 (08-01-17 @ 14:13)  BP: 133/54 (08-01-17 @ 14:13)  RR: 18 (08-01-17 @ 14:13)  SpO2: 98% (08-01-17 @ 14:13)  Wt(kg): --      PHYSICAL EXAM:  Constitutional: well developed, no diaphoresis, no distress.  Neck: No JVD, no carotid bruit, supple, no adenopathy  Respiratory: Good air entrance B/L, no wheezes, rales or rhonchi  Cardiovascular: S1, S2, RRR, no pericardial rub, no murmur  Abdomen: BS+, soft, no tenderness, no bruit  Pelvis: bladder nondistended  Extremities: No cyanosis or clubbing. No peripheral edema.   right knee expanded fluid medial aspect and tender  Neurological: A/O x 3, no focal deficits  Psychiatric: Normal mood, normal affect

## 2017-08-01 NOTE — PROGRESS NOTE ADULT - ASSESSMENT
89 yr old  Palauan-speaking male from home, former smoker, lives with wife, PMH laryngeal cancer (diagnosed 2015, s/p XRT, no evidence disease), COPD, CAD s/p stents x 3, s/p left carotid stent, HTN, asthma, depression, anxiety p/w acute shortness of breath due to diastolic HF and mod-severe MR.  1.Continue po lasix.  2.Severe pulm HTN-Norvasc 2.5mg qd.  3.CAD-asa,statin, labetalol 100mg bid..  4.COPD-nebs.  5.HTN-Cont BP medication.  6.GI and DVT prophylaxis.  7.Continue aldactone 25mg qd.  8.Continue  hydralazine 25mg q8 and isordil 10mg tid for afterload reduction.  9.continue remeron 7.5mg qhs.

## 2017-08-02 DIAGNOSIS — R69 ILLNESS, UNSPECIFIED: ICD-10-CM

## 2017-08-02 LAB
ANION GAP SERPL CALC-SCNC: 11 MMOL/L — SIGNIFICANT CHANGE UP (ref 5–17)
BASOPHILS # BLD AUTO: 0 K/UL — SIGNIFICANT CHANGE UP (ref 0–0.2)
BASOPHILS NFR BLD AUTO: 0.1 % — SIGNIFICANT CHANGE UP (ref 0–2)
BUN SERPL-MCNC: 99 MG/DL — HIGH (ref 7–18)
CALCIUM SERPL-MCNC: 9 MG/DL — SIGNIFICANT CHANGE UP (ref 8.4–10.5)
CHLORIDE SERPL-SCNC: 88 MMOL/L — LOW (ref 96–108)
CO2 SERPL-SCNC: 31 MMOL/L — SIGNIFICANT CHANGE UP (ref 22–31)
CREAT SERPL-MCNC: 1.66 MG/DL — HIGH (ref 0.5–1.3)
EOSINOPHIL # BLD AUTO: 0 K/UL — SIGNIFICANT CHANGE UP (ref 0–0.5)
EOSINOPHIL NFR BLD AUTO: 0 % — SIGNIFICANT CHANGE UP (ref 0–6)
GLUCOSE SERPL-MCNC: 207 MG/DL — HIGH (ref 70–99)
HCT VFR BLD CALC: 42.7 % — SIGNIFICANT CHANGE UP (ref 39–50)
HGB BLD-MCNC: 13.9 G/DL — SIGNIFICANT CHANGE UP (ref 13–17)
LYMPHOCYTES # BLD AUTO: 0.9 K/UL — LOW (ref 1–3.3)
LYMPHOCYTES # BLD AUTO: 6.3 % — LOW (ref 13–44)
MCHC RBC-ENTMCNC: 29.6 PG — SIGNIFICANT CHANGE UP (ref 27–34)
MCHC RBC-ENTMCNC: 32.6 GM/DL — SIGNIFICANT CHANGE UP (ref 32–36)
MCV RBC AUTO: 90.6 FL — SIGNIFICANT CHANGE UP (ref 80–100)
MONOCYTES # BLD AUTO: 0.3 K/UL — SIGNIFICANT CHANGE UP (ref 0–0.9)
MONOCYTES NFR BLD AUTO: 2.3 % — SIGNIFICANT CHANGE UP (ref 2–14)
NEUTROPHILS # BLD AUTO: 12.7 K/UL — HIGH (ref 1.8–7.4)
NEUTROPHILS NFR BLD AUTO: 91.3 % — HIGH (ref 43–77)
PLATELET # BLD AUTO: 304 K/UL — SIGNIFICANT CHANGE UP (ref 150–400)
POTASSIUM SERPL-MCNC: 5 MMOL/L — SIGNIFICANT CHANGE UP (ref 3.5–5.3)
POTASSIUM SERPL-SCNC: 5 MMOL/L — SIGNIFICANT CHANGE UP (ref 3.5–5.3)
RBC # BLD: 4.71 M/UL — SIGNIFICANT CHANGE UP (ref 4.2–5.8)
RBC # FLD: 12.6 % — SIGNIFICANT CHANGE UP (ref 10.3–14.5)
SODIUM SERPL-SCNC: 130 MMOL/L — LOW (ref 135–145)
WBC # BLD: 13.9 K/UL — HIGH (ref 3.8–10.5)
WBC # FLD AUTO: 13.9 K/UL — HIGH (ref 3.8–10.5)

## 2017-08-02 RX ORDER — ONDANSETRON 8 MG/1
4 TABLET, FILM COATED ORAL EVERY 4 HOURS
Qty: 0 | Refills: 0 | Status: DISCONTINUED | OUTPATIENT
Start: 2017-08-02 | End: 2017-08-02

## 2017-08-02 RX ORDER — HYDRALAZINE HCL 50 MG
1 TABLET ORAL
Qty: 0 | Refills: 0 | COMMUNITY

## 2017-08-02 RX ORDER — ATORVASTATIN CALCIUM 80 MG/1
1 TABLET, FILM COATED ORAL
Qty: 15 | Refills: 0 | OUTPATIENT
Start: 2017-08-02 | End: 2017-08-17

## 2017-08-02 RX ORDER — HYDRALAZINE HCL 50 MG
1 TABLET ORAL
Qty: 45 | Refills: 0 | OUTPATIENT
Start: 2017-08-02 | End: 2017-08-17

## 2017-08-02 RX ORDER — AMLODIPINE BESYLATE 2.5 MG/1
1 TABLET ORAL
Qty: 0 | Refills: 0 | COMMUNITY

## 2017-08-02 RX ORDER — TRAMADOL HYDROCHLORIDE 50 MG/1
50 TABLET ORAL THREE TIMES A DAY
Qty: 0 | Refills: 0 | Status: DISCONTINUED | OUTPATIENT
Start: 2017-08-02 | End: 2017-08-04

## 2017-08-02 RX ORDER — COLCHICINE 0.6 MG
1 TABLET ORAL
Qty: 2 | Refills: 0 | OUTPATIENT
Start: 2017-08-02 | End: 2017-08-04

## 2017-08-02 RX ORDER — DOXAZOSIN MESYLATE 4 MG
1 TABLET ORAL
Qty: 0 | Refills: 0 | COMMUNITY

## 2017-08-02 RX ORDER — ASPIRIN/CALCIUM CARB/MAGNESIUM 324 MG
1 TABLET ORAL
Qty: 15 | Refills: 0 | OUTPATIENT
Start: 2017-08-02 | End: 2017-08-17

## 2017-08-02 RX ORDER — AMLODIPINE BESYLATE 2.5 MG/1
1 TABLET ORAL
Qty: 15 | Refills: 0 | OUTPATIENT
Start: 2017-08-02 | End: 2017-08-17

## 2017-08-02 RX ORDER — MIRTAZAPINE 45 MG/1
1 TABLET, ORALLY DISINTEGRATING ORAL
Qty: 15 | Refills: 0 | OUTPATIENT
Start: 2017-08-02 | End: 2017-08-17

## 2017-08-02 RX ORDER — SPIRONOLACTONE 25 MG/1
25 TABLET, FILM COATED ORAL EVERY OTHER DAY
Qty: 0 | Refills: 0 | Status: DISCONTINUED | OUTPATIENT
Start: 2017-08-02 | End: 2017-08-02

## 2017-08-02 RX ORDER — DRONABINOL 2.5 MG
2.5 CAPSULE ORAL
Qty: 0 | Refills: 0 | Status: DISCONTINUED | OUTPATIENT
Start: 2017-08-02 | End: 2017-08-05

## 2017-08-02 RX ORDER — SPIRONOLACTONE 25 MG/1
1 TABLET, FILM COATED ORAL
Qty: 6 | Refills: 0 | OUTPATIENT
Start: 2017-08-02 | End: 2017-08-17

## 2017-08-02 RX ORDER — COLCHICINE 0.6 MG
1 TABLET ORAL
Qty: 7 | Refills: 0 | OUTPATIENT
Start: 2017-08-02 | End: 2017-08-09

## 2017-08-02 RX ORDER — FUROSEMIDE 40 MG
1 TABLET ORAL
Qty: 15 | Refills: 0 | OUTPATIENT
Start: 2017-08-02 | End: 2017-08-17

## 2017-08-02 RX ORDER — DOXAZOSIN MESYLATE 4 MG
1 TABLET ORAL
Qty: 15 | Refills: 0 | OUTPATIENT
Start: 2017-08-02 | End: 2017-08-17

## 2017-08-02 RX ORDER — CLOPIDOGREL BISULFATE 75 MG/1
1 TABLET, FILM COATED ORAL
Qty: 15 | Refills: 0 | OUTPATIENT
Start: 2017-08-02 | End: 2017-08-17

## 2017-08-02 RX ORDER — LABETALOL HCL 100 MG
1 TABLET ORAL
Qty: 0 | Refills: 0 | COMMUNITY

## 2017-08-02 RX ORDER — ONDANSETRON 8 MG/1
4 TABLET, FILM COATED ORAL EVERY 6 HOURS
Qty: 0 | Refills: 0 | Status: DISCONTINUED | OUTPATIENT
Start: 2017-08-02 | End: 2017-08-09

## 2017-08-02 RX ORDER — ASPIRIN/CALCIUM CARB/MAGNESIUM 324 MG
1 TABLET ORAL
Qty: 0 | Refills: 0 | COMMUNITY

## 2017-08-02 RX ORDER — GABAPENTIN 400 MG/1
1 CAPSULE ORAL
Qty: 0 | Refills: 0 | COMMUNITY

## 2017-08-02 RX ADMIN — Medication 20 MILLIGRAM(S): at 12:44

## 2017-08-02 RX ADMIN — Medication 25 MILLIGRAM(S): at 05:20

## 2017-08-02 RX ADMIN — Medication 0.6 MILLIGRAM(S): at 12:44

## 2017-08-02 RX ADMIN — Medication 81 MILLIGRAM(S): at 12:44

## 2017-08-02 RX ADMIN — Medication 3 MILLILITER(S): at 21:14

## 2017-08-02 RX ADMIN — TRAMADOL HYDROCHLORIDE 50 MILLIGRAM(S): 50 TABLET ORAL at 16:00

## 2017-08-02 RX ADMIN — TRAMADOL HYDROCHLORIDE 50 MILLIGRAM(S): 50 TABLET ORAL at 15:30

## 2017-08-02 RX ADMIN — CLOPIDOGREL BISULFATE 75 MILLIGRAM(S): 75 TABLET, FILM COATED ORAL at 12:44

## 2017-08-02 RX ADMIN — HEPARIN SODIUM 5000 UNIT(S): 5000 INJECTION INTRAVENOUS; SUBCUTANEOUS at 18:35

## 2017-08-02 RX ADMIN — Medication 100 MILLIGRAM(S): at 12:44

## 2017-08-02 RX ADMIN — Medication 4 MILLIGRAM(S): at 21:35

## 2017-08-02 RX ADMIN — GABAPENTIN 300 MILLIGRAM(S): 400 CAPSULE ORAL at 05:20

## 2017-08-02 RX ADMIN — PANTOPRAZOLE SODIUM 40 MILLIGRAM(S): 20 TABLET, DELAYED RELEASE ORAL at 05:20

## 2017-08-02 RX ADMIN — Medication 3 MILLILITER(S): at 08:57

## 2017-08-02 RX ADMIN — SPIRONOLACTONE 25 MILLIGRAM(S): 25 TABLET, FILM COATED ORAL at 05:20

## 2017-08-02 RX ADMIN — AMLODIPINE BESYLATE 2.5 MILLIGRAM(S): 2.5 TABLET ORAL at 05:20

## 2017-08-02 RX ADMIN — Medication 25 MILLIGRAM(S): at 21:35

## 2017-08-02 RX ADMIN — Medication 40 MILLIGRAM(S): at 05:20

## 2017-08-02 RX ADMIN — Medication 10 MILLIGRAM(S): at 18:35

## 2017-08-02 RX ADMIN — Medication 3 MILLILITER(S): at 14:47

## 2017-08-02 RX ADMIN — Medication 650 MILLIGRAM(S): at 12:42

## 2017-08-02 RX ADMIN — MIRTAZAPINE 7.5 MILLIGRAM(S): 45 TABLET, ORALLY DISINTEGRATING ORAL at 21:35

## 2017-08-02 RX ADMIN — Medication 650 MILLIGRAM(S): at 13:20

## 2017-08-02 NOTE — PROGRESS NOTE ADULT - SUBJECTIVE AND OBJECTIVE BOX
Patient is a 89y old  Male who presents with a chief complaint of shortness of breath (29 Jul 2017 08:37). Awake, alert, comfortable in bed in NAD. Doing well on oxygen via NC. S/p Respiratory failure. Out of ICU on medical floor.      INTERVAL HPI/OVERNIGHT EVENTS:      VITAL SIGNS:  T(F): 97.5 (08-02-17 @ 05:08)  HR: 68 (08-02-17 @ 05:08)  BP: 139/54 (08-02-17 @ 05:08)  RR: 18 (08-02-17 @ 05:08)  SpO2: 96% (08-02-17 @ 05:08)  Wt(kg): --  I&O's Detail          REVIEW OF SYSTEMS:    CONSTITUTIONAL:  No fevers, chills, sweats    HEENT:  Eyes:  No diplopia or blurred vision. ENT:  No earache, sore throat or runny nose.    CARDIOVASCULAR:  No pressure, squeezing, tightness, or heaviness about the chest; no palpitations.    RESPIRATORY:  Per HPI    GASTROINTESTINAL:  No abdominal pain, nausea, vomiting or diarrhea.    GENITOURINARY:  No dysuria, frequency or urgency.    NEUROLOGIC:  No paresthesias, fasciculations, seizures or weakness.    PSYCHIATRIC:  No disorder of thought or mood.      PHYSICAL EXAM:    Constitutional: Well developed and nourished  Eyes:Perrla  ENMT: normal  Neck:supple  Respiratory: good air entry  Cardiovascular: S1 S2 regular  Gastrointestinal: Soft, Non tender  Extremities: No edema  Vascular:normal  Neurological:Awake, alert,Ox3  Musculoskeletal:Normal      MEDICATIONS  (STANDING):  doxazosin 4 milliGRAM(s) Oral at bedtime  PARoxetine 20 milliGRAM(s) Oral daily  gabapentin 300 milliGRAM(s) Oral two times a day  aspirin enteric coated 81 milliGRAM(s) Oral daily  ALBUTerol/ipratropium for Nebulization 3 milliLiter(s) Nebulizer every 6 hours  atorvastatin 40 milliGRAM(s) Oral at bedtime  clopidogrel Tablet 75 milliGRAM(s) Oral daily  pantoprazole    Tablet 40 milliGRAM(s) Oral before breakfast  spironolactone 25 milliGRAM(s) Oral daily  amLODIPine   Tablet 2.5 milliGRAM(s) Oral daily  hydrALAZINE 25 milliGRAM(s) Oral three times a day  polyethylene glycol 3350 17 Gram(s) Oral daily  docusate sodium 100 milliGRAM(s) Oral daily  senna 2 Tablet(s) Oral at bedtime  mirtazapine 7.5 milliGRAM(s) Oral at bedtime  heparin  Injectable 5000 Unit(s) SubCutaneous every 12 hours  furosemide    Tablet 40 milliGRAM(s) Oral daily  colchicine 0.6 milliGRAM(s) Oral daily    MEDICATIONS  (PRN):  acetaminophen   Tablet. 650 milliGRAM(s) Oral every 6 hours PRN Mild Pain (1 - 3)      Allergies    No Known Allergies    Intolerances        LABS:                        13.6   13.2  )-----------( 325      ( 01 Aug 2017 07:45 )             40.6     08-02    130<L>  |  88<L>  |  99<H>  ----------------------------<  207<H>  5.0   |  31  |  1.66<H>    Ca    9.0      02 Aug 2017 07:29                CAPILLARY BLOOD GLUCOSE            RADIOLOGY & ADDITIONAL TESTS:    CXR:    Ct scan chest:    ekg;    echo: Patient is a 89y old  Male who presents with a chief complaint of shortness of breath (29 Jul 2017 08:37). Awake, alert, comfortable in bed in NAD. Doing well on oxygen via NC. S/p Respiratory failure. Out of ICU on medical floor.      INTERVAL HPI/OVERNIGHT EVENTS: No new events      VITAL SIGNS:  T(F): 97.5 (08-02-17 @ 05:08)  HR: 68 (08-02-17 @ 05:08)  BP: 139/54 (08-02-17 @ 05:08)  RR: 18 (08-02-17 @ 05:08)  SpO2: 96% (08-02-17 @ 05:08)  Wt(kg): --  I&O's Detail          REVIEW OF SYSTEMS:    CONSTITUTIONAL:  No fevers, chills, sweats    HEENT:  Eyes:  No diplopia or blurred vision. ENT:  No earache, sore throat or runny nose.    CARDIOVASCULAR:  No pressure, squeezing, tightness, or heaviness about the chest; no palpitations.    RESPIRATORY:  Per HPI    GASTROINTESTINAL:  No abdominal pain, nausea, vomiting or diarrhea.    GENITOURINARY:  No dysuria, frequency or urgency.    NEUROLOGIC:  No paresthesias, fasciculations, seizures or weakness.    PSYCHIATRIC:  No disorder of thought or mood.      PHYSICAL EXAM:    Constitutional: Well developed and nourished  Eyes:Perrla  ENMT: normal  Neck:supple  Respiratory: rales bilaterally  Cardiovascular: S1 S2 regular  Gastrointestinal: Soft, Non tender  Extremities: No edema  Vascular:normal  Neurological:Awake, alert,Ox3  Musculoskeletal:Normal      MEDICATIONS  (STANDING):  doxazosin 4 milliGRAM(s) Oral at bedtime  PARoxetine 20 milliGRAM(s) Oral daily  gabapentin 300 milliGRAM(s) Oral two times a day  aspirin enteric coated 81 milliGRAM(s) Oral daily  ALBUTerol/ipratropium for Nebulization 3 milliLiter(s) Nebulizer every 6 hours  atorvastatin 40 milliGRAM(s) Oral at bedtime  clopidogrel Tablet 75 milliGRAM(s) Oral daily  pantoprazole    Tablet 40 milliGRAM(s) Oral before breakfast  spironolactone 25 milliGRAM(s) Oral daily  amLODIPine   Tablet 2.5 milliGRAM(s) Oral daily  hydrALAZINE 25 milliGRAM(s) Oral three times a day  polyethylene glycol 3350 17 Gram(s) Oral daily  docusate sodium 100 milliGRAM(s) Oral daily  senna 2 Tablet(s) Oral at bedtime  mirtazapine 7.5 milliGRAM(s) Oral at bedtime  heparin  Injectable 5000 Unit(s) SubCutaneous every 12 hours  furosemide    Tablet 40 milliGRAM(s) Oral daily  colchicine 0.6 milliGRAM(s) Oral daily    MEDICATIONS  (PRN):  acetaminophen   Tablet. 650 milliGRAM(s) Oral every 6 hours PRN Mild Pain (1 - 3)      Allergies    No Known Allergies    Intolerances        LABS:                        13.6   13.2  )-----------( 325      ( 01 Aug 2017 07:45 )             40.6     08-02    130<L>  |  88<L>  |  99<H>  ----------------------------<  207<H>  5.0   |  31  |  1.66<H>    Ca    9.0      02 Aug 2017 07:29                CAPILLARY BLOOD GLUCOSE            RADIOLOGY & ADDITIONAL TESTS:    CXR:    Ct scan chest:    ekg;    echo:

## 2017-08-02 NOTE — PROGRESS NOTE ADULT - SUBJECTIVE AND OBJECTIVE BOX
_____________________________________________________________________________  ========>>  M E D I C A L   A T T E N D I N G    F O L L O W  U P  N O T E  <<=========  ---------------------------------------------------------------------------------------------------------------------------------------    - Patient seen and examined by me approximately thirty minutes ago.  - In summary, patient is a 89y year old man who originally presented with respiratory failure, post Bipap.  - Patient today overall not doing so well today, comfortable, not eating well per family, vomited once today, poor appetite and nauseous.    ==================>> MEDICATIONS <<====================    doxazosin 4 milliGRAM(s) Oral at bedtime  PARoxetine 20 milliGRAM(s) Oral daily  aspirin enteric coated 81 milliGRAM(s) Oral daily  ALBUTerol/ipratropium for Nebulization 3 milliLiter(s) Nebulizer every 6 hours  atorvastatin 40 milliGRAM(s) Oral at bedtime  clopidogrel Tablet 75 milliGRAM(s) Oral daily  amLODIPine   Tablet 2.5 milliGRAM(s) Oral daily  hydrALAZINE 25 milliGRAM(s) Oral three times a day  docusate sodium 100 milliGRAM(s) Oral daily  senna 2 Tablet(s) Oral at bedtime  mirtazapine 7.5 milliGRAM(s) Oral at bedtime  heparin  Injectable 5000 Unit(s) SubCutaneous every 12 hours  furosemide    Tablet 40 milliGRAM(s) Oral daily  bisacodyl Suppository 10 milliGRAM(s) Rectal once  dronabinol 2.5 milliGRAM(s) Oral two times a day    MEDICATIONS  (PRN):  acetaminophen   Tablet. 650 milliGRAM(s) Oral every 6 hours PRN Mild Pain (1 - 3)  ondansetron Injectable 4 milliGRAM(s) IV Push every 4 hours PRN Nausea and/or Vomiting  traMADol 50 milliGRAM(s) Oral three times a day PRN Severe Pain (7 - 10)    ==================>> REVIEW OF SYSTEM <<=================    GEN: no fever, no chills, no pain  RESP: mild  SOB now, no cough, no sputum  CVS: no chest pain, no palpitations, no edema  GI: no abdominal pain, ++ nausea, + vomiting earlier, no constipation, no diarrhea  Neuro: no headache, no dizziness  PSYCH: no anxiety, no depression  Derm : no itching, no rash    ==================>> VITAL SIGNS <<==================    T(C): 36.4 (08-02-17 @ 14:16), Max: 36.9 (08-01-17 @ 21:36)  HR: 68 (08-02-17 @ 14:16) (68 - 77)  BP: 146/67 (08-02-17 @ 14:16) (139/54 - 157/69)  RR: 18 (08-02-17 @ 14:16) (16 - 18)  SpO2: 97% (08-02-17 @ 14:16) (95% - 97%)    ==================>> PHYSICAL EXAM <<=================    GEN: Alert and oriented, pleasant, comfortable NAD, weak appearing  HEENT: NCAT, PERRL, MMM, hearing intact, + nasal canula O2  Neck: supple , no JVD  CVS: S1S2 , regular , No M/R/G appreciated  PULM: mild bilateral ronchi  ABD.: soft. non tender, non distended,  bowel sounds present  Extrem: intact pulses , no edema   Derm: No rash , no ecchymoses  PSYCH : normal mood,  no delusion not anxious     ==================>> LAB AND IMAGING <<==================                                            13.9   13.9  )-----------( 304      ( 02 Aug 2017 07:29 )             42.7        08-02    130<L>  |  88<L>  |  99<H>  ----------------------------<  207<H>  5.0   |  31  |  1.66<H>    Ca    9.0      02 Aug 2017 07:29    < from: Transthoracic Echocardiogram (07.24.17 @ 14:24) >  CONCLUSIONS:  1. Normal mitral valve. Moderate to severe mitral  regurgitation.  2. Calcified trileaflet aortic valve with normal opening.  Mild aortic insufficiency.  3. Normal aortic root.  4. Moderate left atrial enlargement.  5. Normal left ventricular internal dimensions and wall  thicknesses.  6. Normal Left Ventricular Systolic Function,  (EF = 55%)  7. Grade II diastolic dysfunction  8. Normal right atrium.  9. Normal right ventricular size and function.  10. RV systolic pressure is severely increased (PASP 65mm  Hg).  11. There is mild tricuspid regurgitation.  12. There is mild pulmonic regurgitation.  13. Normal pericardium with no pericardial effusion.  < end of copied text >    ________________________________________________________________________  ===============>>  A S S E S S M E N T   A N D   P L A N <<===============  ------------------------------------------------------------------------------------------------------------------------------    **Acute hypoxemic respiratory failure, post bipap, multifactorial, given MR, severe PAH, COPD, and likely acute exacerbation of diastolic heart failure with pleural effusion, overall improved  ---cardio, pulm f/u appreciated  ---Home O2 set up done  ---nebulizer treatment  --- diuresis with lasix per renal and cardio  ---monitor vitals, creatinine electrolytes  --PT/ OOB as able ;  Incentive Spirameter    **nausea, vomiting, decreased PO intake  ---will give trial of marinol    **DAVID likely on CKD?, Hyponatremia  ---renal f/u  ---monitor BMP closely on diuretics  ---free fluid restriction  ---encouraged pt and family for increased PO intake as able    **Hypertension, CAD, MR, PAH  ---Continue with current medications as above  ---DASH diet   ---close monitoring of vitals    **BPH  --- Continue Current medications and monitor.     -GI/DVT Prophylaxis.  DC planing home in 24-48 hrs if stable  --------------------------------------------  Case discussed with pt, HS, family  Education given on deep breathing, plan of care  ___________________________    HNazanin Rich D.O.  Pager: [phone]

## 2017-08-02 NOTE — PROGRESS NOTE ADULT - ASSESSMENT
DAVID  cardiorenal due to CHF improved  CKD due to hypertension..    Hyponatremia due to CHF on LASIX 40 mg daily  Follow renal function and xr chest.  Strict fluid intake to 1 liter a day discussed with the family. DAVID  cardiorenal due to CHF improved  CKD due to hypertension..    Hyponatremia due to CHF on LASIX 40 mg daily.  Hyperkalemia suggest to DC Aldactone and follow.  Strict fluid intake to 1 liter a day discussed with the family.

## 2017-08-02 NOTE — PROGRESS NOTE ADULT - SUBJECTIVE AND OBJECTIVE BOX
CHIEF COMPLAINT:Patient is a 89y old  Male who presents with a chief complaint of shortness of breath .Pt appears comfortable.    	  REVIEW OF SYSTEMS:  [X ] Unable to obtain    PHYSICAL EXAM:  T(C): 36.4 (08-02-17 @ 05:08), Max: 36.9 (08-01-17 @ 14:13)  HR: 68 (08-02-17 @ 05:08) (68 - 77)  BP: 139/54 (08-02-17 @ 05:08) (133/54 - 157/69)  RR: 18 (08-02-17 @ 05:08) (16 - 18)  SpO2: 96% (08-02-17 @ 05:08) (95% - 98%)  Wt(kg): --  I&O's Summary      Appearance: Normal	  HEENT:   Normal oral mucosa, PERRL, EOMI	  Lymphatic: No lymphadenopathy  Cardiovascular: Normal S1 S2, + JVD,2/6 sm  Respiratory: Lungs clear to auscultation	  Gastrointestinal:  Soft, Non-tender, + BS	  Skin: No rashes, No ecchymoses, No cyanosis	  Extremities: Normal range of motion, No clubbing, cyanosis or edema  Vascular: Peripheral pulses palpable 2+ bilaterally    MEDICATIONS  (STANDING):  doxazosin 4 milliGRAM(s) Oral at bedtime  PARoxetine 20 milliGRAM(s) Oral daily  gabapentin 300 milliGRAM(s) Oral two times a day  aspirin enteric coated 81 milliGRAM(s) Oral daily  ALBUTerol/ipratropium for Nebulization 3 milliLiter(s) Nebulizer every 6 hours  atorvastatin 40 milliGRAM(s) Oral at bedtime  clopidogrel Tablet 75 milliGRAM(s) Oral daily  pantoprazole    Tablet 40 milliGRAM(s) Oral before breakfast  spironolactone 25 milliGRAM(s) Oral daily  amLODIPine   Tablet 2.5 milliGRAM(s) Oral daily  hydrALAZINE 25 milliGRAM(s) Oral three times a day  polyethylene glycol 3350 17 Gram(s) Oral daily  docusate sodium 100 milliGRAM(s) Oral daily  senna 2 Tablet(s) Oral at bedtime  mirtazapine 7.5 milliGRAM(s) Oral at bedtime  heparin  Injectable 5000 Unit(s) SubCutaneous every 12 hours  furosemide    Tablet 40 milliGRAM(s) Oral daily  colchicine 0.6 milliGRAM(s) Oral daily      	  LABS:	 	                          13.9   13.9  )-----------( 304      ( 02 Aug 2017 07:29 )             42.7     08-02    130<L>  |  88<L>  |  99<H>  ----------------------------<  207<H>  5.0   |  31  |  1.66<H>    Ca    9.0      02 Aug 2017 07:29      proBNP: Serum Pro-Brain Natriuretic Peptide: 54031 pg/mL (07-24 @ 10:41)    Lipid Profile: Cholesterol 150    HDL 31  TG 83    HgA1c: Hemoglobin A1C, Whole Blood: 5.7 % (07-25 @ 09:08)    TSH: Thyroid Stimulating Hormone, Serum: 0.32 uU/mL (07-25 @ 04:26)

## 2017-08-02 NOTE — PROGRESS NOTE ADULT - ASSESSMENT
89 yr old  Nepalese-speaking male from home, former smoker, lives with wife, PMH laryngeal cancer (diagnosed 2015, s/p XRT, no evidence disease), COPD, CAD s/p stents x 3, s/p left carotid stent, HTN, asthma, depression, anxiety p/w acute shortness of breath due to diastolic HF and mod-severe MR.  1.Continue po lasix.  2.Severe pulm HTN-Norvasc 2.5mg qd.  3.CAD-asa,statin, labetalol 100mg bid..  4.COPD-nebs.  5.HTN-Cont BP medication.  6.GI and DVT prophylaxis.  7.Continue aldactone 25mg qd.  8.Continue  hydralazine 25mg q8 and isordil 10mg tid for afterload reduction.  9.continue remeron 7.5mg qhs.

## 2017-08-02 NOTE — PROGRESS NOTE ADULT - SUBJECTIVE AND OBJECTIVE BOX
Problem List:  Problem List:  DAVID cardiorenal syndrome  Severe MR and diastolic failure, pulmonary hypertension.  Hyponatremia.  CKD STAGE in the past unknown        PAST MEDICAL & SURGICAL HISTORY:  Coronary artery disease involving native coronary artery of native heart without angina pectoris  Laryngeal cancer  Essential hypertension  Asthma  Arthritis  Degenerative joint disease  No significant past surgical history      No Known Allergies      MEDICATIONS  (STANDING):  doxazosin 4 milliGRAM(s) Oral at bedtime  PARoxetine 20 milliGRAM(s) Oral daily  gabapentin 300 milliGRAM(s) Oral two times a day  aspirin enteric coated 81 milliGRAM(s) Oral daily  ALBUTerol/ipratropium for Nebulization 3 milliLiter(s) Nebulizer every 6 hours  atorvastatin 40 milliGRAM(s) Oral at bedtime  clopidogrel Tablet 75 milliGRAM(s) Oral daily  pantoprazole    Tablet 40 milliGRAM(s) Oral before breakfast  spironolactone 25 milliGRAM(s) Oral daily  amLODIPine   Tablet 2.5 milliGRAM(s) Oral daily  hydrALAZINE 25 milliGRAM(s) Oral three times a day  polyethylene glycol 3350 17 Gram(s) Oral daily  docusate sodium 100 milliGRAM(s) Oral daily  senna 2 Tablet(s) Oral at bedtime  mirtazapine 7.5 milliGRAM(s) Oral at bedtime  heparin  Injectable 5000 Unit(s) SubCutaneous every 12 hours  furosemide    Tablet 40 milliGRAM(s) Oral daily  colchicine 0.6 milliGRAM(s) Oral daily    MEDICATIONS  (PRN):  acetaminophen   Tablet. 650 milliGRAM(s) Oral every 6 hours PRN Mild Pain (1 - 3)                            13.9   13.9  )-----------( 304      ( 02 Aug 2017 07:29 )             42.7     08-02    130<L>  |  88<L>  |  99<H>  ----------------------------<  207<H>  5.0   |  31  |  1.66<H>    Ca    9.0      02 Aug 2017 07:29          Potassium, Random Urine: 26 mmol/L (07-29 @ 10:35)  Osmolality, Random Urine: 330 mos/kg (07-29 @ 10:35)  Sodium, Random Urine: 44 mmol/L (07-29 @ 10:35)      REVIEW OF SYSTEMS:  General: no fever no chills, no weight loss.  EYES/ENT: No visual changes;  No vertigo, no headache.  NECK: No pain or stiffness  RESPIRATORY: No cough, wheezing, hemoptysis; No shortness of breath  CARDIOVASCULAR: No chest pain or palpitations. No Edema  GASTROINTESTINAL: No abdominal or epigastric pain. No nausea, vomiting. No diarrhea or constipation. No melena.  GENITOURINARY: No dysuria, frequency, foamy urine, urinary urgency, incontinence or hematuria  NEUROLOGICAL: No numbness or weakness, no tremor , no dizziness.   Muscle skeletal : no joint pain and no swelling of joints and limbs.  Endocrine: no sweating no cold sensation , no hair loss.  SKIN: No itching, burning, rashes, or lesions         VITALS:  T(F): 97.5 (08-02-17 @ 05:08), Max: 98.5 (08-01-17 @ 14:13)  HR: 68 (08-02-17 @ 05:08)  BP: 139/54 (08-02-17 @ 05:08)  RR: 18 (08-02-17 @ 05:08)  SpO2: 96% (08-02-17 @ 05:08)  Wt(kg): --      PHYSICAL EXAM:  Constitutional: well developed, no diaphoresis, no distress.  Neck: No JVD, no carotid bruit, supple, no adenopathy  Respiratory: Good air entrance B/L, no wheezes, rales or rhonchi  Cardiovascular: S1, S2, RRR, no pericardial rub, no murmur  Abdomen: BS+, soft, no tenderness, no bruit  Pelvis: bladder nondistended  Extremities: No cyanosis or clubbing. No peripheral edema.   Pulses: All present  Neurological: A/O x 3, no focal deficits  Psychiatric: Normal mood, normal affect  Skin: No rashes  Vascular Access: Problem List:  Problem List:  DAVID cardiorenal syndrome  Severe MR and diastolic failure, pulmonary hypertension.  Hyponatremia.  CKD STAGE in the past unknown        PAST MEDICAL & SURGICAL HISTORY:  Coronary artery disease involving native coronary artery of native heart without angina pectoris  Laryngeal cancer  Essential hypertension  Asthma  Arthritis  Degenerative joint disease  No significant past surgical history      No Known Allergies      MEDICATIONS  (STANDING):  doxazosin 4 milliGRAM(s) Oral at bedtime  PARoxetine 20 milliGRAM(s) Oral daily  gabapentin 300 milliGRAM(s) Oral two times a day  aspirin enteric coated 81 milliGRAM(s) Oral daily  ALBUTerol/ipratropium for Nebulization 3 milliLiter(s) Nebulizer every 6 hours  atorvastatin 40 milliGRAM(s) Oral at bedtime  clopidogrel Tablet 75 milliGRAM(s) Oral daily  pantoprazole    Tablet 40 milliGRAM(s) Oral before breakfast  spironolactone 25 milliGRAM(s) Oral daily  amLODIPine   Tablet 2.5 milliGRAM(s) Oral daily  hydrALAZINE 25 milliGRAM(s) Oral three times a day  polyethylene glycol 3350 17 Gram(s) Oral daily  docusate sodium 100 milliGRAM(s) Oral daily  senna 2 Tablet(s) Oral at bedtime  mirtazapine 7.5 milliGRAM(s) Oral at bedtime  heparin  Injectable 5000 Unit(s) SubCutaneous every 12 hours  furosemide    Tablet 40 milliGRAM(s) Oral daily  colchicine 0.6 milliGRAM(s) Oral daily    MEDICATIONS  (PRN):  acetaminophen   Tablet. 650 milliGRAM(s) Oral every 6 hours PRN Mild Pain (1 - 3)                            13.9   13.9  )-----------( 304      ( 02 Aug 2017 07:29 )             42.7     08-02    130<L>  |  88<L>  |  99<H>  ----------------------------<  207<H>  5.0   |  31  |  1.66<H>    Ca    9.0      02 Aug 2017 07:29          Potassium, Random Urine: 26 mmol/L (07-29 @ 10:35)  Osmolality, Random Urine: 330 mos/kg (07-29 @ 10:35)  Sodium, Random Urine: 44 mmol/L (07-29 @ 10:35)      REVIEW OF SYSTEMS:  General: no fever no chills, no weight loss.  EYES/ENT: No visual changes;  No vertigo, no headache.  NECK: No pain or stiffness  RESPIRATORY: No cough, wheezing, hemoptysis; No shortness of breath  CARDIOVASCULAR: No chest pain or palpitations. No Edema  GASTROINTESTINAL: No abdominal or epigastric pain. No nausea, vomiting. No diarrhea or constipation. No melena.  GENITOURINARY: No dysuria, frequency.  NEUROLOGICAL: weakness fatigue and malaise        VITALS:  T(F): 97.5 (08-02-17 @ 05:08), Max: 98.5 (08-01-17 @ 14:13)  HR: 68 (08-02-17 @ 05:08)  BP: 139/54 (08-02-17 @ 05:08)  RR: 18 (08-02-17 @ 05:08)  SpO2: 96% (08-02-17 @ 05:08)  Wt(kg): --      PHYSICAL EXAM:  Constitutional: well developed, no diaphoresis, no distress.  Neck: No JVD, no carotid bruit, supple, no adenopathy  Respiratory: Good air entrance B/L, no wheezes, rales or rhonchi  Cardiovascular: S1, S2, RRR, no pericardial rub, no murmur  Abdomen: BS+, soft, no tenderness, no bruit  Pelvis: bladder nondistended  Extremities: No cyanosis or clubbing. No peripheral edema.   Pulses: All present  Neurological: A/O x 3, no focal deficits  Psychiatric: Normal mood, normal affect  Skin: No rashes  Vascular Access:

## 2017-08-03 PROBLEM — J45.909 UNSPECIFIED ASTHMA, UNCOMPLICATED: Chronic | Status: ACTIVE | Noted: 2017-07-24

## 2017-08-03 LAB
ANION GAP SERPL CALC-SCNC: 12 MMOL/L — SIGNIFICANT CHANGE UP (ref 5–17)
BASOPHILS # BLD AUTO: 0 K/UL — SIGNIFICANT CHANGE UP (ref 0–0.2)
BASOPHILS NFR BLD AUTO: 0.2 % — SIGNIFICANT CHANGE UP (ref 0–2)
BUN SERPL-MCNC: 116 MG/DL — HIGH (ref 7–18)
CALCIUM SERPL-MCNC: 9.1 MG/DL — SIGNIFICANT CHANGE UP (ref 8.4–10.5)
CHLORIDE SERPL-SCNC: 87 MMOL/L — LOW (ref 96–108)
CO2 SERPL-SCNC: 33 MMOL/L — HIGH (ref 22–31)
CREAT SERPL-MCNC: 1.63 MG/DL — HIGH (ref 0.5–1.3)
EOSINOPHIL # BLD AUTO: 0 K/UL — SIGNIFICANT CHANGE UP (ref 0–0.5)
EOSINOPHIL NFR BLD AUTO: 0.1 % — SIGNIFICANT CHANGE UP (ref 0–6)
GLUCOSE SERPL-MCNC: 152 MG/DL — HIGH (ref 70–99)
HCT VFR BLD CALC: 40.6 % — SIGNIFICANT CHANGE UP (ref 39–50)
HGB BLD-MCNC: 13 G/DL — SIGNIFICANT CHANGE UP (ref 13–17)
LYMPHOCYTES # BLD AUTO: 0.9 K/UL — LOW (ref 1–3.3)
LYMPHOCYTES # BLD AUTO: 6.6 % — LOW (ref 13–44)
MAGNESIUM SERPL-MCNC: 3 MG/DL — HIGH (ref 1.6–2.6)
MCHC RBC-ENTMCNC: 29 PG — SIGNIFICANT CHANGE UP (ref 27–34)
MCHC RBC-ENTMCNC: 32 GM/DL — SIGNIFICANT CHANGE UP (ref 32–36)
MCV RBC AUTO: 90.6 FL — SIGNIFICANT CHANGE UP (ref 80–100)
MONOCYTES # BLD AUTO: 0.6 K/UL — SIGNIFICANT CHANGE UP (ref 0–0.9)
MONOCYTES NFR BLD AUTO: 4.3 % — SIGNIFICANT CHANGE UP (ref 2–14)
NEUTROPHILS # BLD AUTO: 11.8 K/UL — HIGH (ref 1.8–7.4)
NEUTROPHILS NFR BLD AUTO: 88.8 % — HIGH (ref 43–77)
PHOSPHATE SERPL-MCNC: 3.6 MG/DL — SIGNIFICANT CHANGE UP (ref 2.5–4.5)
PLATELET # BLD AUTO: 294 K/UL — SIGNIFICANT CHANGE UP (ref 150–400)
POTASSIUM SERPL-MCNC: 5.3 MMOL/L — SIGNIFICANT CHANGE UP (ref 3.5–5.3)
POTASSIUM SERPL-SCNC: 5.3 MMOL/L — SIGNIFICANT CHANGE UP (ref 3.5–5.3)
RBC # BLD: 4.48 M/UL — SIGNIFICANT CHANGE UP (ref 4.2–5.8)
RBC # FLD: 12.7 % — SIGNIFICANT CHANGE UP (ref 10.3–14.5)
SODIUM SERPL-SCNC: 132 MMOL/L — LOW (ref 135–145)
WBC # BLD: 13.3 K/UL — HIGH (ref 3.8–10.5)
WBC # FLD AUTO: 13.3 K/UL — HIGH (ref 3.8–10.5)

## 2017-08-03 PROCEDURE — 71010: CPT | Mod: 26

## 2017-08-03 RX ORDER — MIRTAZAPINE 45 MG/1
15 TABLET, ORALLY DISINTEGRATING ORAL AT BEDTIME
Qty: 0 | Refills: 0 | Status: DISCONTINUED | OUTPATIENT
Start: 2017-08-03 | End: 2017-08-05

## 2017-08-03 RX ORDER — SODIUM CHLORIDE 9 MG/ML
1000 INJECTION INTRAMUSCULAR; INTRAVENOUS; SUBCUTANEOUS
Qty: 0 | Refills: 0 | Status: DISCONTINUED | OUTPATIENT
Start: 2017-08-03 | End: 2017-08-04

## 2017-08-03 RX ADMIN — HEPARIN SODIUM 5000 UNIT(S): 5000 INJECTION INTRAVENOUS; SUBCUTANEOUS at 17:18

## 2017-08-03 RX ADMIN — ATORVASTATIN CALCIUM 40 MILLIGRAM(S): 80 TABLET, FILM COATED ORAL at 21:30

## 2017-08-03 RX ADMIN — Medication 20 MILLIGRAM(S): at 11:57

## 2017-08-03 RX ADMIN — Medication 81 MILLIGRAM(S): at 11:57

## 2017-08-03 RX ADMIN — HEPARIN SODIUM 5000 UNIT(S): 5000 INJECTION INTRAVENOUS; SUBCUTANEOUS at 05:44

## 2017-08-03 RX ADMIN — Medication 40 MILLIGRAM(S): at 05:44

## 2017-08-03 RX ADMIN — Medication 3 MILLILITER(S): at 08:54

## 2017-08-03 RX ADMIN — Medication 25 MILLIGRAM(S): at 05:44

## 2017-08-03 RX ADMIN — Medication 3 MILLILITER(S): at 15:54

## 2017-08-03 RX ADMIN — Medication 2.5 MILLIGRAM(S): at 18:12

## 2017-08-03 RX ADMIN — Medication 25 MILLIGRAM(S): at 13:34

## 2017-08-03 RX ADMIN — SENNA PLUS 2 TABLET(S): 8.6 TABLET ORAL at 21:30

## 2017-08-03 RX ADMIN — Medication 100 MILLIGRAM(S): at 11:57

## 2017-08-03 RX ADMIN — Medication 4 MILLIGRAM(S): at 21:30

## 2017-08-03 RX ADMIN — Medication 3 MILLILITER(S): at 20:49

## 2017-08-03 RX ADMIN — SODIUM CHLORIDE 40 MILLILITER(S): 9 INJECTION INTRAMUSCULAR; INTRAVENOUS; SUBCUTANEOUS at 11:57

## 2017-08-03 RX ADMIN — TRAMADOL HYDROCHLORIDE 50 MILLIGRAM(S): 50 TABLET ORAL at 14:27

## 2017-08-03 RX ADMIN — Medication 25 MILLIGRAM(S): at 21:30

## 2017-08-03 RX ADMIN — AMLODIPINE BESYLATE 2.5 MILLIGRAM(S): 2.5 TABLET ORAL at 05:44

## 2017-08-03 RX ADMIN — TRAMADOL HYDROCHLORIDE 50 MILLIGRAM(S): 50 TABLET ORAL at 13:43

## 2017-08-03 RX ADMIN — CLOPIDOGREL BISULFATE 75 MILLIGRAM(S): 75 TABLET, FILM COATED ORAL at 11:57

## 2017-08-03 RX ADMIN — MIRTAZAPINE 15 MILLIGRAM(S): 45 TABLET, ORALLY DISINTEGRATING ORAL at 21:38

## 2017-08-03 NOTE — PROGRESS NOTE ADULT - PROBLEM SELECTOR PLAN 3
nephrology following.  2/2 cardiorenal syndrome, chronic.    patient appears dehydrated, aldactone and lasix held, gentle hydration

## 2017-08-03 NOTE — PROGRESS NOTE ADULT - PROBLEM SELECTOR PLAN 1
wean off O2   nebulizer treatment  gentle diuresis  monitor vitals, creatinine, electrolytes Check oxygen sat on RA   nebulizer treatment  gentle diuresis  monitor vitals, creatinine, electrolytes

## 2017-08-03 NOTE — PROGRESS NOTE ADULT - SUBJECTIVE AND OBJECTIVE BOX
Patient is a 89y old  Male who presents with a chief complaint of shortness of breath (29 Jul 2017 08:37). Awake, alert, comfortable in bed in NAD. Doing well on oxygen via NC. S/p Respiratory failure. Out of ICU on medical floor.      INTERVAL HPI/OVERNIGHT EVENTS:      VITAL SIGNS:  T(F): 98 (08-03-17 @ 05:10)  HR: 62 (08-03-17 @ 05:10)  BP: 162/67 (08-03-17 @ 05:10)  RR: 16 (08-03-17 @ 05:10)  SpO2: 97% (08-03-17 @ 05:10)  Wt(kg): --  I&O's Detail    02 Aug 2017 07:01  -  03 Aug 2017 07:00  --------------------------------------------------------  IN:  Total IN: 0 mL    OUT:    Stool: 1 mL  Total OUT: 1 mL    Total NET: -1 mL              REVIEW OF SYSTEMS:    CONSTITUTIONAL:  No fevers, chills, sweats    HEENT:  Eyes:  No diplopia or blurred vision. ENT:  No earache, sore throat or runny nose.    CARDIOVASCULAR:  No pressure, squeezing, tightness, or heaviness about the chest; no palpitations.    RESPIRATORY:  Per HPI    GASTROINTESTINAL:  No abdominal pain, nausea, vomiting or diarrhea.    GENITOURINARY:  No dysuria, frequency or urgency.    NEUROLOGIC:  No paresthesias, fasciculations, seizures or weakness.    PSYCHIATRIC:  No disorder of thought or mood.      PHYSICAL EXAM:    Constitutional: Well developed and nourished  Eyes:Perrla  ENMT: normal  Neck:supple  Respiratory: good air entry  Cardiovascular: S1 S2 regular  Gastrointestinal: Soft, Non tender  Extremities: No edema  Vascular:normal  Neurological:Awake, alert,Ox3  Musculoskeletal:Normal      MEDICATIONS  (STANDING):  doxazosin 4 milliGRAM(s) Oral at bedtime  PARoxetine 20 milliGRAM(s) Oral daily  aspirin enteric coated 81 milliGRAM(s) Oral daily  ALBUTerol/ipratropium for Nebulization 3 milliLiter(s) Nebulizer every 6 hours  atorvastatin 40 milliGRAM(s) Oral at bedtime  clopidogrel Tablet 75 milliGRAM(s) Oral daily  amLODIPine   Tablet 2.5 milliGRAM(s) Oral daily  hydrALAZINE 25 milliGRAM(s) Oral three times a day  docusate sodium 100 milliGRAM(s) Oral daily  senna 2 Tablet(s) Oral at bedtime  mirtazapine 7.5 milliGRAM(s) Oral at bedtime  heparin  Injectable 5000 Unit(s) SubCutaneous every 12 hours  furosemide    Tablet 40 milliGRAM(s) Oral daily  dronabinol 2.5 milliGRAM(s) Oral two times a day    MEDICATIONS  (PRN):  acetaminophen   Tablet. 650 milliGRAM(s) Oral every 6 hours PRN Mild Pain (1 - 3)  traMADol 50 milliGRAM(s) Oral three times a day PRN Severe Pain (7 - 10)  ondansetron Injectable 4 milliGRAM(s) IV Push every 6 hours PRN Nausea and/or Vomiting      Allergies    No Known Allergies    Intolerances        LABS:                        13.0   13.3  )-----------( 294      ( 03 Aug 2017 07:08 )             40.6     08-03    132<L>  |  87<L>  |  116<H>  ----------------------------<  152<H>  5.3   |  33<H>  |  1.63<H>    Ca    9.1      03 Aug 2017 07:08  Phos  3.6     08-03  Mg     3.0     08-03                CAPILLARY BLOOD GLUCOSE            RADIOLOGY & ADDITIONAL TESTS:    CXR:    Ct scan chest:    ekg;    echo: Patient is a 89y old  Male who presents with a chief complaint of shortness of breath (29 Jul 2017 08:37). Awake, alert, comfortable in bed in NAD. Doing well on oxygen via NC. S/p Respiratory failure. Out of ICU on medical floor.      INTERVAL HPI/OVERNIGHT EVENTS:Still with left knee pain. Low oxygen saturation. May need home oxygen upon DC.  Family refusing Rehab placement.      VITAL SIGNS:  T(F): 98 (08-03-17 @ 05:10)  HR: 62 (08-03-17 @ 05:10)  BP: 162/67 (08-03-17 @ 05:10)  RR: 16 (08-03-17 @ 05:10)  SpO2: 97% (08-03-17 @ 05:10)  Wt(kg): --  I&O's Detail    02 Aug 2017 07:01  -  03 Aug 2017 07:00  --------------------------------------------------------  IN:  Total IN: 0 mL    OUT:    Stool: 1 mL  Total OUT: 1 mL    Total NET: -1 mL              REVIEW OF SYSTEMS:    CONSTITUTIONAL:  No fevers, chills, sweats    HEENT:  Eyes:  No diplopia or blurred vision. ENT:  No earache, sore throat or runny nose.    CARDIOVASCULAR:  No pressure, squeezing, tightness, or heaviness about the chest; no palpitations.    RESPIRATORY:  Per HPI    GASTROINTESTINAL:  No abdominal pain, nausea, vomiting or diarrhea.    GENITOURINARY:  No dysuria, frequency or urgency.    NEUROLOGIC:  No paresthesias, fasciculations, seizures or weakness.    PSYCHIATRIC:  No disorder of thought or mood.      PHYSICAL EXAM:    Constitutional: Well developed and nourished  Eyes:Perrla  ENMT: normal  Neck:supple  Respiratory: good air entry  Cardiovascular: S1 S2 regular  Gastrointestinal: Soft, Non tender  Extremities: right knee dressing clean  Vascular:normal  Neurological:Awake, alert,Ox3  Musculoskeletal:Normal      MEDICATIONS  (STANDING):  doxazosin 4 milliGRAM(s) Oral at bedtime  PARoxetine 20 milliGRAM(s) Oral daily  aspirin enteric coated 81 milliGRAM(s) Oral daily  ALBUTerol/ipratropium for Nebulization 3 milliLiter(s) Nebulizer every 6 hours  atorvastatin 40 milliGRAM(s) Oral at bedtime  clopidogrel Tablet 75 milliGRAM(s) Oral daily  amLODIPine   Tablet 2.5 milliGRAM(s) Oral daily  hydrALAZINE 25 milliGRAM(s) Oral three times a day  docusate sodium 100 milliGRAM(s) Oral daily  senna 2 Tablet(s) Oral at bedtime  mirtazapine 7.5 milliGRAM(s) Oral at bedtime  heparin  Injectable 5000 Unit(s) SubCutaneous every 12 hours  furosemide    Tablet 40 milliGRAM(s) Oral daily  dronabinol 2.5 milliGRAM(s) Oral two times a day    MEDICATIONS  (PRN):  acetaminophen   Tablet. 650 milliGRAM(s) Oral every 6 hours PRN Mild Pain (1 - 3)  traMADol 50 milliGRAM(s) Oral three times a day PRN Severe Pain (7 - 10)  ondansetron Injectable 4 milliGRAM(s) IV Push every 6 hours PRN Nausea and/or Vomiting      Allergies    No Known Allergies    Intolerances        LABS:                        13.0   13.3  )-----------( 294      ( 03 Aug 2017 07:08 )             40.6     08-03    132<L>  |  87<L>  |  116<H>  ----------------------------<  152<H>  5.3   |  33<H>  |  1.63<H>    Ca    9.1      03 Aug 2017 07:08  Phos  3.6     08-03  Mg     3.0     08-03                CAPILLARY BLOOD GLUCOSE            RADIOLOGY & ADDITIONAL TESTS:    CXR:    Ct scan chest:    ekg;    echo:

## 2017-08-03 NOTE — PROGRESS NOTE ADULT - SUBJECTIVE AND OBJECTIVE BOX
CHIEF COMPLAINT: Patient is a 89y old  Male who presents with a chief complaint of shortness of breath. Pt appears comfortable.    	  REVIEW OF SYSTEMS:  [ X] Unable to obtain    PHYSICAL EXAM:  T(C): 36.7 (08-03-17 @ 05:10), Max: 36.7 (08-03-17 @ 05:10)  HR: 62 (08-03-17 @ 05:10) (62 - 68)  BP: 162/67 (08-03-17 @ 05:10) (123/62 - 162/67)  RR: 16 (08-03-17 @ 05:10) (16 - 18)  SpO2: 87% (08-03-17 @ 11:00) (87% - 97%)  Wt(kg): --  I&O's Summary    02 Aug 2017 07:01  -  03 Aug 2017 07:00  --------------------------------------------------------  IN: 0 mL / OUT: 1 mL / NET: -1 mL        Appearance: Normal	  HEENT:   Normal oral mucosa, PERRL, EOMI	  Lymphatic: No lymphadenopathy  Cardiovascular: Normal S1 S2, + JVD, 2/6sm  Respiratory: Lungs clear to auscultation	  Gastrointestinal:  Soft, Non-tender, + BS	  Skin: No rashes, No ecchymoses, No cyanosis	  Extremities: Normal range of motion, No clubbing, cyanosis or edema  Vascular: Peripheral pulses palpable 2+ bilaterally    MEDICATIONS  (STANDING):  doxazosin 4 milliGRAM(s) Oral at bedtime  PARoxetine 20 milliGRAM(s) Oral daily  aspirin enteric coated 81 milliGRAM(s) Oral daily  ALBUTerol/ipratropium for Nebulization 3 milliLiter(s) Nebulizer every 6 hours  atorvastatin 40 milliGRAM(s) Oral at bedtime  clopidogrel Tablet 75 milliGRAM(s) Oral daily  amLODIPine   Tablet 2.5 milliGRAM(s) Oral daily  hydrALAZINE 25 milliGRAM(s) Oral three times a day  docusate sodium 100 milliGRAM(s) Oral daily  senna 2 Tablet(s) Oral at bedtime  mirtazapine 7.5 milliGRAM(s) Oral at bedtime  heparin  Injectable 5000 Unit(s) SubCutaneous every 12 hours  dronabinol 2.5 milliGRAM(s) Oral two times a day  sodium chloride 0.9%. 1000 milliLiter(s) (40 mL/Hr) IV Continuous <Continuous>        	  LABS:	 	                        13.0   13.3  )-----------( 294      ( 03 Aug 2017 07:08 )             40.6     08-03    132<L>  |  87<L>  |  116<H>  ----------------------------<  152<H>  5.3   |  33<H>  |  1.63<H>    Ca    9.1      03 Aug 2017 07:08  Phos  3.6     08-03  Mg     3.0     08-03      proBNP: Serum Pro-Brain Natriuretic Peptide: 56983 pg/mL (07-24 @ 10:41)    Lipid Profile: Cholesterol 150    HDL 31  TG 83    HgA1c: Hemoglobin A1C, Whole Blood: 5.7 % (07-25 @ 09:08)    TSH: Thyroid Stimulating Hormone, Serum: 0.32 uU/mL (07-25 @ 04:26)

## 2017-08-03 NOTE — PROGRESS NOTE ADULT - ASSESSMENT
DAVID  cardiorenal due to CHF improved  CKD due to hypertension..    Hyponatremia due to CHF on LASIX 40 mg daily.  Hyperkalemia suggest to DC Aldactone and follow.  Strict fluid intake to 1 liter a day discussed with the family. DAVID  cardiorenal due to CHF improved  CKD due to hypertension..\    Hyponatremia due to CHF on LASIX 40 [id number] to change to 20 mg daily due to poor intake and increased in serum bicarbonate.  Hyperkalemia suggest to DC Aldactone and follow, nutrition follow up to discuss with the family 2 gm K diet before discharge   Strict fluid intake to 1 liter a day discussed with the family. DAVID  cardiorenal due to CHF improved  CKD due to hypertension.    Hyponatremia due to CHF on LASIX 40 [id number] to change to 20 mg daily due to poor intake and increased in serum bicarbonate.  Hyperkalemia suggest to DC Aldactone and follow, nutrition follow up to discuss with the family 2 gm K diet before discharge   Strict fluid intake to 1 liter a day discussed with the family. DAVID  cardiorenal due to CHF improved .  Patient's appetite is poor and appears to be hypovolemic with increased BUN and sodium bicarbonate due to dehydration and diuresis.  Suggest to DC diuretics.  Suggest start slow IV fluids.  Follow xr chest stat

## 2017-08-03 NOTE — PROGRESS NOTE ADULT - SUBJECTIVE AND OBJECTIVE BOX
Patient is a 89y old  Male who presents with a chief complaint of shortness of breath (29 Jul 2017 08:37)      INTERVAL HPI/OVERNIGHT EVENTS:  T(C): 36.7 (08-03-17 @ 05:10), Max: 36.7 (08-03-17 @ 05:10)  HR: 62 (08-03-17 @ 05:10) (62 - 68)  BP: 162/67 (08-03-17 @ 05:10) (123/62 - 162/67)  RR: 16 (08-03-17 @ 05:10) (16 - 18)  SpO2: 87% (08-03-17 @ 11:00) (87% - 97%)  Wt(kg): --  I&O's Summary    02 Aug 2017 07:01  -  03 Aug 2017 07:00  --------------------------------------------------------  IN: 0 mL / OUT: 1 mL / NET: -1 mL        LABS:                        13.0   13.3  )-----------( 294      ( 03 Aug 2017 07:08 )             40.6     08-03    132<L>  |  87<L>  |  116<H>  ----------------------------<  152<H>  5.3   |  33<H>  |  1.63<H>    Ca    9.1      03 Aug 2017 07:08  Phos  3.6     08-03  Mg     3.0     08-03          CAPILLARY BLOOD GLUCOSE              REVIEW OF SYSTEMS:  CONSTITUTIONAL: No fever, weight loss, or fatigue  EYES: No eye pain, visual disturbances, or discharge  ENMT:  No difficulty hearing, tinnitus, vertigo; No sinus or throat pain  NECK: No pain or stiffness  BREASTS: No pain, masses, or nipple discharge  RESPIRATORY: No cough, wheezing, chills or hemoptysis; No shortness of breath  CARDIOVASCULAR: No chest pain, palpitations, dizziness, or leg swelling  GASTROINTESTINAL: No abdominal or epigastric pain. No nausea, vomiting, or hematemesis; No diarrhea or constipation. No melena or hematochezia.  GENITOURINARY: No dysuria, frequency, hematuria, or incontinence  NEUROLOGICAL: No headaches, memory loss, loss of strength, numbness, or tremors  SKIN: No itching, burning, rashes, or lesions   LYMPH NODES: No enlarged glands  ENDOCRINE: No heat or cold intolerance; No hair loss  MUSCULOSKELETAL: No joint pain or swelling; No muscle, back, or extremity pain  PSYCHIATRIC: No depression, anxiety, mood swings, or difficulty sleeping  HEME/LYMPH: No easy bruising, or bleeding gums  ALLERY AND IMMUNOLOGIC: No hives or eczema    RADIOLOGY & ADDITIONAL TESTS:    Imaging Personally Reviewed:  [ ] YES  [ ] NO    Consultant(s) Notes Reviewed:  [ ] YES  [ ] NO    PHYSICAL EXAM:  GENERAL: NAD, well-groomed, well-developed  HEAD:  Atraumatic, Normocephalic  EYES: EOMI, PERRLA, conjunctiva and sclera clear  ENMT: No tonsillar erythema, exudates, or enlargement; Moist mucous membranes, Good dentition, No lesions  NECK: Supple, No JVD, Normal thyroid  NERVOUS SYSTEM:  Alert & Oriented X3, Good concentration; Motor Strength 5/5 B/L upper and lower extremities; DTRs 2+ intact and symmetric  CHEST/LUNG: Clear to percussion bilaterally; No rales, rhonchi, wheezing, or rubs  HEART: Regular rate and rhythm; No murmurs, rubs, or gallops  ABDOMEN: Soft, Nontender, Nondistended; Bowel sounds present  EXTREMITIES:  2+ Peripheral Pulses, No clubbing, cyanosis, or edema  LYMPH: No lymphadenopathy noted  SKIN: No rashes or lesions    Care Discussed with Consultants/Other Providers [ ] YES  [ ] NO

## 2017-08-03 NOTE — PROGRESS NOTE ADULT - SUBJECTIVE AND OBJECTIVE BOX
_____________________________________________________________________________  ========>>  M E D I C A L   A T T E N D I N G    F O L L O W  U P  N O T E  <<=========  ---------------------------------------------------------------------------------------------------------------------------------------    - Patient seen and examined by me approximately thirty minutes ago.  - In summary, patient is a 89y year old man who originally presented with respiratory failure, post Bipap.  - Patient today overall doing ok, not eating well, no more vomiting or diarrhea.    ==================>> MEDICATIONS <<====================  doxazosin 4 milliGRAM(s) Oral at bedtime  PARoxetine 20 milliGRAM(s) Oral daily  aspirin enteric coated 81 milliGRAM(s) Oral daily  ALBUTerol/ipratropium for Nebulization 3 milliLiter(s) Nebulizer every 6 hours  atorvastatin 40 milliGRAM(s) Oral at bedtime  clopidogrel Tablet 75 milliGRAM(s) Oral daily  amLODIPine   Tablet 2.5 milliGRAM(s) Oral daily  hydrALAZINE 25 milliGRAM(s) Oral three times a day  docusate sodium 100 milliGRAM(s) Oral daily  senna 2 Tablet(s) Oral at bedtime  mirtazapine 7.5 milliGRAM(s) Oral at bedtime  heparin  Injectable 5000 Unit(s) SubCutaneous every 12 hours  dronabinol 2.5 milliGRAM(s) Oral two times a day  sodium chloride 0.9%. 1000 milliLiter(s) IV Continuous <Continuous>    MEDICATIONS  (PRN):  acetaminophen   Tablet. 650 milliGRAM(s) Oral every 6 hours PRN Mild Pain (1 - 3)  traMADol 50 milliGRAM(s) Oral three times a day PRN Severe Pain (7 - 10)  ondansetron Injectable 4 milliGRAM(s) IV Push every 6 hours PRN Nausea and/or Vomiting    ==================>> REVIEW OF SYSTEM <<=================    GEN: no fever, no chills, no pain  RESP: mild  SOB now, no cough, no sputum  CVS: no chest pain, no palpitations, no edema  GI: no abdominal pain, no nausea, no vomiting, no constipation, no diarrhea  Neuro: no headache, no dizziness  Derm : no itching, no rash    ==================>> VITAL SIGNS <<==================    Vital Signs Last 24 Hrs  T(C): 36.8 (08-03-17 @ 14:18)  T(F): 98.2 (08-03-17 @ 14:18), Max: 98.2 (08-03-17 @ 14:18)  HR: 68 (08-03-17 @ 14:18) (62 - 87)  BP: 147/44 (08-03-17 @ 14:18)  BP(mean): --  RR: 18 (08-03-17 @ 14:18) (16 - 18)  SpO2: 96% (08-03-17 @ 14:18) (87% - 97%)      ==================>> PHYSICAL EXAM <<=================    GEN: Alert and oriented, pleasant, comfortable NAD, weak appearing  HEENT: NCAT, PERRL, MMM, hearing intact, + nasal canula O2  Neck: supple , no JVD  CVS: S1S2 , regular , No M/R/G appreciated  PULM: mild bilateral ronchi  ABD.: soft. non tender, non distended,  bowel sounds present  Extrem: intact pulses , no edema   Derm: No rash , no ecchymoses  PSYCH : normal mood,  no delusion not anxious     ==================>> LAB AND IMAGING <<==================                                       13.0   13.3  )-----------( 294      ( 03 Aug 2017 07:08 )             40.6        08-03    132<L>  |  87<L>  |  116<H>  ----------------------------<  152<H>  5.3   |  33<H>  |  1.63<H>    Ca    9.1      03 Aug 2017 07:08  Phos  3.6     08-03  Mg     3.0     08-03      Creatinine:  1.63   (08-03 @ 07:08)  Creatinine:  1.66   (08-02 @ 07:29)  Creatinine:  1.61   (08-01 @ 07:45)                   TSH:      0.32   (07-25-17)           Lipid profile:  (07-25-17)     Total: 150     LDL  : 102     HDL  :31     TG   :83     HgA1C: 5.7  (07-25-17)            < from: Transthoracic Echocardiogram (07.24.17 @ 14:24) >  CONCLUSIONS:  1. Normal mitral valve. Moderate to severe mitral  regurgitation.  2. Calcified trileaflet aortic valve with normal opening.  Mild aortic insufficiency.  3. Normal aortic root.  4. Moderate left atrial enlargement.  5. Normal left ventricular internal dimensions and wall  thicknesses.  6. Normal Left Ventricular Systolic Function,  (EF = 55%)  7. Grade II diastolic dysfunction  8. Normal right atrium.  9. Normal right ventricular size and function.  10. RV systolic pressure is severely increased (PASP 65mm  Hg).  11. There is mild tricuspid regurgitation.  12. There is mild pulmonic regurgitation.  13. Normal pericardium with no pericardial effusion.  < end of copied text >    ________________________________________________________________________  ===============>>  A S S E S S M E N T   A N D   P L A N <<===============  ------------------------------------------------------------------------------------------------------------------------------    **Acute hypoxemic respiratory failure, post bipap, multifactorial, given MR, severe PAH, COPD, and likely acute exacerbation of diastolic heart failure with pleural effusion, overall improved  ---cardio, pulm f/u appreciated  ---Home O2 set up done >>pt family planing on rehab instead  ---nebulizer treatment as needed  --- diuresis as needed per renal and cardio  ---monitor vitals, creatinine electrolytes  --PT/ OOB as able ;  Incentive Spirometer    **nausea, vomiting, decreased PO intake  ---on marinol 2.5 and remeron  ---will try increase remeron to 15 mg    **DAVID likely on CKD?, Hyponatremia  ---renal f/u  ---monitor BMP closely   ---free fluid restriction  ---encouraged pt and family for increased PO intake as able (d/w son)    **Hypertension, CAD, MR, PAH  ---Continue with current medications as above  ---DASH diet   ---close monitoring of vitals    **BPH  --- Continue Current medications and monitor.     -GI/DVT Prophylaxis.  DC planing when more stable  --------------------------------------------  Case discussed with pt, HS, family  Education given on deep breathing, plan of care  ___________________________    H. KARLA Rich.  Pager: [phone]

## 2017-08-03 NOTE — PROGRESS NOTE ADULT - ASSESSMENT
89 yr old  [place]ian-speaking male from home, former smoker, lives with wife, PMH laryngeal cancer (diagnosed 2015, s/p XRT, no evidence disease), COPD, CAD s/p stents x 3, s/p left carotid stent, HTN, asthma, depression, anxiety p/w acute shortness of breath due to diastolic HF and mod-severe MR.  1.Continue po lasix.  2.Severe pulm HTN-Norvasc 2.5mg qd.  3.Continue cardiac medication.  4.COPD-nebs.  5.HTN-Cont BP medication.  6.GI and DVT prophylaxis.  7.Continue remeron 7.5mg qhs.

## 2017-08-03 NOTE — PROGRESS NOTE ADULT - PROBLEM SELECTOR PLAN 1
patient with severe MR. figueroa, 02 sat adequate on nasal canula.  Patient qualifies for home 02 as 02 sat 80% on room air at rest. Patient is in chronic and stable condition.  Patient appears dehydrated with rising bun, hyponatremia  - d/w Dr Andrews - cortez and lasix dc'ed, gentle hydration

## 2017-08-03 NOTE — PROGRESS NOTE ADULT - SUBJECTIVE AND OBJECTIVE BOX
Problem List:  DAVID cardiorenal syndrome.  Hyperkalemia Al  Severe MR and diastolic failure, pulmonary hypertension.  CHF resolved  Hyponatremia.  CKD STAGE in the past unknown        PAST MEDICAL & SURGICAL HISTORY:  Coronary artery disease involving native coronary artery of native heart without angina pectoris  Laryngeal cancer  Essential hypertension  Asthma  Arthritis  Degenerative joint disease  No significant past surgical history      No Known Allergies      MEDICATIONS  (STANDING):  doxazosin 4 milliGRAM(s) Oral at bedtime  PARoxetine 20 milliGRAM(s) Oral daily  aspirin enteric coated 81 milliGRAM(s) Oral daily  ALBUTerol/ipratropium for Nebulization 3 milliLiter(s) Nebulizer every 6 hours  atorvastatin 40 milliGRAM(s) Oral at bedtime  clopidogrel Tablet 75 milliGRAM(s) Oral daily  amLODIPine   Tablet 2.5 milliGRAM(s) Oral daily  hydrALAZINE 25 milliGRAM(s) Oral three times a day  docusate sodium 100 milliGRAM(s) Oral daily  senna 2 Tablet(s) Oral at bedtime  mirtazapine 7.5 milliGRAM(s) Oral at bedtime  heparin  Injectable 5000 Unit(s) SubCutaneous every 12 hours  furosemide    Tablet 40 milliGRAM(s) Oral daily  dronabinol 2.5 milliGRAM(s) Oral two times a day    MEDICATIONS  (PRN):  acetaminophen   Tablet. 650 milliGRAM(s) Oral every 6 hours PRN Mild Pain (1 - 3)  traMADol 50 milliGRAM(s) Oral three times a day PRN Severe Pain (7 - 10)  ondansetron Injectable 4 milliGRAM(s) IV Push every 6 hours PRN Nausea and/or Vomiting                            13.9   13.9  )-----------( 304      ( 02 Aug 2017 07:29 )             42.7     08-03    132<L>  |  87<L>  |  116<H>  ----------------------------<  152<H>  5.3   |  33<H>  |  1.63<H>    Ca    9.1      03 Aug 2017 07:08  Phos  3.6     08-03  Mg     3.0     08-03          Potassium, Random Urine: 26 mmol/L (07-29 @ 10:35)  Osmolality, Random Urine: 330 mos/kg (07-29 @ 10:35)  Sodium, Random Urine: 44 mmol/L (07-29 @ 10:35)      REVIEW OF SYSTEMS:  General: no fever no chills, no weight loss.  EYES/ENT: No visual changes;  No vertigo, no headache.  NECK: No pain or stiffness  RESPIRATORY: No cough, wheezing, hemoptysis; No shortness of breath  CARDIOVASCULAR: No chest pain or palpitations. No Edema  GASTROINTESTINAL: No abdominal or epigastric pain. No nausea, vomiting. No diarrhea or constipation. No melena.  GENITOURINARY: No dysuria, frequency, foamy urine, urinary urgency, incontinence or hematuria  NEUROLOGICAL: No numbness or weakness, no tremor , no dizziness.   Muscle skeletal : no joint pain and no swelling of joints and limbs.  Endocrine: no sweating no cold sensation , no hair loss.  SKIN: No itching, burning, rashes, or lesions         VITALS:  T(F): 98 (08-03-17 @ 05:10), Max: 98 (08-03-17 @ 05:10)  HR: 62 (08-03-17 @ 05:10)  BP: 162/67 (08-03-17 @ 05:10)  RR: 16 (08-03-17 @ 05:10)  SpO2: 97% (08-03-17 @ 05:10)  Wt(kg): --    08-02 @ 07:01  -  08-03 @ 07:00  --------------------------------------------------------  IN: 0 mL / OUT: 1 mL / NET: -1 mL        PHYSICAL EXAM:  Constitutional: well developed, no diaphoresis, no distress.  Neck: No JVD, no carotid bruit, supple, no adenopathy  Respiratory: Good air entrance B/L, no wheezes, rales or rhonchi  Cardiovascular: S1, S2, RRR, no pericardial rub, no murmur  Abdomen: BS+, soft, no tenderness, no bruit  Pelvis: bladder nondistended  Extremities: No cyanosis or clubbing. No peripheral edema.   Pulses: All present  Neurological: A/O x 3, no focal deficits  Psychiatric: Normal mood, normal affect  Skin: No rashes  Vascular Access: Problem List:  DAVID cardiorenal syndrome.  Hyperkalemia Al  Severe MR and diastolic failure, pulmonary hypertension.  CHF resolved  Hyponatremia.  CKD STAGE in the past unknown        PAST MEDICAL & SURGICAL HISTORY:  Coronary artery disease involving native coronary artery of native heart without angina pectoris  Laryngeal cancer  Essential hypertension  Asthma  Arthritis  Degenerative joint disease  No significant past surgical history      No Known Allergies      MEDICATIONS  (STANDING):  doxazosin 4 milliGRAM(s) Oral at bedtime  PARoxetine 20 milliGRAM(s) Oral daily  aspirin enteric coated 81 milliGRAM(s) Oral daily  ALBUTerol/ipratropium for Nebulization 3 milliLiter(s) Nebulizer every 6 hours  atorvastatin 40 milliGRAM(s) Oral at bedtime  clopidogrel Tablet 75 milliGRAM(s) Oral daily  amLODIPine   Tablet 2.5 milliGRAM(s) Oral daily  hydrALAZINE 25 milliGRAM(s) Oral three times a day  docusate sodium 100 milliGRAM(s) Oral daily  senna 2 Tablet(s) Oral at bedtime  mirtazapine 7.5 milliGRAM(s) Oral at bedtime  heparin  Injectable 5000 Unit(s) SubCutaneous every 12 hours  furosemide    Tablet 40 milliGRAM(s) Oral daily  dronabinol 2.5 milliGRAM(s) Oral two times a day    MEDICATIONS  (PRN):  acetaminophen   Tablet. 650 milliGRAM(s) Oral every 6 hours PRN Mild Pain (1 - 3)  traMADol 50 milliGRAM(s) Oral three times a day PRN Severe Pain (7 - 10)  ondansetron Injectable 4 milliGRAM(s) IV Push every 6 hours PRN Nausea and/or Vomiting                            13.9   13.9  )-----------( 304      ( 02 Aug 2017 07:29 )             42.7     08-03    132<L>  |  87<L>  |  116<H>  ----------------------------<  152<H>  5.3   |  33<H>  |  1.63<H>    Ca    9.1      03 Aug 2017 07:08  Phos  3.6     08-03  Mg     3.0     08-03      Basic Metabolic Panel in AM (08.03.17 @ 07:08)    Sodium, Serum: 132 mmol/L    Potassium, Serum: 5.3 mmol/L    Chloride, Serum: 87 mmol/L    Carbon Dioxide, Serum: 33 mmol/L    Anion Gap, Serum: 12 mmol/L    Blood Urea Nitrogen, Serum: 116 mg/dL    Creatinine, Serum: 1.63 mg/dL    Glucose, Serum: 152 mg/dL    Calcium, Total Serum: 9.1 mg/dL    eGFR if Non : 36: Interpretative comment  Potassium, Random Urine: 26 mmol/L (07-29 @ 10:35)  Osmolality, Random Urine: 330 mos/kg (07-29 @ 10:35)  Sodium, Random Urine: 44 mmol/L (07-29 @ 10:35)      REVIEW OF SYSTEMS:  Sleepy tired and follow commends.  Poor appetite, no vomitRE  PIRATORY: No cough, wheezing, hemoptysis; No shortness of breath  CARDIOVASCULAR: No chest pain or palpitations. No Edema    GENITOURINARY: No dysuria, frequency, foamy urine, urinary urgency, incontinence or hematuria  NEUROLOGICAL: No numbness or weakness, no tremor , no dizziness.   \      VITALS:  T(F): 98 (08-03-17 @ 05:10), Max: 98 (08-03-17 @ 05:10)  HR: 62 (08-03-17 @ 05:10)  BP: 162/67 (08-03-17 @ 05:10)  RR: 16 (08-03-17 @ 05:10)  SpO2: 97% (08-03-17 @ 05:10)  Wt(kg): --    08-02 @ 07:01  -  08-03 @ 07:00  --------------------------------------------------------  IN: 0 mL / OUT: 1 mL / NET: -1 mL        PHYSICAL EXAM:  Constitutional:  no diaphoresis, no distress.  Neck: No JVD, no carotid bruit, supple, no adenopathy  Respiratory: Good air entrance B/L, no wheezes, rales or rhonchi  Cardiovascular: S1, S2, systolic M 2/6 LSB and apex. RRR, no pericardial rub, no murmur  Abdomen: BS+, soft, no tenderness, no bruit  Pelvis: bladder nondistended  Extremities: No cyanosis or clubbing. No peripheral edema.   Pulses: All present  Neurological: A/O x 3.  Psychiatric: Normal mood, normal affe

## 2017-08-03 NOTE — PROGRESS NOTE ADULT - ASSESSMENT
89yo [place]ian-speaking male from home, former smoker, lives with wife, PMH laryngeal cancer (diagnosed 2015, s/p XRT, no evidence disease), COPD, CAD s/p stents x 3, s/p left carotid stent, HTN, asthma, depression, anxiety p/w acute shortness of breath 2/2 CHF exacerbation

## 2017-08-03 NOTE — PROGRESS NOTE ADULT - SUBJECTIVE AND OBJECTIVE BOX
Patient is a 89y old  Male who presents with a chief complaint of shortness of breath (29 Jul 2017 08:37)      INTERVAL HPI/OVERNIGHT EVENTS:  patient vomited yesterday, no vomiting overnight  right knee pain improved  no appetite    T(C): 36.7 (08-03-17 @ 05:10), Max: 36.7 (08-03-17 @ 05:10)  HR: 62 (08-03-17 @ 05:10) (62 - 68)  BP: 162/67 (08-03-17 @ 05:10) (123/62 - 162/67)  RR: 16 (08-03-17 @ 05:10) (16 - 18)  SpO2: 87% (08-03-17 @ 11:00) (87% - 97%)  Wt(kg): --  I&O's Summary    02 Aug 2017 07:01  -  03 Aug 2017 07:00  --------------------------------------------------------  IN: 0 mL / OUT: 1 mL / NET: -1 mL        LABS:                        13.0   13.3  )-----------( 294      ( 03 Aug 2017 07:08 )             40.6     08-03    132<L>  |  87<L>  |  116<H>  ----------------------------<  152<H>  5.3   |  33<H>  |  1.63<H>    Ca    9.1      03 Aug 2017 07:08  Phos  3.6     08-03  Mg     3.0     08-03          CAPILLARY BLOOD GLUCOSE        RADIOLOGY & ADDITIONAL TESTS:    Imaging Personally Reviewed:  [ ] YES  [ ] NO    Consultant(s) Notes Reviewed:  [ ] YES  [ ] NO    PHYSICAL EXAM:  GENERAL: NAD, thin, no distress  HNECK: Supple, No JVD,  NERVOUS SYSTEM:  Alert & Oriented X3, Good concentration; Motor Strength 5/5 B/L upper and lower extremities  CHEST/LUNG: Clear, no wheezing  HEART: Regular rate and rhythm  ABDOMEN: Soft, Nontender, Nondistended; Bowel sounds present  EXTREMITIES:  2+ Peripheral Pulses, No clubbing, cyanosis, or edema  LYMPH: No lymphadenopathy noted  SKIN: No rashes or lesions    Care Discussed with Consultants/Other Providers [ ] YES  [ ] NO

## 2017-08-04 LAB
ANION GAP SERPL CALC-SCNC: 10 MMOL/L — SIGNIFICANT CHANGE UP (ref 5–17)
BASOPHILS # BLD AUTO: 0 K/UL — SIGNIFICANT CHANGE UP (ref 0–0.2)
BASOPHILS NFR BLD AUTO: 0.2 % — SIGNIFICANT CHANGE UP (ref 0–2)
BUN SERPL-MCNC: 109 MG/DL — HIGH (ref 7–18)
CALCIUM SERPL-MCNC: 8.9 MG/DL — SIGNIFICANT CHANGE UP (ref 8.4–10.5)
CHLORIDE SERPL-SCNC: 92 MMOL/L — LOW (ref 96–108)
CO2 SERPL-SCNC: 31 MMOL/L — SIGNIFICANT CHANGE UP (ref 22–31)
CREAT SERPL-MCNC: 1.44 MG/DL — HIGH (ref 0.5–1.3)
EOSINOPHIL # BLD AUTO: 0 K/UL — SIGNIFICANT CHANGE UP (ref 0–0.5)
EOSINOPHIL NFR BLD AUTO: 0 % — SIGNIFICANT CHANGE UP (ref 0–6)
GLUCOSE SERPL-MCNC: 137 MG/DL — HIGH (ref 70–99)
HCT VFR BLD CALC: 41.7 % — SIGNIFICANT CHANGE UP (ref 39–50)
HGB BLD-MCNC: 13.3 G/DL — SIGNIFICANT CHANGE UP (ref 13–17)
LYMPHOCYTES # BLD AUTO: 1 K/UL — SIGNIFICANT CHANGE UP (ref 1–3.3)
LYMPHOCYTES # BLD AUTO: 7.5 % — LOW (ref 13–44)
MAGNESIUM SERPL-MCNC: 3 MG/DL — HIGH (ref 1.6–2.6)
MCHC RBC-ENTMCNC: 28.3 PG — SIGNIFICANT CHANGE UP (ref 27–34)
MCHC RBC-ENTMCNC: 31.9 GM/DL — LOW (ref 32–36)
MCV RBC AUTO: 88.8 FL — SIGNIFICANT CHANGE UP (ref 80–100)
MONOCYTES # BLD AUTO: 0.6 K/UL — SIGNIFICANT CHANGE UP (ref 0–0.9)
MONOCYTES NFR BLD AUTO: 4.3 % — SIGNIFICANT CHANGE UP (ref 2–14)
NEUTROPHILS # BLD AUTO: 11.6 K/UL — HIGH (ref 1.8–7.4)
NEUTROPHILS NFR BLD AUTO: 88 % — HIGH (ref 43–77)
PHOSPHATE SERPL-MCNC: 3.6 MG/DL — SIGNIFICANT CHANGE UP (ref 2.5–4.5)
PLATELET # BLD AUTO: 309 K/UL — SIGNIFICANT CHANGE UP (ref 150–400)
POTASSIUM SERPL-MCNC: 5.3 MMOL/L — SIGNIFICANT CHANGE UP (ref 3.5–5.3)
POTASSIUM SERPL-SCNC: 5.3 MMOL/L — SIGNIFICANT CHANGE UP (ref 3.5–5.3)
RBC # BLD: 4.69 M/UL — SIGNIFICANT CHANGE UP (ref 4.2–5.8)
RBC # FLD: 12.7 % — SIGNIFICANT CHANGE UP (ref 10.3–14.5)
SODIUM SERPL-SCNC: 133 MMOL/L — LOW (ref 135–145)
WBC # BLD: 13.1 K/UL — HIGH (ref 3.8–10.5)
WBC # FLD AUTO: 13.1 K/UL — HIGH (ref 3.8–10.5)

## 2017-08-04 RX ORDER — SODIUM CHLORIDE 9 MG/ML
1000 INJECTION INTRAMUSCULAR; INTRAVENOUS; SUBCUTANEOUS
Qty: 0 | Refills: 0 | Status: DISCONTINUED | OUTPATIENT
Start: 2017-08-04 | End: 2017-08-05

## 2017-08-04 RX ORDER — SODIUM CHLORIDE 9 MG/ML
1000 INJECTION INTRAMUSCULAR; INTRAVENOUS; SUBCUTANEOUS
Qty: 0 | Refills: 0 | Status: DISCONTINUED | OUTPATIENT
Start: 2017-08-04 | End: 2017-08-04

## 2017-08-04 RX ADMIN — CLOPIDOGREL BISULFATE 75 MILLIGRAM(S): 75 TABLET, FILM COATED ORAL at 12:04

## 2017-08-04 RX ADMIN — Medication 100 MILLIGRAM(S): at 12:04

## 2017-08-04 RX ADMIN — Medication 25 MILLIGRAM(S): at 06:10

## 2017-08-04 RX ADMIN — Medication 25 MILLIGRAM(S): at 13:19

## 2017-08-04 RX ADMIN — Medication 20 MILLIGRAM(S): at 12:04

## 2017-08-04 RX ADMIN — Medication 2.5 MILLIGRAM(S): at 17:28

## 2017-08-04 RX ADMIN — HEPARIN SODIUM 5000 UNIT(S): 5000 INJECTION INTRAVENOUS; SUBCUTANEOUS at 17:28

## 2017-08-04 RX ADMIN — Medication 2.5 MILLIGRAM(S): at 06:09

## 2017-08-04 RX ADMIN — MIRTAZAPINE 15 MILLIGRAM(S): 45 TABLET, ORALLY DISINTEGRATING ORAL at 21:33

## 2017-08-04 RX ADMIN — Medication 3 MILLILITER(S): at 03:24

## 2017-08-04 RX ADMIN — ATORVASTATIN CALCIUM 40 MILLIGRAM(S): 80 TABLET, FILM COATED ORAL at 21:33

## 2017-08-04 RX ADMIN — TRAMADOL HYDROCHLORIDE 50 MILLIGRAM(S): 50 TABLET ORAL at 13:58

## 2017-08-04 RX ADMIN — SENNA PLUS 2 TABLET(S): 8.6 TABLET ORAL at 21:33

## 2017-08-04 RX ADMIN — Medication 4 MILLIGRAM(S): at 21:33

## 2017-08-04 RX ADMIN — AMLODIPINE BESYLATE 2.5 MILLIGRAM(S): 2.5 TABLET ORAL at 06:10

## 2017-08-04 RX ADMIN — TRAMADOL HYDROCHLORIDE 50 MILLIGRAM(S): 50 TABLET ORAL at 15:00

## 2017-08-04 RX ADMIN — Medication 25 MILLIGRAM(S): at 21:33

## 2017-08-04 RX ADMIN — HEPARIN SODIUM 5000 UNIT(S): 5000 INJECTION INTRAVENOUS; SUBCUTANEOUS at 06:19

## 2017-08-04 RX ADMIN — Medication 3 MILLILITER(S): at 15:17

## 2017-08-04 RX ADMIN — Medication 3 MILLILITER(S): at 20:27

## 2017-08-04 RX ADMIN — Medication 81 MILLIGRAM(S): at 12:04

## 2017-08-04 RX ADMIN — SODIUM CHLORIDE 40 MILLILITER(S): 9 INJECTION INTRAMUSCULAR; INTRAVENOUS; SUBCUTANEOUS at 12:04

## 2017-08-04 RX ADMIN — Medication 3 MILLILITER(S): at 09:56

## 2017-08-04 NOTE — PROGRESS NOTE ADULT - ASSESSMENT
DAVID hypovolemia of diuretics, restart IV for 10 hours 40 ml /hour NS.  Patient is not oliguric  Renal function and sodium improved.

## 2017-08-04 NOTE — PROGRESS NOTE ADULT - PROBLEM SELECTOR PLAN 1
patient with severe MR. figueroa, 02 sat adequate on nasal canula.  Patient qualifies for home 02 as 02 sat 80% on room air at rest. Patient is in chronic and stable condition.  Patient appears dehydrated.  - d/w Dr Darryl toro and lasix dc'ed, gentle hydration x another 12 hours and monitor

## 2017-08-04 NOTE — PROGRESS NOTE ADULT - SUBJECTIVE AND OBJECTIVE BOX
CHIEF COMPLAINT: Patient is a 89y old  Male who presents with a chief complaint of shortness of breath. Pt appears comfortable     	  REVIEW OF SYSTEMS:  [X ] Unable to obtain    PHYSICAL EXAM:  T(C): 36.4 (08-04-17 @ 05:05), Max: 36.8 (08-03-17 @ 14:18)  HR: 60 (08-04-17 @ 05:05) (60 - 87)  BP: 155/64 (08-04-17 @ 05:05) (124/56 - 162/63)  RR: 16 (08-04-17 @ 05:05) (16 - 18)  SpO2: 97% (08-04-17 @ 05:05) (96% - 98%)      Appearance: Normal	  HEENT:   Normal oral mucosa, PERRL, EOMI	  Lymphatic: No lymphadenopathy  Cardiovascular: Normal S1 S2, + JVD, 2/6 sm  Respiratory: Lungs clear to auscultation	  Psychiatry: A & O x 3, Mood & affect appropriate  Gastrointestinal:  Soft, Non-tender, + BS	  Skin: No rashes, No ecchymoses, No cyanosis	  Neurologic: Non-focal  Extremities: Normal range of motion, No clubbing, cyanosis or edema  Vascular: Peripheral pulses palpable 2+ bilaterally    MEDICATIONS  (STANDING):  doxazosin 4 milliGRAM(s) Oral at bedtime  PARoxetine 20 milliGRAM(s) Oral daily  aspirin enteric coated 81 milliGRAM(s) Oral daily  ALBUTerol/ipratropium for Nebulization 3 milliLiter(s) Nebulizer every 6 hours  atorvastatin 40 milliGRAM(s) Oral at bedtime  clopidogrel Tablet 75 milliGRAM(s) Oral daily  amLODIPine   Tablet 2.5 milliGRAM(s) Oral daily  hydrALAZINE 25 milliGRAM(s) Oral three times a day  docusate sodium 100 milliGRAM(s) Oral daily  senna 2 Tablet(s) Oral at bedtime  heparin  Injectable 5000 Unit(s) SubCutaneous every 12 hours  dronabinol 2.5 milliGRAM(s) Oral two times a day  mirtazapine 15 milliGRAM(s) Oral at bedtime  sodium chloride 0.9%. 1000 milliLiter(s) (40 mL/Hr) IV Continuous <Continuous>      	  LABS:	 	                          13.3   13.1  )-----------( 309      ( 04 Aug 2017 06:42 )             41.7     08-04    133<L>  |  92<L>  |  109<H>  ----------------------------<  137<H>  5.3   |  31  |  1.44<H>    Ca    8.9      04 Aug 2017 06:42  Phos  3.6     08-04  Mg     3.0     08-04      proBNP: Serum Pro-Brain Natriuretic Peptide: 62165 pg/mL (07-24 @ 10:41)    Lipid Profile: Cholesterol 150    HDL 31  TG 83    HgA1c: Hemoglobin A1C, Whole Blood: 5.7 % (07-25 @ 09:08)    TSH: Thyroid Stimulating Hormone, Serum: 0.32 uU/mL (07-25 @ 04:26)

## 2017-08-04 NOTE — PROGRESS NOTE ADULT - SUBJECTIVE AND OBJECTIVE BOX
Patient is a 89y old  Male who presents with a chief complaint of shortness of breath (29 Jul 2017 08:37). Awake, alert, comfortable in bed in NAD. Doing well on oxygen via NC. S/p Respiratory failure. Out of ICU on medical floor.      INTERVAL HPI/OVERNIGHT EVENTS:      VITAL SIGNS:  T(F): 97.6 (08-04-17 @ 05:05)  HR: 60 (08-04-17 @ 05:05)  BP: 155/64 (08-04-17 @ 05:05)  RR: 16 (08-04-17 @ 05:05)  SpO2: 97% (08-04-17 @ 05:05)  Wt(kg): --  I&O's Detail          REVIEW OF SYSTEMS:    CONSTITUTIONAL:  No fevers, chills, sweats    HEENT:  Eyes:  No diplopia or blurred vision. ENT:  No earache, sore throat or runny nose.    CARDIOVASCULAR:  No pressure, squeezing, tightness, or heaviness about the chest; no palpitations.    RESPIRATORY:  Per HPI    GASTROINTESTINAL:  No abdominal pain, nausea, vomiting or diarrhea.    GENITOURINARY:  No dysuria, frequency or urgency.    NEUROLOGIC:  No paresthesias, fasciculations, seizures or weakness.    PSYCHIATRIC:  No disorder of thought or mood.      PHYSICAL EXAM:    Constitutional: Well developed and nourished  Eyes:Perrla  ENMT: normal  Neck:supple  Respiratory: good air entry  Cardiovascular: S1 S2 regular  Gastrointestinal: Soft, Non tender  Extremities: No edema  Vascular:normal  Neurological:Awake, alert,Ox3  Musculoskeletal:Normal      MEDICATIONS  (STANDING):  doxazosin 4 milliGRAM(s) Oral at bedtime  PARoxetine 20 milliGRAM(s) Oral daily  aspirin enteric coated 81 milliGRAM(s) Oral daily  ALBUTerol/ipratropium for Nebulization 3 milliLiter(s) Nebulizer every 6 hours  atorvastatin 40 milliGRAM(s) Oral at bedtime  clopidogrel Tablet 75 milliGRAM(s) Oral daily  amLODIPine   Tablet 2.5 milliGRAM(s) Oral daily  hydrALAZINE 25 milliGRAM(s) Oral three times a day  docusate sodium 100 milliGRAM(s) Oral daily  senna 2 Tablet(s) Oral at bedtime  heparin  Injectable 5000 Unit(s) SubCutaneous every 12 hours  dronabinol 2.5 milliGRAM(s) Oral two times a day  sodium chloride 0.9%. 1000 milliLiter(s) (40 mL/Hr) IV Continuous <Continuous>  mirtazapine 15 milliGRAM(s) Oral at bedtime    MEDICATIONS  (PRN):  acetaminophen   Tablet. 650 milliGRAM(s) Oral every 6 hours PRN Mild Pain (1 - 3)  traMADol 50 milliGRAM(s) Oral three times a day PRN Severe Pain (7 - 10)  ondansetron Injectable 4 milliGRAM(s) IV Push every 6 hours PRN Nausea and/or Vomiting      Allergies    No Known Allergies    Intolerances        LABS:                        13.3   13.1  )-----------( 309      ( 04 Aug 2017 06:42 )             41.7     08-04    133<L>  |  92<L>  |  109<H>  ----------------------------<  137<H>  5.3   |  31  |  1.44<H>    Ca    8.9      04 Aug 2017 06:42  Phos  3.6     08-04  Mg     3.0     08-04                CAPILLARY BLOOD GLUCOSE            RADIOLOGY & ADDITIONAL TESTS:    CXR:  < from: Xray Chest 1 View AP-PORTABLE IMMEDIATE (08.03.17 @ 10:54) >  Impression: Mild pulmonary vascular congestion decreased from the prior   study.    Ct scan chest:      ekg;      echo: Patient is a 89y old  Male who presents with a chief complaint of shortness of breath (29 Jul 2017 08:37). Awake, alert, comfortable in bed in NAD. Doing well on oxygen via NC. S/p Respiratory failure. Out of ICU on medical floor.      INTERVAL HPI/OVERNIGHT EVENTS:Patient is resting comfortable in bed in NAD.      VITAL SIGNS:  T(F): 97.6 (08-04-17 @ 05:05)  HR: 60 (08-04-17 @ 05:05)  BP: 155/64 (08-04-17 @ 05:05)  RR: 16 (08-04-17 @ 05:05)  SpO2: 97% (08-04-17 @ 05:05)  Wt(kg): --  I&O's Detail          REVIEW OF SYSTEMS:    CONSTITUTIONAL:  No fevers, chills, sweats    HEENT:  Eyes:  No diplopia or blurred vision. ENT:  No earache, sore throat or runny nose.    CARDIOVASCULAR:  No pressure, squeezing, tightness, or heaviness about the chest; no palpitations.    RESPIRATORY:  Per HPI    GASTROINTESTINAL:  No abdominal pain, nausea, vomiting or diarrhea.    GENITOURINARY:  No dysuria, frequency or urgency.    NEUROLOGIC:  No paresthesias, fasciculations, seizures or weakness.    PSYCHIATRIC:  No disorder of thought or mood.      PHYSICAL EXAM:    Constitutional: Well developed and nourished  Eyes:Perrla  ENMT: normal  Neck:supple  Respiratory: good air entry  Cardiovascular: S1 S2 regular  Gastrointestinal: Soft, Non tender  Extremities: No edema  Vascular:normal  Neurological:Awake, alert,Ox3  Musculoskeletal:Normal      MEDICATIONS  (STANDING):  doxazosin 4 milliGRAM(s) Oral at bedtime  PARoxetine 20 milliGRAM(s) Oral daily  aspirin enteric coated 81 milliGRAM(s) Oral daily  ALBUTerol/ipratropium for Nebulization 3 milliLiter(s) Nebulizer every 6 hours  atorvastatin 40 milliGRAM(s) Oral at bedtime  clopidogrel Tablet 75 milliGRAM(s) Oral daily  amLODIPine   Tablet 2.5 milliGRAM(s) Oral daily  hydrALAZINE 25 milliGRAM(s) Oral three times a day  docusate sodium 100 milliGRAM(s) Oral daily  senna 2 Tablet(s) Oral at bedtime  heparin  Injectable 5000 Unit(s) SubCutaneous every 12 hours  dronabinol 2.5 milliGRAM(s) Oral two times a day  sodium chloride 0.9%. 1000 milliLiter(s) (40 mL/Hr) IV Continuous <Continuous>  mirtazapine 15 milliGRAM(s) Oral at bedtime    MEDICATIONS  (PRN):  acetaminophen   Tablet. 650 milliGRAM(s) Oral every 6 hours PRN Mild Pain (1 - 3)  traMADol 50 milliGRAM(s) Oral three times a day PRN Severe Pain (7 - 10)  ondansetron Injectable 4 milliGRAM(s) IV Push every 6 hours PRN Nausea and/or Vomiting      Allergies    No Known Allergies    Intolerances        LABS:                        13.3   13.1  )-----------( 309      ( 04 Aug 2017 06:42 )             41.7     08-04    133<L>  |  92<L>  |  109<H>  ----------------------------<  137<H>  5.3   |  31  |  1.44<H>    Ca    8.9      04 Aug 2017 06:42  Phos  3.6     08-04  Mg     3.0     08-04                CAPILLARY BLOOD GLUCOSE            RADIOLOGY & ADDITIONAL TESTS:    CXR:  < from: Xray Chest 1 View AP-PORTABLE IMMEDIATE (08.03.17 @ 10:54) >  Impression: Mild pulmonary vascular congestion decreased from the prior   study.    Ct scan chest:      ekg;      echo:

## 2017-08-04 NOTE — PROGRESS NOTE ADULT - ASSESSMENT
89 yr old  Burkinan-speaking male from home, former smoker, lives with wife, PMH laryngeal cancer (diagnosed 2015, s/p XRT, no evidence disease), COPD, CAD s/p stents x 3, s/p left carotid stent, HTN, asthma, depression, anxiety p/w acute shortness of breath due to diastolic HF and mod-severe MR.  1.Continue po lasix.  2.Severe pulm HTN-Norvasc 2.5mg qd.  3.Continue cardiac medication.  4.COPD-nebs.  5.HTN-Cont BP medication.  6.GI and DVT prophylaxis.  7.Continue remeron 15mg qhs.

## 2017-08-04 NOTE — PROGRESS NOTE ADULT - SUBJECTIVE AND OBJECTIVE BOX
Patient is a 89y old  Male who presents with a chief complaint of shortness of breath (29 Jul 2017 08:37)      INTERVAL HPI/OVERNIGHT EVENTS:  no events overnight  patient feels better with mild hydration  no vomiting, appetite somewhat improved  less pain in right knee  family has agreed to rehab facility    T(C): 36.4 (08-04-17 @ 14:14), Max: 36.4 (08-04-17 @ 05:05)  HR: 72 (08-04-17 @ 14:14) (60 - 72)  BP: 142/46 (08-04-17 @ 14:14) (142/46 - 162/63)  RR: 18 (08-04-17 @ 14:14) (16 - 18)  SpO2: 98% (08-04-17 @ 14:14) (97% - 98%)  Wt(kg): --  I&O's Summary      LABS:                        13.3   13.1  )-----------( 309      ( 04 Aug 2017 06:42 )             41.7     08-04    133<L>  |  92<L>  |  109<H>  ----------------------------<  137<H>  5.3   |  31  |  1.44<H>    Ca    8.9      04 Aug 2017 06:42  Phos  3.6     08-04  Mg     3.0     08-04          CAPILLARY BLOOD GLUCOSE      RADIOLOGY & ADDITIONAL TESTS:    Imaging Personally Reviewed:  [ ] YES  [ ] NO    Consultant(s) Notes Reviewed:  [ ] YES  [ ] NO    PHYSICAL EXAM:  GENERAL: NAD, well-groomed, well-developed  NERVOUS SYSTEM:  Alert & Oriented X3, Good concentration; Motor Strength 5/5 B/L upper and lower extremities  CHEST/LUNG: Clear   HEART: Regular rate and rhythm  ABDOMEN: Soft, Nontender, Nondistended; Bowel sounds present  EXTREMITIES:  2+ Peripheral Pulses, No clubbing, cyanosis, or edema  LYMPH: No lymphadenopathy noted  SKIN: No rashes or lesions    Care Discussed with Consultants/Other Providers [ ] YES  [ ] NO

## 2017-08-04 NOTE — PROGRESS NOTE ADULT - ASSESSMENT
89yo Turkish-speaking male from home, former smoker, lives with wife, PMH laryngeal cancer (diagnosed 2015, s/p XRT, no evidence disease), COPD, CAD s/p stents x 3, s/p left carotid stent, HTN, asthma, depression, anxiety p/w acute shortness of breath 2/2 CHF exacerbation

## 2017-08-04 NOTE — PROGRESS NOTE ADULT - PROBLEM SELECTOR PLAN 3
nephrology following.  2/2 cardiorenal syndrome, chronic.    patient appears dehydrated, aldactone and lasix held, gentle hydration x another 12 hours

## 2017-08-04 NOTE — PROGRESS NOTE ADULT - SUBJECTIVE AND OBJECTIVE BOX
Problem List:  DAVID cardiorenal syndrome on admission improved  At present hypovolemia from reduced po intake and diuretics.  He was  placed on IV fluids 40 ml for 12 hours yesterday with improvement in kidney function.    Complete Blood Count + Automated Diff in AM (08.04.17 @ 06:42)    WBC Count: 13.1 K/uL    RBC Count: 4.69 M/uL    Hemoglobin: 13.3 g/dL    Hematocrit: 41.7 %    Mean Cell Volume: 88.8 fl    Mean Cell Hemoglobin: 28.3 pg    Mean Cell Hemoglobin Conc: 31.9 gm/dL    Red Cell Distrib Width: 12.7 %    Platelet Count - Automated: 309 K/uL    Basic Metabolic Panel in AM (08.04.17 @ 06:42)    Sodium, Serum: 133 mmol/L    Potassium, Serum: 5.3 mmol/L    Chloride, Serum: 92 mmol/L    Carbon Dioxide, Serum: 31 mmol/L    Anion Gap, Serum: 10 mmol/L    Blood Urea Nitrogen, Serum: 109 mg/dL    Creatinine, Serum: 1.44 mg/dL    Glucose, Serum: 137 mg/dL    Calcium, Total Serum: 8.9 mg/dL    eGFR if Non : 42: Interpretative comment    xr chest       NTERPRETATION:  History: Follow-up pulmonary edema    Portable AP radiograph of the chest is performed. Comparison is made to   July 27, 2017.    The heart is enlarged. There is pulmonary vascular congestion decreased   from the prior exam. There is no focal infiltrate or pleural effusion.   Osseous structures are intact.    Impression: Mild pulmonary vascular congestion decreased from the prior   study              PAST MEDICAL & SURGICAL HISTORY:  Coronary artery disease involving native coronary artery of native heart without angina pectoris  Laryngeal cancer  Essential hypertension  Asthma  Arthritis  Degenerative joint disease  No significant past surgical history      No Known Allergies      MEDICATIONS  (STANDING):  doxazosin 4 milliGRAM(s) Oral at bedtime  PARoxetine 20 milliGRAM(s) Oral daily  aspirin enteric coated 81 milliGRAM(s) Oral daily  ALBUTerol/ipratropium for Nebulization 3 milliLiter(s) Nebulizer every 6 hours  atorvastatin 40 milliGRAM(s) Oral at bedtime  clopidogrel Tablet 75 milliGRAM(s) Oral daily  amLODIPine   Tablet 2.5 milliGRAM(s) Oral daily  hydrALAZINE 25 milliGRAM(s) Oral three times a day  docusate sodium 100 milliGRAM(s) Oral daily  senna 2 Tablet(s) Oral at bedtime  heparin  Injectable 5000 Unit(s) SubCutaneous every 12 hours  dronabinol 2.5 milliGRAM(s) Oral two times a day  mirtazapine 15 milliGRAM(s) Oral at bedtime  sodium chloride 0.9%. 1000 milliLiter(s) (40 mL/Hr) IV Continuous <Continuous>    MEDICATIONS  (PRN):  acetaminophen   Tablet. 650 milliGRAM(s) Oral every 6 hours PRN Mild Pain (1 - 3)  traMADol 50 milliGRAM(s) Oral three times a day PRN Severe Pain (7 - 10)  ondansetron Injectable 4 milliGRAM(s) IV Push every 6 hours PRN Nausea and/or Vomiting    REVIEW OF SYSTEMS:  General: no fever no chills, no weight loss.  EYES/ENT: No visual changes;  No vertigo, no headache.  NECK: No pain or stiffness  RESPIRATORY: No cough, wheezing, hemoptysis; No shortness of breath  CARDIOVASCULAR: No chest pain or palpitations. No Edema  GASTROINTESTINAL: reduced po intake  GENITOURINARY: No dysuria.    VITALS:  T(F): 97.6 (08-04-17 @ 05:05), Max: 98.2 (08-03-17 @ 14:18)  HR: 60 (08-04-17 @ 05:05)  BP: 155/64 (08-04-17 @ 05:05)  RR: 16 (08-04-17 @ 05:05)  SpO2: 97% (08-04-17 @ 05:05)  Wt(kg): --      PHYSICAL EXAM:  Constitutional: well developed, no diaphoresis, no distress.  Neck: No JVD, no carotid bruit, supple, no adenopathy  Respiratory: Good air entrance B/L, no wheezes, rales or rhonchi  Cardiovascular: S1, S2, RRR, no pericardial rub, no murmur  Abdomen: BS+, soft, no tenderness, no bruit  Pelvis: bladder nondistended  Extremities: No cyanosis or clubbing. No peripheral edema.   Pulses: All present

## 2017-08-04 NOTE — PROGRESS NOTE ADULT - SUBJECTIVE AND OBJECTIVE BOX
_____________________________________________________________________________  ========>>  M E D I C A L   A T T E N D I N G    F O L L O W  U P  N O T E  <<=========  ---------------------------------------------------------------------------------------------------------------------------------------    - Patient seen and examined by me approximately thirty minutes ago.  - In summary, patient is a 89y year old man who originally presented with respiratory failure, post Bipap.  - Patient today overall doing ok, eating a bit better per son, no more vomiting or diarrhea.    ==================>> MEDICATIONS <<====================    doxazosin 4 milliGRAM(s) Oral at bedtime  PARoxetine 20 milliGRAM(s) Oral daily  aspirin enteric coated 81 milliGRAM(s) Oral daily  ALBUTerol/ipratropium for Nebulization 3 milliLiter(s) Nebulizer every 6 hours  atorvastatin 40 milliGRAM(s) Oral at bedtime  clopidogrel Tablet 75 milliGRAM(s) Oral daily  amLODIPine   Tablet 2.5 milliGRAM(s) Oral daily  hydrALAZINE 25 milliGRAM(s) Oral three times a day  docusate sodium 100 milliGRAM(s) Oral daily  senna 2 Tablet(s) Oral at bedtime  heparin  Injectable 5000 Unit(s) SubCutaneous every 12 hours  dronabinol 2.5 milliGRAM(s) Oral two times a day  mirtazapine 15 milliGRAM(s) Oral at bedtime  sodium chloride 0.9%. 1000 milliLiter(s) IV Continuous <Continuous>    MEDICATIONS  (PRN):  acetaminophen   Tablet. 650 milliGRAM(s) Oral every 6 hours PRN Mild Pain (1 - 3)  traMADol 50 milliGRAM(s) Oral three times a day PRN Severe Pain (7 - 10)  ondansetron Injectable 4 milliGRAM(s) IV Push every 6 hours PRN Nausea and/or Vomiting    ==================>> REVIEW OF SYSTEM <<=================    GEN: no fever, no chills,  over the past few days + pain in the left knee  RESP: mild  SOB now, no cough, no sputum  CVS: no chest pain, no palpitations, no edema  GI: no abdominal pain, no nausea, no vomiting, no constipation, no diarrhea  Neuro: no headache, no dizziness  Derm : no itching, no rash    ==================>> VITAL SIGNS <<==================    Vital Signs Last 24 Hrs  T(C): 36.4 (08-04-17 @ 05:05)  T(F): 97.6 (08-04-17 @ 05:05), Max: 98.2 (08-03-17 @ 14:18)  HR: 60 (08-04-17 @ 05:05) (60 - 87)  BP: 155/64 (08-04-17 @ 05:05)  BP(mean): --  RR: 16 (08-04-17 @ 05:05) (16 - 18)  SpO2: 97% (08-04-17 @ 05:05) (96% - 98%)      ==================>> PHYSICAL EXAM <<=================    GEN: Alert and oriented, pleasant, comfortable NAD, weak appearing  HEENT: NCAT, PERRL, MMM, hearing intact, + nasal canula O2  Neck: supple , no JVD  CVS: S1S2 , regular , No M/R/G appreciated  PULM: mild bilateral ronchi  ABD.: soft. non tender, non distended,  bowel sounds present  Extrem: intact pulses , no edema, left knee wrapped  Derm: No rash , no ecchymoses  PSYCH : normal mood,  no delusion not anxious     ==================>> LAB AND IMAGING <<==================                                   13.3   13.1  )-----------( 309      ( 04 Aug 2017 06:42 )             41.7        08-04    133<L>  |  92<L>  |  109<H>  ----------------------------<  137<H>  5.3   |  31  |  1.44<H>    Ca    8.9      04 Aug 2017 06:42  Phos  3.6     08-04  Mg     3.0     08-04      < from: Transthoracic Echocardiogram (07.24.17 @ 14:24) >  CONCLUSIONS:  1. Normal mitral valve. Moderate to severe mitral  regurgitation.  2. Calcified trileaflet aortic valve with normal opening.  Mild aortic insufficiency.  3. Normal aortic root.  4. Moderate left atrial enlargement.  5. Normal left ventricular internal dimensions and wall  thicknesses.  6. Normal Left Ventricular Systolic Function,  (EF = 55%)  7. Grade II diastolic dysfunction  8. Normal right atrium.  9. Normal right ventricular size and function.  10. RV systolic pressure is severely increased (PASP 65mm  Hg).  11. There is mild tricuspid regurgitation.  12. There is mild pulmonic regurgitation.  13. Normal pericardium with no pericardial effusion.  < end of copied text >    ________________________________________________________________________  ===============>>  A S S E S S M E N T   A N D   P L A N <<===============  ------------------------------------------------------------------------------------------------------------------------------    **Acute hypoxemic respiratory failure, post bipap, multifactorial, given MR, severe PAH, COPD, and likely acute exacerbation of diastolic heart failure with pleural effusion, overall improved  ---cardio, pulm f/u appreciated  ---Home O2 set up done >>pt family planing on rehab instead  ---nebulizer treatment as needed  --- diuresis as needed per renal and cardio  ---monitor vitals, creatinine electrolytes  --PT/ OOB as able ;  Incentive Spirometer    **nausea, decreased PO intake  ---on marinol 2.5 and remeron  ---on remeron to 15 mg    **DAVID likely on CKD?, Hyponatremia, overall improved  ---renal f/u  ---monitor BMP closely   ---free fluid restriction  ---encouraged pt and family for increased PO intake as able (d/w son)    **Hypertension, CAD, MR, PAH  ---Continue with current medications as above  ---DASH diet   ---close monitoring of vitals    **BPH  --- Continue Current medications and monitor.     **pseudogout  ---post orthopedic intervention and steropid injection  ---post colchicine  ---PT : :Rehab placement    -GI/DVT Prophylaxis.  DC planing to REHAB  --------------------------------------------  Case discussed with pt, HS, family, Rn  Education given on deep breathing, plan of care  ___________________________    H. KARLA Rich.  Pager: [phone]

## 2017-08-04 NOTE — PROGRESS NOTE ADULT - PROBLEM SELECTOR PLAN 1
Check oxygen sat on RA   nebulizer treatment  gentle diuresis  monitor vitals, creatinine, electrolytes

## 2017-08-05 DIAGNOSIS — M17.11 UNILATERAL PRIMARY OSTEOARTHRITIS, RIGHT KNEE: ICD-10-CM

## 2017-08-05 LAB
ANION GAP SERPL CALC-SCNC: 8 MMOL/L — SIGNIFICANT CHANGE UP (ref 5–17)
BASOPHILS # BLD AUTO: 0 K/UL — SIGNIFICANT CHANGE UP (ref 0–0.2)
BASOPHILS NFR BLD AUTO: 0.3 % — SIGNIFICANT CHANGE UP (ref 0–2)
BUN SERPL-MCNC: 83 MG/DL — HIGH (ref 7–18)
CALCIUM SERPL-MCNC: 8.8 MG/DL — SIGNIFICANT CHANGE UP (ref 8.4–10.5)
CHLORIDE SERPL-SCNC: 99 MMOL/L — SIGNIFICANT CHANGE UP (ref 96–108)
CO2 SERPL-SCNC: 30 MMOL/L — SIGNIFICANT CHANGE UP (ref 22–31)
CREAT SERPL-MCNC: 1.16 MG/DL — SIGNIFICANT CHANGE UP (ref 0.5–1.3)
EOSINOPHIL # BLD AUTO: 0 K/UL — SIGNIFICANT CHANGE UP (ref 0–0.5)
EOSINOPHIL NFR BLD AUTO: 0.2 % — SIGNIFICANT CHANGE UP (ref 0–6)
GLUCOSE SERPL-MCNC: 123 MG/DL — HIGH (ref 70–99)
HCT VFR BLD CALC: 40.2 % — SIGNIFICANT CHANGE UP (ref 39–50)
HGB BLD-MCNC: 12.9 G/DL — LOW (ref 13–17)
LYMPHOCYTES # BLD AUTO: 0.8 K/UL — LOW (ref 1–3.3)
LYMPHOCYTES # BLD AUTO: 7.4 % — LOW (ref 13–44)
MCHC RBC-ENTMCNC: 28.8 PG — SIGNIFICANT CHANGE UP (ref 27–34)
MCHC RBC-ENTMCNC: 32.1 GM/DL — SIGNIFICANT CHANGE UP (ref 32–36)
MCV RBC AUTO: 89.6 FL — SIGNIFICANT CHANGE UP (ref 80–100)
MONOCYTES # BLD AUTO: 0.5 K/UL — SIGNIFICANT CHANGE UP (ref 0–0.9)
MONOCYTES NFR BLD AUTO: 4.8 % — SIGNIFICANT CHANGE UP (ref 2–14)
NEUTROPHILS # BLD AUTO: 9.7 K/UL — HIGH (ref 1.8–7.4)
NEUTROPHILS NFR BLD AUTO: 87.2 % — HIGH (ref 43–77)
PLATELET # BLD AUTO: 283 K/UL — SIGNIFICANT CHANGE UP (ref 150–400)
POTASSIUM SERPL-MCNC: 5.2 MMOL/L — SIGNIFICANT CHANGE UP (ref 3.5–5.3)
POTASSIUM SERPL-SCNC: 5.2 MMOL/L — SIGNIFICANT CHANGE UP (ref 3.5–5.3)
RBC # BLD: 4.49 M/UL — SIGNIFICANT CHANGE UP (ref 4.2–5.8)
RBC # FLD: 12.9 % — SIGNIFICANT CHANGE UP (ref 10.3–14.5)
SODIUM SERPL-SCNC: 137 MMOL/L — SIGNIFICANT CHANGE UP (ref 135–145)
WBC # BLD: 11.1 K/UL — HIGH (ref 3.8–10.5)
WBC # FLD AUTO: 11.1 K/UL — HIGH (ref 3.8–10.5)

## 2017-08-05 RX ORDER — MIRTAZAPINE 45 MG/1
30 TABLET, ORALLY DISINTEGRATING ORAL AT BEDTIME
Qty: 0 | Refills: 0 | Status: DISCONTINUED | OUTPATIENT
Start: 2017-08-05 | End: 2017-08-09

## 2017-08-05 RX ORDER — DRONABINOL 2.5 MG
5 CAPSULE ORAL
Qty: 0 | Refills: 0 | Status: DISCONTINUED | OUTPATIENT
Start: 2017-08-05 | End: 2017-08-09

## 2017-08-05 RX ORDER — NYSTATIN 500MM UNIT
500000 POWDER (EA) MISCELLANEOUS
Qty: 0 | Refills: 0 | Status: COMPLETED | OUTPATIENT
Start: 2017-08-05 | End: 2017-08-08

## 2017-08-05 RX ADMIN — Medication 500000 UNIT(S): at 23:15

## 2017-08-05 RX ADMIN — AMLODIPINE BESYLATE 2.5 MILLIGRAM(S): 2.5 TABLET ORAL at 05:25

## 2017-08-05 RX ADMIN — Medication 3 MILLILITER(S): at 02:30

## 2017-08-05 RX ADMIN — Medication 25 MILLIGRAM(S): at 22:07

## 2017-08-05 RX ADMIN — Medication 81 MILLIGRAM(S): at 12:17

## 2017-08-05 RX ADMIN — Medication 4 MILLIGRAM(S): at 22:07

## 2017-08-05 RX ADMIN — Medication 2.5 MILLIGRAM(S): at 05:25

## 2017-08-05 RX ADMIN — HEPARIN SODIUM 5000 UNIT(S): 5000 INJECTION INTRAVENOUS; SUBCUTANEOUS at 05:25

## 2017-08-05 RX ADMIN — Medication 100 MILLIGRAM(S): at 12:17

## 2017-08-05 RX ADMIN — MIRTAZAPINE 30 MILLIGRAM(S): 45 TABLET, ORALLY DISINTEGRATING ORAL at 22:07

## 2017-08-05 RX ADMIN — Medication 25 MILLIGRAM(S): at 05:25

## 2017-08-05 RX ADMIN — HEPARIN SODIUM 5000 UNIT(S): 5000 INJECTION INTRAVENOUS; SUBCUTANEOUS at 17:25

## 2017-08-05 RX ADMIN — Medication 5 MILLIGRAM(S): at 17:26

## 2017-08-05 RX ADMIN — SENNA PLUS 2 TABLET(S): 8.6 TABLET ORAL at 22:07

## 2017-08-05 RX ADMIN — Medication 500000 UNIT(S): at 17:27

## 2017-08-05 RX ADMIN — ATORVASTATIN CALCIUM 40 MILLIGRAM(S): 80 TABLET, FILM COATED ORAL at 22:07

## 2017-08-05 RX ADMIN — CLOPIDOGREL BISULFATE 75 MILLIGRAM(S): 75 TABLET, FILM COATED ORAL at 12:17

## 2017-08-05 RX ADMIN — Medication 25 MILLIGRAM(S): at 17:26

## 2017-08-05 RX ADMIN — Medication 3 MILLILITER(S): at 09:54

## 2017-08-05 RX ADMIN — Medication 3 MILLILITER(S): at 14:57

## 2017-08-05 RX ADMIN — Medication 500000 UNIT(S): at 12:37

## 2017-08-05 RX ADMIN — Medication 3 MILLILITER(S): at 20:23

## 2017-08-05 NOTE — PROGRESS NOTE ADULT - ASSESSMENT
Cardiorenal syndrome resolved  DAVID improved Creatinine reduced, BUN still elevated.    Off IV fluids continue with fluid restriction 1000ml a day.  Follow fluid status and BUN    Hyponatremia improved post 480 ml of NS yesterday.

## 2017-08-05 NOTE — PROGRESS NOTE ADULT - SUBJECTIVE AND OBJECTIVE BOX
CHIEF COMPLAINT: Patient is a 89y old  Male who presents with a chief complaint of shortness of breath. Pt appears comfortable.    	  REVIEW OF SYSTEMS:  Unable to obtain    PHYSICAL EXAM:  T(C): 36.4 (08-05-17 @ 05:14), Max: 36.9 (08-04-17 @ 21:28)  HR: 85 (08-05-17 @ 05:14) (72 - 88)  BP: 150/54 (08-05-17 @ 05:14) (142/46 - 160/69)  RR: 16 (08-05-17 @ 05:14) (16 - 18)  SpO2: 99% (08-05-17 @ 05:14) (96% - 99%)      Appearance: Normal	  HEENT:   Normal oral mucosa, PERRL, EOMI	  Lymphatic: No lymphadenopathy  Cardiovascular: Normal S1 S2,2/6 sm  Respiratory: Lungs clear to auscultation	  Gastrointestinal:  Soft, Non-tender, + BS	  Skin: No rashes, No ecchymoses, No cyanosis	  Extremities: Normal range of motion, No clubbing, cyanosis or edema  Vascular: Peripheral pulses palpable 2+ bilaterally    MEDICATIONS  (STANDING):  doxazosin 4 milliGRAM(s) Oral at bedtime  aspirin enteric coated 81 milliGRAM(s) Oral daily  ALBUTerol/ipratropium for Nebulization 3 milliLiter(s) Nebulizer every 6 hours  atorvastatin 40 milliGRAM(s) Oral at bedtime  clopidogrel Tablet 75 milliGRAM(s) Oral daily  amLODIPine   Tablet 2.5 milliGRAM(s) Oral daily  hydrALAZINE 25 milliGRAM(s) Oral three times a day  docusate sodium 100 milliGRAM(s) Oral daily  senna 2 Tablet(s) Oral at bedtime  heparin  Injectable 5000 Unit(s) SubCutaneous every 12 hours  nystatin    Suspension 925292 Unit(s) Oral four times a day  dronabinol 5 milliGRAM(s) Oral two times a day  mirtazapine 30 milliGRAM(s) Oral at bedtime      	  LABS:	 	                        12.9   11.1  )-----------( 283      ( 05 Aug 2017 06:42 )             40.2     08-05    137  |  99  |  83<H>  ----------------------------<  123<H>  5.2   |  30  |  1.16    Ca    8.8      05 Aug 2017 06:42  Phos  3.6     08-04  Mg     3.0     08-04      proBNP: Serum Pro-Brain Natriuretic Peptide: 56555 pg/mL (07-24 @ 10:41)    Lipid Profile: Cholesterol 150    HDL 31  TG 83    HgA1c: Hemoglobin A1C, Whole Blood: 5.7 % (07-25 @ 09:08)    TSH: Thyroid Stimulating Hormone, Serum: 0.32 uU/mL (07-25 @ 04:26)

## 2017-08-05 NOTE — PROGRESS NOTE ADULT - SUBJECTIVE AND OBJECTIVE BOX
_____________________________________________________________________________  ========>>  M E D I C A L   A T T E N D I N G    F O L L O W  U P  N O T E  <<=========  ---------------------------------------------------------------------------------------------------------------------------------------    - Patient seen and examined by me approximately thirty minutes ago.  - In summary, patient is a 89y year old man who originally presented with respiratory failure, post Bipap.  - Patient today overall doing ok, not eating well today per family, no more vomiting or diarrhea.    ==================>> MEDICATIONS <<====================  doxazosin 4 milliGRAM(s) Oral at bedtime  PARoxetine 20 milliGRAM(s) Oral daily  aspirin enteric coated 81 milliGRAM(s) Oral daily  ALBUTerol/ipratropium for Nebulization 3 milliLiter(s) Nebulizer every 6 hours  atorvastatin 40 milliGRAM(s) Oral at bedtime  clopidogrel Tablet 75 milliGRAM(s) Oral daily  amLODIPine   Tablet 2.5 milliGRAM(s) Oral daily  hydrALAZINE 25 milliGRAM(s) Oral three times a day  docusate sodium 100 milliGRAM(s) Oral daily  senna 2 Tablet(s) Oral at bedtime  heparin  Injectable 5000 Unit(s) SubCutaneous every 12 hours  mirtazapine 15 milliGRAM(s) Oral at bedtime  nystatin    Suspension 060331 Unit(s) Oral four times a day  dronabinol 5 milliGRAM(s) Oral two times a day    MEDICATIONS  (PRN):  acetaminophen   Tablet. 650 milliGRAM(s) Oral every 6 hours PRN Mild Pain (1 - 3)  traMADol 50 milliGRAM(s) Oral three times a day PRN Severe Pain (7 - 10)  ondansetron Injectable 4 milliGRAM(s) IV Push every 6 hours PRN Nausea and/or Vomiting    ==================>> REVIEW OF SYSTEM <<=================    GEN: no fever, no chills,  over the past few days + pain in the left knee improved significantly  RESP: mild  SOB now, no cough, no sputum  CVS: no chest pain, no palpitations, no edema  GI: no abdominal pain, no nausea, no vomiting, no constipation, no diarrhea  Neuro: no headache, no dizziness  Derm : no itching, no rash    ==================>> VITAL SIGNS <<==================    Vital Signs Last 24 Hrs  T(C): 36.4 (08-05-17 @ 05:14)  T(F): 97.5 (08-05-17 @ 05:14), Max: 98.4 (08-04-17 @ 21:28)  HR: 85 (08-05-17 @ 05:14) (72 - 88)  BP: 150/54 (08-05-17 @ 05:14)  BP(mean): --  RR: 16 (08-05-17 @ 05:14) (16 - 18)  SpO2: 99% (08-05-17 @ 05:14) (96% - 99%)      ==================>> PHYSICAL EXAM <<=================    GEN: Alert and oriented, pleasant, comfortable NAD, weak appearing  HEENT: NCAT, PERRL, MMM, hearing intact, + nasal canula O2  Neck: supple , no JVD  CVS: S1S2 , regular , No M/R/G appreciated  PULM: mild bilateral ronchi  ABD.: soft. non tender, non distended,  bowel sounds present  Extrem: intact pulses , no edema  Derm: No rash , no ecchymoses  PSYCH : normal mood,  no delusion not anxious     ==================>> LAB AND IMAGING <<==================                                            12.9   11.1  )-----------( 283      ( 05 Aug 2017 06:42 )             40.2        08-05    137  |  99  |  83<H>  ----------------------------<  123<H>  5.2   |  30  |  1.16    Ca    8.8      05 Aug 2017 06:42  Phos  3.6     08-04  Mg     3.0     08-04      < from: Transthoracic Echocardiogram (07.24.17 @ 14:24) >  CONCLUSIONS:  1. Normal mitral valve. Moderate to severe mitral  regurgitation.  2. Calcified trileaflet aortic valve with normal opening.  Mild aortic insufficiency.  3. Normal aortic root.  4. Moderate left atrial enlargement.  5. Normal left ventricular internal dimensions and wall  thicknesses.  6. Normal Left Ventricular Systolic Function,  (EF = 55%)  7. Grade II diastolic dysfunction  8. Normal right atrium.  9. Normal right ventricular size and function.  10. RV systolic pressure is severely increased (PASP 65mm  Hg).  11. There is mild tricuspid regurgitation.  12. There is mild pulmonic regurgitation.  13. Normal pericardium with no pericardial effusion.  < end of copied text >    ________________________________________________________________________  ===============>>  A S S E S S M E N T   A N D   P L A N <<===============  ------------------------------------------------------------------------------------------------------------------------------    **respiratory failure, resolved ; MR, severe PAH, COPD, and likely acute exacerbation of diastolic heart failure with pleural eff.  ---cardio, pulm f/u appreciated  ---Home O2 set up done >>pt family planing on rehab instead  ---nebulizer treatment as needed  --- diuresis as needed   ---monitor vitals, creatinine electrolytes  --PT/ OOB as able ;  Incentive Spirometer    **nausea, decreased PO intake  ---on marinol 2.5 and remeron >>increased  ---on remeron to 15 mg  ---given depression and poor appetite, will also DC paxil and increase remeron instead to 30mg    **DAVID likely on CKD?, Hyponatremia, overall improved  ---renal f/u  ---monitor BMP closely   ---free fluid restriction  ---encouraged pt and family for increased PO intake as able (d/w family, Rn)    **Hypertension, CAD, MR, PAH  ---Continue with current medications as above  ---DASH diet   ---close monitoring of vitals    **BPH  --- Continue Current medications and monitor.     **pseudogout  ---post orthopedic intervention and steropid injection  ---post colchicine  ---PT : :Rehab placement    -GI/DVT Prophylaxis.  DC planing to REHAB  --------------------------------------------  Case discussed with pt, HS, family, Rn  Education given on deep breathing, plan of care  ___________________________    H. KARLA Rich.  Pager: [phone] _____________________________________________________________________________  ========>>  M E D I C A L   A T T E N D I N G    F O L L O W  U P  N O T E  <<=========  ---------------------------------------------------------------------------------------------------------------------------------------    - Patient seen and examined by me approximately thirty minutes ago.  - In summary, patient is a 89y year old man who originally presented with respiratory failure, post Bipap.  - Patient today overall doing ok, not eating well today per family, no more vomiting or diarrhea.    ==================>> MEDICATIONS <<====================  doxazosin 4 milliGRAM(s) Oral at bedtime  PARoxetine 20 milliGRAM(s) Oral daily  aspirin enteric coated 81 milliGRAM(s) Oral daily  ALBUTerol/ipratropium for Nebulization 3 milliLiter(s) Nebulizer every 6 hours  atorvastatin 40 milliGRAM(s) Oral at bedtime  clopidogrel Tablet 75 milliGRAM(s) Oral daily  amLODIPine   Tablet 2.5 milliGRAM(s) Oral daily  hydrALAZINE 25 milliGRAM(s) Oral three times a day  docusate sodium 100 milliGRAM(s) Oral daily  senna 2 Tablet(s) Oral at bedtime  heparin  Injectable 5000 Unit(s) SubCutaneous every 12 hours  mirtazapine 15 milliGRAM(s) Oral at bedtime  nystatin    Suspension 020777 Unit(s) Oral four times a day  dronabinol 5 milliGRAM(s) Oral two times a day    MEDICATIONS  (PRN):  acetaminophen   Tablet. 650 milliGRAM(s) Oral every 6 hours PRN Mild Pain (1 - 3)  traMADol 50 milliGRAM(s) Oral three times a day PRN Severe Pain (7 - 10)  ondansetron Injectable 4 milliGRAM(s) IV Push every 6 hours PRN Nausea and/or Vomiting    ==================>> REVIEW OF SYSTEM <<=================    GEN: no fever, no chills,  over the past few days + pain in the left knee improved significantly  RESP: mild  SOB now, no cough, no sputum  CVS: no chest pain, no palpitations, no edema  GI: no abdominal pain, no nausea, no vomiting, no constipation, no diarrhea  Neuro: no headache, no dizziness  Derm : no itching, no rash    ==================>> VITAL SIGNS <<==================    Vital Signs Last 24 Hrs  T(C): 36.4 (08-05-17 @ 05:14)  T(F): 97.5 (08-05-17 @ 05:14), Max: 98.4 (08-04-17 @ 21:28)  HR: 85 (08-05-17 @ 05:14) (72 - 88)  BP: 150/54 (08-05-17 @ 05:14)  BP(mean): --  RR: 16 (08-05-17 @ 05:14) (16 - 18)  SpO2: 99% (08-05-17 @ 05:14) (96% - 99%)      ==================>> PHYSICAL EXAM <<=================    GEN: Alert and oriented, pleasant, comfortable NAD, weak appearing  HEENT: NCAT, PERRL, MMM, hearing intact, + nasal canula O2, + mild oropharyngeal thrush  Neck: supple , no JVD  CVS: S1S2 , regular , No M/R/G appreciated  PULM: mild bilateral ronchi  ABD.: soft. non tender, non distended,  bowel sounds present  Extrem: intact pulses , no edema  Derm: No rash , no ecchymoses  PSYCH : normal mood,  no delusion not anxious     ==================>> LAB AND IMAGING <<==================                                            12.9   11.1  )-----------( 283      ( 05 Aug 2017 06:42 )             40.2        08-05    137  |  99  |  83<H>  ----------------------------<  123<H>  5.2   |  30  |  1.16    Ca    8.8      05 Aug 2017 06:42  Phos  3.6     08-04  Mg     3.0     08-04      < from: Transthoracic Echocardiogram (07.24.17 @ 14:24) >  CONCLUSIONS:  1. Normal mitral valve. Moderate to severe mitral  regurgitation.  2. Calcified trileaflet aortic valve with normal opening.  Mild aortic insufficiency.  3. Normal aortic root.  4. Moderate left atrial enlargement.  5. Normal left ventricular internal dimensions and wall  thicknesses.  6. Normal Left Ventricular Systolic Function,  (EF = 55%)  7. Grade II diastolic dysfunction  8. Normal right atrium.  9. Normal right ventricular size and function.  10. RV systolic pressure is severely increased (PASP 65mm  Hg).  11. There is mild tricuspid regurgitation.  12. There is mild pulmonic regurgitation.  13. Normal pericardium with no pericardial effusion.  < end of copied text >    ________________________________________________________________________  ===============>>  A S S E S S M E N T   A N D   P L A N <<===============  ------------------------------------------------------------------------------------------------------------------------------    **respiratory failure, resolved ; MR, severe PAH, COPD, and likely acute exacerbation of diastolic heart failure with pleural eff.  ---cardio, pulm f/u appreciated  ---Home O2 set up done >>pt family planing on rehab instead  ---nebulizer treatment as needed  --- diuresis as needed   ---monitor vitals, creatinine electrolytes  --PT/ OOB as able ;  Incentive Spirometer    **nausea, decreased PO intake  ---on marinol 2.5 and remeron >>increased  ---on remeron to 15 mg  ---given depression and poor appetite, will also DC paxil and increase remeron instead to 30mg    **DAVID likely on CKD?, Hyponatremia, overall improved  ---renal f/u  ---monitor BMP closely   ---free fluid restriction  ---encouraged pt and family for increased PO intake as able (d/w family, Rn)    **Hypertension, CAD, MR, PAH  ---Continue with current medications as above  ---DASH diet   ---close monitoring of vitals    **BPH  --- Continue Current medications and monitor.     **pseudogout  ---post orthopedic intervention and steropid injection  ---post colchicine  ---PT : :Rehab placement    **thrush  ---nystatin swish ans swallow    -GI/DVT Prophylaxis.  DC planing to REHAB  --------------------------------------------  Case discussed with pt, HS, family, Rn  Education given on deep breathing, plan of care  ___________________________    H. KARLA Rich.  Pager: [phone]

## 2017-08-05 NOTE — PROGRESS NOTE ADULT - ASSESSMENT
89 yr old  [place]incentian-speaking male from home, former smoker, lives with wife, PMH laryngeal cancer (diagnosed 2015, s/p XRT, no evidence disease), COPD, CAD s/p stents x 3, s/p left carotid stent, HTN, asthma, depression, anxiety p/w acute shortness of breath due to diastolic HF and mod-severe MR.  1.Hold po lasix.  2.Severe pulm HTN-Norvasc 2.5mg qd.  3.Continue cardiac medication.  4.COPD-nebs.  5.HTN-Cont BP medication.  6.GI and DVT prophylaxis.  7.Continue remeron.

## 2017-08-05 NOTE — PROGRESS NOTE ADULT - SUBJECTIVE AND OBJECTIVE BOX
Patient is a 89y old  Male who presents with a chief complaint of shortness of breath (29 Jul 2017 08:37). Awake, alert, comfortable in bed in NAD. Doing well on oxygen via NC. S/p Respiratory failure. Out of ICU on medical floor.      INTERVAL HPI/OVERNIGHT EVENTS:      VITAL SIGNS:  T(F): 98.2 (08-05-17 @ 07:00)  HR: 71 (08-05-17 @ 07:00)  BP: 158/84 (08-05-17 @ 07:00)  RR: 16 (08-05-17 @ 07:00)  SpO2: 100% (08-05-17 @ 07:00)  Wt(kg): --  I&O's Detail          REVIEW OF SYSTEMS:    CONSTITUTIONAL:  No fevers, chills, sweats    HEENT:  Eyes:  No diplopia or blurred vision. ENT:  No earache, sore throat or runny nose.    CARDIOVASCULAR:  No pressure, squeezing, tightness, or heaviness about the chest; no palpitations.    RESPIRATORY:  Per HPI    GASTROINTESTINAL:  No abdominal pain, nausea, vomiting or diarrhea.    GENITOURINARY:  No dysuria, frequency or urgency.    NEUROLOGIC:  No paresthesias, fasciculations, seizures or weakness.    PSYCHIATRIC:  No disorder of thought or mood.      PHYSICAL EXAM:    Constitutional: Well developed and nourished  Eyes:Perrla  ENMT: normal  Neck:supple  Respiratory: good air entry  Cardiovascular: S1 S2 regular  Gastrointestinal: Soft, Non tender  Extremities: No edema  Vascular:normal  Neurological:Awake, alert,Ox3  Musculoskeletal:Normal      MEDICATIONS  (STANDING):  carvedilol 25 milliGRAM(s) Oral every 12 hours  isosorbide   mononitrate ER Tablet (IMDUR) 120 milliGRAM(s) Oral daily  simvastatin 20 milliGRAM(s) Oral at bedtime  ferrous    sulfate 325 milliGRAM(s) Oral two times a day with meals  senna 1 Tablet(s) Oral at bedtime  pantoprazole    Tablet 40 milliGRAM(s) Oral before breakfast  calcium carbonate  625 mG + Vitamin D (OsCal 250 + D) 1 Tablet(s) Oral daily  insulin glargine Injectable (LANTUS) 10 Unit(s) SubCutaneous at bedtime  insulin lispro (HumaLOG) corrective regimen sliding scale   SubCutaneous three times a day before meals  dextrose 5%. 1000 milliLiter(s) (50 mL/Hr) IV Continuous <Continuous>  dextrose 50% Injectable 12.5 Gram(s) IV Push once  dextrose 50% Injectable 25 Gram(s) IV Push once  dextrose 50% Injectable 25 Gram(s) IV Push once  hydrALAZINE 100 milliGRAM(s) Oral three times a day  epoetin mali Injectable 25174 Unit(s) IV Push <User Schedule>  ampicillin/sulbactam  IVPB 1.5 Gram(s) IV Intermittent every 8 hours  vancomycin  IVPB   IV Intermittent   vancomycin  IVPB 500 milliGRAM(s) IV Intermittent every 48 hours  docusate sodium 100 milliGRAM(s) Oral daily  heparin  Infusion.  Unit(s)/Hr (12 mL/Hr) IV Continuous <Continuous>  warfarin 7 milliGRAM(s) Oral daily  losartan 50 milliGRAM(s) Oral daily    MEDICATIONS  (PRN):  dextrose Gel 1 Dose(s) Oral once PRN Blood Glucose LESS THAN 70 milliGRAM(s)/deciLiter  glucagon  Injectable 1 milliGRAM(s) IntraMuscular once PRN Glucose <70 milliGRAM(s)/deciLiter  acetaminophen   Tablet 650 milliGRAM(s) Oral every 6 hours PRN leg pain  phenylephrine  1% Nasal 1 Spray(s) Nasal every 4 hours PRN Congestion  ondansetron Injectable 4 milliGRAM(s) IV Push every 6 hours PRN Nausea and/or Vomiting      Allergies    No Known Allergies    Intolerances        LABS:                        8.8    10.2  )-----------( 263      ( 05 Aug 2017 07:23 )             28.6     08-04    135  |  96  |  46<H>  ----------------------------<  186<H>  3.5   |  28  |  5.07<H>    Ca    7.8<L>      04 Aug 2017 07:32      PT/INR - ( 05 Aug 2017 07:23 )   PT: 18.5 sec;   INR: 1.68 ratio         PTT - ( 05 Aug 2017 02:31 )  PTT:56.8 sec          CAPILLARY BLOOD GLUCOSE  101 (05 Aug 2017 07:00)  157 (04 Aug 2017 21:09)  87 (04 Aug 2017 15:40)  162 (04 Aug 2017 11:29)            RADIOLOGY & ADDITIONAL TESTS:    CXR:Patient is a 63y old  Female who presents with a chief complaint of nose bleed and right leg cellulitis (25 Jul 2017 09:25)      INTERVAL HPI/OVERNIGHT EVENTS:      VITAL SIGNS:  T(F): 98.2 (08-05-17 @ 07:00)  HR: 71 (08-05-17 @ 07:00)  BP: 158/84 (08-05-17 @ 07:00)  RR: 16 (08-05-17 @ 07:00)  SpO2: 100% (08-05-17 @ 07:00)  Wt(kg): --  I&O's Detail          REVIEW OF SYSTEMS:    CONSTITUTIONAL:  No fevers, chills, sweats    HEENT:  Eyes:  No diplopia or blurred vision. ENT:  No earache, sore throat or runny nose.    CARDIOVASCULAR:  No pressure, squeezing, tightness, or heaviness about the chest; no palpitations.    RESPIRATORY:  Per HPI    GASTROINTESTINAL:  No abdominal pain, nausea, vomiting or diarrhea.    GENITOURINARY:  No dysuria, frequency or urgency.    NEUROLOGIC:  No paresthesias, fasciculations, seizures or weakness.    PSYCHIATRIC:  No disorder of thought or mood.      PHYSICAL EXAM:    Constitutional: Well developed and nourished  Eyes:Perrla  ENMT: normal  Neck:supple  Respiratory: good air entry  Cardiovascular: S1 S2 regular  Gastrointestinal: Soft, Non tender  Extremities: No edema  Vascular:normal  Neurological:Awake, alert,Ox3  Musculoskeletal:Normal      MEDICATIONS  (STANDING):  carvedilol 25 milliGRAM(s) Oral every 12 hours  isosorbide   mononitrate ER Tablet (IMDUR) 120 milliGRAM(s) Oral daily  simvastatin 20 milliGRAM(s) Oral at bedtime  ferrous    sulfate 325 milliGRAM(s) Oral two times a day with meals  senna 1 Tablet(s) Oral at bedtime  pantoprazole    Tablet 40 milliGRAM(s) Oral before breakfast  calcium carbonate  625 mG + Vitamin D (OsCal 250 + D) 1 Tablet(s) Oral daily  insulin glargine Injectable (LANTUS) 10 Unit(s) SubCutaneous at bedtime  insulin lispro (HumaLOG) corrective regimen sliding scale   SubCutaneous three times a day before meals  dextrose 5%. 1000 milliLiter(s) (50 mL/Hr) IV Continuous <Continuous>  dextrose 50% Injectable 12.5 Gram(s) IV Push once  dextrose 50% Injectable 25 Gram(s) IV Push once  dextrose 50% Injectable 25 Gram(s) IV Push once  hydrALAZINE 100 milliGRAM(s) Oral three times a day  epoetin mali Injectable 06675 Unit(s) IV Push <User Schedule>  ampicillin/sulbactam  IVPB 1.5 Gram(s) IV Intermittent every 8 hours  vancomycin  IVPB   IV Intermittent   vancomycin  IVPB 500 milliGRAM(s) IV Intermittent every 48 hours  docusate sodium 100 milliGRAM(s) Oral daily  heparin  Infusion.  Unit(s)/Hr (12 mL/Hr) IV Continuous <Continuous>  warfarin 7 milliGRAM(s) Oral daily  losartan 50 milliGRAM(s) Oral daily    MEDICATIONS  (PRN):  dextrose Gel 1 Dose(s) Oral once PRN Blood Glucose LESS THAN 70 milliGRAM(s)/deciLiter  glucagon  Injectable 1 milliGRAM(s) IntraMuscular once PRN Glucose <70 milliGRAM(s)/deciLiter  acetaminophen   Tablet 650 milliGRAM(s) Oral every 6 hours PRN leg pain  phenylephrine  1% Nasal 1 Spray(s) Nasal every 4 hours PRN Congestion  ondansetron Injectable 4 milliGRAM(s) IV Push every 6 hours PRN Nausea and/or Vomiting      Allergies    No Known Allergies    Intolerances        LABS:                        8.8    10.2  )-----------( 263      ( 05 Aug 2017 07:23 )             28.6     08-04    135  |  96  |  46<H>  ----------------------------<  186<H>  3.5   |  28  |  5.07<H>    Ca    7.8<L>      04 Aug 2017 07:32      PT/INR - ( 05 Aug 2017 07:23 )   PT: 18.5 sec;   INR: 1.68 ratio         PTT - ( 05 Aug 2017 02:31 )  PTT:56.8 sec          CAPILLARY BLOOD GLUCOSE  101 (05 Aug 2017 07:00)  157 (04 Aug 2017 21:09)  87 (04 Aug 2017 15:40)  162 (04 Aug 2017 11:29)            RADIOLOGY & ADDITIONAL TESTS:    CXR:    Ct scan chest:    ekg;    echo:    Ct scan chest:    ekg;    echo: Patient is a 89y old  Male who presents with a chief complaint of shortness of breath (29 Jul 2017 08:37). Awake, alert, comfortable in bed in NAD. Doing well on oxygen via NC. S/p Respiratory failure. Out of ICU on medical floor.      INTERVAL HPI/OVERNIGHT EVENTS:No new events. Still with pain on right knee      VITAL SIGNS:  T(F): 98.2 (08-05-17 @ 07:00)  HR: 71 (08-05-17 @ 07:00)  BP: 158/84 (08-05-17 @ 07:00)  RR: 16 (08-05-17 @ 07:00)  SpO2: 100% (08-05-17 @ 07:00)  Wt(kg): --  I&O's Detail          REVIEW OF SYSTEMS:    CONSTITUTIONAL:  No fevers, chills, sweats    HEENT:  Eyes:  No diplopia or blurred vision. ENT:  No earache, sore throat or runny nose.    CARDIOVASCULAR:  No pressure, squeezing, tightness, or heaviness about the chest; no palpitations.    RESPIRATORY:  Per HPI    GASTROINTESTINAL:  No abdominal pain, nausea, vomiting or diarrhea.    GENITOURINARY:  No dysuria, frequency or urgency.    NEUROLOGIC:  No paresthesias, fasciculations, seizures or weakness.    PSYCHIATRIC:  No disorder of thought or mood.      PHYSICAL EXAM:    Constitutional: Well developed and nourished  Eyes:Perrla  ENMT: normal  Neck:supple  Respiratory: good air entry  Cardiovascular: S1 S2 regular  Gastrointestinal: Soft, Non tender  Extremities: Tender right knee  Vascular:normal  Neurological:Awake, alert,Ox3  Musculoskeletal:Normal      MEDICATIONS  (STANDING):  carvedilol 25 milliGRAM(s) Oral every 12 hours  isosorbide   mononitrate ER Tablet (IMDUR) 120 milliGRAM(s) Oral daily  simvastatin 20 milliGRAM(s) Oral at bedtime  ferrous    sulfate 325 milliGRAM(s) Oral two times a day with meals  senna 1 Tablet(s) Oral at bedtime  pantoprazole    Tablet 40 milliGRAM(s) Oral before breakfast  calcium carbonate  625 mG + Vitamin D (OsCal 250 + D) 1 Tablet(s) Oral daily  insulin glargine Injectable (LANTUS) 10 Unit(s) SubCutaneous at bedtime  insulin lispro (HumaLOG) corrective regimen sliding scale   SubCutaneous three times a day before meals  dextrose 5%. 1000 milliLiter(s) (50 mL/Hr) IV Continuous <Continuous>  dextrose 50% Injectable 12.5 Gram(s) IV Push once  dextrose 50% Injectable 25 Gram(s) IV Push once  dextrose 50% Injectable 25 Gram(s) IV Push once  hydrALAZINE 100 milliGRAM(s) Oral three times a day  epoetin mali Injectable 75845 Unit(s) IV Push <User Schedule>  ampicillin/sulbactam  IVPB 1.5 Gram(s) IV Intermittent every 8 hours  vancomycin  IVPB   IV Intermittent   vancomycin  IVPB 500 milliGRAM(s) IV Intermittent every 48 hours  docusate sodium 100 milliGRAM(s) Oral daily  heparin  Infusion.  Unit(s)/Hr (12 mL/Hr) IV Continuous <Continuous>  warfarin 7 milliGRAM(s) Oral daily  losartan 50 milliGRAM(s) Oral daily    MEDICATIONS  (PRN):  dextrose Gel 1 Dose(s) Oral once PRN Blood Glucose LESS THAN 70 milliGRAM(s)/deciLiter  glucagon  Injectable 1 milliGRAM(s) IntraMuscular once PRN Glucose <70 milliGRAM(s)/deciLiter  acetaminophen   Tablet 650 milliGRAM(s) Oral every 6 hours PRN leg pain  phenylephrine  1% Nasal 1 Spray(s) Nasal every 4 hours PRN Congestion  ondansetron Injectable 4 milliGRAM(s) IV Push every 6 hours PRN Nausea and/or Vomiting      Allergies    No Known Allergies    Intolerances        LABS:                                         8.8    10.2  )-----------( 263      ( 05 Aug 2017 07:23 )             28.6     08-04    135  |  96  |  46<H>  ----------------------------<  186<H>  3.5   |  28  |  5.07<H>    Ca    7.8<L>      04 Aug 2017 07:32      PT/INR - ( 05 Aug 2017 07:23 )   PT: 18.5 sec;   INR: 1.68 ratio         PTT - ( 05 Aug 2017 02:31 )  PTT:56.8 sec          CAPILLARY BLOOD GLUCOSE  101 (05 Aug 2017 07:00)  157 (04 Aug 2017 21:09)  87 (04 Aug 2017 15:40)  162 (04 Aug 2017 11:29)            RADIOLOGY & ADDITIONAL TESTS:    CXR:    Ct scan chest:    ekg;    echo:    Ct scan chest:    ekg;    echo:

## 2017-08-06 LAB
ANION GAP SERPL CALC-SCNC: 8 MMOL/L — SIGNIFICANT CHANGE UP (ref 5–17)
BUN SERPL-MCNC: 68 MG/DL — HIGH (ref 7–18)
CALCIUM SERPL-MCNC: 9.1 MG/DL — SIGNIFICANT CHANGE UP (ref 8.4–10.5)
CHLORIDE SERPL-SCNC: 100 MMOL/L — SIGNIFICANT CHANGE UP (ref 96–108)
CO2 SERPL-SCNC: 30 MMOL/L — SIGNIFICANT CHANGE UP (ref 22–31)
CREAT SERPL-MCNC: 1.15 MG/DL — SIGNIFICANT CHANGE UP (ref 0.5–1.3)
GLUCOSE SERPL-MCNC: 114 MG/DL — HIGH (ref 70–99)
HCT VFR BLD CALC: 43.4 % — SIGNIFICANT CHANGE UP (ref 39–50)
HGB BLD-MCNC: 13.8 G/DL — SIGNIFICANT CHANGE UP (ref 13–17)
MCHC RBC-ENTMCNC: 28.6 PG — SIGNIFICANT CHANGE UP (ref 27–34)
MCHC RBC-ENTMCNC: 31.8 GM/DL — LOW (ref 32–36)
MCV RBC AUTO: 90.1 FL — SIGNIFICANT CHANGE UP (ref 80–100)
PLATELET # BLD AUTO: 310 K/UL — SIGNIFICANT CHANGE UP (ref 150–400)
POTASSIUM SERPL-MCNC: 4.8 MMOL/L — SIGNIFICANT CHANGE UP (ref 3.5–5.3)
POTASSIUM SERPL-SCNC: 4.8 MMOL/L — SIGNIFICANT CHANGE UP (ref 3.5–5.3)
RBC # BLD: 4.82 M/UL — SIGNIFICANT CHANGE UP (ref 4.2–5.8)
RBC # FLD: 13.2 % — SIGNIFICANT CHANGE UP (ref 10.3–14.5)
SODIUM SERPL-SCNC: 138 MMOL/L — SIGNIFICANT CHANGE UP (ref 135–145)
WBC # BLD: 10.6 K/UL — HIGH (ref 3.8–10.5)
WBC # FLD AUTO: 10.6 K/UL — HIGH (ref 3.8–10.5)

## 2017-08-06 RX ADMIN — CLOPIDOGREL BISULFATE 75 MILLIGRAM(S): 75 TABLET, FILM COATED ORAL at 12:40

## 2017-08-06 RX ADMIN — Medication 3 MILLILITER(S): at 02:51

## 2017-08-06 RX ADMIN — Medication 500000 UNIT(S): at 05:59

## 2017-08-06 RX ADMIN — Medication 3 MILLILITER(S): at 20:20

## 2017-08-06 RX ADMIN — HEPARIN SODIUM 5000 UNIT(S): 5000 INJECTION INTRAVENOUS; SUBCUTANEOUS at 17:33

## 2017-08-06 RX ADMIN — Medication 25 MILLIGRAM(S): at 21:42

## 2017-08-06 RX ADMIN — AMLODIPINE BESYLATE 2.5 MILLIGRAM(S): 2.5 TABLET ORAL at 05:59

## 2017-08-06 RX ADMIN — HEPARIN SODIUM 5000 UNIT(S): 5000 INJECTION INTRAVENOUS; SUBCUTANEOUS at 05:59

## 2017-08-06 RX ADMIN — SENNA PLUS 2 TABLET(S): 8.6 TABLET ORAL at 21:42

## 2017-08-06 RX ADMIN — Medication 100 MILLIGRAM(S): at 12:40

## 2017-08-06 RX ADMIN — Medication 5 MILLIGRAM(S): at 17:34

## 2017-08-06 RX ADMIN — Medication 3 MILLILITER(S): at 14:29

## 2017-08-06 RX ADMIN — Medication 5 MILLIGRAM(S): at 05:59

## 2017-08-06 RX ADMIN — Medication 81 MILLIGRAM(S): at 12:40

## 2017-08-06 RX ADMIN — ATORVASTATIN CALCIUM 40 MILLIGRAM(S): 80 TABLET, FILM COATED ORAL at 21:42

## 2017-08-06 RX ADMIN — Medication 4 MILLIGRAM(S): at 21:42

## 2017-08-06 RX ADMIN — Medication 25 MILLIGRAM(S): at 05:59

## 2017-08-06 RX ADMIN — Medication 500000 UNIT(S): at 12:40

## 2017-08-06 RX ADMIN — Medication 500000 UNIT(S): at 17:33

## 2017-08-06 RX ADMIN — MIRTAZAPINE 30 MILLIGRAM(S): 45 TABLET, ORALLY DISINTEGRATING ORAL at 21:42

## 2017-08-06 RX ADMIN — Medication 3 MILLILITER(S): at 08:51

## 2017-08-06 NOTE — PROGRESS NOTE ADULT - SUBJECTIVE AND OBJECTIVE BOX
Problem List:  DAVID cardiorenal syndrome with severe MR issues resolved.  COPD   Complete Blood Count (08.06.17 @ 07:11)    WBC Count: 10.6 K/uL    RBC Count: 4.82 M/uL    Hemoglobin: 13.8 g/dL    Hematocrit: 43.4 %    Mean Cell Volume: 90.1 fl    Mean Cell Hemoglobin: 28.6 pg    Mean Cell Hemoglobin Conc: 31.8 gm/dL    Red Cell Distrib Width: 13.2 %    Platelet Count - Automated: 310 K/uL    Poor nutrition.  Hypoalbuminemia  Hyponatremia resolved      Basic Metabolic Panel (08.06.17 @ 07:11)    Sodium, Serum: 138 mmol/L    Potassium, Serum: 4.8 mmol/L    Chloride, Serum: 100 mmol/L    Carbon Dioxide, Serum: 30 mmol/L    Anion Gap, Serum: 8 mmol/L    Blood Urea Nitrogen, Serum: 68 mg/dL    Creatinine, Serum: 1.15 mg/dL    Glucose, Serum: 114 mg/dL    Calcium, Total Serum: 9.1 mg/dL    eGFR if Non : 55: Interpretative comment    Complete Blood Count (08.06.17 @ 07:11)    WBC Count: 10.6 K/uL    RBC Count: 4.82 M/uL    Hemoglobin: 13.8 g/dL    Hematocrit: 43.4 %    Mean Cell Volume: 90.1 fl    Mean Cell Hemoglobin: 28.6 pg    Mean Cell Hemoglobin Conc: 31.8 gm/dL    Red Cell Distrib Width: 13.2 %    Platelet Count - Automated: 310 K/uL        PAST MEDICAL & SURGICAL HISTORY:  Coronary artery disease involving native coronary artery of native heart without angina pectoris  Laryngeal cancer  Essential hypertension  Asthma  Arthritis  Degenerative joint disease  No significant past surgical history      No Known Allergies      MEDICATIONS  (STANDING):  doxazosin 4 milliGRAM(s) Oral at bedtime  aspirin enteric coated 81 milliGRAM(s) Oral daily  ALBUTerol/ipratropium for Nebulization 3 milliLiter(s) Nebulizer every 6 hours  atorvastatin 40 milliGRAM(s) Oral at bedtime  clopidogrel Tablet 75 milliGRAM(s) Oral daily  amLODIPine   Tablet 2.5 milliGRAM(s) Oral daily  hydrALAZINE 25 milliGRAM(s) Oral three times a day  docusate sodium 100 milliGRAM(s) Oral daily  senna 2 Tablet(s) Oral at bedtime  heparin  Injectable 5000 Unit(s) SubCutaneous every 12 hours  nystatin    Suspension 980415 Unit(s) Oral four times a day  dronabinol 5 milliGRAM(s) Oral two times a day  mirtazapine 30 milliGRAM(s) Oral at bedtime    MEDICATIONS  (PRN):  acetaminophen   Tablet. 650 milliGRAM(s) Oral every 6 hours PRN Mild Pain (1 - 3)  traMADol 50 milliGRAM(s) Oral three times a day PRN Severe Pain (7 - 10)  ondansetron Injectable 4 milliGRAM(s) IV Push every 6 hours PRN Nausea and/or Vomiting    REVIEW OF SYSTEMS:  General: no fever no chills, no weight loss.  Fatigue malaise  NO dyspnea.  no leg edema    VITALS:  T(F): 98.2 (08-06-17 @ 05:03), Max: 98.2 (08-05-17 @ 21:13)  HR: 68 (08-06-17 @ 05:03)  BP: 130/70 (08-06-17 @ 05:03)  RR: 18 (08-06-17 @ 05:03)  SpO2: 98% (08-06-17 @ 05:03)  Wt(kg): --      PHYSICAL EXAM:  Constitutiona no diaphoresis, no distress.  Neck: No JVD, no carotid bruit, supple, no adenopathy  Respiratory: Good air entrance B/L, no wheezes, rales or rhonchi  Cardiovascular: S1, S2, RRR, no pericardial rub, no murmur  Abdomen: BS+, soft, no tenderness, no bruit  Pelvis: bladder nondistended  Extremities: No cyanosis or clubbing. No peripheral edema  Neurological: A/O x 3, no focal deficits  Psychiatric: Normal mood, normal affect  Skin: No rashes

## 2017-08-06 NOTE — PROGRESS NOTE ADULT - ASSESSMENT
DAVID cardiorenal syndrome and CHF resolved patient is off diuretics at present  Hyponatremia resolved.  Poor nutrition and pO intake no need for diuretics at this time.

## 2017-08-06 NOTE — PROGRESS NOTE ADULT - SUBJECTIVE AND OBJECTIVE BOX
_____________________________________________________________________________  ========>>  M E D I C A L   A T T E N D I N G    F O L L O W  U P  N O T E  <<=========  ---------------------------------------------------------------------------------------------------------------------------------------    - Patient seen and examined by me approximately thirty minutes ago.  - In summary, patient is a 89y year old man who originally presented with respiratory failure, post Bipap.  - Patient today overall doing ok, not eating well..    ==================>> REVIEW OF SYSTEM <<=================    GEN: no fever, no chills,  over the past few days + pain in the left knee improved   RESP: mild  SOB now, no cough, no sputum  CVS: no chest pain, no palpitations, no edema  GI: no abdominal pain, no nausea, no vomiting, no constipation, no diarrhea  Neuro: no headache, no dizziness  Derm : no itching, no rash    ==================>> VITAL SIGNS <<==================    Vital Signs Last 24 Hrs  T(C): 36.4 (08-06-17 @ 14:35)  T(F): 97.5 (08-06-17 @ 14:35), Max: 98.2 (08-05-17 @ 21:13)  HR: 103 (08-06-17 @ 14:35) (68 - 103)  BP: 149/60 (08-06-17 @ 14:35)  BP(mean): --  RR: 18 (08-06-17 @ 14:35) (18 - 18)  SpO2: 100% (08-06-17 @ 14:35) (98% - 100%)      ==================>> PHYSICAL EXAM <<=================    GEN: Alert and oriented, pleasant, comfortable NAD, weak appearing  HEENT: NCAT, PERRL, MMM, hearing intact, + nasal canula O2, + mild oropharyngeal thrush  Neck: supple , no JVD  CVS: S1S2 , regular , No M/R/G appreciated  PULM: mild bilateral ronchi  ABD.: soft. non tender, non distended,  bowel sounds present  Extrem: intact pulses , no edema  Derm: No rash , no ecchymoses  PSYCH : normal mood,  no delusion not anxious     ==================>> LAB AND IMAGING <<==================                                   13.8   10.6  )-----------( 310      ( 06 Aug 2017 07:11 )             43.4        08-06    138  |  100  |  68<H>  ----------------------------<  114<H>  4.8   |  30  |  1.15    Ca    9.1      06 Aug 2017 07:11                    < from: Transthoracic Echocardiogram (07.24.17 @ 14:24) >  CONCLUSIONS:  1. Normal mitral valve. Moderate to severe mitral  regurgitation.  2. Calcified trileaflet aortic valve with normal opening.  Mild aortic insufficiency.  3. Normal aortic root.  4. Moderate left atrial enlargement.  5. Normal left ventricular internal dimensions and wall  thicknesses.  6. Normal Left Ventricular Systolic Function,  (EF = 55%)  7. Grade II diastolic dysfunction  8. Normal right atrium.  9. Normal right ventricular size and function.  10. RV systolic pressure is severely increased (PASP 65mm  Hg).  11. There is mild tricuspid regurgitation.  12. There is mild pulmonic regurgitation.  13. Normal pericardium with no pericardial effusion.  < end of copied text >    ________________________________________________________________________  ===============>>  A S S E S S M E N T   A N D   P L A N <<===============  ------------------------------------------------------------------------------------------------------------------------------    **hypoxemia; MR, severe PAH, COPD, diastolic heart failure with pleural effusion  ---cardio, pulm f/u appreciated  ---Home O2 set up done >>pt family considering rehab  ---nebulizer treatment as needed  --- diuresis as needed   ---monitor vitals, creatinine electrolytes  --PT/ OOB as able ;  Incentive Spirometer    **nausea, decreased PO intake  ---on marinol 2.5 and remeron >>increased  ---on remeron to 15 mg  ---continue increased remeron dose    **DAVID likely on CKD?, Hyponatremia, overall improved  ---renal f/u  ---monitor BMP closely   ---free fluid restriction  ---encouraged pt and family for increased PO intake as able (d/w family, Rn)    **Hypertension, CAD, MR, PAH  ---Continue with current medications as above  ---DASH diet   ---close monitoring of vitals    **BPH  --- Continue Current medications and monitor.     **pseudogout  ---post orthopedic intervention and steropid injection  ---post colchicine  ---PT : :Rehab placement    **thrush  ---nystatin swish ans swallow    -GI/DVT Prophylaxis.  DC planing to REHAB  --------------------------------------------  Case discussed with pt, HS, family, Rn  Education given on deep breathing, plan of care  ___________________________    H. KARLA Rich.  Pager: [phone]

## 2017-08-06 NOTE — PROGRESS NOTE ADULT - SUBJECTIVE AND OBJECTIVE BOX
CHIEF COMPLAINT: Patient is a 89y old  Male who presents with a chief complaint of shortness of breath. Pt appears comfortable.    	  REVIEW OF SYSTEMS:  unable to obtain    PHYSICAL EXAM:  T(C): 36.8 (08-06-17 @ 05:03), Max: 36.8 (08-05-17 @ 21:13)  HR: 68 (08-06-17 @ 05:03) (65 - 86)  BP: 130/70 (08-06-17 @ 05:03) (130/70 - 148/66)  RR: 18 (08-06-17 @ 05:03) (18 - 19)  SpO2: 98% (08-06-17 @ 05:03) (95% - 98%)      Appearance: Normal	  HEENT:   Normal oral mucosa, PERRL, EOMI	  Lymphatic: No lymphadenopathy  Cardiovascular: Normal S1 S2, + JVD,2/6 sm  Respiratory: Lungs clear to auscultation	  Psychiatry: A & O x 3, Mood & affect appropriate  Gastrointestinal:  Soft, Non-tender, + BS	  Skin: No rashes, No ecchymoses, No cyanosis	  Neurologic: Non-focal  Extremities: Normal range of motion, No clubbing, cyanosis or edema  Vascular: Peripheral pulses palpable 2+ bilaterally    MEDICATIONS  (STANDING):  doxazosin 4 milliGRAM(s) Oral at bedtime  aspirin enteric coated 81 milliGRAM(s) Oral daily  ALBUTerol/ipratropium for Nebulization 3 milliLiter(s) Nebulizer every 6 hours  atorvastatin 40 milliGRAM(s) Oral at bedtime  clopidogrel Tablet 75 milliGRAM(s) Oral daily  amLODIPine   Tablet 2.5 milliGRAM(s) Oral daily  hydrALAZINE 25 milliGRAM(s) Oral three times a day  docusate sodium 100 milliGRAM(s) Oral daily  senna 2 Tablet(s) Oral at bedtime  heparin  Injectable 5000 Unit(s) SubCutaneous every 12 hours  nystatin    Suspension 164587 Unit(s) Oral four times a day  dronabinol 5 milliGRAM(s) Oral two times a day  mirtazapine 30 milliGRAM(s) Oral at bedtime    	  LABS:	 	                        13.8   10.6  )-----------( 310      ( 06 Aug 2017 07:11 )             43.4     08-06    138  |  100  |  68<H>  ----------------------------<  114<H>  4.8   |  30  |  1.15    Ca    9.1      06 Aug 2017 07:11      proBNP: Serum Pro-Brain Natriuretic Peptide: 48611 pg/mL (07-24 @ 10:41)    Lipid Profile: Cholesterol 150    HDL 31  TG 83    HgA1c: Hemoglobin A1C, Whole Blood: 5.7 % (07-25 @ 09:08)    TSH: Thyroid Stimulating Hormone, Serum: 0.32 uU/mL (07-25 @ 04:26)

## 2017-08-06 NOTE — PROGRESS NOTE ADULT - ASSESSMENT
89 yr old  Citizen of [place]-speaking male from home, former smoker, lives with wife, PMH laryngeal cancer (diagnosed 2015, s/p XRT, no evidence disease), COPD, CAD s/p stents x 3, s/p left carotid stent, HTN, asthma, depression, anxiety p/w acute shortness of breath due to diastolic HF and mod-severe MR.  1.Hold po lasix.  2.Severe pulm HTN-Norvasc 2.5mg qd.  3.Continue cardiac medication.  4.COPD-nebs.  5.HTN-Cont BP medication.  6.GI and DVT prophylaxis.  7.Continue remeron.

## 2017-08-06 NOTE — PROGRESS NOTE ADULT - SUBJECTIVE AND OBJECTIVE BOX
Patient is a 89y old  Male who presents with a chief complaint of shortness of breath (29 Jul 2017 08:37). Awake, alert, comfortable in bed in NAD. Doing well on oxygen via NC. S/p Respiratory failure. Out of ICU on medical floor.    INTERVAL HPI/OVERNIGHT EVENTS:      VITAL SIGNS:  T(F): 98.2 (08-06-17 @ 05:03)  HR: 68 (08-06-17 @ 05:03)  BP: 130/70 (08-06-17 @ 05:03)  RR: 18 (08-06-17 @ 05:03)  SpO2: 98% (08-06-17 @ 05:03)  Wt(kg): --  I&O's Detail          REVIEW OF SYSTEMS:    CONSTITUTIONAL:  No fevers, chills, sweats    HEENT:  Eyes:  No diplopia or blurred vision. ENT:  No earache, sore throat or runny nose.    CARDIOVASCULAR:  No pressure, squeezing, tightness, or heaviness about the chest; no palpitations.    RESPIRATORY:  Per HPI    GASTROINTESTINAL:  No abdominal pain, nausea, vomiting or diarrhea.    GENITOURINARY:  No dysuria, frequency or urgency.    NEUROLOGIC:  No paresthesias, fasciculations, seizures or weakness.    PSYCHIATRIC:  No disorder of thought or mood.      PHYSICAL EXAM:    Constitutional: Well developed and nourished  Eyes:Perrla  ENMT: normal  Neck:supple  Respiratory: good air entry  Cardiovascular: S1 S2 regular  Gastrointestinal: Soft, Non tender  Extremities: No edema  Vascular:normal  Neurological:Awake, alert,Ox3  Musculoskeletal:Normal      MEDICATIONS  (STANDING):  doxazosin 4 milliGRAM(s) Oral at bedtime  aspirin enteric coated 81 milliGRAM(s) Oral daily  ALBUTerol/ipratropium for Nebulization 3 milliLiter(s) Nebulizer every 6 hours  atorvastatin 40 milliGRAM(s) Oral at bedtime  clopidogrel Tablet 75 milliGRAM(s) Oral daily  amLODIPine   Tablet 2.5 milliGRAM(s) Oral daily  hydrALAZINE 25 milliGRAM(s) Oral three times a day  docusate sodium 100 milliGRAM(s) Oral daily  senna 2 Tablet(s) Oral at bedtime  heparin  Injectable 5000 Unit(s) SubCutaneous every 12 hours  nystatin    Suspension 641487 Unit(s) Oral four times a day  dronabinol 5 milliGRAM(s) Oral two times a day  mirtazapine 30 milliGRAM(s) Oral at bedtime    MEDICATIONS  (PRN):  acetaminophen   Tablet. 650 milliGRAM(s) Oral every 6 hours PRN Mild Pain (1 - 3)  traMADol 50 milliGRAM(s) Oral three times a day PRN Severe Pain (7 - 10)  ondansetron Injectable 4 milliGRAM(s) IV Push every 6 hours PRN Nausea and/or Vomiting      Allergies    No Known Allergies    Intolerances        LABS:                        13.8   10.6  )-----------( 310      ( 06 Aug 2017 07:11 )             43.4     08-06    138  |  100  |  68<H>  ----------------------------<  114<H>  4.8   |  30  |  1.15    Ca    9.1      06 Aug 2017 07:11                CAPILLARY BLOOD GLUCOSE            RADIOLOGY & ADDITIONAL TESTS:                           CXR:    Ct scan chest:    ekg;    echo: Patient is a 89y old  Male who presents with a chief complaint of shortness of breath (29 Jul 2017 08:37). Awake, alert, comfortable in bed in NAD. Doing well on oxygen via NC. S/p Respiratory failure. Out of ICU on medical floor.    INTERVAL HPI/OVERNIGHT EVENTS:Having poor appetite. Lethargic  but somewhat arousable. Family wants to take him home      VITAL SIGNS:  T(F): 98.2 (08-06-17 @ 05:03)  HR: 68 (08-06-17 @ 05:03)  BP: 130/70 (08-06-17 @ 05:03)  RR: 18 (08-06-17 @ 05:03)  SpO2: 98% (08-06-17 @ 05:03)  Wt(kg): --  I&O's Detail          REVIEW OF SYSTEMS:    CONSTITUTIONAL:  No fevers, chills, sweats    HEENT:  Eyes:  No diplopia or blurred vision. ENT:  No earache, sore throat or runny nose.    CARDIOVASCULAR:  No pressure, squeezing, tightness, or heaviness about the chest; no palpitations.    RESPIRATORY:  Per HPI    GASTROINTESTINAL:  No abdominal pain, nausea, vomiting or diarrhea.    GENITOURINARY:  No dysuria, frequency or urgency.    NEUROLOGIC:  No paresthesias, fasciculations, seizures or weakness.    PSYCHIATRIC:  No disorder of thought or mood.      PHYSICAL EXAM:    Constitutional: Well developed and nourished  Eyes:Perrla  ENMT: normal  Neck:supple  Respiratory: good air entry  Cardiovascular: S1 S2 regular  Gastrointestinal: Soft, Non tender  Extremities: No edema  Vascular:normal  Neurological:Lethargic  Musculoskeletal:Normal      MEDICATIONS  (STANDING):  doxazosin 4 milliGRAM(s) Oral at bedtime  aspirin enteric coated 81 milliGRAM(s) Oral daily  ALBUTerol/ipratropium for Nebulization 3 milliLiter(s) Nebulizer every 6 hours  atorvastatin 40 milliGRAM(s) Oral at bedtime  clopidogrel Tablet 75 milliGRAM(s) Oral daily  amLODIPine   Tablet 2.5 milliGRAM(s) Oral daily  hydrALAZINE 25 milliGRAM(s) Oral three times a day  docusate sodium 100 milliGRAM(s) Oral daily  senna 2 Tablet(s) Oral at bedtime  heparin  Injectable 5000 Unit(s) SubCutaneous every 12 hours  nystatin    Suspension 861060 Unit(s) Oral four times a day  dronabinol 5 milliGRAM(s) Oral two times a day  mirtazapine 30 milliGRAM(s) Oral at bedtime    MEDICATIONS  (PRN):  acetaminophen   Tablet. 650 milliGRAM(s) Oral every 6 hours PRN Mild Pain (1 - 3)  traMADol 50 milliGRAM(s) Oral three times a day PRN Severe Pain (7 - 10)  ondansetron Injectable 4 milliGRAM(s) IV Push every 6 hours PRN Nausea and/or Vomiting      Allergies    No Known Allergies    Intolerances        LABS:                        13.8   10.6  )-----------( 310      ( 06 Aug 2017 07:11 )             43.4     08-06    138  |  100  |  68<H>  ----------------------------<  114<H>  4.8   |  30  |  1.15    Ca    9.1      06 Aug 2017 07:11                CAPILLARY BLOOD GLUCOSE            RADIOLOGY & ADDITIONAL TESTS:                           CXR:    Ct scan chest:    ekg;    echo:

## 2017-08-07 DIAGNOSIS — B37.0 CANDIDAL STOMATITIS: ICD-10-CM

## 2017-08-07 DIAGNOSIS — I80.9 PHLEBITIS AND THROMBOPHLEBITIS OF UNSPECIFIED SITE: ICD-10-CM

## 2017-08-07 LAB
ANION GAP SERPL CALC-SCNC: 6 MMOL/L — SIGNIFICANT CHANGE UP (ref 5–17)
BASOPHILS # BLD AUTO: 0 K/UL — SIGNIFICANT CHANGE UP (ref 0–0.2)
BASOPHILS NFR BLD AUTO: 0.3 % — SIGNIFICANT CHANGE UP (ref 0–2)
BUN SERPL-MCNC: 67 MG/DL — HIGH (ref 7–18)
CALCIUM SERPL-MCNC: 9 MG/DL — SIGNIFICANT CHANGE UP (ref 8.4–10.5)
CHLORIDE SERPL-SCNC: 99 MMOL/L — SIGNIFICANT CHANGE UP (ref 96–108)
CO2 SERPL-SCNC: 31 MMOL/L — SIGNIFICANT CHANGE UP (ref 22–31)
CREAT SERPL-MCNC: 1.23 MG/DL — SIGNIFICANT CHANGE UP (ref 0.5–1.3)
EOSINOPHIL # BLD AUTO: 0.1 K/UL — SIGNIFICANT CHANGE UP (ref 0–0.5)
EOSINOPHIL NFR BLD AUTO: 0.8 % — SIGNIFICANT CHANGE UP (ref 0–6)
GLUCOSE SERPL-MCNC: 127 MG/DL — HIGH (ref 70–99)
HCT VFR BLD CALC: 43.9 % — SIGNIFICANT CHANGE UP (ref 39–50)
HGB BLD-MCNC: 14.3 G/DL — SIGNIFICANT CHANGE UP (ref 13–17)
LYMPHOCYTES # BLD AUTO: 1.3 K/UL — SIGNIFICANT CHANGE UP (ref 1–3.3)
LYMPHOCYTES # BLD AUTO: 9.4 % — LOW (ref 13–44)
MAGNESIUM SERPL-MCNC: 2.5 MG/DL — SIGNIFICANT CHANGE UP (ref 1.6–2.6)
MCHC RBC-ENTMCNC: 29.1 PG — SIGNIFICANT CHANGE UP (ref 27–34)
MCHC RBC-ENTMCNC: 32.6 GM/DL — SIGNIFICANT CHANGE UP (ref 32–36)
MCV RBC AUTO: 89.4 FL — SIGNIFICANT CHANGE UP (ref 80–100)
MONOCYTES # BLD AUTO: 0.5 K/UL — SIGNIFICANT CHANGE UP (ref 0–0.9)
MONOCYTES NFR BLD AUTO: 3.8 % — SIGNIFICANT CHANGE UP (ref 2–14)
NEUTROPHILS # BLD AUTO: 11.9 K/UL — HIGH (ref 1.8–7.4)
NEUTROPHILS NFR BLD AUTO: 85.7 % — HIGH (ref 43–77)
PHOSPHATE SERPL-MCNC: 4.2 MG/DL — SIGNIFICANT CHANGE UP (ref 2.5–4.5)
PLATELET # BLD AUTO: 304 K/UL — SIGNIFICANT CHANGE UP (ref 150–400)
POTASSIUM SERPL-MCNC: 4.8 MMOL/L — SIGNIFICANT CHANGE UP (ref 3.5–5.3)
POTASSIUM SERPL-SCNC: 4.8 MMOL/L — SIGNIFICANT CHANGE UP (ref 3.5–5.3)
RBC # BLD: 4.91 M/UL — SIGNIFICANT CHANGE UP (ref 4.2–5.8)
RBC # FLD: 13.1 % — SIGNIFICANT CHANGE UP (ref 10.3–14.5)
SODIUM SERPL-SCNC: 136 MMOL/L — SIGNIFICANT CHANGE UP (ref 135–145)
WBC # BLD: 13.9 K/UL — HIGH (ref 3.8–10.5)
WBC # FLD AUTO: 13.9 K/UL — HIGH (ref 3.8–10.5)

## 2017-08-07 RX ORDER — FLUCONAZOLE 150 MG/1
200 TABLET ORAL DAILY
Qty: 0 | Refills: 0 | Status: DISCONTINUED | OUTPATIENT
Start: 2017-08-07 | End: 2017-08-07

## 2017-08-07 RX ORDER — FLUCONAZOLE 150 MG/1
100 TABLET ORAL EVERY 24 HOURS
Qty: 0 | Refills: 0 | Status: DISCONTINUED | OUTPATIENT
Start: 2017-08-08 | End: 2017-08-09

## 2017-08-07 RX ORDER — DIPHENHYDRAMINE HYDROCHLORIDE AND LIDOCAINE HYDROCHLORIDE AND ALUMINUM HYDROXIDE AND MAGNESIUM HYDRO
10 KIT THREE TIMES A DAY
Qty: 0 | Refills: 0 | Status: DISCONTINUED | OUTPATIENT
Start: 2017-08-07 | End: 2017-08-09

## 2017-08-07 RX ADMIN — HEPARIN SODIUM 5000 UNIT(S): 5000 INJECTION INTRAVENOUS; SUBCUTANEOUS at 06:01

## 2017-08-07 RX ADMIN — Medication 25 MILLIGRAM(S): at 22:37

## 2017-08-07 RX ADMIN — Medication 3 MILLILITER(S): at 08:21

## 2017-08-07 RX ADMIN — HEPARIN SODIUM 5000 UNIT(S): 5000 INJECTION INTRAVENOUS; SUBCUTANEOUS at 18:10

## 2017-08-07 RX ADMIN — FLUCONAZOLE 200 MILLIGRAM(S): 150 TABLET ORAL at 14:09

## 2017-08-07 RX ADMIN — Medication 500000 UNIT(S): at 00:33

## 2017-08-07 RX ADMIN — AMLODIPINE BESYLATE 2.5 MILLIGRAM(S): 2.5 TABLET ORAL at 06:01

## 2017-08-07 RX ADMIN — Medication 25 MILLIGRAM(S): at 06:01

## 2017-08-07 RX ADMIN — CLOPIDOGREL BISULFATE 75 MILLIGRAM(S): 75 TABLET, FILM COATED ORAL at 14:09

## 2017-08-07 RX ADMIN — Medication 4 MILLIGRAM(S): at 22:37

## 2017-08-07 RX ADMIN — Medication 500000 UNIT(S): at 06:01

## 2017-08-07 RX ADMIN — MIRTAZAPINE 30 MILLIGRAM(S): 45 TABLET, ORALLY DISINTEGRATING ORAL at 22:37

## 2017-08-07 RX ADMIN — Medication 5 MILLIGRAM(S): at 18:11

## 2017-08-07 RX ADMIN — DIPHENHYDRAMINE HYDROCHLORIDE AND LIDOCAINE HYDROCHLORIDE AND ALUMINUM HYDROXIDE AND MAGNESIUM HYDRO 10 MILLILITER(S): KIT at 22:37

## 2017-08-07 RX ADMIN — Medication 81 MILLIGRAM(S): at 14:09

## 2017-08-07 RX ADMIN — Medication 500000 UNIT(S): at 14:09

## 2017-08-07 RX ADMIN — ATORVASTATIN CALCIUM 40 MILLIGRAM(S): 80 TABLET, FILM COATED ORAL at 22:37

## 2017-08-07 RX ADMIN — Medication 3 MILLILITER(S): at 22:29

## 2017-08-07 RX ADMIN — Medication 500000 UNIT(S): at 18:11

## 2017-08-07 RX ADMIN — Medication 25 MILLIGRAM(S): at 14:09

## 2017-08-07 RX ADMIN — Medication 5 MILLIGRAM(S): at 06:01

## 2017-08-07 RX ADMIN — SENNA PLUS 2 TABLET(S): 8.6 TABLET ORAL at 22:37

## 2017-08-07 RX ADMIN — Medication 100 MILLIGRAM(S): at 14:08

## 2017-08-07 RX ADMIN — Medication 3 MILLILITER(S): at 16:12

## 2017-08-07 NOTE — PROGRESS NOTE ADULT - SUBJECTIVE AND OBJECTIVE BOX
CHIEF COMPLAINT: Patient is a 89y old  Male who presents with a chief complaint of shortness of breath. Pt appears comfortable.    	  REVIEW OF SYSTEMS:  [ X] Unable to obtain    PHYSICAL EXAM:  T(C): 36.4 (08-07-17 @ 04:55), Max: 36.4 (08-06-17 @ 14:35)  HR: 97 (08-07-17 @ 08:00) (75 - 104)  BP: 155/78 (08-07-17 @ 08:00) (125/64 - 167/72)  RR: 16 (08-07-17 @ 04:55) (16 - 18)  SpO2: 93% (08-07-17 @ 08:00) (93% - 100%)    Appearance: Normal	  HEENT:   Normal oral mucosa, PERRL, EOMI	  Lymphatic: No lymphadenopathy  Cardiovascular: Normal S1 S2, + JVD, 2/6sm  Respiratory: Lungs clear to auscultation	  Gastrointestinal:  Soft, Non-tender, + BS	  Skin: No rashes, No ecchymoses, No cyanosis	  Extremities: Normal range of motion, No clubbing, cyanosis or edema  Vascular: Peripheral pulses palpable 2+ bilaterally    MEDICATIONS  (STANDING):  doxazosin 4 milliGRAM(s) Oral at bedtime  aspirin enteric coated 81 milliGRAM(s) Oral daily  ALBUTerol/ipratropium for Nebulization 3 milliLiter(s) Nebulizer every 6 hours  atorvastatin 40 milliGRAM(s) Oral at bedtime  clopidogrel Tablet 75 milliGRAM(s) Oral daily  amLODIPine   Tablet 2.5 milliGRAM(s) Oral daily  hydrALAZINE 25 milliGRAM(s) Oral three times a day  docusate sodium 100 milliGRAM(s) Oral daily  senna 2 Tablet(s) Oral at bedtime  heparin  Injectable 5000 Unit(s) SubCutaneous every 12 hours  nystatin    Suspension 542125 Unit(s) Oral four times a day  dronabinol 5 milliGRAM(s) Oral two times a day  mirtazapine 30 milliGRAM(s) Oral at bedtime      	  LABS:	 	                        14.3   13.9  )-----------( 304      ( 07 Aug 2017 06:05 )             43.9     08-07    136  |  99  |  67<H>  ----------------------------<  127<H>  4.8   |  31  |  1.23    Ca    9.0      07 Aug 2017 06:05  Phos  4.2     08-07  Mg     2.5     08-07      proBNP: Serum Pro-Brain Natriuretic Peptide: 74121 pg/mL (07-24 @ 10:41)    Lipid Profile: Cholesterol 150    HDL 31  TG 83    HgA1c: Hemoglobin A1C, Whole Blood: 5.7 % (07-25 @ 09:08)    TSH: Thyroid Stimulating Hormone, Serum: 0.32 uU/mL (07-25 @ 04:26)

## 2017-08-07 NOTE — PROGRESS NOTE ADULT - SUBJECTIVE AND OBJECTIVE BOX
Patient is a 89y old  Male who presents with a chief complaint of shortness of breath (29 Jul 2017 08:37)      INTERVAL HPI/OVERNIGHT EVENTS:  right knee pain better  still endorses throat pain and odynophagia  poor appetite  wife at bedside does not want rehab, wants to go home    T(C): 36.6 (08-07-17 @ 14:27), Max: 36.6 (08-07-17 @ 14:27)  HR: 99 (08-07-17 @ 14:27) (80 - 104)  BP: 141/68 (08-07-17 @ 14:27) (125/64 - 167/72)  RR: 18 (08-07-17 @ 14:27) (16 - 18)  SpO2: 96% (08-07-17 @ 14:27) (93% - 97%)  Wt(kg): --  I&O's Summary      LABS:                        14.3   13.9  )-----------( 304      ( 07 Aug 2017 06:05 )             43.9     08-07    136  |  99  |  67<H>  ----------------------------<  127<H>  4.8   |  31  |  1.23    Ca    9.0      07 Aug 2017 06:05  Phos  4.2     08-07  Mg     2.5     08-07          CAPILLARY BLOOD GLUCOSE      RADIOLOGY & ADDITIONAL TESTS:    Imaging Personally Reviewed:  [ ] YES  [ ] NO    Consultant(s) Notes Reviewed:  [ ] YES  [ ] NO    PHYSICAL EXAM:  GENERAL: NAD, well-groomed, well-developed  NECK: Supple, No JVD  HEENT: multiple erosions soft palate  CHEST/LUNG: Clear   HEART: Regular rate and rhythm; No murmurs, rubs, or gallops  ABDOMEN: Soft, Nontender, Nondistended; Bowel sounds present  EXTREMITIES:  no edema; redness/warmth left forearm from previous IV site; no fluctuance, no pus    Care Discussed with Consultants/Other Providers [ ] YES  [ ] NO

## 2017-08-07 NOTE — PROGRESS NOTE ADULT - SUBJECTIVE AND OBJECTIVE BOX
Patient is a 89y old  Male who presents with a chief complaint of shortness of breath (29 Jul 2017 08:37). Awake, alert, comfortable in bed in NAD. Doing well on oxygen via NC. S/p Respiratory failure. Out of ICU on medical floor.    INTERVAL HPI/OVERNIGHT EVENTS: Having poor appetite. Lethargic  but somewhat arousable. Family wants to take him home              VITAL SIGNS:  T(F): 97.5 (08-07-17 @ 04:55)  HR: 82 (08-07-17 @ 07:09)  BP: 125/64 (08-07-17 @ 07:09)  RR: 16 (08-07-17 @ 04:55)  SpO2: 97% (08-07-17 @ 04:55)  Wt(kg): --  I&O's Detail          REVIEW OF SYSTEMS:    CONSTITUTIONAL:  No fevers, chills, sweats    HEENT:  Eyes:  No diplopia or blurred vision. ENT:  No earache, sore throat or runny nose.    CARDIOVASCULAR:  No pressure, squeezing, tightness, or heaviness about the chest; no palpitations.    RESPIRATORY:  Per HPI    GASTROINTESTINAL:  No abdominal pain, nausea, vomiting or diarrhea.    GENITOURINARY:  No dysuria, frequency or urgency.    NEUROLOGIC:  No paresthesias, fasciculations, seizures or weakness.    PSYCHIATRIC:  No disorder of thought or mood.      PHYSICAL EXAM:    Constitutional: Well developed and nourished  Eyes:Perrla  ENMT: normal  Neck:supple  Respiratory: good air entry  Cardiovascular: S1 S2 regular  Gastrointestinal: Soft, Non tender  Extremities: No edema  Vascular:normal  Neurological:Awake, alert,Ox3  Musculoskeletal:Normal      MEDICATIONS  (STANDING):  doxazosin 4 milliGRAM(s) Oral at bedtime  aspirin enteric coated 81 milliGRAM(s) Oral daily  ALBUTerol/ipratropium for Nebulization 3 milliLiter(s) Nebulizer every 6 hours  atorvastatin 40 milliGRAM(s) Oral at bedtime  clopidogrel Tablet 75 milliGRAM(s) Oral daily  amLODIPine   Tablet 2.5 milliGRAM(s) Oral daily  hydrALAZINE 25 milliGRAM(s) Oral three times a day  docusate sodium 100 milliGRAM(s) Oral daily  senna 2 Tablet(s) Oral at bedtime  heparin  Injectable 5000 Unit(s) SubCutaneous every 12 hours  nystatin    Suspension 427338 Unit(s) Oral four times a day  dronabinol 5 milliGRAM(s) Oral two times a day  mirtazapine 30 milliGRAM(s) Oral at bedtime    MEDICATIONS  (PRN):  acetaminophen   Tablet. 650 milliGRAM(s) Oral every 6 hours PRN Mild Pain (1 - 3)  traMADol 50 milliGRAM(s) Oral three times a day PRN Severe Pain (7 - 10)  ondansetron Injectable 4 milliGRAM(s) IV Push every 6 hours PRN Nausea and/or Vomiting      Allergies    No Known Allergies    Intolerances        LABS:                        14.3   13.9  )-----------( 304      ( 07 Aug 2017 06:05 )             43.9     08-07    136  |  99  |  67<H>  ----------------------------<  127<H>  4.8   |  31  |  1.23    Ca    9.0      07 Aug 2017 06:05  Phos  4.2     08-07  Mg     2.5     08-07                CAPILLARY BLOOD GLUCOSE            RADIOLOGY & ADDITIONAL TESTS:    CXR:    Ct scan chest:    ekg;    echo:

## 2017-08-07 NOTE — PROGRESS NOTE ADULT - ASSESSMENT
89yo [place]-speaking male from home, former smoker, lives with wife, PMH laryngeal cancer (diagnosed 2015, s/p XRT, no evidence disease), COPD, CAD s/p stents x 3, s/p left carotid stent, HTN, asthma, depression, anxiety p/w acute shortness of breath 2/2 CHF exacerbation

## 2017-08-07 NOTE — PROGRESS NOTE ADULT - PROBLEM SELECTOR PLAN 1
patient with severe MR. figueroa, 02 sat adequate on nasal canula.  Patient qualifies for home 02 as 02 sat 80% on room air at rest. Patient is in chronic and stable condition.  Patient appears dehydrated.  - d/w Dr Andrews - cortez and lasix dc'ed.  patient appears more alert.  off IVF now

## 2017-08-07 NOTE — PROGRESS NOTE ADULT - SUBJECTIVE AND OBJECTIVE BOX
Problem List:  DAVID non oliguric due to cardiorenal syndrome  CHF resolved  Renal function improved.  Basic Metabolic Panel (08.07.17 @ 06:05)    Sodium, Serum: 136 mmol/L    Potassium, Serum: 4.8 mmol/L    Chloride, Serum: 99 mmol/L    Carbon Dioxide, Serum: 31 mmol/L    Anion Gap, Serum: 6 mmol/L    Blood Urea Nitrogen, Serum: 67 mg/dL    Creatinine, Serum: 1.23 mg/dL    Glucose, Serum: 127 mg/dL    Calcium, Total Serum: 9.0 mg/dL    eGFR if Non : 50: Interpretative comment  The units for eGFR are ml/min/1.73m2 (normalized body surface area). The    PAST MEDICAL & SURGICAL HISTORY:  Coronary artery disease involving native coronary artery of native heart without angina pectoris  Laryngeal cancer  Essential hypertension  Asthma  Arthritis  Degenerative joint disease  No significant past surgical history    No Known Allergies    MEDICATIONS  (STANDING):  doxazosin 4 milliGRAM(s) Oral at bedtime  aspirin enteric coated 81 milliGRAM(s) Oral daily  ALBUTerol/ipratropium for Nebulization 3 milliLiter(s) Nebulizer every 6 hours  atorvastatin 40 milliGRAM(s) Oral at bedtime  clopidogrel Tablet 75 milliGRAM(s) Oral daily  amLODIPine   Tablet 2.5 milliGRAM(s) Oral daily  hydrALAZINE 25 milliGRAM(s) Oral three times a day  docusate sodium 100 milliGRAM(s) Oral daily  senna 2 Tablet(s) Oral at bedtime  heparin  Injectable 5000 Unit(s) SubCutaneous every 12 hours  nystatin    Suspension [id number] Unit(s) Oral four times a day  dronabinol 5 milliGRAM(s) Oral two times a day  mirtazapine 30 milliGRAM(s) Oral at bedtime    MEDICATIONS  (PRN):  acetaminophen   Tablet. 650 milliGRAM(s) Oral every 6 hours PRN Mild Pain (1 - 3)  traMADol 50 milliGRAM(s) Oral three times a day PRN Severe Pain (7 - 10)  ondansetron Injectable 4 milliGRAM(s) IV Push every 6 hours PRN Nausea and/or Vomiting                            14.3   13.9  )-----------( 304      ( 07 Aug 2017 06:05 )             43.9     REVIEW OF SYSTEMS:  General: no fever no chills, no weight loss. weak.  EYES/ENT: No visual changes;  No vertigo, no headache.  NECK: No pain or stiffness  RESPIRATORY: No cough, wheezing, hemoptysis; No shortness of breath  CARDIOVASCULAR: No chest pain or palpitations. No Edema  GASTROINTESTINAL: reduce po intake  GENITOURINARY: No dysuria, frequency, foamy urine, urinary urgency, incontinence or hematuria  NEUROLOGICAL: No numbness or weakness, no tremor , no dizziness.     VITALS:  T(F): 97.5 (08-07-17 @ 04:55), Max: 97.5 (08-06-17 @ 14:35)  HR: 82 (08-07-17 @ 07:09)  BP: 125/64 (08-07-17 @ 07:09)  RR: 16 (08-07-17 @ 04:55)  SpO2: 97% (08-07-17 @ 04:55)  Wt(kg): --      PHYSICAL EXAM:  Constitutional: well developed, no diaphoresis, no distress.  Neck: No JVD, no carotid bruit, supple, no adenopathy  Respiratory: Good air entrance B/L, no wheezes, rales or rhonchi  Cardiovascular: irregular irregular no pericardial rub, no murmur  Abdomen: BS+, soft, no tenderness, no bruit  Pelvis: bladder nondistended  Extremities: No cyanosis or clubbing. No peripheral edema.   Pulses: All present  Neurological: A/O x 3, no focal deficits  Psychiatric: Normal mood, normal affect  Skin: No rashes  Vascular Access:

## 2017-08-07 NOTE — PROGRESS NOTE ADULT - ASSESSMENT
89 yr old  Irish-speaking male from home, former smoker, lives with wife, PMH laryngeal cancer (diagnosed 2015, s/p XRT, no evidence disease), COPD, CAD s/p stents x 3, s/p left carotid stent, HTN, asthma, depression, anxiety p/w acute shortness of breath due to diastolic HF and mod-severe MR.  1.Hold po lasix.  2.Severe pulm HTN-Norvasc 2.5mg qd.  3.Continue cardiac medication.  4.COPD-nebs.  5.HTN-Cont BP medication.  6.GI and DVT prophylaxis.  7.Continue remeron.

## 2017-08-07 NOTE — PROGRESS NOTE ADULT - PROBLEM SELECTOR PLAN 5
nephrology following.  2/2 cardiorenal syndrome, chronic. resolved after gentle hydration and holding lasix and aldactone

## 2017-08-07 NOTE — PROGRESS NOTE ADULT - ASSESSMENT
DAVID cardiorenal syndrome and CHF resolved patient is off diuretics at present creatinine and BUN improved  Hyponatremia resolved.  Poor nutrition and pO intake no need for diuretics at this time.  No need for renal follow up at this time please call if need

## 2017-08-07 NOTE — PROGRESS NOTE ADULT - SUBJECTIVE AND OBJECTIVE BOX
_____________________________________________________________________________  ========>>  M E D I C A L   A T T E N D I N G    F O L L O W  U P  N O T E  <<=========  ---------------------------------------------------------------------------------------------------------------------------------------    - Patient seen and examined by me approximately thirty minutes ago.  - In summary, patient is a 89y year old man who originally presented with respiratory failure, post Bipap.  - Patient today overall doing ok, not eating well..  Pt reportedly not willing to go to rehab,, home care being setup again!!    ==================>> REVIEW OF SYSTEM <<=================    GEN: no fever, no chills  RESP: mild  SOB now, no cough, no sputum, reports of discomfort with swallowing   CVS: no chest pain, no palpitations, no edema(started on diflucan for fungal infection)  GI: no abdominal pain, no nausea, no vomiting, no constipation, no diarrhea  Neuro: no headache, no dizziness  Derm : no itching, no rash  PSYCH: feels depressed    ==================>> VITAL SIGNS <<==================    Vital Signs Last 24 Hrs  T(C): 36.6 (08-07-17 @ 14:27)  T(F): 97.8 (08-07-17 @ 14:27), Max: 97.8 (08-07-17 @ 14:27)  HR: 99 (08-07-17 @ 14:27) (75 - 104)  BP: 141/68 (08-07-17 @ 14:27)  BP(mean): --  RR: 18 (08-07-17 @ 14:27) (16 - 18)  SpO2: 96% (08-07-17 @ 14:27) (93% - 97%)      ==================>> PHYSICAL EXAM <<=================    GEN: Alert and oriented, pleasant, comfortable NAD, weak appearing  HEENT: NCAT, PERRL, MMM, hearing intact,  + mild oropharyngeal thrush  Neck: supple , no JVD  CVS: S1S2 , regular , No M/R/G appreciated  PULM: clear bilaterally (limited)  ABD.: soft. non tender, non distended,  bowel sounds present  Extrem: intact pulses , no edema  Derm: No rash , no ecchymoses  PSYCH : normal mood,  no delusion not anxious     ==================>> LAB AND IMAGING <<==================                                            14.3   13.9  )-----------( 304      ( 07 Aug 2017 06:05 )             43.9        08-07    136  |  99  |  67<H>  ----------------------------<  127<H>  4.8   |  31  |  1.23    Ca    9.0      07 Aug 2017 06:05  Phos  4.2     08-07  Mg     2.5     08-07      < from: Transthoracic Echocardiogram (07.24.17 @ 14:24) >  CONCLUSIONS:  1. Normal mitral valve. Moderate to severe mitral  regurgitation.  2. Calcified trileaflet aortic valve with normal opening.  Mild aortic insufficiency.  3. Normal aortic root.  4. Moderate left atrial enlargement.  5. Normal left ventricular internal dimensions and wall  thicknesses.  6. Normal Left Ventricular Systolic Function,  (EF = 55%)  7. Grade II diastolic dysfunction  8. Normal right atrium.  9. Normal right ventricular size and function.  10. RV systolic pressure is severely increased (PASP 65mm  Hg).  11. There is mild tricuspid regurgitation.  12. There is mild pulmonic regurgitation.  13. Normal pericardium with no pericardial effusion.  < end of copied text >    ________________________________________________________________________  ===============>>  A S S E S S M E N T   A N D   P L A N <<===============  ------------------------------------------------------------------------------------------------------------------------------    **hypoxemia; MR, severe PAH, COPD, diastolic heart failure with pleural effusion, overall improved  ---cardio, pulm f/u appreciated  ---Home O2 set up being worked on  ---nebulizer treatment as needed  --- diuresis as needed   ---monitor vitals, creatinine electrolytes  --PT/ OOB as able ;  Incentive Spirometer    **nausea, decreased PO intake  ---on marinol 5 mg  ---on remeron to 30mg : continue    **DAVID likely on CKD?, Hyponatremia, overall improved  ---renal f/u  ---monitor BMP closely   ---free fluid restriction  ---encouraged pt and family for increased PO intake as able (d/w family, Rn)    **Hypertension, CAD, MR, PAH  ---Continue with current medications as above  ---DASH diet   ---close monitoring of vitals    **BPH  --- Continue Current medications and monitor.     **pseudogout  ---post orthopedic intervention and steropid injection  ---post colchicine  ---PT : home PT    **thrush  ---nystatin swish ans swallow    -GI/DVT Prophylaxis.  DC planing as above  --------------------------------------------  Case discussed with pt, HS, family, Rn  Education given on deep breathing, plan of care  ___________________________    H. KARLA Rich.  Pager: [phone] _____________________________________________________________________________  ========>>  M E D I C A L   A T T E N D I N G    F O L L O W  U P  N O T E  <<=========  ---------------------------------------------------------------------------------------------------------------------------------------    - Patient seen and examined by me approximately thirty minutes ago.  - In summary, patient is a 89y year old man who originally presented with respiratory failure, post Bipap.  - Patient today overall doing ok, not eating well..  Pt reportedly not willing to go to rehab,, home care being setup again!!    ==================>> REVIEW OF SYSTEM <<=================    GEN: no fever, no chills  RESP: mild  SOB now, no cough, no sputum, reports of discomfort with swallowing   CVS: no chest pain, no palpitations, no edema(started on diflucan for fungal infection)  GI: no abdominal pain, no nausea, no vomiting, no constipation, no diarrhea  Neuro: no headache, no dizziness  Derm : no itching, no rash  PSYCH: feels depressed    ==================>> VITAL SIGNS <<==================    Vital Signs Last 24 Hrs  T(C): 36.6 (08-07-17 @ 14:27)  T(F): 97.8 (08-07-17 @ 14:27), Max: 97.8 (08-07-17 @ 14:27)  HR: 99 (08-07-17 @ 14:27) (75 - 104)  BP: 141/68 (08-07-17 @ 14:27)  BP(mean): --  RR: 18 (08-07-17 @ 14:27) (16 - 18)  SpO2: 96% (08-07-17 @ 14:27) (93% - 97%)      ==================>> PHYSICAL EXAM <<=================    GEN: Alert and oriented, pleasant, comfortable NAD, weak appearing  HEENT: NCAT, PERRL, MMM, hearing intact,  + mild oropharyngeal thrush  Neck: supple , no JVD  CVS: S1S2 , regular , No M/R/G appreciated  PULM: clear bilaterally (limited)  ABD.: soft. non tender, non distended,  bowel sounds present  Extrem: intact pulses , no edema  Derm: No rash , no ecchymoses  PSYCH : normal mood,  no delusion not anxious     ==================>> LAB AND IMAGING <<==================                                            14.3   13.9  )-----------( 304      ( 07 Aug 2017 06:05 )             43.9        08-07    136  |  99  |  67<H>  ----------------------------<  127<H>  4.8   |  31  |  1.23    Ca    9.0      07 Aug 2017 06:05  Phos  4.2     08-07  Mg     2.5     08-07      < from: Transthoracic Echocardiogram (07.24.17 @ 14:24) >  CONCLUSIONS:  1. Normal mitral valve. Moderate to severe mitral  regurgitation.  2. Calcified trileaflet aortic valve with normal opening.  Mild aortic insufficiency.  3. Normal aortic root.  4. Moderate left atrial enlargement.  5. Normal left ventricular internal dimensions and wall  thicknesses.  6. Normal Left Ventricular Systolic Function,  (EF = 55%)  7. Grade II diastolic dysfunction  8. Normal right atrium.  9. Normal right ventricular size and function.  10. RV systolic pressure is severely increased (PASP 65mm  Hg).  11. There is mild tricuspid regurgitation.  12. There is mild pulmonic regurgitation.  13. Normal pericardium with no pericardial effusion.  < end of copied text >    ________________________________________________________________________  ===============>>  A S S E S S M E N T   A N D   P L A N <<===============  ------------------------------------------------------------------------------------------------------------------------------    **hypoxemia; MR, severe PAH, COPD, diastolic heart failure with pleural effusion, overall improved  ---cardio, pulm f/u appreciated  ---Home O2 set up being worked on  ---nebulizer treatment as needed  --- diuresis as needed   ---monitor vitals, creatinine electrolytes  --PT/ OOB as able ;  Incentive Spirometer    **nausea, decreased PO intake  ---on marinol 5 mg  ---on remeron to 30mg : continue    **DAVID likely on CKD?, Hyponatremia, overall improved  ---renal f/u  ---monitor BMP closely   ---free fluid restriction  ---encouraged pt and family for increased PO intake as able (d/w family, Rn)    **Hypertension, CAD, MR, PAH  ---Continue with current medications as above  ---DASH diet   ---close monitoring of vitals    **BPH  --- Continue Current medications and monitor.     **pseudogout  ---post orthopedic intervention and steropid injection  ---post colchicine  ---PT : home PT    **thrush  ---nystatin swish ans swallow, short course diflucan    -GI/DVT Prophylaxis.  DC planing as above  --------------------------------------------  Case discussed with pt, HS, family, Rn  Education given on deep breathing, plan of care  ___________________________    H. KARLA Rich.  Pager: [phone]

## 2017-08-08 DIAGNOSIS — G30.9 ALZHEIMER'S DISEASE, UNSPECIFIED: ICD-10-CM

## 2017-08-08 LAB
ANION GAP SERPL CALC-SCNC: 8 MMOL/L — SIGNIFICANT CHANGE UP (ref 5–17)
BUN SERPL-MCNC: 70 MG/DL — HIGH (ref 7–18)
CALCIUM SERPL-MCNC: 9.3 MG/DL — SIGNIFICANT CHANGE UP (ref 8.4–10.5)
CHLORIDE SERPL-SCNC: 98 MMOL/L — SIGNIFICANT CHANGE UP (ref 96–108)
CO2 SERPL-SCNC: 29 MMOL/L — SIGNIFICANT CHANGE UP (ref 22–31)
CREAT SERPL-MCNC: 1.37 MG/DL — HIGH (ref 0.5–1.3)
GLUCOSE SERPL-MCNC: 128 MG/DL — HIGH (ref 70–99)
HCT VFR BLD CALC: 44.9 % — SIGNIFICANT CHANGE UP (ref 39–50)
HGB BLD-MCNC: 14.4 G/DL — SIGNIFICANT CHANGE UP (ref 13–17)
MCHC RBC-ENTMCNC: 29.3 PG — SIGNIFICANT CHANGE UP (ref 27–34)
MCHC RBC-ENTMCNC: 32.2 GM/DL — SIGNIFICANT CHANGE UP (ref 32–36)
MCV RBC AUTO: 91 FL — SIGNIFICANT CHANGE UP (ref 80–100)
PLATELET # BLD AUTO: 286 K/UL — SIGNIFICANT CHANGE UP (ref 150–400)
POTASSIUM SERPL-MCNC: 4.7 MMOL/L — SIGNIFICANT CHANGE UP (ref 3.5–5.3)
POTASSIUM SERPL-SCNC: 4.7 MMOL/L — SIGNIFICANT CHANGE UP (ref 3.5–5.3)
RBC # BLD: 4.93 M/UL — SIGNIFICANT CHANGE UP (ref 4.2–5.8)
RBC # FLD: 13 % — SIGNIFICANT CHANGE UP (ref 10.3–14.5)
SODIUM SERPL-SCNC: 135 MMOL/L — SIGNIFICANT CHANGE UP (ref 135–145)
WBC # BLD: 15.2 K/UL — HIGH (ref 3.8–10.5)
WBC # FLD AUTO: 15.2 K/UL — HIGH (ref 3.8–10.5)

## 2017-08-08 PROCEDURE — 71010: CPT | Mod: 26

## 2017-08-08 RX ORDER — VANCOMYCIN HCL 1 G
1000 VIAL (EA) INTRAVENOUS ONCE
Qty: 0 | Refills: 0 | Status: COMPLETED | OUTPATIENT
Start: 2017-08-08 | End: 2017-08-08

## 2017-08-08 RX ORDER — CHLORPROMAZINE HCL 10 MG
25 TABLET ORAL ONCE
Qty: 0 | Refills: 0 | Status: COMPLETED | OUTPATIENT
Start: 2017-08-08 | End: 2017-08-08

## 2017-08-08 RX ORDER — SODIUM CHLORIDE 9 MG/ML
1000 INJECTION, SOLUTION INTRAVENOUS
Qty: 0 | Refills: 0 | Status: DISCONTINUED | OUTPATIENT
Start: 2017-08-08 | End: 2017-08-09

## 2017-08-08 RX ADMIN — CLOPIDOGREL BISULFATE 75 MILLIGRAM(S): 75 TABLET, FILM COATED ORAL at 11:17

## 2017-08-08 RX ADMIN — Medication 5 MILLIGRAM(S): at 17:08

## 2017-08-08 RX ADMIN — SODIUM CHLORIDE 50 MILLILITER(S): 9 INJECTION, SOLUTION INTRAVENOUS at 16:45

## 2017-08-08 RX ADMIN — Medication 3 MILLILITER(S): at 21:40

## 2017-08-08 RX ADMIN — HEPARIN SODIUM 5000 UNIT(S): 5000 INJECTION INTRAVENOUS; SUBCUTANEOUS at 17:08

## 2017-08-08 RX ADMIN — DIPHENHYDRAMINE HYDROCHLORIDE AND LIDOCAINE HYDROCHLORIDE AND ALUMINUM HYDROXIDE AND MAGNESIUM HYDRO 10 MILLILITER(S): KIT at 21:13

## 2017-08-08 RX ADMIN — Medication 25 MILLIGRAM(S): at 13:15

## 2017-08-08 RX ADMIN — Medication 5 MILLIGRAM(S): at 06:33

## 2017-08-08 RX ADMIN — Medication 100 MILLIGRAM(S): at 11:17

## 2017-08-08 RX ADMIN — Medication 3 MILLILITER(S): at 09:58

## 2017-08-08 RX ADMIN — Medication 3 MILLILITER(S): at 15:50

## 2017-08-08 RX ADMIN — Medication 25 MILLIGRAM(S): at 21:13

## 2017-08-08 RX ADMIN — ATORVASTATIN CALCIUM 40 MILLIGRAM(S): 80 TABLET, FILM COATED ORAL at 21:13

## 2017-08-08 RX ADMIN — Medication 500000 UNIT(S): at 06:32

## 2017-08-08 RX ADMIN — Medication 25 MILLIGRAM(S): at 06:29

## 2017-08-08 RX ADMIN — FLUCONAZOLE 100 MILLIGRAM(S): 150 TABLET ORAL at 06:30

## 2017-08-08 RX ADMIN — SENNA PLUS 2 TABLET(S): 8.6 TABLET ORAL at 21:13

## 2017-08-08 RX ADMIN — Medication 4 MILLIGRAM(S): at 21:13

## 2017-08-08 RX ADMIN — Medication 25 MILLIGRAM(S): at 11:17

## 2017-08-08 RX ADMIN — AMLODIPINE BESYLATE 2.5 MILLIGRAM(S): 2.5 TABLET ORAL at 06:29

## 2017-08-08 RX ADMIN — Medication 250 MILLIGRAM(S): at 11:17

## 2017-08-08 RX ADMIN — MIRTAZAPINE 30 MILLIGRAM(S): 45 TABLET, ORALLY DISINTEGRATING ORAL at 21:13

## 2017-08-08 RX ADMIN — HEPARIN SODIUM 5000 UNIT(S): 5000 INJECTION INTRAVENOUS; SUBCUTANEOUS at 06:30

## 2017-08-08 RX ADMIN — Medication 81 MILLIGRAM(S): at 11:17

## 2017-08-08 NOTE — PROGRESS NOTE ADULT - SUBJECTIVE AND OBJECTIVE BOX
Patient is a 89y old  Male who presents with a chief complaint of shortness of breath (29 Jul 2017 08:37)      INTERVAL HPI/OVERNIGHT EVENTS:  right knee pain improved  swallowing improved but still poor appetite  endorses left arm pain at previous IV site  + hicuups    T(C): 36.8 (08-08-17 @ 14:10), Max: 37.4 (08-07-17 @ 21:41)  HR: 98 (08-08-17 @ 14:10) (93 - 102)  BP: 124/60 (08-08-17 @ 14:10) (123/70 - 138/66)  RR: 16 (08-08-17 @ 14:10) (16 - 17)  SpO2: 98% (08-08-17 @ 14:10) (93% - 98%)  Wt(kg): --  I&O's Summary      LABS:                        14.4   15.2  )-----------( 286      ( 08 Aug 2017 07:02 )             44.9     08-08    135  |  98  |  70<H>  ----------------------------<  128<H>  4.7   |  29  |  1.37<H>    Ca    9.3      08 Aug 2017 07:02  Phos  4.2     08-07  Mg     2.5     08-07          CAPILLARY BLOOD GLUCOSE        RADIOLOGY & ADDITIONAL TESTS:    Imaging Personally Reviewed:  [ ] YES  [ ] NO    Consultant(s) Notes Reviewed:  [ ] YES  [ ] NO    PHYSICAL EXAM:  GENERAL: NAD, alert, oriented  NECK: Supple, No JVD  HEENT: soft palate superficial erosions healing  NERVOUS SYSTEM:  Alert & Oriented X3, Motor Strength 5/5 B/L upper and lower extremities; sensation intact  CHEST/LUNG: Clear bilaterally  HEART: Regular rate and rhythm; No murmurs, rubs, or gallops  ABDOMEN: Soft, Nontender, Nondistended  EXTREMITIES:  2+ Peripheral Pulses, No clubbing, cyanosis, or edema; increased redness left forearm around IV site, no fluctuance, no pus    Care Discussed with Consultants/Other Providers [ ] YES  [ ] NO

## 2017-08-08 NOTE — PROGRESS NOTE ADULT - SUBJECTIVE AND OBJECTIVE BOX
Patient is a 89y old  Male who presents with a chief complaint of shortness of breath (29 Jul 2017 08:37). Awake, alert, comfortable in bed in NAD. Doing well on oxygen via NC. S/p Respiratory failure. Out of ICU on medical floor.    INTERVAL HPI/OVERNIGHT EVENTS: Having poor appetite. Lethargic  but somewhat arousable. Family wants to take him home      VITAL SIGNS:  T(F): 97.6 (08-08-17 @ 05:11)  HR: 93 (08-08-17 @ 05:11)  BP: 138/66 (08-08-17 @ 05:11)  RR: 16 (08-08-17 @ 05:11)  SpO2: 95% (08-08-17 @ 05:11)  Wt(kg): --  I&O's Detail          REVIEW OF SYSTEMS:    CONSTITUTIONAL:  No fevers, chills, sweats    HEENT:  Eyes:  No diplopia or blurred vision. ENT:  No earache, sore throat or runny nose.    CARDIOVASCULAR:  No pressure, squeezing, tightness, or heaviness about the chest; no palpitations.    RESPIRATORY:  Per HPI    GASTROINTESTINAL:  No abdominal pain, nausea, vomiting or diarrhea.    GENITOURINARY:  No dysuria, frequency or urgency.    NEUROLOGIC:  No paresthesias, fasciculations, seizures or weakness.    PSYCHIATRIC:  No disorder of thought or mood.      PHYSICAL EXAM:    Constitutional: Well developed and nourished  Eyes:Perrla  ENMT: normal  Neck:supple  Respiratory: good air entry  Cardiovascular: S1 S2 regular  Gastrointestinal: Soft, Non tender  Extremities: No edema  Vascular:normal  Neurological:Awake, alert,Ox3  Musculoskeletal:Normal      MEDICATIONS  (STANDING):  doxazosin 4 milliGRAM(s) Oral at bedtime  aspirin enteric coated 81 milliGRAM(s) Oral daily  ALBUTerol/ipratropium for Nebulization 3 milliLiter(s) Nebulizer every 6 hours  atorvastatin 40 milliGRAM(s) Oral at bedtime  clopidogrel Tablet 75 milliGRAM(s) Oral daily  amLODIPine   Tablet 2.5 milliGRAM(s) Oral daily  hydrALAZINE 25 milliGRAM(s) Oral three times a day  docusate sodium 100 milliGRAM(s) Oral daily  senna 2 Tablet(s) Oral at bedtime  heparin  Injectable 5000 Unit(s) SubCutaneous every 12 hours  dronabinol 5 milliGRAM(s) Oral two times a day  mirtazapine 30 milliGRAM(s) Oral at bedtime  fluconAZOLE IVPB 100 milliGRAM(s) IV Intermittent every 24 hours  FIRST- Mouthwash  BLM 10 milliLiter(s) Swish and Spit three times a day    MEDICATIONS  (PRN):  acetaminophen   Tablet. 650 milliGRAM(s) Oral every 6 hours PRN Mild Pain (1 - 3)  traMADol 50 milliGRAM(s) Oral three times a day PRN Severe Pain (7 - 10)  ondansetron Injectable 4 milliGRAM(s) IV Push every 6 hours PRN Nausea and/or Vomiting  artificial  tears Solution 1 Drop(s) Both EYES two times a day PRN Dry Eyes      Allergies    No Known Allergies    Intolerances        LABS:                        14.4   15.2  )-----------( 286      ( 08 Aug 2017 07:02 )             44.9     08-08    135  |  98  |  70<H>  ----------------------------<  128<H>  4.7   |  29  |  1.37<H>    Ca    9.3      08 Aug 2017 07:02  Phos  4.2     08-07  Mg     2.5     08-07                CAPILLARY BLOOD GLUCOSE            RADIOLOGY & ADDITIONAL TESTS:    CXR:    Ct scan chest:    ekg;    echo: Patient is a 89y old  Male who presents with a chief complaint of shortness of breath (29 Jul 2017 08:37). Awake, alert, comfortable in bed in NAD. Doing well on oxygen via NC. S/p Respiratory failure. Out of ICU on medical floor.    INTERVAL HPI/OVERNIGHT EVENTS: Having poor appetite. Lethargic  but somewhat arousable. Family wants to take him home  Pain on right knee improved. Has a hx of gout.    VITAL SIGNS:  T(F): 97.6 (08-08-17 @ 05:11)  HR: 93 (08-08-17 @ 05:11)  BP: 138/66 (08-08-17 @ 05:11)  RR: 16 (08-08-17 @ 05:11)  SpO2: 95% (08-08-17 @ 05:11)  Wt(kg): --  I&O's Detail          REVIEW OF SYSTEMS:    CONSTITUTIONAL:  No fevers, chills, sweats    HEENT:  Eyes:  No diplopia or blurred vision. ENT:  No earache, sore throat or runny nose.    CARDIOVASCULAR:  No pressure, squeezing, tightness, or heaviness about the chest; no palpitations.    RESPIRATORY:  Per HPI    GASTROINTESTINAL:  No abdominal pain, nausea, vomiting or diarrhea.    GENITOURINARY:  No dysuria, frequency or urgency.    NEUROLOGIC:  No paresthesias, fasciculations, seizures or weakness.    PSYCHIATRIC:  No disorder of thought or mood.      PHYSICAL EXAM:    Constitutional: Well developed and nourished  Eyes:Perrla  ENMT: normal  Neck:supple  Respiratory: good air entry  Cardiovascular: S1 S2 regular  Gastrointestinal: Soft, Non tender  Extremities: No edema  Vascular:normal  Neurological:Awake, alert,Ox3  Musculoskeletal:Normal      MEDICATIONS  (STANDING):  doxazosin 4 milliGRAM(s) Oral at bedtime  aspirin enteric coated 81 milliGRAM(s) Oral daily  ALBUTerol/ipratropium for Nebulization 3 milliLiter(s) Nebulizer every 6 hours  atorvastatin 40 milliGRAM(s) Oral at bedtime  clopidogrel Tablet 75 milliGRAM(s) Oral daily  amLODIPine   Tablet 2.5 milliGRAM(s) Oral daily  hydrALAZINE 25 milliGRAM(s) Oral three times a day  docusate sodium 100 milliGRAM(s) Oral daily  senna 2 Tablet(s) Oral at bedtime  heparin  Injectable 5000 Unit(s) SubCutaneous every 12 hours  dronabinol 5 milliGRAM(s) Oral two times a day  mirtazapine 30 milliGRAM(s) Oral at bedtime  fluconAZOLE IVPB 100 milliGRAM(s) IV Intermittent every 24 hours  FIRST- Mouthwash  BLM 10 milliLiter(s) Swish and Spit three times a day    MEDICATIONS  (PRN):  acetaminophen   Tablet. 650 milliGRAM(s) Oral every 6 hours PRN Mild Pain (1 - 3)  traMADol 50 milliGRAM(s) Oral three times a day PRN Severe Pain (7 - 10)  ondansetron Injectable 4 milliGRAM(s) IV Push every 6 hours PRN Nausea and/or Vomiting  artificial  tears Solution 1 Drop(s) Both EYES two times a day PRN Dry Eyes      Allergies    No Known Allergies    Intolerances        LABS:                        14.4   15.2  )-----------( 286      ( 08 Aug 2017 07:02 )             44.9     08-08    135  |  98  |  70<H>  ----------------------------<  128<H>  4.7   |  29  |  1.37<H>    Ca    9.3      08 Aug 2017 07:02  Phos  4.2     08-07  Mg     2.5     08-07                CAPILLARY BLOOD GLUCOSE            RADIOLOGY & ADDITIONAL TESTS:    CXR:    Ct scan chest:    ekg;    echo:

## 2017-08-08 NOTE — PROGRESS NOTE ADULT - SUBJECTIVE AND OBJECTIVE BOX
Problem List:  DAVID cardiorenal syndrome ad CHF on admission  Dehydration at present with poor nutrition.  Left arm Phlebitis   Oral thrush.    PAST MEDICAL & SURGICAL HISTORY:  Coronary artery disease involving native coronary artery of native heart without angina pectoris  Laryngeal cancer  Essential hypertension  Asthma  Arthritis  Degenerative joint disease  No significant past surgical history  No Known Allergies    MEDICATIONS  (STANDING):  doxazosin 4 milliGRAM(s) Oral at bedtime  aspirin enteric coated 81 milliGRAM(s) Oral daily  ALBUTerol/ipratropium for Nebulization 3 milliLiter(s) Nebulizer every 6 hours  atorvastatin 40 milliGRAM(s) Oral at bedtime  clopidogrel Tablet 75 milliGRAM(s) Oral daily  amLODIPine   Tablet 2.5 milliGRAM(s) Oral daily  hydrALAZINE 25 milliGRAM(s) Oral three times a day  docusate sodium 100 milliGRAM(s) Oral daily  senna 2 Tablet(s) Oral at bedtime  heparin  Injectable 5000 Unit(s) SubCutaneous every 12 hours  dronabinol 5 milliGRAM(s) Oral two times a day  mirtazapine 30 milliGRAM(s) Oral at bedtime  fluconAZOLE IVPB 100 milliGRAM(s) IV Intermittent every 24 hours  FIRST- Mouthwash  BLM 10 milliLiter(s) Swish and Spit three times a day    MEDICATIONS  (PRN):  acetaminophen   Tablet. 650 milliGRAM(s) Oral every 6 hours PRN Mild Pain (1 - 3)  traMADol 50 milliGRAM(s) Oral three times a day PRN Severe Pain (7 - 10)  ondansetron Injectable 4 milliGRAM(s) IV Push every 6 hours PRN Nausea and/or Vomiting  artificial  tears Solution 1 Drop(s) Both EYES two times a day PRN Dry Eyes                        14.4   15.2  )-----------( 286      ( 08 Aug 2017 07:02 )             44.9     08-08    135  |  98  |  70<H>  ----------------------------<  128<H>  4.7   |  29  |  1.37<H>    Ca    9.3      08 Aug 2017 07:02  Phos  4.2     08-07  Mg     2.5     08-07        REVIEW OF SYSTEMS:  weak malaise and fatigue  Reduced pom intake    VITALS:  T(F): 98.2 (08-08-17 @ 14:10), Max: 99.3 (08-07-17 @ 21:41)  HR: 98 (08-08-17 @ 14:10)  BP: 124/60 (08-08-17 @ 14:10)  RR: 16 (08-08-17 @ 14:10)  SpO2: 98% (08-08-17 @ 14:10)  Wt(kg): --  PHYSICAL EXAM:  Constitutional:  no diaphoresis, no distress.  Neck: No JVD, no carotid bruit, supple, no adenopathy  Respiratory: Good air entrance B/L, no wheezes, rales or rhonchi  Cardiovascular: irregular irregular, no pericardial rub, no murmur  Abdomen: BS+, soft, no tenderness, no bruit  Pelvis: bladder nondistended  Extremities: No cyanosis or clubbing. No peripheral edema.

## 2017-08-08 NOTE — PROGRESS NOTE ADULT - SUBJECTIVE AND OBJECTIVE BOX
_____________________________________________________________________________  ========>>  M E D I C A L   A T T E N D I N G    F O L L O W  U P  N O T E  <<=========  ---------------------------------------------------------------------------------------------------------------------------------------    - Patient seen and examined by me approximately thirty minutes ago.  - In summary, patient is a 89y year old man who originally presented with respiratory failure, post Bipap.  - Patient today overall doing ok, not eating well.      Pt developed left foerearm thrombophlebitis at an old IV site, with elevated WBC, vanco given X1 dose earlier today    ==================>> REVIEW OF SYSTEM <<=================    GEN: no fever, no chills, pain in left aem  RESP: mild  SOB now, no cough, no sputum, swallowing seems to have improved  CVS: no chest pain, no palpitations, no edema  GI: no abdominal pain, no nausea, no vomiting, no constipation, no diarrhea  Neuro: no headache, no dizziness  Derm : no itching, no rash  PSYCH: feels depressed    ==================>> VITAL SIGNS <<==================  Vital Signs Last 24 Hrs  T(C): 36.8 (08-08-17 @ 14:10)  T(F): 98.2 (08-08-17 @ 14:10), Max: 99.3 (08-07-17 @ 21:41)  HR: 98 (08-08-17 @ 14:10) (93 - 102)  BP: 124/60 (08-08-17 @ 14:10)  BP(mean): --  RR: 16 (08-08-17 @ 14:10) (16 - 17)  SpO2: 98% (08-08-17 @ 14:10) (93% - 98%)      ==================>> PHYSICAL EXAM <<=================    GEN: Alert and oriented, pleasant, comfortable NAD, weak appearing  HEENT: NCAT, PERRL, MMM, hearing intact,  + mild oropharyngeal thrush  Neck: supple , no JVD  CVS: S1S2 , regular , No M/R/G appreciated  PULM: clear bilaterally (limited)  ABD.: soft. non tender, non distended,  bowel sounds present  Extrem: intact pulses , no edema      left forearm superficial thrombophlebitis  Derm: No rash , no ecchymoses  PSYCH : normal mood,  no delusion not anxious     ==================>> LAB AND IMAGING <<==================                                                     14.4   15.2  )-----------( 286      ( 08 Aug 2017 07:02 )             44.9   WBC:  15.2    (08-08-17 @ 07:02)  WBC:  13.9    (08-07-17 @ 06:05)  WBC:  10.6    (08-06-17 @ 07:11)  WBC:  11.1    (08-05-17 @ 06:42)  WBC:  13.1    (08-04-17 @ 06:42)       08-08    135  |  98  |  70<H>  ----------------------------<  128<H>  4.7   |  29  |  1.37<H>    Ca    9.3      08 Aug 2017 07:02  Phos  4.2     08-07  Mg     2.5     08-07      Creatinine:  1.37   (08-08 @ 07:02)  Creatinine:  1.23   (08-07 @ 06:05)  Creatinine:  1.15   (08-06 @ 07:11)     TSH:      0.32   (07-25-17)           Lipid profile:  (07-25-17)     Total: 150     LDL  : 102     HDL  :31     TG   :83  ________________________________________________________________________  ===============>>  A S S E S S M E N T   A N D   P L A N <<===============  ------------------------------------------------------------------------------------------------------------------------------    **hypoxemia; MR, severe PAH, COPD, diastolic heart failure with pleural effusion, overall improved  ---cardio, pulm f/u appreciated  ---Home O2 set up being worked on  ---nebulizer treatment as needed  --- diuresis as needed   ---monitor vitals, creatinine electrolytes  --PT/ OOB as able ;  Incentive Spirometer    ** decreased PO intake  ---on marinol 5 mg  ---on remeron to 30mg : continue    **superficial throbophlebitis  ---warm packs  ---vanco X 1  ---pain mgmt    **DAVID likely on CKD?, Hyponatremia, overall improved  ---renal f/u  ---monitor BMP closely   ---free fluid restriction  ---encouraged pt and family for increased PO intake as able (d/w family, Rn)    **Hypertension, CAD, MR, PAH  ---Continue with current medications as above  ---DASH diet   ---close monitoring of vitals    **BPH  --- Continue Current medications and monitor.     **pseudogout  ---post orthopedic intervention and steropid injection  ---post colchicine  ---PT : home PT    **thrush  ---nystatin swish ans swallow, short course diflucan    -GI/DVT Prophylaxis.  DC planing as above  --------------------------------------------  Case discussed with pt, HS, family, Rn  Education given on deep breathing, plan of care  ___________________________    H. KARLA Rich.  Pager: [phone]

## 2017-08-08 NOTE — PROGRESS NOTE ADULT - SUBJECTIVE AND OBJECTIVE BOX
CHIEF COMPLAINT:Patient is a 89y old  Male who presents with a chief complaint of shortness of breath.Pt appears comfortable.    	  REVIEW OF SYSTEMS:  [X ] Unable to obtain    PHYSICAL EXAM:  T(C): 36.4 (08-08-17 @ 05:11), Max: 37.4 (08-07-17 @ 21:41)  HR: 93 (08-08-17 @ 05:11) (93 - 102)  BP: 138/66 (08-08-17 @ 05:11) (123/70 - 141/68)  RR: 16 (08-08-17 @ 05:11) (16 - 18)  SpO2: 95% (08-08-17 @ 05:11) (95% - 96%)    Appearance: Normal	  HEENT:   Normal oral mucosa, PERRL, EOMI	  Lymphatic: No lymphadenopathy  Cardiovascular: Normal S1 S2, 2/6sm  Respiratory: Lungs clear to auscultation	  Gastrointestinal:  Soft, Non-tender, + BS	  Skin: No rashes, No ecchymoses, No cyanosis	  Extremities: Normal range of motion, No clubbing, cyanosis or edema  Vascular: Peripheral pulses palpable 2+ bilaterally    MEDICATIONS  (STANDING):  doxazosin 4 milliGRAM(s) Oral at bedtime  aspirin enteric coated 81 milliGRAM(s) Oral daily  ALBUTerol/ipratropium for Nebulization 3 milliLiter(s) Nebulizer every 6 hours  atorvastatin 40 milliGRAM(s) Oral at bedtime  clopidogrel Tablet 75 milliGRAM(s) Oral daily  amLODIPine   Tablet 2.5 milliGRAM(s) Oral daily  hydrALAZINE 25 milliGRAM(s) Oral three times a day  docusate sodium 100 milliGRAM(s) Oral daily  senna 2 Tablet(s) Oral at bedtime  heparin  Injectable 5000 Unit(s) SubCutaneous every 12 hours  dronabinol 5 milliGRAM(s) Oral two times a day  mirtazapine 30 milliGRAM(s) Oral at bedtime  fluconAZOLE IVPB 100 milliGRAM(s) IV Intermittent every 24 hours  FIRST- Mouthwash  BLM 10 milliLiter(s) Swish and Spit three times a day      	  LABS:	 	                        14.4   15.2  )-----------( 286      ( 08 Aug 2017 07:02 )             44.9     08-08    135  |  98  |  70<H>  ----------------------------<  128<H>  4.7   |  29  |  1.37<H>    Ca    9.3      08 Aug 2017 07:02  Phos  4.2     08-07  Mg     2.5     08-07      proBNP: Serum Pro-Brain Natriuretic Peptide: 09811 pg/mL (07-24 @ 10:41)    Lipid Profile: Cholesterol 150    HDL 31  TG 83    HgA1c: Hemoglobin A1C, Whole Blood: 5.7 % (07-25 @ 09:08)    TSH: Thyroid Stimulating Hormone, Serum: 0.32 uU/mL (07-25 @ 04:26)

## 2017-08-08 NOTE — PROGRESS NOTE ADULT - PROBLEM SELECTOR PLAN 1
patient with severe MR. figueroa, 02 sat adequate on nasal canula.  Patient qualifies for home 02 as 02 sat 80% on room air at rest. Patient is in chronic and stable condition.  Patient appears dehydrated.  - d/w Dr Andrews - cortez and lasix dc'ed.  patient appears more alert.  will restart low rate hydration as creatinine bumped up slightly

## 2017-08-08 NOTE — BEHAVIORAL HEALTH ASSESSMENT NOTE - SUMMARY
This is a 89 y/o male with PMH of [place] was admitted with respiratory failure.  Patient was evaluated,chart was reviewed,case was reviewed.  Patient is unable to provide PMH.  Patient is A/O x1,sad looking,minimally verbal,behaviorally controlled.  Speech is goal directed ,logical.Denied s/h thought.Denied a/v hallucinations.memory and cognition-limited.I&J-limited.

## 2017-08-08 NOTE — PROGRESS NOTE ADULT - ASSESSMENT
89 yr old  [place]os[place]-speaking male from home, former smoker, lives with wife, PMH laryngeal cancer (diagnosed 2015, s/p XRT, no evidence disease), COPD, CAD s/p stents x 3, s/p left carotid stent, HTN, asthma, depression, anxiety p/w acute shortness of breath due to diastolic HF and mod-severe MR.  1.Psychiatry eval.  2.Severe pulm HTN-Norvasc 2.5mg qd.  3.Continue cardiac medication.  4.COPD-nebs.  5.HTN-Cont BP medication.  6.GI and DVT prophylaxis.

## 2017-08-08 NOTE — PROGRESS NOTE ADULT - ASSESSMENT
89yo Tuvaluan-speaking male from home, former smoker, lives with wife, PMH laryngeal cancer (diagnosed 2015, s/p XRT, no evidence disease), COPD, CAD s/p stents x 3, s/p left carotid stent, HTN, asthma, depression, anxiety p/w acute shortness of breath 2/2 CHF exacerbation

## 2017-08-09 VITALS — HEART RATE: 114 BPM

## 2017-08-09 LAB
ANION GAP SERPL CALC-SCNC: 7 MMOL/L — SIGNIFICANT CHANGE UP (ref 5–17)
BASOPHILS # BLD AUTO: 0 K/UL — SIGNIFICANT CHANGE UP (ref 0–0.2)
BASOPHILS NFR BLD AUTO: 0.2 % — SIGNIFICANT CHANGE UP (ref 0–2)
BUN SERPL-MCNC: 63 MG/DL — HIGH (ref 7–18)
CALCIUM SERPL-MCNC: 8.2 MG/DL — LOW (ref 8.4–10.5)
CHLORIDE SERPL-SCNC: 101 MMOL/L — SIGNIFICANT CHANGE UP (ref 96–108)
CO2 SERPL-SCNC: 29 MMOL/L — SIGNIFICANT CHANGE UP (ref 22–31)
CREAT SERPL-MCNC: 1.16 MG/DL — SIGNIFICANT CHANGE UP (ref 0.5–1.3)
EOSINOPHIL # BLD AUTO: 0.1 K/UL — SIGNIFICANT CHANGE UP (ref 0–0.5)
EOSINOPHIL NFR BLD AUTO: 0.8 % — SIGNIFICANT CHANGE UP (ref 0–6)
GLUCOSE SERPL-MCNC: 141 MG/DL — HIGH (ref 70–99)
HCT VFR BLD CALC: 39.6 % — SIGNIFICANT CHANGE UP (ref 39–50)
HGB BLD-MCNC: 12.7 G/DL — LOW (ref 13–17)
LYMPHOCYTES # BLD AUTO: 1 K/UL — SIGNIFICANT CHANGE UP (ref 1–3.3)
LYMPHOCYTES # BLD AUTO: 9.2 % — LOW (ref 13–44)
MCHC RBC-ENTMCNC: 29.2 PG — SIGNIFICANT CHANGE UP (ref 27–34)
MCHC RBC-ENTMCNC: 32.1 GM/DL — SIGNIFICANT CHANGE UP (ref 32–36)
MCV RBC AUTO: 91 FL — SIGNIFICANT CHANGE UP (ref 80–100)
MONOCYTES # BLD AUTO: 0.5 K/UL — SIGNIFICANT CHANGE UP (ref 0–0.9)
MONOCYTES NFR BLD AUTO: 4.7 % — SIGNIFICANT CHANGE UP (ref 2–14)
NEUTROPHILS # BLD AUTO: 8.9 K/UL — HIGH (ref 1.8–7.4)
NEUTROPHILS NFR BLD AUTO: 85 % — HIGH (ref 43–77)
PLATELET # BLD AUTO: 243 K/UL — SIGNIFICANT CHANGE UP (ref 150–400)
POTASSIUM SERPL-MCNC: 4.1 MMOL/L — SIGNIFICANT CHANGE UP (ref 3.5–5.3)
POTASSIUM SERPL-SCNC: 4.1 MMOL/L — SIGNIFICANT CHANGE UP (ref 3.5–5.3)
RBC # BLD: 4.35 M/UL — SIGNIFICANT CHANGE UP (ref 4.2–5.8)
RBC # FLD: 12.9 % — SIGNIFICANT CHANGE UP (ref 10.3–14.5)
SODIUM SERPL-SCNC: 137 MMOL/L — SIGNIFICANT CHANGE UP (ref 135–145)
WBC # BLD: 10.5 K/UL — SIGNIFICANT CHANGE UP (ref 3.8–10.5)
WBC # FLD AUTO: 10.5 K/UL — SIGNIFICANT CHANGE UP (ref 3.8–10.5)

## 2017-08-09 PROCEDURE — 84100 ASSAY OF PHOSPHORUS: CPT

## 2017-08-09 PROCEDURE — 80048 BASIC METABOLIC PNL TOTAL CA: CPT

## 2017-08-09 PROCEDURE — 76775 US EXAM ABDO BACK WALL LIM: CPT

## 2017-08-09 PROCEDURE — 80053 COMPREHEN METABOLIC PANEL: CPT

## 2017-08-09 PROCEDURE — 97116 GAIT TRAINING THERAPY: CPT

## 2017-08-09 PROCEDURE — 94660 CPAP INITIATION&MGMT: CPT

## 2017-08-09 PROCEDURE — 85730 THROMBOPLASTIN TIME PARTIAL: CPT

## 2017-08-09 PROCEDURE — 82550 ASSAY OF CK (CPK): CPT

## 2017-08-09 PROCEDURE — 71045 X-RAY EXAM CHEST 1 VIEW: CPT

## 2017-08-09 PROCEDURE — 84481 FREE ASSAY (FT-3): CPT

## 2017-08-09 PROCEDURE — 80061 LIPID PANEL: CPT

## 2017-08-09 PROCEDURE — 94640 AIRWAY INHALATION TREATMENT: CPT

## 2017-08-09 PROCEDURE — 93005 ELECTROCARDIOGRAM TRACING: CPT

## 2017-08-09 PROCEDURE — 94760 N-INVAS EAR/PLS OXIMETRY 1: CPT

## 2017-08-09 PROCEDURE — 99291 CRITICAL CARE FIRST HOUR: CPT | Mod: 25

## 2017-08-09 PROCEDURE — 84439 ASSAY OF FREE THYROXINE: CPT

## 2017-08-09 PROCEDURE — 83935 ASSAY OF URINE OSMOLALITY: CPT

## 2017-08-09 PROCEDURE — 83036 HEMOGLOBIN GLYCOSYLATED A1C: CPT

## 2017-08-09 PROCEDURE — 81001 URINALYSIS AUTO W/SCOPE: CPT

## 2017-08-09 PROCEDURE — 82553 CREATINE MB FRACTION: CPT

## 2017-08-09 PROCEDURE — 93306 TTE W/DOPPLER COMPLETE: CPT

## 2017-08-09 PROCEDURE — 83735 ASSAY OF MAGNESIUM: CPT

## 2017-08-09 PROCEDURE — 36415 COLL VENOUS BLD VENIPUNCTURE: CPT

## 2017-08-09 PROCEDURE — 84300 ASSAY OF URINE SODIUM: CPT

## 2017-08-09 PROCEDURE — 83605 ASSAY OF LACTIC ACID: CPT

## 2017-08-09 PROCEDURE — 84443 ASSAY THYROID STIM HORMONE: CPT

## 2017-08-09 PROCEDURE — 84133 ASSAY OF URINE POTASSIUM: CPT

## 2017-08-09 PROCEDURE — 83880 ASSAY OF NATRIURETIC PEPTIDE: CPT

## 2017-08-09 PROCEDURE — 82803 BLOOD GASES ANY COMBINATION: CPT

## 2017-08-09 PROCEDURE — 97530 THERAPEUTIC ACTIVITIES: CPT

## 2017-08-09 PROCEDURE — 96374 THER/PROPH/DIAG INJ IV PUSH: CPT

## 2017-08-09 PROCEDURE — 87040 BLOOD CULTURE FOR BACTERIA: CPT

## 2017-08-09 PROCEDURE — 84484 ASSAY OF TROPONIN QUANT: CPT

## 2017-08-09 PROCEDURE — 73562 X-RAY EXAM OF KNEE 3: CPT

## 2017-08-09 PROCEDURE — 97110 THERAPEUTIC EXERCISES: CPT

## 2017-08-09 PROCEDURE — 85610 PROTHROMBIN TIME: CPT

## 2017-08-09 PROCEDURE — 85027 COMPLETE CBC AUTOMATED: CPT

## 2017-08-09 RX ORDER — HYDRALAZINE HCL 50 MG
1 TABLET ORAL
Qty: 45 | Refills: 0 | OUTPATIENT
Start: 2017-08-09 | End: 2017-08-24

## 2017-08-09 RX ORDER — ASPIRIN/CALCIUM CARB/MAGNESIUM 324 MG
1 TABLET ORAL
Qty: 15 | Refills: 0 | OUTPATIENT
Start: 2017-08-09 | End: 2017-08-24

## 2017-08-09 RX ORDER — FLUCONAZOLE 150 MG/1
1 TABLET ORAL
Qty: 10 | Refills: 0 | OUTPATIENT
Start: 2017-08-09 | End: 2017-08-14

## 2017-08-09 RX ORDER — CEPHALEXIN 500 MG
500 CAPSULE ORAL
Qty: 5000 | Refills: 0 | OUTPATIENT
Start: 2017-08-09 | End: 2017-08-14

## 2017-08-09 RX ORDER — CLOPIDOGREL BISULFATE 75 MG/1
1 TABLET, FILM COATED ORAL
Qty: 15 | Refills: 0 | OUTPATIENT
Start: 2017-08-09 | End: 2017-08-24

## 2017-08-09 RX ORDER — CEPHALEXIN 500 MG
1 CAPSULE ORAL
Qty: 10 | Refills: 0 | OUTPATIENT
Start: 2017-08-09 | End: 2017-08-14

## 2017-08-09 RX ORDER — DOXAZOSIN MESYLATE 4 MG
1 TABLET ORAL
Qty: 15 | Refills: 0 | OUTPATIENT
Start: 2017-08-09 | End: 2017-08-24

## 2017-08-09 RX ORDER — CHLORPROMAZINE HCL 10 MG
25 TABLET ORAL ONCE
Qty: 0 | Refills: 0 | Status: COMPLETED | OUTPATIENT
Start: 2017-08-09 | End: 2017-08-09

## 2017-08-09 RX ORDER — AMLODIPINE BESYLATE 2.5 MG/1
1 TABLET ORAL
Qty: 15 | Refills: 0 | OUTPATIENT
Start: 2017-08-09 | End: 2017-08-24

## 2017-08-09 RX ORDER — VANCOMYCIN HCL 1 G
1000 VIAL (EA) INTRAVENOUS ONCE
Qty: 0 | Refills: 0 | Status: DISCONTINUED | OUTPATIENT
Start: 2017-08-09 | End: 2017-08-09

## 2017-08-09 RX ORDER — CEPHALEXIN 500 MG
500 CAPSULE ORAL EVERY 12 HOURS
Qty: 0 | Refills: 0 | Status: DISCONTINUED | OUTPATIENT
Start: 2017-08-09 | End: 2017-08-09

## 2017-08-09 RX ORDER — ATORVASTATIN CALCIUM 80 MG/1
1 TABLET, FILM COATED ORAL
Qty: 15 | Refills: 0 | OUTPATIENT
Start: 2017-08-09 | End: 2017-08-24

## 2017-08-09 RX ORDER — MIRTAZAPINE 45 MG/1
1 TABLET, ORALLY DISINTEGRATING ORAL
Qty: 15 | Refills: 0 | OUTPATIENT
Start: 2017-08-09 | End: 2017-08-24

## 2017-08-09 RX ORDER — DOXAZOSIN MESYLATE 4 MG
1 TABLET ORAL
Qty: 0 | Refills: 0 | COMMUNITY
Start: 2017-08-09

## 2017-08-09 RX ADMIN — HEPARIN SODIUM 5000 UNIT(S): 5000 INJECTION INTRAVENOUS; SUBCUTANEOUS at 05:20

## 2017-08-09 RX ADMIN — Medication 5 MILLIGRAM(S): at 18:34

## 2017-08-09 RX ADMIN — AMLODIPINE BESYLATE 2.5 MILLIGRAM(S): 2.5 TABLET ORAL at 05:20

## 2017-08-09 RX ADMIN — Medication 81 MILLIGRAM(S): at 12:13

## 2017-08-09 RX ADMIN — HEPARIN SODIUM 5000 UNIT(S): 5000 INJECTION INTRAVENOUS; SUBCUTANEOUS at 18:34

## 2017-08-09 RX ADMIN — CLOPIDOGREL BISULFATE 75 MILLIGRAM(S): 75 TABLET, FILM COATED ORAL at 12:13

## 2017-08-09 RX ADMIN — DIPHENHYDRAMINE HYDROCHLORIDE AND LIDOCAINE HYDROCHLORIDE AND ALUMINUM HYDROXIDE AND MAGNESIUM HYDRO 10 MILLILITER(S): KIT at 05:20

## 2017-08-09 RX ADMIN — Medication 100 MILLIGRAM(S): at 18:44

## 2017-08-09 RX ADMIN — Medication 25 MILLIGRAM(S): at 05:20

## 2017-08-09 RX ADMIN — Medication 3 MILLILITER(S): at 14:52

## 2017-08-09 RX ADMIN — Medication 100 MILLIGRAM(S): at 12:13

## 2017-08-09 RX ADMIN — Medication 25 MILLIGRAM(S): at 18:34

## 2017-08-09 RX ADMIN — Medication 500 MILLIGRAM(S): at 12:12

## 2017-08-09 RX ADMIN — Medication 3 MILLILITER(S): at 08:19

## 2017-08-09 RX ADMIN — Medication 100 MILLIGRAM(S): at 12:12

## 2017-08-09 RX ADMIN — Medication 25 MILLIGRAM(S): at 12:12

## 2017-08-09 RX ADMIN — FLUCONAZOLE 100 MILLIGRAM(S): 150 TABLET ORAL at 05:46

## 2017-08-09 RX ADMIN — Medication 5 MILLIGRAM(S): at 05:19

## 2017-08-09 RX ADMIN — Medication 500 MILLIGRAM(S): at 18:34

## 2017-08-09 NOTE — CHART NOTE - NSCHARTNOTEFT_GEN_A_CORE
spoke with nurse - patient heart rats between , BP stable. patient is asymptomatic    Nurse spoke with Dr Layla ceballos to discharge  Discharge papers completed

## 2017-08-09 NOTE — PROGRESS NOTE ADULT - SUBJECTIVE AND OBJECTIVE BOX
Patient is a 89y old  Male who presents with a chief complaint of shortness of breath (29 Jul 2017 08:37)      INTERVAL HPI/OVERNIGHT EVENTS:  patient states less pain swallowing, less knee pain.  persistent left arm pain  family wants hospital bed and commode now    T(C): 36.4 (08-09-17 @ 04:57), Max: 36.8 (08-08-17 @ 14:10)  HR: 84 (08-09-17 @ 04:57) (84 - 98)  BP: 132/56 (08-09-17 @ 04:57) (124/60 - 132/56)  RR: 16 (08-09-17 @ 11:00) (16 - 17)  SpO2: 94% (08-09-17 @ 11:00) (94% - 98%)  Wt(kg): --  I&O's Summary      LABS:                        12.7   10.5  )-----------( 243      ( 09 Aug 2017 06:28 )             39.6     08-09    137  |  101  |  63<H>  ----------------------------<  141<H>  4.1   |  29  |  1.16    Ca    8.2<L>      09 Aug 2017 06:28          CAPILLARY BLOOD GLUCOSE                RADIOLOGY & ADDITIONAL TESTS:    Imaging Personally Reviewed:  [ ] YES  [ ] NO    Consultant(s) Notes Reviewed:  [ ] YES  [ ] NO    PHYSICAL EXAM:  GENERAL: NAD  ENMT: healed palate lesions  NECK: Supple, No JVD  NERVOUS SYSTEM:  Alert & Oriented X3, Good concentration; Motor Strength 5/5 B/L upper and lower extremities  CHEST/LUNG: Clear bilaterally, no wheezing  HEART: Regular rate and rhythm; No murmurs, rubs, or gallops  ABDOMEN: Soft, Nontender, Nondistended; Bowel sounds present  EXTREMITIES:  no edema, decreased redness left forearm, small amount pus expressed from old IV site  LYMPH: No lymphadenopathy noted  SKIN: No rashes or lesions    Care Discussed with Consultants/Other Providers [ ] YES  [ ] NO

## 2017-08-09 NOTE — PROGRESS NOTE ADULT - ASSESSMENT
DAVID cardiorenal syndrome and CHF resolved patient is off diuretics at present creatinine and BUN improved with IV hydration.  Poor nutrition and pO intake no need for diuretics at this time.  Hypertension stable BP.  Chf stable

## 2017-08-09 NOTE — PROGRESS NOTE ADULT - PROBLEM SELECTOR PLAN 3
redness improved.  surgery consulted - no need for I and D  - d/w attending - will start keflex and doxycylcine  leukocytosis resolved  warm compress

## 2017-08-09 NOTE — PROGRESS NOTE ADULT - SUBJECTIVE AND OBJECTIVE BOX
DAVID due to cardirena syndrome resolved  Severe MR  Dehydration at present reduced po intake  Malnutrition  Right arm Phlebitis.  COPD  PAST MEDICAL & SURGICAL HISTORY:  Coronary artery disease involving native coronary artery of native heart without angina pectoris  Laryngeal cancer  Essential hypertension  Asthma  Arthritis  Degenerative joint disease  No significant past surgical history      No Known Allergies    MEDICATIONS  (STANDING):  doxazosin 4 milliGRAM(s) Oral at bedtime  aspirin enteric coated 81 milliGRAM(s) Oral daily  ALBUTerol/ipratropium for Nebulization 3 milliLiter(s) Nebulizer every 6 hours  atorvastatin 40 milliGRAM(s) Oral at bedtime  clopidogrel Tablet 75 milliGRAM(s) Oral daily  amLODIPine   Tablet 2.5 milliGRAM(s) Oral daily  hydrALAZINE 25 milliGRAM(s) Oral three times a day  docusate sodium 100 milliGRAM(s) Oral daily  senna 2 Tablet(s) Oral at bedtime  heparin  Injectable 5000 Unit(s) SubCutaneous every 12 hours  dronabinol 5 milliGRAM(s) Oral two times a day  mirtazapine 30 milliGRAM(s) Oral at bedtime  fluconAZOLE IVPB 100 milliGRAM(s) IV Intermittent every 24 hours  FIRST- Mouthwash  BLM 10 milliLiter(s) Swish and Spit three times a day  dextrose 5% + sodium chloride 0.45%. 1000 milliLiter(s) (50 mL/Hr) IV Continuous <Continuous>  chlorproMAZINE    Tablet 25 milliGRAM(s) Oral once  cephalexin 500 milliGRAM(s) Oral every 12 hours  doxycycline hyclate Capsule 100 milliGRAM(s) Oral every 12 hours    MEDICATIONS  (PRN):  acetaminophen   Tablet. 650 milliGRAM(s) Oral every 6 hours PRN Mild Pain (1 - 3)  traMADol 50 milliGRAM(s) Oral three times a day PRN Severe Pain (7 - 10)  ondansetron Injectable 4 milliGRAM(s) IV Push every 6 hours PRN Nausea and/or Vomiting  artificial  tears Solution 1 Drop(s) Both EYES two times a day PRN Dry Eyes                        12.7   10.5  )-----------( 243      ( 09 Aug 2017 06:28 )             39.6     08-09    137  |  101  |  63<H>  ----------------------------<  141<H>  4.1   |  29  |  1.16    Ca    8.2<L>      09 Aug 2017 06:28        REVIEW OF SYSTEMS:  General: no fever no chills, malaise adn fatigue positive  RESPIRATORY: No cough, wheezing, hemoptysis; No shortness of breath  CARDIOVASCULAR: No chest pain or palpitations. No Edema  GASTROINTESTINAL: reduced PO intake no vomit or nausea.  GENITOURINARY: No dysuria.    VITALS:  T(F): 97.5 (08-09-17 @ 04:57), Max: 98.2 (08-08-17 @ 14:10)  HR: 84 (08-09-17 @ 04:57)  BP: 132/56 (08-09-17 @ 04:57)  RR: 16 (08-09-17 @ 04:57)  SpO2: 96% (08-09-17 @ 04:57)  Wt(kg): --      PHYSICAL EXAM:  Constitutional: well developed, no diaphoresis, no distress.  Neck: No JVD, no carotid bruit, supple, no adenopathy  Respiratory: Good air entrance B/L, no wheezes, rales or rhonchi  Cardiovascular: S1, S2, RRR, no pericardial rub, no murmur  Abdomen: BS+, soft, no tenderness, no bruit  Pelvis: bladder nondistended  Extremities: No cyanosis or clubbing. No peripheral edema.

## 2017-08-09 NOTE — PROGRESS NOTE ADULT - PROBLEM SELECTOR PROBLEM 5
NSTEMI (non-ST elevated myocardial infarction)
Coronary artery disease involving native coronary artery of native heart without angina pectoris
Hyponatremia
Need for prophylactic measure
Need for prophylactic measure

## 2017-08-09 NOTE — CONSULT NOTE ADULT - CONSULT REQUESTED DATE/TIME
01-Aug-2017 14:36
25-Jul-2017 10:02
25-Jul-2017 10:15
25-Jul-2017 13:03
26-Jul-2017 12:13
09-Aug-2017 15:14

## 2017-08-09 NOTE — PROGRESS NOTE ADULT - ASSESSMENT
89yo Estonian-speaking male from home, former smoker, lives with wife, PMH laryngeal cancer (diagnosed 2015, s/p XRT, no evidence disease), COPD, CAD s/p stents x 3, s/p left carotid stent, HTN, asthma, depression, anxiety p/w acute shortness of breath 2/2 CHF exacerbation

## 2017-08-09 NOTE — CONSULT NOTE ADULT - CONSULT REASON
renal failure acute
CHF
Pulmonary HTN ,COPD,Asthma
Right knee swelling/pain
pot ICU / medical management / care
Lt UE Thrombophlebitis

## 2017-08-09 NOTE — CONSULT NOTE ADULT - EXTREMITIES COMMENTS
Lt mid forearm, area of erythema, no edema, no fluctuance, min TTP, cord like vein, previous IV insertion site w/ min slough, no active drainage

## 2017-08-09 NOTE — PROGRESS NOTE ADULT - PROBLEM SELECTOR PLAN 4
-continue oxygen   -Bronchodilators  -CCB
slight improvement, 2/2 cardiorenal syndrome 2/2 chf
stable, 2/2 cardiorenal syndrome 2/2 chf
stable, 2/2 cardiorenal syndrome 2/2 chf
xray bilateral knees : chondrocalcinosis  orthopedic surgery performed arthrocentesis and steroid injection  pain control better  family has agreed to rehab - likely dc Monday, 8/7
xray bilateral knees : chondrocalcinosis  orthopedic surgery performed arthrocentesis and steroid injection  pain control better  family has declined rehab - wants to take patient home
xray bilateral knees : chondrocalcinosis  orthopedic surgery performed arthrocentesis and steroid injection  pain control better  family has declined rehab - wants to take patient home
-likely secondary to underlying chf and lasix use   -reduced the dose of lasix   -f/u with BMP  -f/u renal usg  renal -
stable, no chest pain  - c/w asa, statin
-continue oxygen   -Bronchodilators  -CCB

## 2017-08-09 NOTE — CONSULT NOTE ADULT - ASSESSMENT
DAVID possible cardiorenal due to CHF.  Increased in BUN and creatinine from diuretics.   CHF due to diastolic CHF and MR.  Increased in BUN CHF , diuretics effect and use of high dose of steroids on admission.  Its not clear if there is a history of CKD.  No clear if he took NSAID.    Suggest to follow xr chest.  US of kidneys.
89 yr old  Nigerian-speaking male from home, former smoker, lives with wife, PMH laryngeal cancer (diagnosed 2015, s/p XRT, no evidence disease), COPD, CAD s/p stents x 3, s/p left carotid stent, HTN, asthma, depression, anxiety p/w acute shortness of breath due to diastolic HF and mod-severe MR.  1.IV lasix.  2.Severe pulm HTN-Norvasc.  3.CAD-asa,b blocker,statin.  4.COPD-nebs.  5.HTN-Cont BP medication.  6.GI and DVT prophylaxis.
89 y/o Male w/ Lt UE Thrombophlebitis

## 2017-08-09 NOTE — PROGRESS NOTE ADULT - PROBLEM SELECTOR PLAN 2
follow up CXR  Lasix prn
not improved with nystatin   diflucan IV started  magic mouthwash
symptomatic improvement with diflucan, magic mouthwash
symptomatic improvement with diflucan, magic mouthwash
xray bilateral knees : chondrocalcinosis  orthopedic surgery consulted - will perform arthrocentesis and steroid injection  d/w Dr Andrews - no nsaids, ok to start colchicine
xray bilateral knees : chondrocalcinosis  orthopedic surgery performed arthrocentesis and steroid injection  ultram for pain control  physical therapy recommending PT but family refusing
xray bilateral knees : chondrocalcinosis  orthopedic surgery performed arthrocentesis and steroid injection  ultram for pain control, pain somewhat better  family has agreed to rehab - likely dc Monday, 8/7
RV systolic pressure is severely increased (PASP 65mmHg).  -Continue oxygen   -C/W Bronchodilators  -c/w calcium channel blockers
nephrology following.  2/2 cardiorenal syndrome, chronic  - c/w lasix and monitor.  If increased tomorrow, can dc home
follow up CXR  Lasix prn

## 2017-08-09 NOTE — CONSULT NOTE ADULT - PROBLEM SELECTOR RECOMMENDATION 9
-IV Lasix ,   daily weights, strict ins and outs  -S/p BiPAP; C/W Nasal Cannula oxygen supplementation   -Monitor BMP for electrolyte changes
No acute vascular intervention at this time   Warm compress to the affected area   Arm elevation   Pain medication PRN  Care as per medicine   D/w Dr Goff and agrees

## 2017-08-09 NOTE — CHART NOTE - NSCHARTNOTEFT_GEN_A_CORE
Nurse notified me about heart rate ranging between 80 and 120. No symptoms, Attending Dr. Rich was informed and fine with continuing discharge.

## 2017-08-09 NOTE — PROGRESS NOTE ADULT - PROBLEM SELECTOR PLAN 7
Monitor labs  Renal follow up
stable, no chest pain  - c/w asa, statin

## 2017-08-09 NOTE — PROGRESS NOTE ADULT - PROBLEM SELECTOR PROBLEM 2
Pleural effusion
Candida infection of mouth
Pseudogout
Hyponatremia
Pulmonary HTN

## 2017-08-09 NOTE — PROGRESS NOTE ADULT - ASSESSMENT
89 yr old  [place]ymra[place]-speaking male from home, former smoker, lives with wife, PMH laryngeal cancer (diagnosed 2015, s/p XRT, no evidence disease), COPD, CAD s/p stents x 3, s/p left carotid stent, HTN, asthma, depression, anxiety p/w acute shortness of breath due to diastolic HF and mod-severe MR.  1.Psychiatry eval appreciated,abilify added.  2.Severe pulm HTN-Norvasc 2.5mg qd.  3.Continue cardiac medication.  4.COPD-nebs.  5.HTN-Cont BP medication.  6.GI and DVT prophylaxis.

## 2017-08-09 NOTE — PROGRESS NOTE ADULT - PROBLEM SELECTOR PROBLEM 4
Pulmonary HTN
DAVID (acute kidney injury)
Pseudogout
Coronary artery disease involving native coronary artery of native heart without angina pectoris
DAVID (acute kidney injury)

## 2017-08-09 NOTE — PROGRESS NOTE ADULT - PROBLEM SELECTOR PLAN 8
Pain control  PT  Ortho follow up
DVT ppx, no need for gi ppx
DVT ppx, no need for gi ppx
Pain control  PT  Ortho follow up
DVT ppx, no need for gi ppx

## 2017-08-09 NOTE — CONSULT NOTE ADULT - SUBJECTIVE AND OBJECTIVE BOX
Attending: Dr Goff     HPI:  89yo Chadian-speaking male from home, former smoker, lives with wife, PMH laryngeal cancer (diagnosed 2015, s/p XRT, no evidence disease), COPD, CAD s/p stents x 3, s/p left carotid stent, HTN, asthma, depression, anxiety p/w acute shortness of breath. Vascular surgery called to evaluate Lt UE. Pt admits to pain in the lt UE/ forearm. Denies fever, chills. As per nursing staff pt had IV removed on Sunday after          PAST MEDICAL & SURGICAL HISTORY:  Coronary artery disease involving native coronary artery of native heart without angina pectoris  Laryngeal cancer  Essential hypertension  Asthma  Arthritis  Degenerative joint disease  No significant past surgical history      MEDICATIONS  (STANDING):  doxazosin 4 milliGRAM(s) Oral at bedtime  aspirin enteric coated 81 milliGRAM(s) Oral daily  ALBUTerol/ipratropium for Nebulization 3 milliLiter(s) Nebulizer every 6 hours  atorvastatin 40 milliGRAM(s) Oral at bedtime  clopidogrel Tablet 75 milliGRAM(s) Oral daily  amLODIPine   Tablet 2.5 milliGRAM(s) Oral daily  hydrALAZINE 25 milliGRAM(s) Oral three times a day  docusate sodium 100 milliGRAM(s) Oral daily  senna 2 Tablet(s) Oral at bedtime  heparin  Injectable 5000 Unit(s) SubCutaneous every 12 hours  dronabinol 5 milliGRAM(s) Oral two times a day  mirtazapine 30 milliGRAM(s) Oral at bedtime  fluconAZOLE IVPB 100 milliGRAM(s) IV Intermittent every 24 hours  FIRST- Mouthwash  BLM 10 milliLiter(s) Swish and Spit three times a day  cephalexin 500 milliGRAM(s) Oral every 12 hours  doxycycline hyclate Capsule 100 milliGRAM(s) Oral every 12 hours    MEDICATIONS  (PRN):  acetaminophen   Tablet. 650 milliGRAM(s) Oral every 6 hours PRN Mild Pain (1 - 3)  traMADol 50 milliGRAM(s) Oral three times a day PRN Severe Pain (7 - 10)  ondansetron Injectable 4 milliGRAM(s) IV Push every 6 hours PRN Nausea and/or Vomiting  artificial  tears Solution 1 Drop(s) Both EYES two times a day PRN Dry Eyes      Allergies    No Known Allergies    Intolerances        SOCIAL HISTORY:    FAMILY HISTORY:      Vital Signs Last 24 Hrs  T(C): 36.6 (09 Aug 2017 14:19), Max: 36.6 (09 Aug 2017 14:19)  T(F): 97.9 (09 Aug 2017 14:19), Max: 97.9 (09 Aug 2017 14:19)  HR: 100 (09 Aug 2017 14:20) (84 - 100)  BP: 132/59 (09 Aug 2017 14:20) (131/74 - 146/71)  BP(mean): --  RR: 16 (09 Aug 2017 14:19) (16 - 17)  SpO2: 97% (09 Aug 2017 14:20) (94% - 97%)    I&O's Summary      LABS:                        12.7   10.5  )-----------( 243      ( 09 Aug 2017 06:28 )             39.6     08-09    137  |  101  |  63<H>  ----------------------------<  141<H>  4.1   |  29  |  1.16    Ca    8.2<L>      09 Aug 2017 06:28          CAPILLARY BLOOD GLUCOSE            Cultures:      RADIOLOGY & ADDITIONAL STUDIES: Attending: Dr Goff     HPI:  89yo [place] Islander-speaking male from home, former smoker, lives with wife, PMH laryngeal cancer (diagnosed 2015, s/p XRT, no evidence disease), COPD, CAD s/p stents x 3, s/p left carotid stent, HTN, asthma, depression, anxiety p/w acute shortness of breath. Vascular surgery called to evaluate Lt UE. Pt admits to pain in the lt UE/ forearm. Denies fever, chills. As per nursing staff pt had IV removed on Sunday after area or erythema was noticed.         PAST MEDICAL & SURGICAL HISTORY:  Coronary artery disease involving native coronary artery of native heart without angina pectoris  Laryngeal cancer  Essential hypertension  Asthma  Arthritis  Degenerative joint disease  No significant past surgical history      MEDICATIONS  (STANDING):  doxazosin 4 milliGRAM(s) Oral at bedtime  aspirin enteric coated 81 milliGRAM(s) Oral daily  ALBUTerol/ipratropium for Nebulization 3 milliLiter(s) Nebulizer every 6 hours  atorvastatin 40 milliGRAM(s) Oral at bedtime  clopidogrel Tablet 75 milliGRAM(s) Oral daily  amLODIPine   Tablet 2.5 milliGRAM(s) Oral daily  hydrALAZINE 25 milliGRAM(s) Oral three times a day  docusate sodium 100 milliGRAM(s) Oral daily  senna 2 Tablet(s) Oral at bedtime  heparin  Injectable 5000 Unit(s) SubCutaneous every 12 hours  dronabinol 5 milliGRAM(s) Oral two times a day  mirtazapine 30 milliGRAM(s) Oral at bedtime  fluconAZOLE IVPB 100 milliGRAM(s) IV Intermittent every 24 hours  FIRST- Mouthwash  BLM 10 milliLiter(s) Swish and Spit three times a day  cephalexin 500 milliGRAM(s) Oral every 12 hours  doxycycline hyclate Capsule 100 milliGRAM(s) Oral every 12 hours    MEDICATIONS  (PRN):  acetaminophen   Tablet. 650 milliGRAM(s) Oral every 6 hours PRN Mild Pain (1 - 3)  traMADol 50 milliGRAM(s) Oral three times a day PRN Severe Pain (7 - 10)  ondansetron Injectable 4 milliGRAM(s) IV Push every 6 hours PRN Nausea and/or Vomiting  artificial  tears Solution 1 Drop(s) Both EYES two times a day PRN Dry Eyes      Allergies  No Known Allergies  Intolerances        Vital Signs Last 24 Hrs  T(C): 36.6 (09 Aug 2017 14:19), Max: 36.6 (09 Aug 2017 14:19)  T(F): 97.9 (09 Aug 2017 14:19), Max: 97.9 (09 Aug 2017 14:19)  HR: 100 (09 Aug 2017 14:20) (84 - 100)  BP: 132/59 (09 Aug 2017 14:20) (131/74 - 146/71)  BP(mean): --  RR: 16 (09 Aug 2017 14:19) (16 - 17)  SpO2: 97% (09 Aug 2017 14:20) (94% - 97%)    I&O's Summary      LABS:                        12.7   10.5  )-----------( 243      ( 09 Aug 2017 06:28 )             39.6     08-09    137  |  101  |  63<H>  ----------------------------<  141<H>  4.1   |  29  |  1.16    Ca    8.2<L>      09 Aug 2017 06:28          RADIOLOGY & ADDITIONAL STUDIES:  < from: Xray Chest 1 View AP- PORTABLE-Urgent (08.08.17 @ 10:15) >  IMPRESSION:  Mild increased interstitial lung markings without significant change.   Better aeration lung bases. No new consolidation or pleural effusion.    < end of copied text >

## 2017-08-09 NOTE — PROGRESS NOTE ADULT - PROBLEM SELECTOR PROBLEM 3
Asthma
Hyponatremia
Thrombophlebitis
DAVID (acute kidney injury)
Essential hypertension

## 2017-08-09 NOTE — PROGRESS NOTE ADULT - PROBLEM SELECTOR PROBLEM 7
DAVID (acute kidney injury)
Coronary artery disease involving native coronary artery of native heart without angina pectoris
Coronary artery disease involving native coronary artery of native heart without angina pectoris
DAVID (acute kidney injury)
Coronary artery disease involving native coronary artery of native heart without angina pectoris

## 2017-08-09 NOTE — PROGRESS NOTE ADULT - PROBLEM SELECTOR PROBLEM 1
Acute respiratory failure with hypoxia
Acute diastolic congestive heart failure

## 2017-08-09 NOTE — PROGRESS NOTE ADULT - PROVIDER SPECIALTY LIST ADULT
Cardiology
Critical Care
Internal Medicine
Nephrology
Pulmonology
Nephrology
Nephrology

## 2017-08-09 NOTE — PROGRESS NOTE ADULT - SUBJECTIVE AND OBJECTIVE BOX
_____________________________________________________________________________  ========>>  M E D I C A L   A T T E N D I N G    F O L L O W  U P  N O T E  <<=========  ---------------------------------------------------------------------------------------------------------------------------------------    - Patient seen and examined by me approximately thirty minutes ago.  - In summary, patient is a 89y year old man who originally presented with respiratory failure, post Bipap.  - Patient today overall doing ok, not eating well.    ==================>> REVIEW OF SYSTEM <<=================    GEN: no fever, no chills, pain in left arm improved  RESP: mild  SOB now, no cough, no sputum, swallowing seems to have improved  CVS: no chest pain, no palpitations, no edema  GI: no abdominal pain, no nausea, no vomiting, no constipation, no diarrhea  Neuro: no headache, no dizziness  Derm : no itching, no rash  PSYCH: feels depressed    ==================>> VITAL SIGNS <<==================    Vital Signs Last 24 Hrs  T(C): 36.6 (08-09-17 @ 14:19)  T(F): 97.9 (08-09-17 @ 14:19), Max: 97.9 (08-09-17 @ 14:19)  HR: 100 (08-09-17 @ 14:20) (84 - 100)  BP: 132/59 (08-09-17 @ 14:20)  BP(mean): --  RR: 16 (08-09-17 @ 14:19) (16 - 17)  SpO2: 97% (08-09-17 @ 14:20) (94% - 97%)      ==================>> PHYSICAL EXAM <<=================    GEN: Alert and oriented, pleasant, comfortable NAD, weak appearing  HEENT: NCAT, PERRL, MMM, hearing intact,  + mild oropharyngeal thrush, improved  Neck: supple , no JVD  CVS: S1S2 , regular , No M/R/G appreciated  PULM: clear bilaterally (limited)  ABD.: soft. non tender, non distended,  bowel sounds present  Extrem: intact pulses , no edema      left forearm superficial thrombophlebitis  Derm: No rash , no ecchymoses  PSYCH : normal mood,  no delusion not anxious     ==================>> LAB AND IMAGING <<==================                                             12.7   10.5  )-----------( 243      ( 09 Aug 2017 06:28 )             39.6        08-09    137  |  101  |  63<H>  ----------------------------<  141<H>  4.1   |  29  |  1.16    Ca    8.2<L>      09 Aug 2017 06:28      Creatinine:  1.16   (08-09 @ 06:28)  Creatinine:  1.37   (08-08 @ 07:02)  Creatinine:  1.23   (08-07 @ 06:05)                   TSH:      0.32   (07-25-17)           Lipid profile:  (07-25-17)     Total: 150     LDL  : 102     HDL  :31     TG   :83     HgA1C: 5.7  (07-25-17)          ________________________________________________________________________  ===============>>  A S S E S S M E N T   A N D   P L A N <<===============  ------------------------------------------------------------------------------------------------------------------------------    **hypoxemia; MR, severe PAH, COPD, diastolic heart failure with pleural effusion, overall improved  ---cardio, pulm f/u appreciated  ---Home O2 not needed now as pt saturating  better!  ---nebulizer treatment as needed  --- diuresis as needed   ---monitor vitals, creatinine electrolytes  --PT/ OOB as able ;  Incentive Spirometer    ** decreased PO intake  ---on marinol 5 mg  ---on remeron to 30mg : continue    **superficial throbophlebitis  ---warm packs  ---given fever, leukocytosis, will complete 5 days course of oral abx as d/w house staff  ---pain mgmt    **DAVID likely on CKD?, Hyponatremia, overall improved  ---renal f/u  ---monitor BMP closely   ---free fluid restriction  ---encouraged pt and family for increased PO intake as able (d/w family, Rn)    **Hypertension, CAD, MR, PAH  ---Continue with current medications as above  ---DASH diet   ---close monitoring of vitals    **BPH  --- Continue Current medications and monitor.     **pseudogout  ---post orthopedic intervention and steropid injection  ---post colchicine  ---PT : home PT    **thrush  ---nystatin swish ans swallow, short course diflucan    -GI/DVT Prophylaxis.  DC planing home  --------------------------------------------  Case discussed with pt, HS, family, Rn  Education given on deep breathing, plan of care, nutrition  ___________________________    H. KARLA Rich.  Pager: [phone]

## 2017-08-09 NOTE — PROGRESS NOTE ADULT - PROBLEM SELECTOR PLAN 1
patient with severe MR. figueroa, 02 sat adequate on nasal canula.  Patient qualifies for home 02 as 02 sat 80% on room air at rest. Patient is in chronic and stable condition, off IVF  - d/w Dr Darryl toro and lasix dc'ed.  patient appears more alert.   - patient qualifies for hospital bed as need HOB elevated as has orthopnea.  also hx larngeal ca, needs HOB elevated to prevent aspiration  patient needs additional hours home attendant given debilitated state

## 2017-08-09 NOTE — PROGRESS NOTE ADULT - PROBLEM SELECTOR PROBLEM 8
Right knee DJD
Need for prophylactic measure
Need for prophylactic measure
Right knee DJD
Need for prophylactic measure

## 2017-08-09 NOTE — PROGRESS NOTE ADULT - PROBLEM SELECTOR PROBLEM 6
R/O Acute systolic congestive heart failure
DAVID (acute kidney injury)
Need for prophylactic measure

## 2017-08-09 NOTE — PROGRESS NOTE ADULT - SUBJECTIVE AND OBJECTIVE BOX
CHIEF COMPLAINT:Patient is a 89y old  Male who presents with a chief complaint of shortness of breath.Pt appears comfortable.    	  REVIEW OF SYSTEMS:  [X ] Unable to obtain    PHYSICAL EXAM:  T(C): 36.4 (08-09-17 @ 04:57), Max: 36.8 (08-08-17 @ 14:10)  HR: 84 (08-09-17 @ 04:57) (84 - 98)  BP: 132/56 (08-09-17 @ 04:57) (124/60 - 132/56)  RR: 16 (08-09-17 @ 04:57) (16 - 17)  SpO2: 96% (08-09-17 @ 04:57) (93% - 98%)  Wt(kg): --  I&O's Summary      Appearance: Normal	  HEENT:   Normal oral mucosa, PERRL, EOMI	  Lymphatic: No lymphadenopathy  Cardiovascular: Normal S1 S2, 2/6sm  Respiratory: Lungs clear to auscultation	  Gastrointestinal:  Soft, Non-tender, + BS	  Skin: No rashes, No ecchymoses, No cyanosis	  Extremities: Normal range of motion, No clubbing, cyanosis or edema  Vascular: Peripheral pulses palpable 2+ bilaterally    MEDICATIONS  (STANDING):  doxazosin 4 milliGRAM(s) Oral at bedtime  aspirin enteric coated 81 milliGRAM(s) Oral daily  ALBUTerol/ipratropium for Nebulization 3 milliLiter(s) Nebulizer every 6 hours  atorvastatin 40 milliGRAM(s) Oral at bedtime  clopidogrel Tablet 75 milliGRAM(s) Oral daily  amLODIPine   Tablet 2.5 milliGRAM(s) Oral daily  hydrALAZINE 25 milliGRAM(s) Oral three times a day  docusate sodium 100 milliGRAM(s) Oral daily  senna 2 Tablet(s) Oral at bedtime  heparin  Injectable 5000 Unit(s) SubCutaneous every 12 hours  dronabinol 5 milliGRAM(s) Oral two times a day  mirtazapine 30 milliGRAM(s) Oral at bedtime  fluconAZOLE IVPB 100 milliGRAM(s) IV Intermittent every 24 hours  FIRST- Mouthwash  BLM 10 milliLiter(s) Swish and Spit three times a day  chlorproMAZINE    Tablet 25 milliGRAM(s) Oral once  cephalexin 500 milliGRAM(s) Oral every 12 hours  doxycycline hyclate Capsule 100 milliGRAM(s) Oral every 12 hours        	  LABS:	 	                      12.7   10.5  )-----------( 243      ( 09 Aug 2017 06:28 )             39.6     08-09    137  |  101  |  63<H>  ----------------------------<  141<H>  4.1   |  29  |  1.16    Ca    8.2<L>      09 Aug 2017 06:28      proBNP: Serum Pro-Brain Natriuretic Peptide: 09199 pg/mL (07-24 @ 10:41)    Lipid Profile: Cholesterol 150    HDL 31  TG 83    HgA1c: Hemoglobin A1C, Whole Blood: 5.7 % (07-25 @ 09:08)    TSH: Thyroid Stimulating Hormone, Serum: 0.32 uU/mL (07-25 @ 04:26)

## 2017-08-14 DIAGNOSIS — E86.0 DEHYDRATION: ICD-10-CM

## 2017-08-14 DIAGNOSIS — E87.1 HYPO-OSMOLALITY AND HYPONATREMIA: ICD-10-CM

## 2017-08-14 DIAGNOSIS — Z95.5 PRESENCE OF CORONARY ANGIOPLASTY IMPLANT AND GRAFT: ICD-10-CM

## 2017-08-14 DIAGNOSIS — Z79.52 LONG TERM (CURRENT) USE OF SYSTEMIC STEROIDS: ICD-10-CM

## 2017-08-14 DIAGNOSIS — I50.31 ACUTE DIASTOLIC (CONGESTIVE) HEART FAILURE: ICD-10-CM

## 2017-08-14 DIAGNOSIS — I80.8 PHLEBITIS AND THROMBOPHLEBITIS OF OTHER SITES: ICD-10-CM

## 2017-08-14 DIAGNOSIS — I34.0 NONRHEUMATIC MITRAL (VALVE) INSUFFICIENCY: ICD-10-CM

## 2017-08-14 DIAGNOSIS — E46 UNSPECIFIED PROTEIN-CALORIE MALNUTRITION: ICD-10-CM

## 2017-08-14 DIAGNOSIS — I25.10 ATHEROSCLEROTIC HEART DISEASE OF NATIVE CORONARY ARTERY WITHOUT ANGINA PECTORIS: ICD-10-CM

## 2017-08-14 DIAGNOSIS — I13.0 HYPERTENSIVE HEART AND CHRONIC KIDNEY DISEASE WITH HEART FAILURE AND STAGE 1 THROUGH STAGE 4 CHRONIC KIDNEY DISEASE, OR UNSPECIFIED CHRONIC KIDNEY DISEASE: ICD-10-CM

## 2017-08-14 DIAGNOSIS — M19.90 UNSPECIFIED OSTEOARTHRITIS, UNSPECIFIED SITE: ICD-10-CM

## 2017-08-14 DIAGNOSIS — N18.3 CHRONIC KIDNEY DISEASE, STAGE 3 (MODERATE): ICD-10-CM

## 2017-08-14 DIAGNOSIS — Z85.21 PERSONAL HISTORY OF MALIGNANT NEOPLASM OF LARYNX: ICD-10-CM

## 2017-08-14 DIAGNOSIS — R79.89 OTHER SPECIFIED ABNORMAL FINDINGS OF BLOOD CHEMISTRY: ICD-10-CM

## 2017-08-14 DIAGNOSIS — Z79.82 LONG TERM (CURRENT) USE OF ASPIRIN: ICD-10-CM

## 2017-08-14 DIAGNOSIS — I24.8 OTHER FORMS OF ACUTE ISCHEMIC HEART DISEASE: ICD-10-CM

## 2017-08-14 DIAGNOSIS — J45.909 UNSPECIFIED ASTHMA, UNCOMPLICATED: ICD-10-CM

## 2017-08-14 DIAGNOSIS — F41.9 ANXIETY DISORDER, UNSPECIFIED: ICD-10-CM

## 2017-08-14 DIAGNOSIS — B37.0 CANDIDAL STOMATITIS: ICD-10-CM

## 2017-08-14 DIAGNOSIS — J44.9 CHRONIC OBSTRUCTIVE PULMONARY DISEASE, UNSPECIFIED: ICD-10-CM

## 2017-08-14 DIAGNOSIS — F32.9 MAJOR DEPRESSIVE DISORDER, SINGLE EPISODE, UNSPECIFIED: ICD-10-CM

## 2017-08-14 DIAGNOSIS — Z87.891 PERSONAL HISTORY OF NICOTINE DEPENDENCE: ICD-10-CM

## 2017-08-14 DIAGNOSIS — Z92.3 PERSONAL HISTORY OF IRRADIATION: ICD-10-CM

## 2017-08-14 DIAGNOSIS — T50.2X5A ADVERSE EFFECT OF CARBONIC-ANHYDRASE INHIBITORS, BENZOTHIADIAZIDES AND OTHER DIURETICS, INITIAL ENCOUNTER: ICD-10-CM

## 2017-08-14 DIAGNOSIS — M11.261 OTHER CHONDROCALCINOSIS, RIGHT KNEE: ICD-10-CM

## 2017-08-14 DIAGNOSIS — J96.01 ACUTE RESPIRATORY FAILURE WITH HYPOXIA: ICD-10-CM

## 2017-08-14 DIAGNOSIS — I27.2 OTHER SECONDARY PULMONARY HYPERTENSION: ICD-10-CM

## 2017-08-14 DIAGNOSIS — N17.9 ACUTE KIDNEY FAILURE, UNSPECIFIED: ICD-10-CM

## 2017-09-01 PROCEDURE — G9001: CPT

## 2017-10-18 ENCOUNTER — INPATIENT (INPATIENT)
Facility: HOSPITAL | Age: 82
LOS: 9 days | DRG: 64 | End: 2017-10-28
Attending: INTERNAL MEDICINE | Admitting: INTERNAL MEDICINE
Payer: MEDICARE

## 2017-10-18 VITALS
RESPIRATION RATE: 17 BRPM | SYSTOLIC BLOOD PRESSURE: 118 MMHG | OXYGEN SATURATION: 95 % | DIASTOLIC BLOOD PRESSURE: 65 MMHG | HEIGHT: 66 IN | TEMPERATURE: 98 F | HEART RATE: 70 BPM | WEIGHT: 139.99 LBS

## 2017-10-18 PROCEDURE — 71250 CT THORAX DX C-: CPT | Mod: 26

## 2017-10-18 PROCEDURE — 72125 CT NECK SPINE W/O DYE: CPT | Mod: 26

## 2017-10-18 PROCEDURE — 70450 CT HEAD/BRAIN W/O DYE: CPT | Mod: 26

## 2017-10-18 PROCEDURE — 72170 X-RAY EXAM OF PELVIS: CPT | Mod: 26

## 2017-10-18 NOTE — ED ADULT TRIAGE NOTE - CHIEF COMPLAINT QUOTE
C/O FALL AT 12 NOON TODAY WITH LACERATION TO RT. HAND,ALSO C/O LEFT FLANK PAIN WHEN MOVING AS PER EMS

## 2017-10-19 DIAGNOSIS — R55 SYNCOPE AND COLLAPSE: ICD-10-CM

## 2017-10-19 DIAGNOSIS — I10 ESSENTIAL (PRIMARY) HYPERTENSION: ICD-10-CM

## 2017-10-19 DIAGNOSIS — N39.0 URINARY TRACT INFECTION, SITE NOT SPECIFIED: ICD-10-CM

## 2017-10-19 DIAGNOSIS — M19.90 UNSPECIFIED OSTEOARTHRITIS, UNSPECIFIED SITE: ICD-10-CM

## 2017-10-19 DIAGNOSIS — N30.00 ACUTE CYSTITIS WITHOUT HEMATURIA: ICD-10-CM

## 2017-10-19 DIAGNOSIS — C32.9 MALIGNANT NEOPLASM OF LARYNX, UNSPECIFIED: ICD-10-CM

## 2017-10-19 DIAGNOSIS — I25.10 ATHEROSCLEROTIC HEART DISEASE OF NATIVE CORONARY ARTERY WITHOUT ANGINA PECTORIS: ICD-10-CM

## 2017-10-19 DIAGNOSIS — J45.909 UNSPECIFIED ASTHMA, UNCOMPLICATED: ICD-10-CM

## 2017-10-19 DIAGNOSIS — Z29.9 ENCOUNTER FOR PROPHYLACTIC MEASURES, UNSPECIFIED: ICD-10-CM

## 2017-10-19 LAB
ALBUMIN SERPL ELPH-MCNC: 3.2 G/DL — LOW (ref 3.5–5)
ALP SERPL-CCNC: 117 U/L — SIGNIFICANT CHANGE UP (ref 40–120)
ALT FLD-CCNC: 27 U/L DA — SIGNIFICANT CHANGE UP (ref 10–60)
ANION GAP SERPL CALC-SCNC: 9 MMOL/L — SIGNIFICANT CHANGE UP (ref 5–17)
ANION GAP SERPL CALC-SCNC: 9 MMOL/L — SIGNIFICANT CHANGE UP (ref 5–17)
APPEARANCE UR: CLEAR — SIGNIFICANT CHANGE UP
APTT BLD: 30.9 SEC — SIGNIFICANT CHANGE UP (ref 27.5–37.4)
AST SERPL-CCNC: 25 U/L — SIGNIFICANT CHANGE UP (ref 10–40)
BASOPHILS # BLD AUTO: 0.1 K/UL — SIGNIFICANT CHANGE UP (ref 0–0.2)
BASOPHILS NFR BLD AUTO: 0.4 % — SIGNIFICANT CHANGE UP (ref 0–2)
BILIRUB SERPL-MCNC: 0.6 MG/DL — SIGNIFICANT CHANGE UP (ref 0.2–1.2)
BILIRUB UR-MCNC: NEGATIVE — SIGNIFICANT CHANGE UP
BUN SERPL-MCNC: 45 MG/DL — HIGH (ref 7–18)
BUN SERPL-MCNC: 49 MG/DL — HIGH (ref 7–18)
CALCIUM SERPL-MCNC: 9 MG/DL — SIGNIFICANT CHANGE UP (ref 8.4–10.5)
CALCIUM SERPL-MCNC: 9.2 MG/DL — SIGNIFICANT CHANGE UP (ref 8.4–10.5)
CHLORIDE SERPL-SCNC: 100 MMOL/L — SIGNIFICANT CHANGE UP (ref 96–108)
CHLORIDE SERPL-SCNC: 98 MMOL/L — SIGNIFICANT CHANGE UP (ref 96–108)
CHOLEST SERPL-MCNC: 98 MG/DL — SIGNIFICANT CHANGE UP (ref 10–199)
CK MB BLD-MCNC: 4.9 % — HIGH (ref 0–3.5)
CK MB CFR SERPL CALC: 2.2 NG/ML — SIGNIFICANT CHANGE UP (ref 0–3.6)
CK SERPL-CCNC: 45 U/L — SIGNIFICANT CHANGE UP (ref 35–232)
CK SERPL-CCNC: 53 U/L — SIGNIFICANT CHANGE UP (ref 35–232)
CO2 SERPL-SCNC: 29 MMOL/L — SIGNIFICANT CHANGE UP (ref 22–31)
CO2 SERPL-SCNC: 30 MMOL/L — SIGNIFICANT CHANGE UP (ref 22–31)
COLOR SPEC: YELLOW — SIGNIFICANT CHANGE UP
CREAT SERPL-MCNC: 1.19 MG/DL — SIGNIFICANT CHANGE UP (ref 0.5–1.3)
CREAT SERPL-MCNC: 1.21 MG/DL — SIGNIFICANT CHANGE UP (ref 0.5–1.3)
DIFF PNL FLD: ABNORMAL
EOSINOPHIL # BLD AUTO: 0.1 K/UL — SIGNIFICANT CHANGE UP (ref 0–0.5)
EOSINOPHIL NFR BLD AUTO: 0.7 % — SIGNIFICANT CHANGE UP (ref 0–6)
FOLATE SERPL-MCNC: >20 NG/ML — SIGNIFICANT CHANGE UP (ref 4.8–24.2)
GLUCOSE SERPL-MCNC: 141 MG/DL — HIGH (ref 70–99)
GLUCOSE SERPL-MCNC: 98 MG/DL — SIGNIFICANT CHANGE UP (ref 70–99)
GLUCOSE UR QL: NEGATIVE — SIGNIFICANT CHANGE UP
HCT VFR BLD CALC: 35.9 % — LOW (ref 39–50)
HCT VFR BLD CALC: 38.6 % — LOW (ref 39–50)
HDLC SERPL-MCNC: 31 MG/DL — LOW (ref 40–125)
HGB BLD-MCNC: 11 G/DL — LOW (ref 13–17)
HGB BLD-MCNC: 12 G/DL — LOW (ref 13–17)
INR BLD: 1.06 RATIO — SIGNIFICANT CHANGE UP (ref 0.88–1.16)
KETONES UR-MCNC: NEGATIVE — SIGNIFICANT CHANGE UP
LACTATE SERPL-SCNC: 2.4 MMOL/L — HIGH (ref 0.7–2)
LEUKOCYTE ESTERASE UR-ACNC: ABNORMAL
LIPID PNL WITH DIRECT LDL SERPL: 52 MG/DL — SIGNIFICANT CHANGE UP
LYMPHOCYTES # BLD AUTO: 27.4 % — SIGNIFICANT CHANGE UP (ref 13–44)
LYMPHOCYTES # BLD AUTO: 3.3 K/UL — SIGNIFICANT CHANGE UP (ref 1–3.3)
MAGNESIUM SERPL-MCNC: 2.2 MG/DL — SIGNIFICANT CHANGE UP (ref 1.6–2.6)
MCHC RBC-ENTMCNC: 28.4 PG — SIGNIFICANT CHANGE UP (ref 27–34)
MCHC RBC-ENTMCNC: 28.6 PG — SIGNIFICANT CHANGE UP (ref 27–34)
MCHC RBC-ENTMCNC: 30.7 GM/DL — LOW (ref 32–36)
MCHC RBC-ENTMCNC: 31.1 GM/DL — LOW (ref 32–36)
MCV RBC AUTO: 92 FL — SIGNIFICANT CHANGE UP (ref 80–100)
MCV RBC AUTO: 92.6 FL — SIGNIFICANT CHANGE UP (ref 80–100)
MONOCYTES # BLD AUTO: 0.7 K/UL — SIGNIFICANT CHANGE UP (ref 0–0.9)
MONOCYTES NFR BLD AUTO: 5.9 % — SIGNIFICANT CHANGE UP (ref 2–14)
NEUTROPHILS # BLD AUTO: 8 K/UL — HIGH (ref 1.8–7.4)
NEUTROPHILS NFR BLD AUTO: 65.5 % — SIGNIFICANT CHANGE UP (ref 43–77)
NITRITE UR-MCNC: NEGATIVE — SIGNIFICANT CHANGE UP
PH UR: 6.5 — SIGNIFICANT CHANGE UP (ref 5–8)
PHOSPHATE SERPL-MCNC: 2.9 MG/DL — SIGNIFICANT CHANGE UP (ref 2.5–4.5)
PLATELET # BLD AUTO: 242 K/UL — SIGNIFICANT CHANGE UP (ref 150–400)
PLATELET # BLD AUTO: 286 K/UL — SIGNIFICANT CHANGE UP (ref 150–400)
POTASSIUM SERPL-MCNC: 3.6 MMOL/L — SIGNIFICANT CHANGE UP (ref 3.5–5.3)
POTASSIUM SERPL-MCNC: 3.6 MMOL/L — SIGNIFICANT CHANGE UP (ref 3.5–5.3)
POTASSIUM SERPL-SCNC: 3.6 MMOL/L — SIGNIFICANT CHANGE UP (ref 3.5–5.3)
POTASSIUM SERPL-SCNC: 3.6 MMOL/L — SIGNIFICANT CHANGE UP (ref 3.5–5.3)
PROT SERPL-MCNC: 7.5 G/DL — SIGNIFICANT CHANGE UP (ref 6–8.3)
PROT UR-MCNC: 30 MG/DL
PROTHROM AB SERPL-ACNC: 11.6 SEC — SIGNIFICANT CHANGE UP (ref 9.8–12.7)
RBC # BLD: 3.88 M/UL — LOW (ref 4.2–5.8)
RBC # BLD: 4.19 M/UL — LOW (ref 4.2–5.8)
RBC # FLD: 14.6 % — HIGH (ref 10.3–14.5)
RBC # FLD: 14.8 % — HIGH (ref 10.3–14.5)
SODIUM SERPL-SCNC: 137 MMOL/L — SIGNIFICANT CHANGE UP (ref 135–145)
SODIUM SERPL-SCNC: 138 MMOL/L — SIGNIFICANT CHANGE UP (ref 135–145)
SP GR SPEC: 1.01 — SIGNIFICANT CHANGE UP (ref 1.01–1.02)
TOTAL CHOLESTEROL/HDL RATIO MEASUREMENT: 3.2 RATIO — LOW (ref 3.4–9.6)
TRIGL SERPL-MCNC: 76 MG/DL — SIGNIFICANT CHANGE UP (ref 10–149)
TROPONIN I SERPL-MCNC: 0.07 NG/ML — HIGH (ref 0–0.04)
TROPONIN I SERPL-MCNC: 0.08 NG/ML — HIGH (ref 0–0.04)
UROBILINOGEN FLD QL: NEGATIVE — SIGNIFICANT CHANGE UP
WBC # BLD: 12.1 K/UL — HIGH (ref 3.8–10.5)
WBC # BLD: 12.2 K/UL — HIGH (ref 3.8–10.5)
WBC # FLD AUTO: 12.1 K/UL — HIGH (ref 3.8–10.5)
WBC # FLD AUTO: 12.2 K/UL — HIGH (ref 3.8–10.5)

## 2017-10-19 PROCEDURE — 99285 EMERGENCY DEPT VISIT HI MDM: CPT | Mod: 25

## 2017-10-19 PROCEDURE — 12002 RPR S/N/AX/GEN/TRNK2.6-7.5CM: CPT

## 2017-10-19 RX ORDER — AMLODIPINE BESYLATE 2.5 MG/1
2.5 TABLET ORAL DAILY
Qty: 0 | Refills: 0 | Status: DISCONTINUED | OUTPATIENT
Start: 2017-10-19 | End: 2017-10-20

## 2017-10-19 RX ORDER — PANTOPRAZOLE SODIUM 20 MG/1
40 TABLET, DELAYED RELEASE ORAL
Qty: 0 | Refills: 0 | Status: DISCONTINUED | OUTPATIENT
Start: 2017-10-19 | End: 2017-10-25

## 2017-10-19 RX ORDER — CEFTRIAXONE 500 MG/1
INJECTION, POWDER, FOR SOLUTION INTRAMUSCULAR; INTRAVENOUS
Qty: 0 | Refills: 0 | Status: DISCONTINUED | OUTPATIENT
Start: 2017-10-19 | End: 2017-10-22

## 2017-10-19 RX ORDER — TIOTROPIUM BROMIDE 18 UG/1
1 CAPSULE ORAL; RESPIRATORY (INHALATION) DAILY
Qty: 0 | Refills: 0 | Status: DISCONTINUED | OUTPATIENT
Start: 2017-10-19 | End: 2017-10-26

## 2017-10-19 RX ORDER — ASPIRIN/CALCIUM CARB/MAGNESIUM 324 MG
324 TABLET ORAL DAILY
Qty: 0 | Refills: 0 | Status: DISCONTINUED | OUTPATIENT
Start: 2017-10-19 | End: 2017-10-19

## 2017-10-19 RX ORDER — CLOPIDOGREL BISULFATE 75 MG/1
75 TABLET, FILM COATED ORAL DAILY
Qty: 0 | Refills: 0 | Status: DISCONTINUED | OUTPATIENT
Start: 2017-10-19 | End: 2017-10-25

## 2017-10-19 RX ORDER — ACETAMINOPHEN 500 MG
650 TABLET ORAL EVERY 6 HOURS
Qty: 0 | Refills: 0 | Status: DISCONTINUED | OUTPATIENT
Start: 2017-10-19 | End: 2017-10-25

## 2017-10-19 RX ORDER — ATORVASTATIN CALCIUM 80 MG/1
40 TABLET, FILM COATED ORAL AT BEDTIME
Qty: 0 | Refills: 0 | Status: DISCONTINUED | OUTPATIENT
Start: 2017-10-19 | End: 2017-10-25

## 2017-10-19 RX ORDER — SODIUM CHLORIDE 9 MG/ML
500 INJECTION INTRAMUSCULAR; INTRAVENOUS; SUBCUTANEOUS ONCE
Qty: 0 | Refills: 0 | Status: COMPLETED | OUTPATIENT
Start: 2017-10-19 | End: 2017-10-19

## 2017-10-19 RX ORDER — IPRATROPIUM/ALBUTEROL SULFATE 18-103MCG
3 AEROSOL WITH ADAPTER (GRAM) INHALATION EVERY 6 HOURS
Qty: 0 | Refills: 0 | Status: DISCONTINUED | OUTPATIENT
Start: 2017-10-19 | End: 2017-10-28

## 2017-10-19 RX ORDER — ASPIRIN/CALCIUM CARB/MAGNESIUM 324 MG
81 TABLET ORAL DAILY
Qty: 0 | Refills: 0 | Status: DISCONTINUED | OUTPATIENT
Start: 2017-10-19 | End: 2017-10-25

## 2017-10-19 RX ORDER — CEFTRIAXONE 500 MG/1
1 INJECTION, POWDER, FOR SOLUTION INTRAMUSCULAR; INTRAVENOUS ONCE
Qty: 0 | Refills: 0 | Status: COMPLETED | OUTPATIENT
Start: 2017-10-19 | End: 2017-10-19

## 2017-10-19 RX ORDER — MIRTAZAPINE 45 MG/1
7.5 TABLET, ORALLY DISINTEGRATING ORAL DAILY
Qty: 0 | Refills: 0 | Status: DISCONTINUED | OUTPATIENT
Start: 2017-10-19 | End: 2017-10-25

## 2017-10-19 RX ORDER — METOPROLOL TARTRATE 50 MG
12.5 TABLET ORAL
Qty: 0 | Refills: 0 | Status: DISCONTINUED | OUTPATIENT
Start: 2017-10-19 | End: 2017-10-22

## 2017-10-19 RX ORDER — MAGNESIUM SULFATE 500 MG/ML
1 VIAL (ML) INJECTION ONCE
Qty: 0 | Refills: 0 | Status: COMPLETED | OUTPATIENT
Start: 2017-10-19 | End: 2017-10-19

## 2017-10-19 RX ORDER — CEFTRIAXONE 500 MG/1
1 INJECTION, POWDER, FOR SOLUTION INTRAMUSCULAR; INTRAVENOUS EVERY 24 HOURS
Qty: 0 | Refills: 0 | Status: DISCONTINUED | OUTPATIENT
Start: 2017-10-20 | End: 2017-10-22

## 2017-10-19 RX ORDER — ALBUTEROL 90 UG/1
1 AEROSOL, METERED ORAL EVERY 4 HOURS
Qty: 0 | Refills: 0 | Status: DISCONTINUED | OUTPATIENT
Start: 2017-10-19 | End: 2017-10-28

## 2017-10-19 RX ORDER — HEPARIN SODIUM 5000 [USP'U]/ML
5000 INJECTION INTRAVENOUS; SUBCUTANEOUS EVERY 12 HOURS
Qty: 0 | Refills: 0 | Status: DISCONTINUED | OUTPATIENT
Start: 2017-10-19 | End: 2017-10-25

## 2017-10-19 RX ADMIN — CEFTRIAXONE 100 GRAM(S): 500 INJECTION, POWDER, FOR SOLUTION INTRAMUSCULAR; INTRAVENOUS at 18:10

## 2017-10-19 RX ADMIN — Medication 3 MILLILITER(S): at 20:29

## 2017-10-19 RX ADMIN — Medication 3 MILLILITER(S): at 03:00

## 2017-10-19 RX ADMIN — MIRTAZAPINE 7.5 MILLIGRAM(S): 45 TABLET, ORALLY DISINTEGRATING ORAL at 14:02

## 2017-10-19 RX ADMIN — Medication 12.5 MILLIGRAM(S): at 18:10

## 2017-10-19 RX ADMIN — ATORVASTATIN CALCIUM 40 MILLIGRAM(S): 80 TABLET, FILM COATED ORAL at 22:09

## 2017-10-19 RX ADMIN — Medication 100 GRAM(S): at 02:13

## 2017-10-19 RX ADMIN — Medication 3 MILLILITER(S): at 15:17

## 2017-10-19 RX ADMIN — Medication 650 MILLIGRAM(S): at 07:18

## 2017-10-19 RX ADMIN — Medication 650 MILLIGRAM(S): at 11:57

## 2017-10-19 RX ADMIN — CLOPIDOGREL BISULFATE 75 MILLIGRAM(S): 75 TABLET, FILM COATED ORAL at 11:55

## 2017-10-19 RX ADMIN — Medication 324 MILLIGRAM(S): at 02:15

## 2017-10-19 RX ADMIN — Medication 650 MILLIGRAM(S): at 13:00

## 2017-10-19 RX ADMIN — Medication 650 MILLIGRAM(S): at 06:27

## 2017-10-19 RX ADMIN — HEPARIN SODIUM 5000 UNIT(S): 5000 INJECTION INTRAVENOUS; SUBCUTANEOUS at 18:12

## 2017-10-19 RX ADMIN — ATORVASTATIN CALCIUM 40 MILLIGRAM(S): 80 TABLET, FILM COATED ORAL at 03:00

## 2017-10-19 RX ADMIN — HEPARIN SODIUM 5000 UNIT(S): 5000 INJECTION INTRAVENOUS; SUBCUTANEOUS at 06:27

## 2017-10-19 RX ADMIN — Medication 20 MILLIGRAM(S): at 14:03

## 2017-10-19 RX ADMIN — SODIUM CHLORIDE 1000 MILLILITER(S): 9 INJECTION INTRAMUSCULAR; INTRAVENOUS; SUBCUTANEOUS at 02:13

## 2017-10-19 NOTE — PROGRESS NOTE ADULT - SUBJECTIVE AND OBJECTIVE BOX
PGY 1 Note discussed with supervising resident and primary attending    Patient is a 89y old  Male who presents with a chief complaint of fall, possible syncope (19 Oct 2017 02:12)      INTERVAL HPI/OVERNIGHT EVENTS: patient seen and examined at bed side,  patient denies any dizziness, patient is orthostatic positive, offers no new complaints; current symptoms resolving    MEDICATIONS  (STANDING):  ALBUTerol    90 MICROgram(s) HFA Inhaler 1 Puff(s) Inhalation every 4 hours  ALBUTerol/ipratropium for Nebulization 3 milliLiter(s) Nebulizer every 6 hours  amLODIPine   Tablet 2.5 milliGRAM(s) Oral daily  aspirin enteric coated 81 milliGRAM(s) Oral daily  atorvastatin 40 milliGRAM(s) Oral at bedtime  clopidogrel Tablet 75 milliGRAM(s) Oral daily  heparin  Injectable 5000 Unit(s) SubCutaneous every 12 hours  metoprolol 12.5 milliGRAM(s) Oral two times a day  mirtazapine 7.5 milliGRAM(s) Oral daily  pantoprazole    Tablet 40 milliGRAM(s) Oral before breakfast  PARoxetine 20 milliGRAM(s) Oral daily  tiotropium 18 MICROgram(s) Capsule 1 Capsule(s) Inhalation daily    MEDICATIONS  (PRN):  acetaminophen   Tablet. 650 milliGRAM(s) Oral every 6 hours PRN Moderate Pain (4 - 6)      __________________________________________________  REVIEW OF SYSTEMS:    CONSTITUTIONAL: No fever,   EYES: no acute visual disturbances  NECK: No pain or stiffness  RESPIRATORY: No cough; No shortness of breath  CARDIOVASCULAR: No chest pain, no palpitations  GASTROINTESTINAL: No pain. No nausea or vomiting; No diarrhea   NEUROLOGICAL: No headache or numbness, no tremors  MUSCULOSKELETAL: No joint pain, no muscle pain  GENITOURINARY: no dysuria, no frequency, no hesitancy  PSYCHIATRY: no depression , no anxiety  ALL OTHER  ROS negative        Vital Signs Last 24 Hrs  T(C): 36.8 (19 Oct 2017 11:20), Max: 36.9 (19 Oct 2017 03:29)  T(F): 98.2 (19 Oct 2017 11:20), Max: 98.4 (19 Oct 2017 03:29)  HR: 63 (19 Oct 2017 07:35) (58 - 70)  BP: 115/34 (19 Oct 2017 07:35) (115/34 - 136/64)  BP(mean): --  RR: 17 (19 Oct 2017 11:20) (16 - 18)  SpO2: 97% (19 Oct 2017 11:20) (95% - 99%)    ________________________________________________  PHYSICAL EXAM:  GENERAL: NAD  HEENT: Normocephalic;  conjunctivae and sclerae clear; moist mucous membranes;   NECK : supple  CHEST/LUNG: Clear to auscultation bilaterally with good air entry   HEART: S1 S2  regular; no murmurs, gallops or rubs  ABDOMEN: Soft, Nontender, Nondistended; Bowel sounds present  EXTREMITIES: no cyanosis; no edema; no calf tenderness  SKIN: warm and dry; no rash  NERVOUS SYSTEM:  Awake and alert; Oriented  to place, person and time ; no new deficits    _________________________________________________  LABS:                        11.0   12.1  )-----------( 242      ( 19 Oct 2017 10:05 )             35.9     10    138  |  100  |  45<H>  ----------------------------<  141<H>  3.6   |  29  |  1.19    Ca    9.0      19 Oct 2017 10:05  Phos  2.9     10-19  Mg     2.2     10-19    TPro  7.5  /  Alb  3.2<L>  /  TBili  0.6  /  DBili  x   /  AST  25  /  ALT  27  /  AlkPhos  117  10    PT/INR - ( 19 Oct 2017 00:09 )   PT: 11.6 sec;   INR: 1.06 ratio         PTT - ( 19 Oct 2017 00:09 )  PTT:30.9 sec  Urinalysis Basic - ( 19 Oct 2017 09:20 )    Color: Yellow / Appearance: Clear / S.010 / pH: x  Gluc: x / Ketone: Negative  / Bili: Negative / Urobili: Negative   Blood: x / Protein: 30 mg/dL / Nitrite: Negative   Leuk Esterase: Moderate / RBC: 0-2 /HPF / WBC 26-50 /HPF   Sq Epi: x / Non Sq Epi: x / Bacteria: Moderate /HPF      CAPILLARY BLOOD GLUCOSE            RADIOLOGY & ADDITIONAL TESTS:    Imaging Personally Reviewed:  YES  CT chest No displaced rib fracture. Incidental findings as described in the body   of the report.    ct head No evidence of acute intracranial hemorrhage, midline shift or CT   evidence of acute territorial infarct.      Consultant(s) Notes Reviewed:   YES    Care Discussed with Consultants : yes    Plan of care was discussed with patient and /or primary care giver; all questions and concerns were addressed and care was aligned with patient's wishes.

## 2017-10-19 NOTE — PHYSICAL THERAPY INITIAL EVALUATION ADULT - TRANSFER SAFETY CONCERNS NOTED: BED/CHAIR, REHAB EVAL
decreased weight-shifting ability/losing balance/decreased step length/decreased balance during turns

## 2017-10-19 NOTE — H&P ADULT - ASSESSMENT
Patient is a 89 y/o M pt with PMHx of arthritis, asthma, CAD, 5 stents,  HTN, laryngeal CA, recent admission in July for CHF exacerbation ( needing BiPaP and ICU admission) presents to ED c/o syncopal episode x today.      Patient has stable vitals in the ED, mild leucocytosis of 12.2 but no fever/chills, no dysuria, cough or other complaints, elevated BUN of 49 ( however it has been elevated even during prior labs), coags WNL, he has CKD stage III and troponin 1of 0.071, with no ischemic changes on the EKG. CT chest ruled out any rib fractures but shows emphysema, and atelectesis ( will order incentile spirometry) calcified mediatinal LN, calcified hepatic granuloma, gen osteopenia with compression deformity at T11, CT Head and C spine without any acute changes  Patient being admitted for multiple falls and syncopal evaluation

## 2017-10-19 NOTE — CONSULT NOTE ADULT - ASSESSMENT
Patient is a 89 yr old Male pt with PMHx of arthritis, asthma, CAD, 5 stents,  HTN, laryngeal CA, CKD stage III recent admission in July for CHF exacerbation ( needing BiPaP and ICU admission) presents to ED c/o syncopal episode x today.   1.Tele monitoring.  2.Orthostatic BP q shift.  3.MRI-r/o cva.  4.CAD -d/c labetalol, lopressor 12.5mg bid,asa,statin.  5.Pulm HTN-change norvasc 2.5mg qd.  6.Continue rest of medication.  7.GI and DVT prophylaxis.

## 2017-10-19 NOTE — PATIENT PROFILE ADULT. - ABILITY TO HEAR (WITH HEARING AID OR HEARING APPLIANCE IF NORMALLY USED):
Mildly to Moderately Impaired: difficulty hearing in some environments or speaker may need to increase volume or speak distinctly/Hard of Hearing

## 2017-10-19 NOTE — PHYSICAL THERAPY INITIAL EVALUATION ADULT - GENERAL OBSERVATIONS, REHAB EVAL
Pt received supine in bed, supplemental O2 at 2L/min via nasal cannula, tele and NAD. Pt refused phone  services. Received social history from son, Thom, via phone call.

## 2017-10-19 NOTE — H&P ADULT - NSHPPHYSICALEXAM_GEN_ALL_CORE
GENERAL: eledrly Ghanaian speaking male with son at bedside, no acute distress  HEAD: atraumatic, normocephalic   EYES: PERRLA, white sclera.   ENMT: nasal mucosa- Oral cavity- WNL  NECK: supple, JVD/ no JVD, thyroid- WNL  LYMPH: no palpable lymph nodes     SKIN:  multiple ecchymosis over arms, vertical laceration over left hand  CHEST/LUNG: No Chest deformity ,chest tenderness+. bilateral breath sounds,no  adventitious sounds  HEART: RRR, no m/r/g   ABDOMEN: soft, nontender, nondistended; bowel sounds.+  EXTREMITIES: no pedal edema, no cyanosis, no clubbing.  NEURO: [id number]

## 2017-10-19 NOTE — PROGRESS NOTE ADULT - PROBLEM SELECTOR PLAN 1
Patient with syncope, multiple similar episodes in the last 2 months  Exact mechanism of fall/syncope is unknown  Initial CT head and CT C spine negative for acute changes, CT Chest ruled out rib fractures but shows osteopenia and T11 compression deformity  PT, Fall precautions, Check CK  Incentive spirometry for atelectesis  Could be 2/2 to orthostatic hypotension as noted by son at home, however patient orthostatic negative in the ED  recent ECHO in July shows severe MR, preserved EF with Grade II DD, will not repeat ECHO, f/up US carotids, Pelvic Xray to r/o fractures  patient was taking labetalol, norvac , metoprolol, lasix, at home   -patinet is orthostatic positive   - will hold his home medications   - lopressor 12.5mg, Norvasc 2.5 mg     - Cardio: Dr Xiao  - F/u MRI brain

## 2017-10-19 NOTE — PROGRESS NOTE ADULT - PROBLEM SELECTOR PLAN 2
Will hold Anti HTN meds as son's description suggested orthostatic hypotension  DASH diet  - patient is orthostatic positive  - caitlin due to polypharmacy  - will hold home medications

## 2017-10-19 NOTE — PHYSICAL THERAPY INITIAL EVALUATION ADULT - LIVES WITH, PROFILE
As per son, pt lives alone in an apartment with elevator access and no steps to enter. HHA comes 6-8 hours/day daily./alone

## 2017-10-19 NOTE — PROGRESS NOTE ADULT - PROBLEM SELECTOR PLAN 3
Patient with Known H/o CAD, no CP, EKG: Poor baseline but no ischemic changes  T1 mildly elevated to 0.071 >> T3 0.08 , patient family refuse T3   - patient is asymptomatic, EKG no new ST T wave changes, patient hs CKD tropenemia  likely due to CKD vs Demand  -ECHO6. Normal Left Ventricular Systolic Function,  (EF = 55%)  Grade II diastolic dysfunction  - F/u MRI   Dr Xiao on board

## 2017-10-19 NOTE — ED PROVIDER NOTE - CHPI ED SYMPTOMS NEG
no fever, no chills, no shortness of breath, no cough, no chest pain, no palpitations, no nausea, no vomiting, no diarrhea, no abd pain, no melena, no numbness, no tingling, no weakness

## 2017-10-19 NOTE — PROGRESS NOTE ADULT - PROBLEM SELECTOR PLAN 7
patient UA positive Esterase and WBC elevated   - pt elevated wbc count   - f/u urine cultures  - c/w Rocephin 1g q24hrs

## 2017-10-19 NOTE — ED PROVIDER NOTE - MEDICAL DECISION MAKING DETAILS
89 y/o M py with syncopal episode x today. Will obtain EKG, CXR, labs, laceration repair, and admit for syncopal work up.

## 2017-10-19 NOTE — PHYSICAL THERAPY INITIAL EVALUATION ADULT - CRITERIA FOR SKILLED THERAPEUTIC INTERVENTIONS
rehab potential/impairments found/therapy frequency/predicted duration of therapy intervention/functional limitations in following categories/risk reduction/prevention/anticipated discharge recommendation

## 2017-10-19 NOTE — H&P ADULT - PROBLEM SELECTOR PLAN 1
Patient with syncope, multiple similar episodes in the last 2 months  Exact mechanism of fall/syncope is unknown  Initial CT head and CT C spine negative for acute changes, CT Chest ruled out rib fractures but shows osteopenia and T11 compression deformity  PT, Fall precautions, Check CK  Could be 2/2 to orthostatic hypotension as noted by son at home, however patient orthostatic negative in the ED  recent ECHO in July shows severe MR, preserved EF with Grade II DD, will not repeat ECHO, f/up US carotids, Pelvic Xray to r/o fractures  Son to bring all home medications in AM, Primary team to pls update Med-rec since meds have been changes since the past visit Patient with syncope, multiple similar episodes in the last 2 months  Exact mechanism of fall/syncope is unknown  Initial CT head and CT C spine negative for acute changes, CT Chest ruled out rib fractures but shows osteopenia and T11 compression deformity  PT, Fall precautions, Check CK  Incentive spirometry for atelectesis  Could be 2/2 to orthostatic hypotension as noted by son at home, however patient orthostatic negative in the ED  recent ECHO in July shows severe MR, preserved EF with Grade II DD, will not repeat ECHO, f/up US carotids, Pelvic Xray to r/o fractures  Son to bring all home medications in AM, Primary team to pls update Med-rec since meds have been changes since the past visit Patient with syncope, multiple similar episodes in the last 2 months  Exact mechanism of fall/syncope is unknown  Initial CT head and CT C spine negative for acute changes, CT Chest ruled out rib fractures but shows osteopenia and T11 compression deformity  PT, Fall precautions, Check CK  Incentive spirometry for atelectesis  Could be 2/2 to orthostatic hypotension as noted by son at home, however patient orthostatic negative in the ED  recent ECHO in July shows severe MR, preserved EF with Grade II DD, will not repeat ECHO, f/up US carotids, Pelvic Xray to r/o fractures  Son to bring all home medications in AM, Primary team to pls update Med-rec since meds have been changed since the past visit Patient with syncope, multiple similar episodes in the last 2 months  Exact mechanism of fall/syncope is unknown  Initial CT head and CT C spine negative for acute changes, CT Chest ruled out rib fractures but shows osteopenia and T11 compression deformity  PT, Fall precautions, Check CK  Incentive spirometry for atelectesis  Could be 2/2 to orthostatic hypotension as noted by son at home, however patient orthostatic negative in the ED  recent ECHO in July shows severe MR, preserved EF with Grade II DD, will not repeat ECHO, f/up US carotids, Pelvic Xray to r/o fractures  Son to bring all home medications in AM, Primary team to pls update Med-rec since meds have been changed since the past visit  Cardio: Dr Xiao

## 2017-10-19 NOTE — H&P ADULT - HISTORY OF PRESENT ILLNESS
Patient is a 89 y/o M pt with PMHx of arthritis, asthma, CAD, 5 stents,  HTN, laryngeal CA, recent admission in July for CHF exacerbation ( needing BiPaP and ICU admission) presents to ED c/o syncopal episode x today. Pt reports he is unable to recall the history of events, no LOC or head trauma, fall was unwitnessed. As per son at bedside, pt had 5 falls in the past 2 months. In the ED, pt is c/o pain to the laceration site on the left hand, and pain to the left rib. Son says that patient's medications have been modified since the last visit and he does not remember the list. Patient admits to feeling dizzy when getting up from bed and walking and son has noted that patient's BP drops when he stands up from sitting position   Pt denies fever, chills, shortness of breath, cough, chest pain, palpitations, nausea, vomiting, diarrhea, abd pain, melena, numbness, tingling, weakness, or any other complaints. NKDA.  Surgical hx of cardiac stents, quit smoking/drinking 30-40 years ago, FHx unknown. Patient is a 89 y/o M pt with PMHx of arthritis, asthma, CAD, 5 stents,  HTN, laryngeal CA, CKD stage III recent admission in July for CHF exacerbation ( needing BiPaP and ICU admission) presents to ED c/o syncopal episode x today. Pt reports he is unable to recall the history of events, no LOC or head trauma, fall was unwitnessed. As per son at bedside, pt had 5 falls in the past 2 months. In the ED, pt is c/o pain to the laceration site on the left hand, and pain to the left rib. Son says that patient's medications have been modified since the last visit and he does not remember the list. Patient admits to feeling dizzy when getting up from bed and walking and son has noted that patient's BP drops when he stands up from sitting position   Pt denies fever, chills, shortness of breath, cough, chest pain, palpitations, nausea, vomiting, diarrhea, abd pain, melena, numbness, tingling, weakness, or any other complaints. NKDA.  Surgical hx of cardiac stents, quit smoking/drinking 30-40 years ago, FHx unknown.    Discussed GOC with son at bedside: Full Code

## 2017-10-19 NOTE — ED PROVIDER NOTE - MUSCULOSKELETAL, MLM
Tenderness to palpation to the left rib at multiple levels laterally. No hip pain. Moving all 4 extremities. Left hand with full motor and sensory in all fingers and wrist. No bony tenderness over the hand or wrist.

## 2017-10-19 NOTE — H&P ADULT - ATTENDING COMMENTS
Patient seen, examined, and interviewed by me, case discussed with me, chart reviewed, agree with above H/P as reviewed and edited by me.  See  full note above.

## 2017-10-19 NOTE — H&P ADULT - PROBLEM SELECTOR PLAN 2
Will hold Anti HTN meds as son's description suggested orthostatic hypotension  DASH diet  Primary team to please update Med Rec

## 2017-10-19 NOTE — ED PROVIDER NOTE - OBJECTIVE STATEMENT
89 y/o M pt with PMHx of arthritis, asthma, CAD HTN, laryngeal CA, presents to ED c/o syncopal episode x today. Pt reports he is unable to recall the history of events. As per son at bedside, pt had 5 falls in the past 2 months. In the ED, pt is c/o pain to the laceration site on the left hand, and pain to the left rib. Pt denies fever, chills, shortness of breath, cough, chest pain, palpitations, nausea, vomiting, diarrhea, abd pain, melena, numbness, tingling, weakness, or any other complaints. NKDA.

## 2017-10-19 NOTE — H&P ADULT - PROBLEM SELECTOR PLAN 3
Patient with Known H/o CAD, no CP, EKG: Poor baseline but no ischemic changes  T1 mildly elevated to 0.071, could be 2/2 to CKD, patient reports no CP  No signs of fluid overload  Will trend troponins and monitor on TELE

## 2017-10-19 NOTE — CONSULT NOTE ADULT - SUBJECTIVE AND OBJECTIVE BOX
CHIEF COMPLAINT:Patient is a 89y old  Male who presents with a chief complaint of fall, possible syncope.      HPI:  Patient is a 89 yr old Male pt with PMHx of arthritis, asthma, CAD, 5 stents,  HTN, laryngeal CA, CKD stage III recent admission in July for CHF exacerbation ( needing BiPaP and ICU admission) presents to ED c/o syncopal episode x today. Pt reports he is unable to recall the history of events, no LOC or head trauma, fall was unwitnessed. As per son at bedside, pt had 5 falls in the past 2 months. In the ED, pt is c/o pain to the laceration site on the left hand, and pain to the left rib. Son says that patient's medications have been modified since the last visit and he does not remember the list. Patient admits to feeling dizzy when getting up from bed and walking and son has noted that patient's BP drops when he stands up from sitting position .      PAST MEDICAL & SURGICAL HISTORY:  Coronary artery disease involving native coronary artery of native heart without angina pectoris  Laryngeal cancer  Essential hypertension  Asthma  Arthritis  Degenerative joint disease  Pulmonary HTN      MEDICATIONS  (STANDING):  ALBUTerol    90 MICROgram(s) HFA Inhaler 1 Puff(s) Inhalation every 4 hours  ALBUTerol/ipratropium for Nebulization 3 milliLiter(s) Nebulizer every 6 hours  aspirin enteric coated 81 milliGRAM(s) Oral daily  atorvastatin 40 milliGRAM(s) Oral at bedtime  clopidogrel Tablet 75 milliGRAM(s) Oral daily  heparin  Injectable 5000 Unit(s) SubCutaneous every 12 hours  mirtazapine 7.5 milliGRAM(s) Oral daily  pantoprazole    Tablet 40 milliGRAM(s) Oral before breakfast  PARoxetine 20 milliGRAM(s) Oral daily  tiotropium 18 MICROgram(s) Capsule 1 Capsule(s) Inhalation daily    MEDICATIONS  (PRN):  acetaminophen   Tablet. 650 milliGRAM(s) Oral every 6 hours PRN Moderate Pain (4 - 6)      FAMILY HISTORY:Non-contributory      SOCIAL HISTORY:    [ X] Non-smoker    [X ] Alcohol-denies    Allergies    No Known Allergies    Intolerances    	    REVIEW OF SYSTEMS:  [ X] Unable to obtain    PHYSICAL EXAM:  T(C): 36.5 (10-19-17 @ 07:35), Max: 36.9 (10-19-17 @ 03:29)  HR: 63 (10-19-17 @ 07:35) (58 - 70)  BP: 115/34 (10-19-17 @ 07:35) (115/34 - 136/64)  RR: 18 (10-19-17 @ 07:35) (16 - 18)  SpO2: 99% (10-19-17 @ 07:35) (95% - 99%)  Wt(kg): --  I&O's Summary    18 Oct 2017 07:01  -  19 Oct 2017 07:00  --------------------------------------------------------  IN: 0 mL / OUT: 200 mL / NET: -200 mL        Appearance: Normal	  HEENT:   Normal oral mucosa, PERRL, EOMI	  Lymphatic: No lymphadenopathy  Cardiovascular: Normal S1 S2, No JVD, No murmurs, No edema  Respiratory: Lungs clear to auscultation	  Psychiatry: A & O x 3, Mood & affect appropriate  Gastrointestinal:  Soft, Non-tender, + BS	  Skin: No rashes, No ecchymoses, No cyanosis	  Neurologic: Non-focal  Extremities: Normal range of motion, No clubbing, cyanosis or edema  Vascular: Peripheral pulses palpable 2+ bilaterally  	    ECG:  	nsr    	  LABS:	 	    CARDIAC MARKERS:  CARDIAC MARKERS ( 19 Oct 2017 00:10 )  x     / x     / 53 U/L / x     / x      CARDIAC MARKERS ( 19 Oct 2017 00:09 )  0.071 ng/mL / x     / x     / x     / x                                  11.0   12.1  )-----------( 242      ( 19 Oct 2017 10:05 )             35.9     10-19    137  |  98  |  49<H>  ----------------------------<  98  3.6   |  30  |  1.21    Ca    9.2      19 Oct 2017 00:09    TPro  7.5  /  Alb  3.2<L>  /  TBili  0.6  /  DBili  x   /  AST  25  /  ALT  27  /  AlkPhos  117  10-19      [X ] Echocardiogram:  CONCLUSIONS:  1. Normal mitral valve. Moderate to severe mitral  regurgitation.  2. Calcified trileaflet aortic valve with normal opening.  Mild aortic insufficiency.  3. Normal aortic root.  4. Moderate left atrial enlargement.  5. Normal left ventricular internal dimensions and wall  thicknesses.  6. Normal Left Ventricular Systolic Function,  (EF = 55%)  7. Grade II diastolic dysfunction  8. Normal right atrium.  9. Normal right ventricular size and function.  10. RV systolic pressure is severely increased (PASP 65mm  Hg).  11. There is mild tricuspid regurgitation.  12. There is mild pulmonic regurgitation.  13. Normal pericardium with no pericardial effusion.

## 2017-10-20 DIAGNOSIS — R55 SYNCOPE AND COLLAPSE: ICD-10-CM

## 2017-10-20 PROCEDURE — 93880 EXTRACRANIAL BILAT STUDY: CPT | Mod: 26

## 2017-10-20 PROCEDURE — 70551 MRI BRAIN STEM W/O DYE: CPT | Mod: 26

## 2017-10-20 RX ORDER — BACITRACIN ZINC 500 UNIT/G
1 OINTMENT IN PACKET (EA) TOPICAL DAILY
Qty: 0 | Refills: 0 | Status: DISCONTINUED | OUTPATIENT
Start: 2017-10-20 | End: 2017-10-28

## 2017-10-20 RX ORDER — AMLODIPINE BESYLATE 2.5 MG/1
2.5 TABLET ORAL DAILY
Qty: 0 | Refills: 0 | Status: DISCONTINUED | OUTPATIENT
Start: 2017-10-20 | End: 2017-10-25

## 2017-10-20 RX ADMIN — CEFTRIAXONE 100 GRAM(S): 500 INJECTION, POWDER, FOR SOLUTION INTRAMUSCULAR; INTRAVENOUS at 18:02

## 2017-10-20 RX ADMIN — Medication 3 MILLILITER(S): at 15:07

## 2017-10-20 RX ADMIN — Medication 650 MILLIGRAM(S): at 02:55

## 2017-10-20 RX ADMIN — Medication 1 APPLICATION(S): at 21:41

## 2017-10-20 RX ADMIN — Medication 3 MILLILITER(S): at 08:22

## 2017-10-20 RX ADMIN — AMLODIPINE BESYLATE 2.5 MILLIGRAM(S): 2.5 TABLET ORAL at 05:36

## 2017-10-20 RX ADMIN — Medication 650 MILLIGRAM(S): at 13:28

## 2017-10-20 RX ADMIN — Medication 12.5 MILLIGRAM(S): at 18:02

## 2017-10-20 RX ADMIN — Medication 20 MILLIGRAM(S): at 11:02

## 2017-10-20 RX ADMIN — Medication 650 MILLIGRAM(S): at 14:50

## 2017-10-20 RX ADMIN — HEPARIN SODIUM 5000 UNIT(S): 5000 INJECTION INTRAVENOUS; SUBCUTANEOUS at 05:36

## 2017-10-20 RX ADMIN — HEPARIN SODIUM 5000 UNIT(S): 5000 INJECTION INTRAVENOUS; SUBCUTANEOUS at 18:02

## 2017-10-20 RX ADMIN — Medication 650 MILLIGRAM(S): at 03:37

## 2017-10-20 RX ADMIN — AMLODIPINE BESYLATE 2.5 MILLIGRAM(S): 2.5 TABLET ORAL at 21:43

## 2017-10-20 RX ADMIN — Medication 81 MILLIGRAM(S): at 11:02

## 2017-10-20 RX ADMIN — Medication 650 MILLIGRAM(S): at 22:07

## 2017-10-20 RX ADMIN — CLOPIDOGREL BISULFATE 75 MILLIGRAM(S): 75 TABLET, FILM COATED ORAL at 11:02

## 2017-10-20 RX ADMIN — PANTOPRAZOLE SODIUM 40 MILLIGRAM(S): 20 TABLET, DELAYED RELEASE ORAL at 05:36

## 2017-10-20 RX ADMIN — Medication 650 MILLIGRAM(S): at 21:38

## 2017-10-20 RX ADMIN — Medication 12.5 MILLIGRAM(S): at 05:36

## 2017-10-20 RX ADMIN — MIRTAZAPINE 7.5 MILLIGRAM(S): 45 TABLET, ORALLY DISINTEGRATING ORAL at 11:02

## 2017-10-20 RX ADMIN — ATORVASTATIN CALCIUM 40 MILLIGRAM(S): 80 TABLET, FILM COATED ORAL at 21:39

## 2017-10-20 RX ADMIN — Medication 3 MILLILITER(S): at 20:29

## 2017-10-20 NOTE — PROGRESS NOTE ADULT - PROBLEM SELECTOR PLAN 1
Likely 2/2 orthostatic hypotension  CT head negative  Holding hydralazine and amlodipine  PT recommended home w home pT  Carotid doppler shows moderate ICA stenosis 50-69%  f/u MRI head

## 2017-10-20 NOTE — PROGRESS NOTE ADULT - SUBJECTIVE AND OBJECTIVE BOX
CHIEF COMPLAINT:Patient is a 89y old  Male who presents with a chief complaint of fall, possible syncope .Pt appears comfortable.    	  REVIEW OF SYSTEMS:  CONSTITUTIONAL: No fever, weight loss, or fatigue  EYES: No eye pain, visual disturbances, or discharge  ENT:  No difficulty hearing, tinnitus, vertigo; No sinus or throat pain  NECK: No pain or stiffness  RESPIRATORY: No cough, wheezing, chills or hemoptysis; No Shortness of Breath  CARDIOVASCULAR: No chest pain, palpitations, passing out, dizziness, or leg swelling  GASTROINTESTINAL: No abdominal or epigastric pain. No nausea, vomiting, or hematemesis; No diarrhea or constipation. No melena or hematochezia.  GENITOURINARY: No dysuria, frequency, hematuria, or incontinence  NEUROLOGICAL: No headaches, memory loss, loss of strength, numbness, or tremors  SKIN: No itching, burning, rashes, or lesions   LYMPH Nodes: No enlarged glands  ENDOCRINE: No heat or cold intolerance; No hair loss  MUSCULOSKELETAL: No joint pain or swelling; No muscle, back, or extremity pain  PSYCHIATRIC: No depression, anxiety, mood swings, or difficulty sleeping  HEME/LYMPH: No easy bruising, or bleeding gums  ALLERGY AND IMMUNOLOGIC: No hives or eczema	      PHYSICAL EXAM:  T(C): 36.9 (10-20-17 @ 08:10), Max: 37.4 (10-20-17 @ 00:17)  HR: 65 (10-20-17 @ 08:10) (62 - 73)  BP: 119/45 (10-20-17 @ 08:10) (110/67 - 158/55)  RR: 18 (10-20-17 @ 08:10) (16 - 19)  SpO2: 98% (10-20-17 @ 08:10) (93% - 100%)    I&O's Summary    19 Oct 2017 07:01  -  20 Oct 2017 07:00  --------------------------------------------------------  IN: 486 mL / OUT: 650 mL / NET: -164 mL        Appearance: Normal	  HEENT:   Normal oral mucosa, PERRL, EOMI	  Lymphatic: No lymphadenopathy  Cardiovascular: Normal S1 S2, No JVD, No murmurs, No edema  Respiratory: Lungs clear to auscultation	  Psychiatry: A & O x 3, Mood & affect appropriate  Gastrointestinal:  Soft, Non-tender, + BS	  Skin: No rashes, No ecchymoses, No cyanosis	  Neurologic: Non-focal  Extremities: Normal range of motion, No clubbing, cyanosis or edema  Vascular: Peripheral pulses palpable 2+ bilaterally    MEDICATIONS  (STANDING):  ALBUTerol    90 MICROgram(s) HFA Inhaler 1 Puff(s) Inhalation every 4 hours  ALBUTerol/ipratropium for Nebulization 3 milliLiter(s) Nebulizer every 6 hours  amLODIPine   Tablet 2.5 milliGRAM(s) Oral daily  aspirin enteric coated 81 milliGRAM(s) Oral daily  atorvastatin 40 milliGRAM(s) Oral at bedtime  cefTRIAXone   IVPB 1 Gram(s) IV Intermittent every 24 hours  cefTRIAXone   IVPB      clopidogrel Tablet 75 milliGRAM(s) Oral daily  heparin  Injectable 5000 Unit(s) SubCutaneous every 12 hours  metoprolol 12.5 milliGRAM(s) Oral two times a day  mirtazapine 7.5 milliGRAM(s) Oral daily  pantoprazole    Tablet 40 milliGRAM(s) Oral before breakfast  PARoxetine 20 milliGRAM(s) Oral daily  tiotropium 18 MICROgram(s) Capsule 1 Capsule(s) Inhalation daily      TELEMETRY: 	    ECG:  	  RADIOLOGY:  OTHER: 	  	  LABS:	 	    CARDIAC MARKERS:  CARDIAC MARKERS ( 19 Oct 2017 10:05 )  0.081 ng/mL / x     / 45 U/L / x     / 2.2 ng/mL  CARDIAC MARKERS ( 19 Oct 2017 00:10 )  x     / x     / 53 U/L / x     / x      CARDIAC MARKERS ( 19 Oct 2017 00:09 )  0.071 ng/mL / x     / x     / x     / x                                    11.0   12.1  )-----------( 242      ( 19 Oct 2017 10:05 )             35.9     10-19    138  |  100  |  45<H>  ----------------------------<  141<H>  3.6   |  29  |  1.19    Ca    9.0      19 Oct 2017 10:05  Phos  2.9     10-19  Mg     2.2     10-19    TPro  7.5  /  Alb  3.2<L>  /  TBili  0.6  /  DBili  x   /  AST  25  /  ALT  27  /  AlkPhos  117  10-19    proBNP:   Lipid Profile: Cholesterol 98  LDL 52  HDL 31  TG 76    HgA1c:   TSH:

## 2017-10-20 NOTE — PROGRESS NOTE ADULT - SUBJECTIVE AND OBJECTIVE BOX
_________________________________________________________________________________________  ========>>  M E D I C A L   A T T E N D I N G    F O L L O W  U P  N O T E  <<=========  -----------------------------------------------------------------------------------------------------    - Patient seen and examined by me approximately thirty minutes ago.  - In summary, patient is a 89y year old man who originally presented post syncope and fall at home  - Patient today overall doing ok, comfortable, eating OK.     ==================>> MEDICATIONS <<====================    MEDICATIONS  (STANDING):  ALBUTerol    90 MICROgram(s) HFA Inhaler 1 Puff(s) Inhalation every 4 hours  ALBUTerol/ipratropium for Nebulization 3 milliLiter(s) Nebulizer every 6 hours  amLODIPine   Tablet 2.5 milliGRAM(s) Oral daily  aspirin enteric coated 81 milliGRAM(s) Oral daily  atorvastatin 40 milliGRAM(s) Oral at bedtime  cefTRIAXone   IVPB 1 Gram(s) IV Intermittent every 24 hours  cefTRIAXone   IVPB      clopidogrel Tablet 75 milliGRAM(s) Oral daily  heparin  Injectable 5000 Unit(s) SubCutaneous every 12 hours  metoprolol 12.5 milliGRAM(s) Oral two times a day  mirtazapine 7.5 milliGRAM(s) Oral daily  pantoprazole    Tablet 40 milliGRAM(s) Oral before breakfast  PARoxetine 20 milliGRAM(s) Oral daily  tiotropium 18 MICROgram(s) Capsule 1 Capsule(s) Inhalation daily    MEDICATIONS  (PRN):  acetaminophen   Tablet. 650 milliGRAM(s) Oral every 6 hours PRN Moderate Pain (4 - 6)    ==================>> REVIEW OF SYSTEM <<=================    GEN: no fever, no chills, + pain in left hand (otherwise, doing well per son at bedside, ambulates to bathroom with assist)  RESP: no SOB, no cough, no sputum  CVS: no chest pain, no palpitations, no edema  GI: no abdominal pain, no nausea, no constipation, no diarrhea  : no dysuria, no frequency, no hematuria  Neuro: no headache, no dizziness  Derm : no itching, no rash    ==================>> VITAL SIGNS <<==================    T(C): 36.9 (10-20-17 @ 08:10), Max: 37.4 (10-20-17 @ 00:17)  HR: 65 (10-20-17 @ 08:10) (62 - 73)  BP: 119/45 (10-20-17 @ 08:10) (110/67 - 158/55)  RR: 18 (10-20-17 @ 08:10) (16 - 19)  SpO2: 98% (10-20-17 @ 08:10) (93% - 100%)    I&O's Summary  19 Oct 2017 07:  -  20 Oct 2017 07:00  --------------------------------------------------------  IN: 486 mL / OUT: 650 mL / NET: -164 mL    20 Oct 2017 07  -  20 Oct 2017 12:00  --------------------------------------------------------  IN: 118 mL / OUT: 0 mL / NET: 118 mL    ==================>> PHYSICAL EXAM <<=================    GEN: A&O X 3 , NAD , comfortable  HEENT: NCAT, PERRL, MMM, hearing intact  Neck: supple , no JVD  CVS: S1S2 , regular , No M/R/G appreciated  PULM: CTA B/L,  no W/R/R appreciated  ABD.: soft. non tender, non distended,  bowel sounds present  Extrem: intact pulses , no edema, + scattered echymosis, + left hand wound wrapped   PSYCH : normal mood,  not anxious     ==================>> LAB AND IMAGING <<==================                        11.0   12.1  )-----------( 242      ( 19 Oct 2017 10:05 )             35.9        10-19    138  |  100  |  45<H>  ----------------------------<  141<H>  3.6   |  29  |  1.19    Ca    9.0      19 Oct 2017 10:05  Phos  2.9     10-19  Mg     2.2     10-19    TPro  7.5  /  Alb  3.2<L>  /  TBili  0.6  /  DBili  x   /  AST  25  /  ALT  27  /  AlkPhos  117  10-19    Creatinine:  1.19   (10-19 @ 10:05)  Creatinine:  1.21   (10-19 @ 00:09)    PT/INR - ( 19 Oct 2017 00:09 )   PT: 11.6 sec;   INR: 1.06 ratio    PTT - ( 19 Oct 2017 00:09 )  PTT:30.9 sec              CARDIAC MARKERS ( 19 Oct 2017 10:05 )  0.081 ng/mL / x     / 45 U/L / x     / 2.2 ng/mL  CARDIAC MARKERS ( 19 Oct 2017 00:10 )  x     / x     / 53 U/L / x     / x      CARDIAC MARKERS ( 19 Oct 2017 00:09 )  0.071 ng/mL / x     / x     / x     / x        Urinalysis Basic - ( 19 Oct 2017 09:20 )  Color: Yellow / Appearance: Clear / S.010 / pH: x  Gluc: x / Ketone: Negative  / Bili: Negative / Urobili: Negative   Blood: x / Protein: 30 mg/dL / Nitrite: Negative   Leuk Esterase: Moderate / RBC: 0-2 /HPF / WBC 26-50 /HPF   Sq Epi: x / Non Sq Epi: x / Bacteria: Moderate /HPF    Lipid profile:  (10-19-17)     Total: 98     LDL  : 52     HDL  :31     TG   :76    ___________________________________________________________________________________  ===============>>  A S S E S S M E N T   A N D   P L A N <<===============  ------------------------------------------------------------------------------------------    · Assessment		  Patient is a 89 y/o M pt with PMHx of arthritis, asthma, CAD, 5 stents,  HTN, laryngeal CA, recent admission in July for CHF exacerbation ( needing BiPaP and ICU admission) presents to ED c/o syncopal episode x today.      Patient has stable vitals in the ED, mild leucocytosis of 12.2 but no fever/chills, no dysuria, cough or other complaints, elevated BUN of 49 ( however it has been elevated even during prior labs), coags WNL, he has CKD stage III and troponin 1of 0.071, with no ischemic changes on the EKG. CT chest ruled out any rib fractures but shows emphysema, and atelectesis ( will order incentile spirometry) calcified mediatinal LN, calcified hepatic granuloma, gen osteopenia with compression deformity at T11, CT Head and C spine without any acute changes      Problem/Plan - 1:  ·  Problem: Syncope, unclear etiology  mildly elevated troponins not indicative of ACS  Initial CT head and CT C spine negative for acute changes, CT Chest ruled out rib fractures but shows osteopenia and T11 compression deformity  PT, Fall precautions, Pt's son wants to take hhim HOME, not rehab  Incentive spirometry for atelectesis  Could be 2/2 to orthostatic hypotension as noted by son at home, however patient orthostatic negative in the ED  recent ECHO in July shows severe MR, preserved EF with Grade II DD, will not repeat ECHO, f/up US carotids, Pelvic Xray to r/o fractures  Cardio f/u appreciated  check xay of left hand given pain and apply bacitracin to skin laceration    Problem/Plan - 2:  ·  Problem: Essential hypertension.  Plan: Will hold Anti HTN meds as son's description suggested orthostatic hypotension  DASH diet    Problem/Plan - 3:  ·  Problem: Coronary artery disease involving native coronary artery of native heart without angina pectoris.  Plan: Patient with Known H/o CAD, no CP, EKG: Poor baseline but no ischemic changes  No signs of fluid overload  trend troponins if any changes  monitor on TELE.     Problem/Plan - 4:  ·  Problem: Asthma.  Plan: Nebs PRN  O2 2L via NC.     Problem/Plan - 5:  ·  Problem: h/o Laryngeal cancer  in remission, c/w monitoring.     Problem/Plan - 6:  Problem: Prophylactic measure. Plan: IMPROVE score:2  Heparin sc.    Problem/Plan - 7:  ·  Problem: Acute cystitis without hematuria.  Plan: continue Rocephin  f/u cultures.     -GI/DVT Prophylaxis.  -PT, Fall precautions, home care arrangement  --------------------------------------------  Case discussed with son, pt, cardio  Education given on as above, fall precautions  close monitoring  ___________________________  H. KARLA Rich.  Pager: [phone]

## 2017-10-20 NOTE — CONSULT NOTE ADULT - ASSESSMENT
No vascular surgical intervention warranted based on the above findings reported  medical management  vascular surgery f/u as op as needed pcp

## 2017-10-20 NOTE — CONSULT NOTE ADULT - SUBJECTIVE AND OBJECTIVE BOX
History of Present Illness: 	  Patient is a 89 y/o M pt with PMHx of arthritis, asthma, CAD, 5 stents,  HTN, laryngeal CA, CKD stage III recent admission in July for CHF exacerbation ( needing BiPaP and ICU admission) presents to ED c/o syncopal episode x today. Pt reports he is unable to recall the history of events, no LOC or head trauma, fall was unwitnessed. As per son at bedside, pt had 5 falls in the past 2 months. In the ED, pt is c/o pain to the laceration site on the left hand, and pain to the left rib. Son says that patient's medications have been modified since the last visit and he does not remember the list. Patient admits to feeling dizzy when getting up from bed and walking and son has noted that patient's BP drops when he stands up from sitting position   Pt denies fever, chills, shortness of breath, cough, chest pain, palpitations, nausea, vomiting, diarrhea, abd pain, melena, numbness, tingling, weakness, or any other complaints. NKDA.  Surgical hx of cardiac stents, quit smoking/drinking 30-40 years ago, FHx unknown  Pt underwent brain MRI which shows no evidence of infarct  Carotid doppler indicates Moderate 50-69% stenosis of right internal carotid artery  Pt without neurological defecit noted on exam  BP stable presently   alert NAD  5/5 motor strength bilaterally  sensation intact bulaterally  no car< from: US Duplex Carotid Arteries Complete, Bilateral (10.20.17 @ 13:30) >  Impression: Moderate (50-69%) stenosis at the right internal carotid   artery by velocity criteria.    No hemodynamically significant stenosis at the left internal carotid   artery by velocity criteria.    < end of copied text >  otid bruits of thrill noted                          11.0   12.1  )-----------( 242      ( 19 Oct 2017 10:05 )             35.9     10-19    138  |  100  |  45<H>  ----------------------------<  141<H>  3.6   |  29  |  1.19    Ca    9.0      19 Oct 2017 10:05  Phos  2.9     10-19  Mg     2.2     10-19    TPro  7.5  /  Alb  3.2<L>  /  TBili  0.6  /  DBili  x   /  AST  25  /  ALT  27  /  AlkPhos  117  10-19    < from: MRI Head w/o Cont (10.20.17 @ 15:16) >  IMPRESSION:  No evidence of acute infarct.    < end of copied text >

## 2017-10-20 NOTE — PROGRESS NOTE ADULT - SUBJECTIVE AND OBJECTIVE BOX
HPI:  Patient is a 89 y/o M pt with PMHx of arthritis, asthma, CAD, 5 stents,  HTN, laryngeal CA, CKD CHF came with LOC and fall admitted to r/o ACS. UA positive.    Interval history:    Patient vitals are stable. He complaints of dysuria. Denies dizziness, hematuria, chest pain, nausea, vomiting, rash, shortness of breath  Discussed GOC with son at bedside: Full Code (19 Oct 2017 02:12)      Patient is a 89y old  Male who presents with a chief complaint of fall, possible syncope (19 Oct 2017 02:12)      INTERVAL HPI/OVERNIGHT EVENTS:  T(C): 36.9 (10-20-17 @ 08:10), Max: 37.4 (10-20-17 @ 00:17)  HR: 65 (10-20-17 @ 08:10) (62 - 73)  BP: 119/45 (10-20-17 @ 08:10) (110/67 - 158/55)  RR: 18 (10-20-17 @ 08:10) (16 - 19)  SpO2: 98% (10-20-17 @ 08:10) (93% - 100%)          MEDICATIONS  (STANDING):  ALBUTerol    90 MICROgram(s) HFA Inhaler 1 Puff(s) Inhalation every 4 hours  ALBUTerol/ipratropium for Nebulization 3 milliLiter(s) Nebulizer every 6 hours  amLODIPine   Tablet 2.5 milliGRAM(s) Oral daily  aspirin enteric coated 81 milliGRAM(s) Oral daily  atorvastatin 40 milliGRAM(s) Oral at bedtime  BACItracin   Ointment 1 Application(s) Topical daily  cefTRIAXone   IVPB 1 Gram(s) IV Intermittent every 24 hours  cefTRIAXone   IVPB      clopidogrel Tablet 75 milliGRAM(s) Oral daily  heparin  Injectable 5000 Unit(s) SubCutaneous every 12 hours  metoprolol 12.5 milliGRAM(s) Oral two times a day  mirtazapine 7.5 milliGRAM(s) Oral daily  pantoprazole    Tablet 40 milliGRAM(s) Oral before breakfast  PARoxetine 20 milliGRAM(s) Oral daily  tiotropium 18 MICROgram(s) Capsule 1 Capsule(s) Inhalation daily    MEDICATIONS  (PRN):  acetaminophen   Tablet. 650 milliGRAM(s) Oral every 6 hours PRN Moderate Pain (4 - 6)      PHYSICAL EXAM:  GENERAL: NAD, well-groomed, well-developed  HEAD:  Atraumatic, Normocephalic  EYES: EOMI, PERRLA, conjunctiva and sclera clear  ENMT: No tonsillar erythema, exudates, or enlargement; Moist mucous membranes, Good dentition, No lesions  NECK: Supple, No JVD, Normal thyroid  NERVOUS SYSTEM:  Alert & Oriented X2, Motor Strength 5/5 B/L upper and lower extremities; DTRs 2+ intact and symmetric  CHEST/LUNG: Clear to percussion bilaterally; No rales, rhonchi, wheezing, or rubs  HEART: Regular rate and rhythm; No murmurs, rubs, or gallops  ABDOMEN: Soft, Nontender, Nondistended; Bowel sounds present  EXTREMITIES:  right hand dressing stable, pain on hand movements  LYMPH: No lymphadenopathy noted  SKIN: No rashes or lesions  LABS:                        11.0   12.1  )-----------( 242      ( 19 Oct 2017 10:05 )             35.9     10    138  |  100  |  45<H>  ----------------------------<  141<H>  3.6   |  29  |  1.19    Ca    9.0      19 Oct 2017 10:05  Phos  2.9     10-19  Mg     2.2     10-19    TPro  7.5  /  Alb  3.2<L>  /  TBili  0.6  /  DBili  x   /  AST  25  /  ALT  27  /  AlkPhos  117  10-    PT/INR - ( 19 Oct 2017 00:09 )   PT: 11.6 sec;   INR: 1.06 ratio         PTT - ( 19 Oct 2017 00:09 )  PTT:30.9 sec  Urinalysis Basic - ( 19 Oct 2017 09:20 )    Color: Yellow / Appearance: Clear / S.010 / pH: x  Gluc: x / Ketone: Negative  / Bili: Negative / Urobili: Negative   Blood: x / Protein: 30 mg/dL / Nitrite: Negative   Leuk Esterase: Moderate / RBC: 0-2 /HPF / WBC 26-50 /HPF   Sq Epi: x / Non Sq Epi: x / Bacteria: Moderate /HPF      CAPILLARY BLOOD GLUCOSE            Urinalysis Basic - ( 19 Oct 2017 09:20 )    Color: Yellow / Appearance: Clear / S.010 / pH: x  Gluc: x / Ketone: Negative  / Bili: Negative / Urobili: Negative   Blood: x / Protein: 30 mg/dL / Nitrite: Negative   Leuk Esterase: Moderate / RBC: 0-2 /HPF / WBC 26-50 /HPF   Sq Epi: x / Non Sq Epi: x / Bacteria: Moderate /HPF

## 2017-10-20 NOTE — PROGRESS NOTE ADULT - ASSESSMENT
Patient is a 89 yr old Male pt with PMHx of arthritis, asthma, CAD, 5 stents,  HTN, laryngeal CA, CKD stage III recent admission in July for CHF exacerbation ( needing BiPaP and ICU admission) presents to ED c/o syncopal episode x today.   1.Tele monitoring.  2.Orthostatic BP q shift.  3.MRI-r/o cva.  4.CAD - lopressor 12.5mg bid,asa,statin.  5.Pulm HTN- norvasc 2.5mg qd.  6.Continue rest of medication.  7.GI and DVT prophylaxis.

## 2017-10-21 DIAGNOSIS — M79.641 PAIN IN RIGHT HAND: ICD-10-CM

## 2017-10-21 DIAGNOSIS — M79.642 PAIN IN LEFT HAND: ICD-10-CM

## 2017-10-21 LAB
-  AMPICILLIN: SIGNIFICANT CHANGE UP
-  CIPROFLOXACIN: SIGNIFICANT CHANGE UP
-  NITROFURANTOIN: SIGNIFICANT CHANGE UP
-  TETRACYCLINE: SIGNIFICANT CHANGE UP
-  VANCOMYCIN: SIGNIFICANT CHANGE UP
ALBUMIN SERPL ELPH-MCNC: 3 G/DL — LOW (ref 3.5–5)
ALP SERPL-CCNC: 106 U/L — SIGNIFICANT CHANGE UP (ref 40–120)
ALT FLD-CCNC: 21 U/L DA — SIGNIFICANT CHANGE UP (ref 10–60)
ANION GAP SERPL CALC-SCNC: 8 MMOL/L — SIGNIFICANT CHANGE UP (ref 5–17)
APTT BLD: 32.5 SEC — SIGNIFICANT CHANGE UP (ref 27.5–37.4)
AST SERPL-CCNC: 19 U/L — SIGNIFICANT CHANGE UP (ref 10–40)
BILIRUB SERPL-MCNC: 0.5 MG/DL — SIGNIFICANT CHANGE UP (ref 0.2–1.2)
BUN SERPL-MCNC: 33 MG/DL — HIGH (ref 7–18)
CALCIUM SERPL-MCNC: 9.2 MG/DL — SIGNIFICANT CHANGE UP (ref 8.4–10.5)
CHLORIDE SERPL-SCNC: 104 MMOL/L — SIGNIFICANT CHANGE UP (ref 96–108)
CO2 SERPL-SCNC: 27 MMOL/L — SIGNIFICANT CHANGE UP (ref 22–31)
CREAT SERPL-MCNC: 1.04 MG/DL — SIGNIFICANT CHANGE UP (ref 0.5–1.3)
CULTURE RESULTS: SIGNIFICANT CHANGE UP
GLUCOSE SERPL-MCNC: 95 MG/DL — SIGNIFICANT CHANGE UP (ref 70–99)
HCT VFR BLD CALC: 40.3 % — SIGNIFICANT CHANGE UP (ref 39–50)
HGB BLD-MCNC: 12.7 G/DL — LOW (ref 13–17)
INR BLD: 1.11 RATIO — SIGNIFICANT CHANGE UP (ref 0.88–1.16)
LYMPHOCYTES # BLD AUTO: 11 % — LOW (ref 13–44)
MAGNESIUM SERPL-MCNC: 2.2 MG/DL — SIGNIFICANT CHANGE UP (ref 1.6–2.6)
MCHC RBC-ENTMCNC: 29.4 PG — SIGNIFICANT CHANGE UP (ref 27–34)
MCHC RBC-ENTMCNC: 31.5 GM/DL — LOW (ref 32–36)
MCV RBC AUTO: 93.2 FL — SIGNIFICANT CHANGE UP (ref 80–100)
METHOD TYPE: SIGNIFICANT CHANGE UP
MONOCYTES NFR BLD AUTO: 10 % — SIGNIFICANT CHANGE UP (ref 2–14)
NEUTROPHILS NFR BLD AUTO: 70 % — SIGNIFICANT CHANGE UP (ref 43–77)
ORGANISM # SPEC MICROSCOPIC CNT: SIGNIFICANT CHANGE UP
ORGANISM # SPEC MICROSCOPIC CNT: SIGNIFICANT CHANGE UP
PHOSPHATE SERPL-MCNC: 3.3 MG/DL — SIGNIFICANT CHANGE UP (ref 2.5–4.5)
PLATELET # BLD AUTO: 276 K/UL — SIGNIFICANT CHANGE UP (ref 150–400)
POTASSIUM SERPL-MCNC: 4.2 MMOL/L — SIGNIFICANT CHANGE UP (ref 3.5–5.3)
POTASSIUM SERPL-SCNC: 4.2 MMOL/L — SIGNIFICANT CHANGE UP (ref 3.5–5.3)
PROT SERPL-MCNC: 7.5 G/DL — SIGNIFICANT CHANGE UP (ref 6–8.3)
PROTHROM AB SERPL-ACNC: 12.1 SEC — SIGNIFICANT CHANGE UP (ref 9.8–12.7)
RBC # BLD: 4.32 M/UL — SIGNIFICANT CHANGE UP (ref 4.2–5.8)
RBC # FLD: 15.2 % — HIGH (ref 10.3–14.5)
SODIUM SERPL-SCNC: 139 MMOL/L — SIGNIFICANT CHANGE UP (ref 135–145)
SPECIMEN SOURCE: SIGNIFICANT CHANGE UP
WBC # BLD: 12.8 K/UL — HIGH (ref 3.8–10.5)
WBC # FLD AUTO: 12.8 K/UL — HIGH (ref 3.8–10.5)

## 2017-10-21 PROCEDURE — 73120 X-RAY EXAM OF HAND: CPT | Mod: 26,LT

## 2017-10-21 RX ORDER — VANCOMYCIN HCL 1 G
1000 VIAL (EA) INTRAVENOUS ONCE
Qty: 0 | Refills: 0 | Status: COMPLETED | OUTPATIENT
Start: 2017-10-21 | End: 2017-10-21

## 2017-10-21 RX ORDER — PHENAZOPYRIDINE HCL 100 MG
100 TABLET ORAL EVERY 8 HOURS
Qty: 0 | Refills: 0 | Status: COMPLETED | OUTPATIENT
Start: 2017-10-21 | End: 2017-10-24

## 2017-10-21 RX ORDER — TRAMADOL HYDROCHLORIDE 50 MG/1
25 TABLET ORAL EVERY 6 HOURS
Qty: 0 | Refills: 0 | Status: DISCONTINUED | OUTPATIENT
Start: 2017-10-21 | End: 2017-10-25

## 2017-10-21 RX ADMIN — Medication 3 MILLILITER(S): at 14:34

## 2017-10-21 RX ADMIN — CLOPIDOGREL BISULFATE 75 MILLIGRAM(S): 75 TABLET, FILM COATED ORAL at 11:17

## 2017-10-21 RX ADMIN — Medication 100 MILLIGRAM(S): at 11:16

## 2017-10-21 RX ADMIN — TRAMADOL HYDROCHLORIDE 25 MILLIGRAM(S): 50 TABLET ORAL at 11:16

## 2017-10-21 RX ADMIN — Medication 81 MILLIGRAM(S): at 11:17

## 2017-10-21 RX ADMIN — Medication 100 MILLIGRAM(S): at 13:15

## 2017-10-21 RX ADMIN — Medication 100 MILLIGRAM(S): at 21:25

## 2017-10-21 RX ADMIN — Medication 12.5 MILLIGRAM(S): at 05:59

## 2017-10-21 RX ADMIN — Medication 3 MILLILITER(S): at 09:20

## 2017-10-21 RX ADMIN — Medication 3 MILLILITER(S): at 02:32

## 2017-10-21 RX ADMIN — Medication 3 MILLILITER(S): at 20:07

## 2017-10-21 RX ADMIN — Medication 12.5 MILLIGRAM(S): at 17:26

## 2017-10-21 RX ADMIN — MIRTAZAPINE 7.5 MILLIGRAM(S): 45 TABLET, ORALLY DISINTEGRATING ORAL at 11:16

## 2017-10-21 RX ADMIN — TRAMADOL HYDROCHLORIDE 25 MILLIGRAM(S): 50 TABLET ORAL at 17:37

## 2017-10-21 RX ADMIN — HEPARIN SODIUM 5000 UNIT(S): 5000 INJECTION INTRAVENOUS; SUBCUTANEOUS at 05:58

## 2017-10-21 RX ADMIN — ATORVASTATIN CALCIUM 40 MILLIGRAM(S): 80 TABLET, FILM COATED ORAL at 21:25

## 2017-10-21 RX ADMIN — TRAMADOL HYDROCHLORIDE 25 MILLIGRAM(S): 50 TABLET ORAL at 18:30

## 2017-10-21 RX ADMIN — CEFTRIAXONE 100 GRAM(S): 500 INJECTION, POWDER, FOR SOLUTION INTRAMUSCULAR; INTRAVENOUS at 17:26

## 2017-10-21 RX ADMIN — TRAMADOL HYDROCHLORIDE 25 MILLIGRAM(S): 50 TABLET ORAL at 12:20

## 2017-10-21 RX ADMIN — Medication 20 MILLIGRAM(S): at 11:17

## 2017-10-21 RX ADMIN — Medication 1 APPLICATION(S): at 11:16

## 2017-10-21 RX ADMIN — HEPARIN SODIUM 5000 UNIT(S): 5000 INJECTION INTRAVENOUS; SUBCUTANEOUS at 17:26

## 2017-10-21 RX ADMIN — AMLODIPINE BESYLATE 2.5 MILLIGRAM(S): 2.5 TABLET ORAL at 05:57

## 2017-10-21 RX ADMIN — Medication 250 MILLIGRAM(S): at 11:16

## 2017-10-21 RX ADMIN — PANTOPRAZOLE SODIUM 40 MILLIGRAM(S): 20 TABLET, DELAYED RELEASE ORAL at 05:59

## 2017-10-21 NOTE — PROGRESS NOTE ADULT - PROBLEM SELECTOR PLAN 1
Likely 2/2 orthostatic hypotension  CT head negative  Holding hydralazine and amlodipine  PT recommended home w home pT  Carotid doppler shows moderate ICA stenosis 50-69%  MRI no acute stroke   - vascular surgery recommended no acute intervention at the point, patient needs outpatient follow up with vascular surgery. Likely 2/2 orthostatic hypotension  CT head negative  - orthostatic positive   Holding hydralazine and amlodipine  PT recommended home w home pT  Carotid doppler shows moderate ICA stenosis 50-69%  MRI no acute stroke   - vascular surgery recommended no acute intervention at the point, patient needs outpatient follow up with vascular surgery.

## 2017-10-21 NOTE — PROGRESS NOTE ADULT - SUBJECTIVE AND OBJECTIVE BOX
_________________________________________________________________________________________  ========>>  M E D I C A L   A T T E N D I N G    F O L L O W  U P  N O T E  <<=========  -----------------------------------------------------------------------------------------------------    - Patient seen and examined by me approximately thirty minutes ago.  - In summary, patient is a 89y year old man who originally presented post syncope and fall at home  - Patient with continued complaints of pain and burning with urination and with pain in the left wrist, xtay not done yet..    ==================>> MEDICATIONS <<====================    ALBUTerol    90 MICROgram(s) HFA Inhaler 1 Puff(s) Inhalation every 4 hours  ALBUTerol/ipratropium for Nebulization 3 milliLiter(s) Nebulizer every 6 hours  amLODIPine   Tablet 2.5 milliGRAM(s) Oral daily  aspirin enteric coated 81 milliGRAM(s) Oral daily  atorvastatin 40 milliGRAM(s) Oral at bedtime  BACItracin   Ointment 1 Application(s) Topical daily  cefTRIAXone   IVPB 1 Gram(s) IV Intermittent every 24 hours  cefTRIAXone   IVPB      clopidogrel Tablet 75 milliGRAM(s) Oral daily  heparin  Injectable 5000 Unit(s) SubCutaneous every 12 hours  metoprolol 12.5 milliGRAM(s) Oral two times a day  mirtazapine 7.5 milliGRAM(s) Oral daily  pantoprazole    Tablet 40 milliGRAM(s) Oral before breakfast  PARoxetine 20 milliGRAM(s) Oral daily  phenazopyridine 100 milliGRAM(s) Oral every 8 hours  tiotropium 18 MICROgram(s) Capsule 1 Capsule(s) Inhalation daily  vancomycin  IVPB 1000 milliGRAM(s) IV Intermittent once    MEDICATIONS  (PRN):  acetaminophen   Tablet. 650 milliGRAM(s) Oral every 6 hours PRN Moderate Pain (4 - 6)  traMADol 25 milliGRAM(s) Oral every 6 hours PRN Moderate Pain (4 - 6)    ==================>> REVIEW OF SYSTEM <<=================    GEN: no fever, no chills, + pain in left hand and with urination as above  RESP: no SOB, no cough, no sputum  CVS: no chest pain, no palpitations, no edema  GI: no abdominal pain, no nausea, no constipation, no diarrhea  : + dysuria, no frequency, no hematuria  Neuro: no headache, no dizziness  Derm : no itching, no rash    ==================>> VITAL SIGNS <<==================    Vital Signs Last 24 Hrs  T(C): 36.8 (10-21-17 @ 07:35)  T(F): 98.3 (10-21-17 @ 07:35), Max: 98.3 (10-20-17 @ 19:31)  HR: 69 (10-21-17 @ 07:35) (60 - 69)  BP: 138/48 (10-21-17 @ 07:35)  BP(mean): --  RR: 18 (10-21-17 @ 07:35) (17 - 19)  SpO2: 97% (10-21-17 @ 07:35) (94% - 97%)      ==================>> PHYSICAL EXAM <<=================    GEN: A&O X 3 , NAD , comfortable  HEENT: NCAT, PERRL, MMM, hearing intact  Neck: supple , no JVD  CVS: S1S2 , regular , No M/R/G appreciated  PULM: CTA B/L,  no W/R/R appreciated  ABD.: soft. non tender, non distended,  bowel sounds present  Extrem: intact pulses , no edema, + scattered echymosis, + left hand wound wrapped   PSYCH : normal mood,  not anxious     ==================>> LAB AND IMAGING <<==================                           12.7   12.8  )-----------( 276      ( 21 Oct 2017 09:06 )             40.3        10-21    139  |  104  |  33<H>  ----------------------------<  95  4.2   |  27  |  1.04    Ca    9.2      21 Oct 2017 09:06  Phos  3.3     10-21  Mg     2.2     10-21    TPro  7.5  /  Alb  3.0<L>  /  TBili  0.5  /  DBili  x   /  AST  19  /  ALT  21  /  AlkPhos  106  10-21    < from: US Duplex Carotid Arteries Complete, Bilateral (10.20.17 @ 13:30) >  Impression: Moderate (50-69%) stenosis at the right internal carotid  artery by velocity criteria.  No hemodynamically significant stenosis at the left internal carotid artery by velocity criteria.  < end of copied text >    < from: MRI Head w/o Cont (10.20.17 @ 15:16) >  IMPRESSION:  No evidence of acute infarct.  < end of copied text >    _______________________  C U L T U R E S :    Culture - Urine (collected 19 Oct 2017 23:21)  Source: .Urine Clean Catch (Midstream)  Preliminary Report (20 Oct 2017 18:38):    50,000 - 99,000 CFU/mL Enterococcus faecalis    ___________________________________________________________________________________  ===============>>  A S S E S S M E N T   A N D   P L A N <<===============  ------------------------------------------------------------------------------------------    · Assessment		  Patient is a 89 y/o M pt with PMHx of arthritis, asthma, CAD, 5 stents,  HTN, laryngeal CA, recent admission in July for CHF exacerbation ( needing BiPaP and ICU admission) presents to ED c/o syncopal episode x today.      Patient has stable vitals in the ED, mild leucocytosis of 12.2 but no fever/chills, no dysuria, cough or other complaints, elevated BUN of 49 ( however it has been elevated even during prior labs), coags WNL, he has CKD stage III and troponin 1of 0.071, with no ischemic changes on the EKG. CT chest ruled out any rib fractures but shows emphysema, and atelectesis ( will order incentile spirometry) calcified mediatinal LN, calcified hepatic granuloma, gen osteopenia with compression deformity at T11, CT Head and C spine without any acute changes      Problem/Plan - 1:  ·  Problem: Syncope, unclear etiology  so far no significantl findings to explain syncope  Could be 2/2 to orthostatic hypotension as noted by son at home, however patient orthostatic negative in the ED  workup as above: mild one sided KARSON wuold not explain syncope..  Cardio f/u   check xay of left hand given pain and apply bacitracin to skin laceration  pain mgmt : added tramadol as above, local wound care    Problem/Plan - 2:  ·  Problem: Essential hypertension.  Plan: Will hold Anti HTN meds as son's description suggested orthostatic hypotension  DASH diet    Problem/Plan - 3:  ·  Problem: Coronary artery disease involving native coronary artery of native heart without angina pectoris.  Plan: Patient with Known H/o CAD, no CP, EKG: Poor baseline but no ischemic changes  No signs of fluid overload  trend troponins if any changes  monitor on TELE.     Problem/Plan - 4:  ·  Problem: Asthma.  Plan: Nebs PRN  O2 2L via NC.     Problem/Plan - 5:  ·  Problem: Acute cystitis with dysurea,   cultures as above: will give one dose vanco pending C/S results  f/u cultures.   will add pyriduum as above    -GI/DVT Prophylaxis.  Incentive spirometry for atelectesis    -PT, Fall precautions, home care arrangement  --------------------------------------------  Case discussed with son, pt, RN  Education given on as above, fall precautions  close monitoring  ___________________________  H. KARLA Rich.  Pager: [phone]

## 2017-10-21 NOTE — CHART NOTE - NSCHARTNOTEFT_GEN_A_CORE
Paged by nurse informing me that patient's left hand laceration was bleeding after removing dressing. Assessed wound for any evidence of dehiscence, but there was no evidence.  Utilized normal saline-soaked guaze to clean wound followed by applying xeroform and gauze wrap for pressure.

## 2017-10-21 NOTE — PROGRESS NOTE ADULT - PROBLEM SELECTOR PLAN 2
UA positive   - urine cultures Enterococcus faecalis  continue with vancomycin and Rocephin   -f/u sensitivity  - ID consult as per Dr. Rich

## 2017-10-21 NOTE — PROGRESS NOTE ADULT - SUBJECTIVE AND OBJECTIVE BOX
PGY 1 Note discussed with supervising resident and primary attending    Patient is a 89y old  Male who presents with a chief complaint of fall, possible syncope (19 Oct 2017 02:12)      INTERVAL HPI/OVERNIGHT EVENTS: patient seen and examined at bed side, patient complains of right hand pain, urine cs Enterococcus Fecalis,   offers no new complaints; current symptoms resolving    MEDICATIONS  (STANDING):  ALBUTerol    90 MICROgram(s) HFA Inhaler 1 Puff(s) Inhalation every 4 hours  ALBUTerol/ipratropium for Nebulization 3 milliLiter(s) Nebulizer every 6 hours  amLODIPine   Tablet 2.5 milliGRAM(s) Oral daily  aspirin enteric coated 81 milliGRAM(s) Oral daily  atorvastatin 40 milliGRAM(s) Oral at bedtime  BACItracin   Ointment 1 Application(s) Topical daily  cefTRIAXone   IVPB 1 Gram(s) IV Intermittent every 24 hours  cefTRIAXone   IVPB      clopidogrel Tablet 75 milliGRAM(s) Oral daily  heparin  Injectable 5000 Unit(s) SubCutaneous every 12 hours  metoprolol 12.5 milliGRAM(s) Oral two times a day  mirtazapine 7.5 milliGRAM(s) Oral daily  pantoprazole    Tablet 40 milliGRAM(s) Oral before breakfast  PARoxetine 20 milliGRAM(s) Oral daily  phenazopyridine 100 milliGRAM(s) Oral every 8 hours  tiotropium 18 MICROgram(s) Capsule 1 Capsule(s) Inhalation daily  vancomycin  IVPB 1000 milliGRAM(s) IV Intermittent once    MEDICATIONS  (PRN):  acetaminophen   Tablet. 650 milliGRAM(s) Oral every 6 hours PRN Moderate Pain (4 - 6)  traMADol 25 milliGRAM(s) Oral every 6 hours PRN Moderate Pain (4 - 6)      __________________________________________________  REVIEW OF SYSTEMS:    CONSTITUTIONAL: No fever,   EYES: no acute visual disturbances  NECK: No pain or stiffness  RESPIRATORY: No cough; No shortness of breath  CARDIOVASCULAR: No chest pain, no palpitations  GASTROINTESTINAL: No pain. No nausea or vomiting; No diarrhea   NEUROLOGICAL: No headache or numbness, no tremors  MUSCULOSKELETAL: right hand wound and pain   GENITOURINARY: no dysuria, no frequency, no hesitancy  PSYCHIATRY: no depression , no anxiety  ALL OTHER  ROS negative        Vital Signs Last 24 Hrs  T(C): 36.8 (21 Oct 2017 07:35), Max: 36.8 (20 Oct 2017 19:31)  T(F): 98.3 (21 Oct 2017 07:35), Max: 98.3 (20 Oct 2017 19:31)  HR: 69 (21 Oct 2017 07:35) (60 - 69)  BP: 138/48 (21 Oct 2017 07:35) (121/51 - 149/58)  BP(mean): --  RR: 18 (21 Oct 2017 07:35) (17 - 19)  SpO2: 97% (21 Oct 2017 07:35) (94% - 97%)    ________________________________________________  PHYSICAL EXAM:  GENERAL: NAD  HEENT: Normocephalic;  conjunctivae and sclerae clear; moist mucous membranes;   NECK : supple  CHEST/LUNG: Clear to auscultation bilaterally with good air entry   HEART: S1 S2  regular; no murmurs, gallops or rubs  ABDOMEN: Soft, Nontender, Nondistended; Bowel sounds present  EXTREMITIES: right hand wound with dressing   SKIN: warm and dry; no rash  NERVOUS SYSTEM:  Awake and alert; Oriented  to place, person and time ; no new deficits    _________________________________________________  LABS:                        12.7   12.8  )-----------( 276      ( 21 Oct 2017 09:06 )             40.3     10-21    139  |  104  |  33<H>  ----------------------------<  95  4.2   |  27  |  1.04    Ca    9.2      21 Oct 2017 09:06  Phos  3.3     10-21  Mg     2.2     10-21    TPro  7.5  /  Alb  3.0<L>  /  TBili  0.5  /  DBili  x   /  AST  19  /  ALT  21  /  AlkPhos  106  10-21    PT/INR - ( 21 Oct 2017 09:06 )   PT: 12.1 sec;   INR: 1.11 ratio         PTT - ( 21 Oct 2017 09:06 )  PTT:32.5 sec    CAPILLARY BLOOD GLUCOSE            RADIOLOGY & ADDITIONAL TESTS:    Imaging Personally Reviewed:  YES    MRI The ventricles and cortical sulci are prominent reflecting parenchymal   volume loss. There is no evidence of acute infarct, intracranial   hemorrhage, mass effect or midline shift. The basal cisterns are patent.    Scattered foci of T2/FLAIR hyperintensity are noted in the   periventricular and subcortical white matter, nonspecific but likely   sequela of small vessel ischemic disease.    The major intracranial flow voids at the skull base are preserved. Small   mucus retention cyst/polyp right maxillary sinus, mild mucosal thickening   right maxillary sinus and bilateral anterior ethmoid air cells. Trace   fluid signal in the right inferior mastoid air cells. The native ocular   lenses are surgically absent.    IMPRESSION:  No evidence of acute infarct.    Culture - Urine (10.19.17 @ 23:21)    Specimen Source: .Urine Clean Catch (Midstream)    Culture Results:   50,000 - 99,000 CFU/mL Enterococcus faecalis          Consultant(s) Notes Reviewed:   YES    Care Discussed with Consultants :yes    Plan of care was discussed with patient and /or primary care giver; all questions and concerns were addressed and care was aligned with patient's wishes. PGY 1 Note discussed with supervising resident and primary attending    Patient is a 89y old  Male who presents with a chief complaint of fall, possible syncope (19 Oct 2017 02:12)      INTERVAL HPI/OVERNIGHT EVENTS: patient seen and examined at bed side, patient complains of right hand pain, urine cs Enterococcus Fecalis,   offers no new complaints; current symptoms resolving    MEDICATIONS  (STANDING):  ALBUTerol    90 MICROgram(s) HFA Inhaler 1 Puff(s) Inhalation every 4 hours  ALBUTerol/ipratropium for Nebulization 3 milliLiter(s) Nebulizer every 6 hours  amLODIPine   Tablet 2.5 milliGRAM(s) Oral daily  aspirin enteric coated 81 milliGRAM(s) Oral daily  atorvastatin 40 milliGRAM(s) Oral at bedtime  BACItracin   Ointment 1 Application(s) Topical daily  cefTRIAXone   IVPB 1 Gram(s) IV Intermittent every 24 hours  cefTRIAXone   IVPB      clopidogrel Tablet 75 milliGRAM(s) Oral daily  heparin  Injectable 5000 Unit(s) SubCutaneous every 12 hours  metoprolol 12.5 milliGRAM(s) Oral two times a day  mirtazapine 7.5 milliGRAM(s) Oral daily  pantoprazole    Tablet 40 milliGRAM(s) Oral before breakfast  PARoxetine 20 milliGRAM(s) Oral daily  phenazopyridine 100 milliGRAM(s) Oral every 8 hours  tiotropium 18 MICROgram(s) Capsule 1 Capsule(s) Inhalation daily  vancomycin  IVPB 1000 milliGRAM(s) IV Intermittent once    MEDICATIONS  (PRN):  acetaminophen   Tablet. 650 milliGRAM(s) Oral every 6 hours PRN Moderate Pain (4 - 6)  traMADol 25 milliGRAM(s) Oral every 6 hours PRN Moderate Pain (4 - 6)      __________________________________________________  REVIEW OF SYSTEMS:    CONSTITUTIONAL: No fever,   EYES: no acute visual disturbances  NECK: No pain or stiffness  RESPIRATORY: No cough; No shortness of breath  CARDIOVASCULAR: No chest pain, no palpitations  GASTROINTESTINAL: No pain. No nausea or vomiting; No diarrhea   NEUROLOGICAL: No headache or numbness, no tremors  MUSCULOSKELETAL: left hand wound and pain   GENITOURINARY: no dysuria, no frequency, no hesitancy  PSYCHIATRY: no depression , no anxiety  ALL OTHER  ROS negative        Vital Signs Last 24 Hrs  T(C): 36.8 (21 Oct 2017 07:35), Max: 36.8 (20 Oct 2017 19:31)  T(F): 98.3 (21 Oct 2017 07:35), Max: 98.3 (20 Oct 2017 19:31)  HR: 69 (21 Oct 2017 07:35) (60 - 69)  BP: 138/48 (21 Oct 2017 07:35) (121/51 - 149/58)  BP(mean): --  RR: 18 (21 Oct 2017 07:35) (17 - 19)  SpO2: 97% (21 Oct 2017 07:35) (94% - 97%)    ________________________________________________  PHYSICAL EXAM:  GENERAL: NAD  HEENT: Normocephalic;  conjunctivae and sclerae clear; moist mucous membranes;   NECK : supple  CHEST/LUNG: Clear to auscultation bilaterally with good air entry   HEART: S1 S2  regular; no murmurs, gallops or rubs  ABDOMEN: Soft, Nontender, Nondistended; Bowel sounds present  EXTREMITIES: left hand wound with dressing   SKIN: warm and dry; no rash  NERVOUS SYSTEM:  Awake and alert; Oriented  to place, person and time ; no new deficits    _________________________________________________  LABS:                        12.7   12.8  )-----------( 276      ( 21 Oct 2017 09:06 )             40.3     10-21    139  |  104  |  33<H>  ----------------------------<  95  4.2   |  27  |  1.04    Ca    9.2      21 Oct 2017 09:06  Phos  3.3     10-21  Mg     2.2     10-21    TPro  7.5  /  Alb  3.0<L>  /  TBili  0.5  /  DBili  x   /  AST  19  /  ALT  21  /  AlkPhos  106  10-21    PT/INR - ( 21 Oct 2017 09:06 )   PT: 12.1 sec;   INR: 1.11 ratio         PTT - ( 21 Oct 2017 09:06 )  PTT:32.5 sec    CAPILLARY BLOOD GLUCOSE            RADIOLOGY & ADDITIONAL TESTS:    Imaging Personally Reviewed:  YES    MRI The ventricles and cortical sulci are prominent reflecting parenchymal   volume loss. There is no evidence of acute infarct, intracranial   hemorrhage, mass effect or midline shift. The basal cisterns are patent.    Scattered foci of T2/FLAIR hyperintensity are noted in the   periventricular and subcortical white matter, nonspecific but likely   sequela of small vessel ischemic disease.    The major intracranial flow voids at the skull base are preserved. Small   mucus retention cyst/polyp right maxillary sinus, mild mucosal thickening   right maxillary sinus and bilateral anterior ethmoid air cells. Trace   fluid signal in the right inferior mastoid air cells. The native ocular   lenses are surgically absent.    IMPRESSION:  No evidence of acute infarct.    Culture - Urine (10.19.17 @ 23:21)    Specimen Source: .Urine Clean Catch (Midstream)    Culture Results:   50,000 - 99,000 CFU/mL Enterococcus faecalis          Consultant(s) Notes Reviewed:   YES    Care Discussed with Consultants :yes    Plan of care was discussed with patient and /or primary care giver; all questions and concerns were addressed and care was aligned with patient's wishes.

## 2017-10-21 NOTE — PROGRESS NOTE ADULT - SUBJECTIVE AND OBJECTIVE BOX
CARDIOLOGY     PROGRESS  NOTE   ________________________________________________    CHIEF COMPLAINT:Patient is a 89y old  Male who presents with a chief complaint of fall, possible syncope (19 Oct 2017 02:12)    	  REVIEW OF SYSTEMS:  CONSTITUTIONAL: No fever, weight loss, or fatigue  EYES: No eye pain, visual disturbances, or discharge  ENT:  No difficulty hearing, tinnitus, vertigo; No sinus or throat pain  NECK: No pain or stiffness  RESPIRATORY: No cough, wheezing, chills or hemoptysis; No Shortness of Breath  CARDIOVASCULAR: No chest pain, palpitations, passing out, dizziness, or leg swelling  GASTROINTESTINAL: No abdominal or epigastric pain. No nausea, vomiting, or hematemesis; No diarrhea or constipation. No melena or hematochezia.  GENITOURINARY: No dysuria, frequency, hematuria, or incontinence  NEUROLOGICAL: No headaches, memory loss, loss of strength, numbness, or tremors  SKIN: No itching, burning, rashes, or lesions   LYMPH Nodes: No enlarged glands  ENDOCRINE: No heat or cold intolerance; No hair loss  MUSCULOSKELETAL: No joint pain or swelling; No muscle, back, or extremity pain  PSYCHIATRIC: No depression, anxiety, mood swings, or difficulty sleeping  HEME/LYMPH: No easy bruising, or bleeding gums  ALLERGY AND IMMUNOLOGIC: No hives or eczema	    [ ] All others negative	  [ ] Unable to obtain    PHYSICAL EXAM:  T(C): 36.8 (10-21-17 @ 07:35), Max: 36.8 (10-20-17 @ 19:31)  HR: 69 (10-21-17 @ 07:35) (60 - 69)  BP: 138/48 (10-21-17 @ 07:35) (121/51 - 149/58)  RR: 18 (10-21-17 @ 07:35) (17 - 19)  SpO2: 97% (10-21-17 @ 07:35) (94% - 97%)  Wt(kg): --  I&O's Summary    20 Oct 2017 07:01  -  21 Oct 2017 07:00  --------------------------------------------------------  IN: 268 mL / OUT: 675 mL / NET: -407 mL        Appearance: Normal	  HEENT:   Normal oral mucosa, PERRL, EOMI	  Lymphatic: No lymphadenopathy  Cardiovascular: Normal S1 S2, No JVD, + systolic  murmurs, No edema  Respiratory: Lungs clear to auscultation	  Psychiatry: A & O x 3, Mood & affect appropriate  Gastrointestinal:  Soft, Non-tender, + BS	  Skin: No rashes, No ecchymoses, No cyanosis	  Neurologic: Non-focal  Extremities: Normal range of motion, No clubbing, cyanosis or edema  Vascular: Peripheral pulses palpable 2+ bilaterally    MEDICATIONS  (STANDING):  ALBUTerol    90 MICROgram(s) HFA Inhaler 1 Puff(s) Inhalation every 4 hours  ALBUTerol/ipratropium for Nebulization 3 milliLiter(s) Nebulizer every 6 hours  amLODIPine   Tablet 2.5 milliGRAM(s) Oral daily  aspirin enteric coated 81 milliGRAM(s) Oral daily  atorvastatin 40 milliGRAM(s) Oral at bedtime  BACItracin   Ointment 1 Application(s) Topical daily  cefTRIAXone   IVPB 1 Gram(s) IV Intermittent every 24 hours  cefTRIAXone   IVPB      clopidogrel Tablet 75 milliGRAM(s) Oral daily  heparin  Injectable 5000 Unit(s) SubCutaneous every 12 hours  metoprolol 12.5 milliGRAM(s) Oral two times a day  mirtazapine 7.5 milliGRAM(s) Oral daily  pantoprazole    Tablet 40 milliGRAM(s) Oral before breakfast  PARoxetine 20 milliGRAM(s) Oral daily  phenazopyridine 100 milliGRAM(s) Oral every 8 hours  tiotropium 18 MICROgram(s) Capsule 1 Capsule(s) Inhalation daily  vancomycin  IVPB 1000 milliGRAM(s) IV Intermittent once      TELEMETRY: 	    ECG:  	  RADIOLOGY:  OTHER: 	  	  LABS:	 	    CARDIAC MARKERS:                                12.7   12.8  )-----------( 276      ( 21 Oct 2017 09:06 )             40.3     10-21    139  |  104  |  33<H>  ----------------------------<  95  4.2   |  27  |  1.04    Ca    9.2      21 Oct 2017 09:06  Phos  3.3     10-21  Mg     2.2     10-21    TPro  7.5  /  Alb  3.0<L>  /  TBili  0.5  /  DBili  x   /  AST  19  /  ALT  21  /  AlkPhos  106  10-21    proBNP:   Lipid Profile: Cholesterol 98  LDL 52  HDL 31  TG 76    HgA1c:   TSH:   PT/INR - ( 21 Oct 2017 09:06 )   PT: 12.1 sec;   INR: 1.11 ratio         PTT - ( 21 Oct 2017 09:06 )  PTT:32.5 sec      Assessment and plan  ---------------------------  Patient is a 89 yr old Male pt with PMHx of arthritis, asthma, CAD, 5 stents,  HTN, laryngeal CA, CKD stage III recent admission in July for CHF exacerbation ( needing BiPaP and ICU admission) presents to ED c/o syncopal episode x today.   1.Tele monitoring.  2.Orthostatic BP q shift.  3.MRI-r/o cva.  4.CAD - lopressor 12.5mg bid,asa,statin.  5.Pulm HTN- norvasc 2.5mg qd.  6.Continue rest of medication.

## 2017-10-22 ENCOUNTER — TRANSCRIPTION ENCOUNTER (OUTPATIENT)
Age: 82
End: 2017-10-22

## 2017-10-22 RX ORDER — METOPROLOL TARTRATE 50 MG
12.5 TABLET ORAL AT BEDTIME
Qty: 0 | Refills: 0 | Status: DISCONTINUED | OUTPATIENT
Start: 2017-10-22 | End: 2017-10-23

## 2017-10-22 RX ORDER — CIPROFLOXACIN LACTATE 400MG/40ML
500 VIAL (ML) INTRAVENOUS EVERY 12 HOURS
Qty: 0 | Refills: 0 | Status: DISCONTINUED | OUTPATIENT
Start: 2017-10-22 | End: 2017-10-25

## 2017-10-22 RX ORDER — HYDRALAZINE HCL 50 MG
25 TABLET ORAL THREE TIMES A DAY
Qty: 0 | Refills: 0 | Status: DISCONTINUED | OUTPATIENT
Start: 2017-10-22 | End: 2017-10-25

## 2017-10-22 RX ORDER — METOPROLOL TARTRATE 50 MG
25 TABLET ORAL
Qty: 0 | Refills: 0 | Status: DISCONTINUED | OUTPATIENT
Start: 2017-10-22 | End: 2017-10-23

## 2017-10-22 RX ADMIN — Medication 500 MILLIGRAM(S): at 17:40

## 2017-10-22 RX ADMIN — Medication 12.5 MILLIGRAM(S): at 06:23

## 2017-10-22 RX ADMIN — Medication 3 MILLILITER(S): at 09:11

## 2017-10-22 RX ADMIN — PANTOPRAZOLE SODIUM 40 MILLIGRAM(S): 20 TABLET, DELAYED RELEASE ORAL at 06:23

## 2017-10-22 RX ADMIN — Medication 650 MILLIGRAM(S): at 16:26

## 2017-10-22 RX ADMIN — Medication 20 MILLIGRAM(S): at 12:20

## 2017-10-22 RX ADMIN — Medication 100 MILLIGRAM(S): at 06:24

## 2017-10-22 RX ADMIN — Medication 3 MILLILITER(S): at 14:41

## 2017-10-22 RX ADMIN — Medication 25 MILLIGRAM(S): at 17:40

## 2017-10-22 RX ADMIN — Medication 1 APPLICATION(S): at 10:00

## 2017-10-22 RX ADMIN — Medication 12.5 MILLIGRAM(S): at 21:38

## 2017-10-22 RX ADMIN — ATORVASTATIN CALCIUM 40 MILLIGRAM(S): 80 TABLET, FILM COATED ORAL at 21:38

## 2017-10-22 RX ADMIN — TRAMADOL HYDROCHLORIDE 25 MILLIGRAM(S): 50 TABLET ORAL at 07:05

## 2017-10-22 RX ADMIN — MIRTAZAPINE 7.5 MILLIGRAM(S): 45 TABLET, ORALLY DISINTEGRATING ORAL at 12:20

## 2017-10-22 RX ADMIN — HEPARIN SODIUM 5000 UNIT(S): 5000 INJECTION INTRAVENOUS; SUBCUTANEOUS at 17:40

## 2017-10-22 RX ADMIN — HEPARIN SODIUM 5000 UNIT(S): 5000 INJECTION INTRAVENOUS; SUBCUTANEOUS at 06:56

## 2017-10-22 RX ADMIN — Medication 3 MILLILITER(S): at 20:24

## 2017-10-22 RX ADMIN — AMLODIPINE BESYLATE 2.5 MILLIGRAM(S): 2.5 TABLET ORAL at 06:23

## 2017-10-22 RX ADMIN — CLOPIDOGREL BISULFATE 75 MILLIGRAM(S): 75 TABLET, FILM COATED ORAL at 12:20

## 2017-10-22 RX ADMIN — TRAMADOL HYDROCHLORIDE 25 MILLIGRAM(S): 50 TABLET ORAL at 06:23

## 2017-10-22 RX ADMIN — Medication 25 MILLIGRAM(S): at 23:22

## 2017-10-22 RX ADMIN — Medication 100 MILLIGRAM(S): at 21:38

## 2017-10-22 RX ADMIN — Medication 650 MILLIGRAM(S): at 17:40

## 2017-10-22 RX ADMIN — Medication 100 MILLIGRAM(S): at 16:23

## 2017-10-22 RX ADMIN — Medication 81 MILLIGRAM(S): at 12:20

## 2017-10-22 NOTE — DISCHARGE NOTE ADULT - PLAN OF CARE
Prevent additional episodes You were admitted for your episode of syncope. Your initial testing (CT scan of your head) was negative for any pathology, but we performed additional tests. 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 Ensure that you always get up slowly when rising from bed and utilize assistive devices when walking to prevent future falls. Follow up with your outpatient primary care provider for additional recommendations and continued monitoring. You were found to have a urinary tract infection during your hospitalization and treated with intravenous ceftriaxone. Continue with your current oral antimicrobial regimen until completion and follow up with outpatient primary care provider within 1 month of discharge. Your blood pressure was well-controlled during your admission. Continue with your current regimen of anti-hypertensive medications and ensure that you follow up with your primary care provider within 1 month of discharge for further recommendations and monitoring Continue with your current analgesic medication regimen. Our orthopedist evaluated you and recommended... DR GAMEZ Continue with your current doses of plavix, aspirin, beta blocker, and statin as prescribed. Continue to regularly follow up with your cardiologist. Continue with your inhaled medications as prescribed. Continue with your current analgesic medication regimen. We performed radiographic imaging of your knee which revealed mild osteoarthritis and a supra-patellar effusion. Orthopaedics evaluated you and (ORTHO) Follow up with care at [facility] for neurologic evaluation.

## 2017-10-22 NOTE — DISCHARGE NOTE ADULT - SECONDARY DIAGNOSIS.
UTI (urinary tract infection) Essential hypertension Degenerative joint disease Coronary artery disease involving native coronary artery of native heart without angina pectoris Asthma Other right-sided nontraumatic intracerebral hemorrhage

## 2017-10-22 NOTE — DISCHARGE NOTE ADULT - MEDICATION SUMMARY - MEDICATIONS TO TAKE
I will START or STAY ON the medications listed below when I get home from the hospital:    ensure [email or web address]  -- 1 3 times a day  -- Indication: For Nutrition    doxazosin 4 mg oral tablet  -- 1 tab(s) by mouth once a day (at bedtime)  -- Indication: For BPH    mirtazapine 7.5 mg oral tablet  -- 1 tab(s) by mouth once a day (at bedtime)  -- Indication: For Depression    PARoxetine 20 mg oral tablet  -- 1 tab(s) by mouth once a day  -- Indication: For Depression    atorvastatin 40 mg oral tablet  -- 1 tab(s) by mouth once a day (at bedtime)  -- Indication: For Hyperlipidemia    metoprolol tartrate 25 mg oral tablet  -- 1 tab(s) by mouth 2 times a day  -- Indication: For HTN    amLODIPine 2.5 mg oral tablet  -- 1 tab(s) by mouth once a day  -- Indication: For HTN    Dextrose 5% with 0.45% NaCl intravenous solution  -- 1000 milliliter(s) intravenous   -- Indication: For Nutrition    hydrALAZINE 25 mg oral tablet  -- 1 tab(s) by mouth 3 times a day  -- Indication: For HTN

## 2017-10-22 NOTE — CHART NOTE - NSCHARTNOTEFT_GEN_A_CORE
Pt complains severe pain on his left keen due to arthroedema, given pain medication and orthopedics consulted with Dr. Sutton. Dr. Sutton will eval pt tomorrow morning. Pt complains severe pain on his left keen due to arthroedema, given pain medication and orthopedics consulted with Dr. Penny. Dr. Penny will eval pt tomorrow morning.

## 2017-10-22 NOTE — PROGRESS NOTE ADULT - SUBJECTIVE AND OBJECTIVE BOX
CARDIOLOGY     PROGRESS  NOTE   ________________________________________________    CHIEF COMPLAINT:Patient is a 89y old  Male who presents with a chief complaint of fall, possible syncope (19 Oct 2017 02:12)  no complain  	  REVIEW OF SYSTEMS:  CONSTITUTIONAL: No fever, weight loss, or fatigue  EYES: No eye pain, visual disturbances, or discharge  ENT:  No difficulty hearing, tinnitus, vertigo; No sinus or throat pain  NECK: No pain or stiffness  RESPIRATORY: No cough, wheezing, chills or hemoptysis; No Shortness of Breath  CARDIOVASCULAR: No chest pain, palpitations, passing out, dizziness, or leg swelling  GASTROINTESTINAL: No abdominal or epigastric pain. No nausea, vomiting, or hematemesis; No diarrhea or constipation. No melena or hematochezia.  GENITOURINARY: No dysuria, frequency, hematuria, or incontinence  NEUROLOGICAL: No headaches, memory loss, loss of strength, numbness, or tremors  SKIN: No itching, burning, rashes, or lesions   LYMPH Nodes: No enlarged glands  ENDOCRINE: No heat or cold intolerance; No hair loss  MUSCULOSKELETAL: No joint pain or swelling; No muscle, back, or extremity pain  PSYCHIATRIC: No depression, anxiety, mood swings, or difficulty sleeping  HEME/LYMPH: No easy bruising, or bleeding gums  ALLERGY AND IMMUNOLOGIC: No hives or eczema	    [ ] All others negative	  [ ] Unable to obtain    PHYSICAL EXAM:  T(C): 36.8 (10-22-17 @ 07:45), Max: 37.1 (10-21-17 @ 11:15)  HR: 71 (10-22-17 @ 07:45) (71 - 90)  BP: 131/50 (10-22-17 @ 07:45) (131/50 - 175/63)  RR: 18 (10-22-17 @ 07:45) (18 - 18)  SpO2: 97% (10-22-17 @ 07:45) (93% - 98%)  Wt(kg): --  I&O's Summary    21 Oct 2017 07:01  -  22 Oct 2017 07:00  --------------------------------------------------------  IN: 400 mL / OUT: 450 mL / NET: -50 mL    22 Oct 2017 07:01  -  22 Oct 2017 10:16  --------------------------------------------------------  IN: 230 mL / OUT: 0 mL / NET: 230 mL        Appearance: Normal	  HEENT:   Normal oral mucosa, PERRL, EOMI	  Lymphatic: No lymphadenopathy  Cardiovascular: Normal S1 S2, No JVD, + systolic murmurs, No edema  Respiratory: Lungs clear to auscultation	  Psychiatry: A & O x 3, Mood & affect appropriate  Gastrointestinal:  Soft, Non-tender, + BS	  Skin: No rashes, No ecchymoses, No cyanosis	  Neurologic: Non-focal  Extremities: Normal range of motion, No clubbing, cyanosis or edema  Vascular: Peripheral pulses palpable 2+ bilaterally    MEDICATIONS  (STANDING):  ALBUTerol    90 MICROgram(s) HFA Inhaler 1 Puff(s) Inhalation every 4 hours  ALBUTerol/ipratropium for Nebulization 3 milliLiter(s) Nebulizer every 6 hours  amLODIPine   Tablet 2.5 milliGRAM(s) Oral daily  aspirin enteric coated 81 milliGRAM(s) Oral daily  atorvastatin 40 milliGRAM(s) Oral at bedtime  BACItracin   Ointment 1 Application(s) Topical daily  ciprofloxacin     Tablet 500 milliGRAM(s) Oral every 12 hours  clopidogrel Tablet 75 milliGRAM(s) Oral daily  heparin  Injectable 5000 Unit(s) SubCutaneous every 12 hours  metoprolol 12.5 milliGRAM(s) Oral two times a day  mirtazapine 7.5 milliGRAM(s) Oral daily  pantoprazole    Tablet 40 milliGRAM(s) Oral before breakfast  PARoxetine 20 milliGRAM(s) Oral daily  phenazopyridine 100 milliGRAM(s) Oral every 8 hours  tiotropium 18 MICROgram(s) Capsule 1 Capsule(s) Inhalation daily      TELEMETRY: psvt at 160 bpm	    ECG:  	  RADIOLOGY:  OTHER: 	  	  LABS:	 	    CARDIAC MARKERS:                                12.7   12.8  )-----------( 276      ( 21 Oct 2017 09:06 )             40.3     10-21    139  |  104  |  33<H>  ----------------------------<  95  4.2   |  27  |  1.04    Ca    9.2      21 Oct 2017 09:06  Phos  3.3     10-21  Mg     2.2     10-21    TPro  7.5  /  Alb  3.0<L>  /  TBili  0.5  /  DBili  x   /  AST  19  /  ALT  21  /  AlkPhos  106  10-21    proBNP:   Lipid Profile: Cholesterol 98  LDL 52  HDL 31  TG 76    HgA1c:   TSH:   PT/INR - ( 21 Oct 2017 09:06 )   PT: 12.1 sec;   INR: 1.11 ratio         PTT - ( 21 Oct 2017 09:06 )  PTT:32.5 sec  < from: Transthoracic Echocardiogram (07.24.17 @ 14:24) >  1. Normal mitral valve. Moderate to severe mitral  regurgitation.  2. Calcified trileaflet aortic valve with normal opening.  Mild aortic insufficiency.  3. Normal aortic root.  4. Moderate left atrial enlargement.  5. Normal left ventricular internal dimensions and wall  thicknesses.  6. Normal Left Ventricular Systolic Function,  (EF = 55%)  7. Grade II diastolic dysfunction  8. Normal right atrium.  9. Normal right ventricular size and function.  10. RV systolic pressure is severely increased (PASP 65mm  Hg).  11. There is mild tricuspid regurgitation.  12. There is mild pulmonic regurgitation.  13. Normal pericardium with no pericardial effusion.    < end of copied text >      Assessment and plan  ---------------------------  Patient is a 89 yr old Male pt with PMHx of arthritis, asthma, CAD, 5 stents,  HTN, laryngeal CA, CKD stage III recent admission in July for CHF exacerbation ( needing BiPaP and ICU admission) presents to ED c/o syncopal episode x today.   1.Tele monitoring.  2.Orthostatic BP q shift.  3.MRI-r/o cva.  4.CAD - lopressor 12.5mg bid,asa,statin.  5.Pulm HTN- norvasc 2.5mg qd.  6. runs of svt increase beta blocker syncope r/o sss

## 2017-10-22 NOTE — DISCHARGE NOTE ADULT - ABILITY TO HEAR (WITH HEARING AID OR HEARING APPLIANCE IF NORMALLY USED):
Hard of Hearing/Mildly to Moderately Impaired: difficulty hearing in some environments or speaker may need to increase volume or speak distinctly

## 2017-10-22 NOTE — DISCHARGE NOTE ADULT - HOSPITAL COURSE
89M PMHx CAD (s/p PCI x 5), Asthma, HTN, Laryngeal CA, CKD Stage III, CHF, OA came to ED s/p fall. Patient denied any syncope or head trauma, but son reported multiple falls over past 2 months. Patient admitted to dizziness upon standing and son confirmed history of orthostatic hypotension. Recently discharged from hospital in 7/2017 due to CHF exacerbation requiring BiPAP  ED - patient was noted to have left hand laceration (s/p suturing)   Troponin x 3 negative; CT failed to reveal any evidence of fracture or acute intracranial hemorrhage. Patient was admitted for multiple falls and syncope evaluation.  On the floors, patient underwent additional TIA testing including carotid doppler (which revealed moderate-severity stenosis of the Right carotid artery) and MRI/MRA which failed to elucidate any pathology. His stay was complicated by severe left-sided knee pain, so orthopaedics was consulted. Dr. Penny recommended (ORTHO)    Given patient's improved clinical status and current hemodynamic stability, decision was made to discharge.  Please refer to patient's complete medical chart with documents for a full hospital course, for this is only a brief summary. 89M PMHx CAD (s/p PCI x 5), Asthma, HTN, Laryngeal CA, CKD Stage III, CHF, OA came to ED s/p fall. Patient denied any syncope or head trauma, but son reported multiple falls over past 2 months. Patient admitted to dizziness upon standing and son confirmed history of orthostatic hypotension. Recently discharged from hospital in 7/2017 due to CHF exacerbation requiring BiPAP  ED - patient was noted to have left hand laceration (s/p suturing)   Troponin x 3 negative; CT failed to reveal any evidence of fracture or acute intracranial hemorrhage. Patient was admitted for multiple falls and syncope evaluation.  On the floors, patient underwent additional TIA testing including carotid doppler (which revealed moderate-severity stenosis of the Right carotid artery) and MRI/MRA which failed to elucidate any pathology. His stay was complicated by severe left-sided knee pain, so orthopaedics was consulted. Dr. Hull recommended (ORTHO)    Given patient's improved clinical status and current hemodynamic stability, decision was made to discharge.  Please refer to patient's complete medical chart with documents for a full hospital course, for this is only a brief summary. 89M PMHx CAD (s/p PCI x 5), Asthma, HTN, Laryngeal CA, CKD Stage III, CHF, OA came to ED s/p fall. Patient denied any syncope or head trauma, but son reported multiple falls over past 2 months. Patient admitted to dizziness upon standing and son confirmed history of orthostatic hypotension. Recently discharged from hospital in 7/2017 due to CHF exacerbation requiring BiPAP  ED - patient was noted to have left hand laceration (s/p suturing)   Troponin x 3 negative; CT failed to reveal any evidence of fracture or acute intracranial hemorrhage. Patient was admitted for multiple falls and syncope evaluation.  On the floors, patient underwent additional TIA testing including carotid doppler (which revealed moderate-severity stenosis of the Right carotid artery) and MRI/MRA which failed to elucidate any pathology. His stay was complicated by severe left-sided knee pain, so orthopaedics was consulted. Dr. Hull aspirated 10 CC fluid and injected 80 mg of Depomedrol with 5 CC of lidocaine into joint on 10/24.    Given patient's improved clinical status and current hemodynamic stability, decision was made to discharge.  Please refer to patient's complete medical chart with documents for a full hospital course, for this is only a brief summary. 89M PMHx CAD (s/p PCI x 5), Asthma, HTN, Laryngeal CA, CKD Stage III, CHF, OA came to ED s/p fall. Patient denied any syncope or head trauma, but son reported multiple falls over past 2 months. Patient admitted to dizziness upon standing and son confirmed history of orthostatic hypotension. Recently discharged from hospital in 7/2017 due to CHF exacerbation requiring BiPAP  ED - patient was noted to have left hand laceration (s/p suturing)   Troponin x 3 negative; CT failed to reveal any evidence of fracture or acute intracranial hemorrhage. Patient was admitted for multiple falls and syncope evaluation.  On the floors, patient underwent additional TIA testing including carotid doppler (which revealed moderate-severity stenosis of the Right carotid artery) and MRI/MRA which failed to elucidate any pathology. His stay was complicated by severe left-sided knee pain, so orthopaedics was consulted. Dr. Hull aspirated 10 CC fluid and injected 80 mg of Depomedrol with 5 CC of lidocaine into joint on 10/24.  On 10/25, patient was noted to have decreased responsiveness. Labs were within normal limits except for mild respiratory acidosis with elevated Co2 and pH 7.21. 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 Patient's aspirin, plavix, and heparin were discontinued and neurology was consulted. Patient was placed on holding for acceptance at [facility] Neuro ICU.   Given patient's clinical status and current hemodynamic stability, decision was made to discharge.  Please refer to patient's complete medical chart with documents for a full hospital course, for this is only a brief summary.

## 2017-10-22 NOTE — DISCHARGE NOTE ADULT - THE PATIENT HAS
difficulty decision making difficulty decision making/difficulty remembering/difficulty concentrating

## 2017-10-22 NOTE — DISCHARGE NOTE ADULT - CARE PLAN
Principal Discharge DX:	Syncope, unspecified syncope type  Goal:	Prevent additional episodes  Instructions for follow-up, activity and diet:	You were admitted for your episode of syncope. Your initial testing (CT scan of your head) was negative for any pathology, but we performed additional tests. 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 Ensure that you always get up slowly when rising from bed and utilize assistive devices when walking to prevent future falls. Follow up with your outpatient primary care provider for additional recommendations and continued monitoring.  Secondary Diagnosis:	UTI (urinary tract infection)  Instructions for follow-up, activity and diet:	You were found to have a urinary tract infection during your hospitalization and treated with intravenous ceftriaxone. Continue with your current oral antimicrobial regimen until completion and follow up with outpatient primary care provider within 1 month of discharge.  Secondary Diagnosis:	Essential hypertension  Instructions for follow-up, activity and diet:	Your blood pressure was well-controlled during your admission. Continue with your current regimen of anti-hypertensive medications and ensure that you follow up with your primary care provider within 1 month of discharge for further recommendations and monitoring  Secondary Diagnosis:	Degenerative joint disease  Instructions for follow-up, activity and diet:	Continue with your current analgesic medication regimen. Our orthopedist evaluated you and recommended... DR GAMEZ  Secondary Diagnosis:	Coronary artery disease involving native coronary artery of native heart without angina pectoris  Instructions for follow-up, activity and diet:	Continue with your current doses of plavix, aspirin, beta blocker, and statin as prescribed. Continue to regularly follow up with your cardiologist.  Secondary Diagnosis:	Asthma  Instructions for follow-up, activity and diet:	Continue with your inhaled medications as prescribed. Principal Discharge DX:	Syncope, unspecified syncope type  Goal:	Prevent additional episodes  Instructions for follow-up, activity and diet:	You were admitted for your episode of syncope. Your initial testing (CT scan of your head) was negative for any pathology, but we performed additional tests. 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 Ensure that you always get up slowly when rising from bed and utilize assistive devices when walking to prevent future falls. Follow up with your outpatient primary care provider for additional recommendations and continued monitoring.  Secondary Diagnosis:	UTI (urinary tract infection)  Instructions for follow-up, activity and diet:	You were found to have a urinary tract infection during your hospitalization and treated with intravenous ceftriaxone. Continue with your current oral antimicrobial regimen until completion and follow up with outpatient primary care provider within 1 month of discharge.  Secondary Diagnosis:	Essential hypertension  Instructions for follow-up, activity and diet:	Your blood pressure was well-controlled during your admission. Continue with your current regimen of anti-hypertensive medications and ensure that you follow up with your primary care provider within 1 month of discharge for further recommendations and monitoring  Secondary Diagnosis:	Degenerative joint disease  Instructions for follow-up, activity and diet:	Continue with your current analgesic medication regimen. We performed radiographic imaging of your knee which revealed mild osteoarthritis and a supra-patellar effusion. Orthopaedics evaluated you and (ORTHO)  Secondary Diagnosis:	Coronary artery disease involving native coronary artery of native heart without angina pectoris  Instructions for follow-up, activity and diet:	Continue with your current doses of plavix, aspirin, beta blocker, and statin as prescribed. Continue to regularly follow up with your cardiologist.  Secondary Diagnosis:	Asthma  Instructions for follow-up, activity and diet:	Continue with your inhaled medications as prescribed. Principal Discharge DX:	Syncope, unspecified syncope type  Goal:	Prevent additional episodes  Instructions for follow-up, activity and diet:	You were admitted for your episode of syncope. Your initial testing (CT scan of your head) was negative for any pathology, but we performed additional tests. 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 Ensure that you always get up slowly when rising from bed and utilize assistive devices when walking to prevent future falls. Follow up with your outpatient primary care provider for additional recommendations and continued monitoring.  Secondary Diagnosis:	UTI (urinary tract infection)  Instructions for follow-up, activity and diet:	You were found to have a urinary tract infection during your hospitalization and treated with intravenous ceftriaxone. Continue with your current oral antimicrobial regimen until completion and follow up with outpatient primary care provider within 1 month of discharge.  Secondary Diagnosis:	Essential hypertension  Instructions for follow-up, activity and diet:	Your blood pressure was well-controlled during your admission. Continue with your current regimen of anti-hypertensive medications and ensure that you follow up with your primary care provider within 1 month of discharge for further recommendations and monitoring  Secondary Diagnosis:	Degenerative joint disease  Instructions for follow-up, activity and diet:	Continue with your current analgesic medication regimen. We performed radiographic imaging of your knee which revealed mild osteoarthritis and a supra-patellar effusion. Orthopaedics evaluated you and (ORTHO)  Secondary Diagnosis:	Coronary artery disease involving native coronary artery of native heart without angina pectoris  Instructions for follow-up, activity and diet:	Continue with your current doses of plavix, aspirin, beta blocker, and statin as prescribed. Continue to regularly follow up with your cardiologist.  Secondary Diagnosis:	Asthma  Instructions for follow-up, activity and diet:	Continue with your inhaled medications as prescribed.  Secondary Diagnosis:	Other right-sided nontraumatic intracerebral hemorrhage  Instructions for follow-up, activity and diet:	Follow up with care at [facility] for neurologic evaluation.

## 2017-10-22 NOTE — DISCHARGE NOTE ADULT - PATIENT PORTAL LINK FT
“You can access the [facility] Patient Portal, offered by [facility], by registering with the following website: [email or web address]”

## 2017-10-23 DIAGNOSIS — M19.90 UNSPECIFIED OSTEOARTHRITIS, UNSPECIFIED SITE: ICD-10-CM

## 2017-10-23 LAB
24R-OH-CALCIDIOL SERPL-MCNC: 40.7 NG/ML — SIGNIFICANT CHANGE UP (ref 30–80)
ALBUMIN SERPL ELPH-MCNC: 2.7 G/DL — LOW (ref 3.5–5)
ALP SERPL-CCNC: 112 U/L — SIGNIFICANT CHANGE UP (ref 40–120)
ALT FLD-CCNC: 23 U/L DA — SIGNIFICANT CHANGE UP (ref 10–60)
ANION GAP SERPL CALC-SCNC: 8 MMOL/L — SIGNIFICANT CHANGE UP (ref 5–17)
AST SERPL-CCNC: 25 U/L — SIGNIFICANT CHANGE UP (ref 10–40)
BASOPHILS # BLD AUTO: 0 K/UL — SIGNIFICANT CHANGE UP (ref 0–0.2)
BASOPHILS NFR BLD AUTO: 0.2 % — SIGNIFICANT CHANGE UP (ref 0–2)
BILIRUB SERPL-MCNC: 0.5 MG/DL — SIGNIFICANT CHANGE UP (ref 0.2–1.2)
BUN SERPL-MCNC: 41 MG/DL — HIGH (ref 7–18)
CALCIUM SERPL-MCNC: 9.1 MG/DL — SIGNIFICANT CHANGE UP (ref 8.4–10.5)
CHLORIDE SERPL-SCNC: 102 MMOL/L — SIGNIFICANT CHANGE UP (ref 96–108)
CO2 SERPL-SCNC: 29 MMOL/L — SIGNIFICANT CHANGE UP (ref 22–31)
CREAT SERPL-MCNC: 1.21 MG/DL — SIGNIFICANT CHANGE UP (ref 0.5–1.3)
EOSINOPHIL # BLD AUTO: 0.1 K/UL — SIGNIFICANT CHANGE UP (ref 0–0.5)
EOSINOPHIL NFR BLD AUTO: 0.5 % — SIGNIFICANT CHANGE UP (ref 0–6)
GLUCOSE SERPL-MCNC: 116 MG/DL — HIGH (ref 70–99)
HCT VFR BLD CALC: 38.1 % — LOW (ref 39–50)
HGB BLD-MCNC: 11.7 G/DL — LOW (ref 13–17)
LYMPHOCYTES # BLD AUTO: 2.8 K/UL — SIGNIFICANT CHANGE UP (ref 1–3.3)
LYMPHOCYTES # BLD AUTO: 21 % — SIGNIFICANT CHANGE UP (ref 13–44)
MAGNESIUM SERPL-MCNC: 2.3 MG/DL — SIGNIFICANT CHANGE UP (ref 1.6–2.6)
MCHC RBC-ENTMCNC: 28.7 PG — SIGNIFICANT CHANGE UP (ref 27–34)
MCHC RBC-ENTMCNC: 30.6 GM/DL — LOW (ref 32–36)
MCV RBC AUTO: 93.7 FL — SIGNIFICANT CHANGE UP (ref 80–100)
MONOCYTES # BLD AUTO: 0.8 K/UL — SIGNIFICANT CHANGE UP (ref 0–0.9)
MONOCYTES NFR BLD AUTO: 6 % — SIGNIFICANT CHANGE UP (ref 2–14)
NEUTROPHILS # BLD AUTO: 9.5 K/UL — HIGH (ref 1.8–7.4)
NEUTROPHILS NFR BLD AUTO: 72.4 % — SIGNIFICANT CHANGE UP (ref 43–77)
PHOSPHATE SERPL-MCNC: 2.9 MG/DL — SIGNIFICANT CHANGE UP (ref 2.5–4.5)
PLATELET # BLD AUTO: 267 K/UL — SIGNIFICANT CHANGE UP (ref 150–400)
POTASSIUM SERPL-MCNC: 4 MMOL/L — SIGNIFICANT CHANGE UP (ref 3.5–5.3)
POTASSIUM SERPL-SCNC: 4 MMOL/L — SIGNIFICANT CHANGE UP (ref 3.5–5.3)
PROT SERPL-MCNC: 7.4 G/DL — SIGNIFICANT CHANGE UP (ref 6–8.3)
RBC # BLD: 4.07 M/UL — LOW (ref 4.2–5.8)
RBC # FLD: 14.2 % — SIGNIFICANT CHANGE UP (ref 10.3–14.5)
SODIUM SERPL-SCNC: 139 MMOL/L — SIGNIFICANT CHANGE UP (ref 135–145)
WBC # BLD: 13.1 K/UL — HIGH (ref 3.8–10.5)
WBC # FLD AUTO: 13.1 K/UL — HIGH (ref 3.8–10.5)

## 2017-10-23 RX ORDER — METOPROLOL TARTRATE 50 MG
25 TABLET ORAL
Qty: 0 | Refills: 0 | Status: DISCONTINUED | OUTPATIENT
Start: 2017-10-23 | End: 2017-10-25

## 2017-10-23 RX ADMIN — Medication 100 MILLIGRAM(S): at 06:08

## 2017-10-23 RX ADMIN — Medication 3 MILLILITER(S): at 02:57

## 2017-10-23 RX ADMIN — HEPARIN SODIUM 5000 UNIT(S): 5000 INJECTION INTRAVENOUS; SUBCUTANEOUS at 05:23

## 2017-10-23 RX ADMIN — Medication 500 MILLIGRAM(S): at 05:23

## 2017-10-23 RX ADMIN — Medication 3 MILLILITER(S): at 14:19

## 2017-10-23 RX ADMIN — Medication 100 MILLIGRAM(S): at 22:37

## 2017-10-23 RX ADMIN — MIRTAZAPINE 7.5 MILLIGRAM(S): 45 TABLET, ORALLY DISINTEGRATING ORAL at 12:41

## 2017-10-23 RX ADMIN — Medication 3 MILLILITER(S): at 09:20

## 2017-10-23 RX ADMIN — Medication 500 MILLIGRAM(S): at 17:03

## 2017-10-23 RX ADMIN — Medication 25 MILLIGRAM(S): at 08:52

## 2017-10-23 RX ADMIN — HEPARIN SODIUM 5000 UNIT(S): 5000 INJECTION INTRAVENOUS; SUBCUTANEOUS at 17:04

## 2017-10-23 RX ADMIN — TRAMADOL HYDROCHLORIDE 25 MILLIGRAM(S): 50 TABLET ORAL at 10:56

## 2017-10-23 RX ADMIN — Medication 100 MILLIGRAM(S): at 16:16

## 2017-10-23 RX ADMIN — Medication 25 MILLIGRAM(S): at 16:48

## 2017-10-23 RX ADMIN — AMLODIPINE BESYLATE 2.5 MILLIGRAM(S): 2.5 TABLET ORAL at 05:23

## 2017-10-23 RX ADMIN — Medication 1 APPLICATION(S): at 12:44

## 2017-10-23 RX ADMIN — Medication 25 MILLIGRAM(S): at 05:23

## 2017-10-23 RX ADMIN — Medication 20 MILLIGRAM(S): at 12:41

## 2017-10-23 RX ADMIN — ATORVASTATIN CALCIUM 40 MILLIGRAM(S): 80 TABLET, FILM COATED ORAL at 22:37

## 2017-10-23 RX ADMIN — Medication 81 MILLIGRAM(S): at 12:41

## 2017-10-23 RX ADMIN — CLOPIDOGREL BISULFATE 75 MILLIGRAM(S): 75 TABLET, FILM COATED ORAL at 12:41

## 2017-10-23 RX ADMIN — Medication 25 MILLIGRAM(S): at 22:37

## 2017-10-23 RX ADMIN — TRAMADOL HYDROCHLORIDE 25 MILLIGRAM(S): 50 TABLET ORAL at 12:00

## 2017-10-23 RX ADMIN — PANTOPRAZOLE SODIUM 40 MILLIGRAM(S): 20 TABLET, DELAYED RELEASE ORAL at 05:23

## 2017-10-23 RX ADMIN — Medication 25 MILLIGRAM(S): at 17:04

## 2017-10-23 NOTE — PROGRESS NOTE ADULT - SUBJECTIVE AND OBJECTIVE BOX
PGY 1 Note discussed with supervising resident and primary attending    Patient is a 89y old  Male who presents with a chief complaint of fall, possible syncope (22 Oct 2017 21:45)      INTERVAL HPI/OVERNIGHT EVENTS: offers no new complaints; current symptoms resolving    MEDICATIONS  (STANDING):  ALBUTerol    90 MICROgram(s) HFA Inhaler 1 Puff(s) Inhalation every 4 hours  ALBUTerol/ipratropium for Nebulization 3 milliLiter(s) Nebulizer every 6 hours  amLODIPine   Tablet 2.5 milliGRAM(s) Oral daily  aspirin enteric coated 81 milliGRAM(s) Oral daily  atorvastatin 40 milliGRAM(s) Oral at bedtime  BACItracin   Ointment 1 Application(s) Topical daily  ciprofloxacin     Tablet 500 milliGRAM(s) Oral every 12 hours  clopidogrel Tablet 75 milliGRAM(s) Oral daily  heparin  Injectable 5000 Unit(s) SubCutaneous every 12 hours  hydrALAZINE 25 milliGRAM(s) Oral three times a day  metoprolol 25 milliGRAM(s) Oral with breakfast  metoprolol 12.5 milliGRAM(s) Oral at bedtime  mirtazapine 7.5 milliGRAM(s) Oral daily  pantoprazole    Tablet 40 milliGRAM(s) Oral before breakfast  PARoxetine 20 milliGRAM(s) Oral daily  phenazopyridine 100 milliGRAM(s) Oral every 8 hours  tiotropium 18 MICROgram(s) Capsule 1 Capsule(s) Inhalation daily    MEDICATIONS  (PRN):  acetaminophen   Tablet. 650 milliGRAM(s) Oral every 6 hours PRN Moderate Pain (4 - 6)  traMADol 25 milliGRAM(s) Oral every 6 hours PRN Moderate Pain (4 - 6)      __________________________________________________  REVIEW OF SYSTEMS:  CONSTITUTIONAL: No fever  NECK: No pain or stiffness  RESPIRATORY: No cough; No shortness of breath  CARDIOVASCULAR: No chest pain, no palpitations  GASTROINTESTINAL: No pain. No constipation, nausea or vomiting; No diarrhea   NEUROLOGICAL: No headache or numbness, no tremors  MUSCULOSKELETAL: No joint pain, no muscle pain  GENITOURINARY: no dysuria, no frequency, no hesitancy  PSYCHIATRY: no depression , no anxiety  ALL OTHER  ROS negative        Vital Signs Last 24 Hrs  T(C): 36.8 (23 Oct 2017 04:51), Max: 37.1 (22 Oct 2017 11:17)  T(F): 98.3 (23 Oct 2017 04:51), Max: 98.7 (22 Oct 2017 11:17)  HR: 76 (23 Oct 2017 04:51) (71 - 107)  BP: 132/61 (23 Oct 2017 04:51) (125/57 - 175/63)  BP(mean): --  RR: 17 (23 Oct 2017 04:51) (17 - 18)  SpO2: 93% (23 Oct 2017 04:51) (91% - 98%)    ________________________________________________  PHYSICAL EXAM:  GENERAL: NAD, lying in bed  HEENT: Normocephalic;  conjunctivae and sclerae clear; moist mucous membranes  NECK : supple, trachea midline, no JVD  CHEST/LUNG: Clear to auscultation bilaterally with good air entry   HEART: S1 S2,  regular rate and rhythm,; no murmurs, gallops or rubs  ABDOMEN: Soft, Nontender, Nondistended; Bowel sounds present  EXTREMITIES: no cyanosis; no edema; no calf tenderness  SKIN: warm and dry; no rash  NERVOUS SYSTEM:  AAOx3; no new focal deficits    _________________________________________________  LABS:                        12.7   12.8  )-----------( 276      ( 21 Oct 2017 09:06 )             40.3     10-21    139  |  104  |  33<H>  ----------------------------<  95  4.2   |  27  |  1.04    Ca    9.2      21 Oct 2017 09:06  Phos  3.3     10-21  Mg     2.2     10-21    TPro  7.5  /  Alb  3.0<L>  /  TBili  0.5  /  DBili  x   /  AST  19  /  ALT  21  /  AlkPhos  106  10-21    PT/INR - ( 21 Oct 2017 09:06 )   PT: 12.1 sec;   INR: 1.11 ratio         PTT - ( 21 Oct 2017 09:06 )  PTT:32.5 sec    CAPILLARY BLOOD GLUCOSE          RADIOLOGY & ADDITIONAL TESTS:    Imaging Personally Reviewed:  YES    Consultant(s) Notes Reviewed:   YES    Care Discussed with Consultants:   Infectious Disease [] Endocrinology [] Neurology [] ENT [] Cardiology [] Electrophysiology [] Pulmonology [] Gastroenterology [] Nephrology [] Urology [] Orthopaedics [] Vascular Surgery [] Thoracic Surgery [] Plastic Surgery [] General Surgery [] Podiatry [] Psychiatry [] Hematology/Oncology [] Pain [] Palliative Care []    Plan of care was discussed with patient and/or primary care giver; all questions and concerns were addressed and care was aligned with patient's wishes. PGY 1 Note discussed with supervising resident and primary attending    Patient is a 89y old  Male who presents with a chief complaint of fall, possible syncope (22 Oct 2017 21:45)      INTERVAL HPI/OVERNIGHT EVENTS: offers no new complaints; current symptoms resolving    MEDICATIONS  (STANDING):  ALBUTerol    90 MICROgram(s) HFA Inhaler 1 Puff(s) Inhalation every 4 hours  ALBUTerol/ipratropium for Nebulization 3 milliLiter(s) Nebulizer every 6 hours  amLODIPine   Tablet 2.5 milliGRAM(s) Oral daily  aspirin enteric coated 81 milliGRAM(s) Oral daily  atorvastatin 40 milliGRAM(s) Oral at bedtime  BACItracin   Ointment 1 Application(s) Topical daily  ciprofloxacin     Tablet 500 milliGRAM(s) Oral every 12 hours  clopidogrel Tablet 75 milliGRAM(s) Oral daily  heparin  Injectable 5000 Unit(s) SubCutaneous every 12 hours  hydrALAZINE 25 milliGRAM(s) Oral three times a day  metoprolol 25 milliGRAM(s) Oral with breakfast  metoprolol 12.5 milliGRAM(s) Oral at bedtime  mirtazapine 7.5 milliGRAM(s) Oral daily  pantoprazole    Tablet 40 milliGRAM(s) Oral before breakfast  PARoxetine 20 milliGRAM(s) Oral daily  phenazopyridine 100 milliGRAM(s) Oral every 8 hours  tiotropium 18 MICROgram(s) Capsule 1 Capsule(s) Inhalation daily    MEDICATIONS  (PRN):  acetaminophen   Tablet. 650 milliGRAM(s) Oral every 6 hours PRN Moderate Pain (4 - 6)  traMADol 25 milliGRAM(s) Oral every 6 hours PRN Moderate Pain (4 - 6)      __________________________________________________  REVIEW OF SYSTEMS:  CONSTITUTIONAL: No fever  RESPIRATORY: No cough; No shortness of breath  CARDIOVASCULAR: No chest pain, no palpitations  GASTROINTESTINAL: No pain. No constipation, nausea or vomiting; No diarrhea   NEUROLOGICAL: No headache or numbness, no tremors  MUSCULOSKELETAL: No joint pain, no muscle pain  ALL OTHER  ROS negative        Vital Signs Last 24 Hrs  T(C): 36.8 (23 Oct 2017 04:51), Max: 37.1 (22 Oct 2017 11:17)  T(F): 98.3 (23 Oct 2017 04:51), Max: 98.7 (22 Oct 2017 11:17)  HR: 76 (23 Oct 2017 04:51) (71 - 107)  BP: 132/61 (23 Oct 2017 04:51) (125/57 - 175/63)  BP(mean): --  RR: 17 (23 Oct 2017 04:51) (17 - 18)  SpO2: 93% (23 Oct 2017 04:51) (91% - 98%)    ________________________________________________  PHYSICAL EXAM:  GENERAL: NAD, lying in bed  HEENT: Normocephalic;  conjunctivae and sclerae clear; moist mucous membranes  CHEST/LUNG: Clear to auscultation bilaterally with good air entry   HEART: S1 S2,  regular rate and rhythm,; no murmurs, gallops or rubs  ABDOMEN: Soft, Nontender, Nondistended; Bowel sounds present  EXTREMITIES: no cyanosis; no edema; no calf tenderness  SKIN: warm and dry; no rash. Left hand without bleeding  NERVOUS SYSTEM:  AAOx3; no new focal deficits    _________________________________________________  LABS:                        12.7   12.8  )-----------( 276      ( 21 Oct 2017 09:06 )             40.3     10-21    139  |  104  |  33<H>  ----------------------------<  95  4.2   |  27  |  1.04    Ca    9.2      21 Oct 2017 09:06  Phos  3.3     10-21  Mg     2.2     10-21    TPro  7.5  /  Alb  3.0<L>  /  TBili  0.5  /  DBili  x   /  AST  19  /  ALT  21  /  AlkPhos  106  10-21    PT/INR - ( 21 Oct 2017 09:06 )   PT: 12.1 sec;   INR: 1.11 ratio         PTT - ( 21 Oct 2017 09:06 )  PTT:32.5 sec    CAPILLARY BLOOD GLUCOSE          RADIOLOGY & ADDITIONAL TESTS:    Imaging Personally Reviewed:  YES    Consultant(s) Notes Reviewed:   YES    Care Discussed with Consultants:   Infectious Disease [] Endocrinology [] Neurology [] ENT [] Cardiology [x] Electrophysiology [] Pulmonology [] Gastroenterology [] Nephrology [] Urology [] Orthopaedics [] Vascular Surgery [x] Thoracic Surgery [] Plastic Surgery [] General Surgery [] Podiatry [] Psychiatry [] Hematology/Oncology [] Pain [] Palliative Care []    Plan of care was discussed with patient and/or primary care giver; all questions and concerns were addressed and care was aligned with patient's wishes.

## 2017-10-23 NOTE — PROGRESS NOTE ADULT - ASSESSMENT
Patient is a 89 yr old Male pt with PMHx of arthritis, asthma, CAD, 5 stents,  HTN, laryngeal CA, CKD stage III recent admission in July for CHF exacerbation ( needing BiPaP and ICU admission) presents to ED c/o syncopal episode x today.   1.Tele monitoring.  2.CAD - Inc lopressor 25 mg bid,asa,statin.  3.Pulm HTN- norvasc 2.5mg qd.  4.Continue rest of medication.  5.GI and DVT prophylaxis.

## 2017-10-23 NOTE — PROGRESS NOTE ADULT - PROBLEM SELECTOR PLAN 4
UA positive; Urine culture revealed E. faecalis  Continue with ciprofloxacin and pyridium for dysuria

## 2017-10-23 NOTE — PROGRESS NOTE ADULT - PROBLEM SELECTOR PLAN 7
S/p suture insertion for left hand sutures  Continue with local wound care and topical bacitracin S/p suture insertion for left hand laceration  Continue with local wound care and topical bacitracin

## 2017-10-23 NOTE — PROGRESS NOTE ADULT - SUBJECTIVE AND OBJECTIVE BOX
CHIEF COMPLAINT:Patient is a 89y old  Male who presents with a chief complaint of fall, possible syncope. Pt appears comfortable.    	  REVIEW OF SYSTEMS:  CONSTITUTIONAL: No fever, weight loss, or fatigue  EYES: No eye pain, visual disturbances, or discharge  ENT:  No difficulty hearing, tinnitus, vertigo; No sinus or throat pain  NECK: No pain or stiffness  RESPIRATORY: No cough, wheezing, chills or hemoptysis; No Shortness of Breath  CARDIOVASCULAR: No chest pain, palpitations, passing out, dizziness, or leg swelling  GASTROINTESTINAL: No abdominal or epigastric pain. No nausea, vomiting, or hematemesis; No diarrhea or constipation. No melena or hematochezia.  GENITOURINARY: No dysuria, frequency, hematuria, or incontinence  NEUROLOGICAL: No headaches, memory loss, loss of strength, numbness, or tremors  SKIN: No itching, burning, rashes, or lesions   LYMPH Nodes: No enlarged glands  ENDOCRINE: No heat or cold intolerance; No hair loss  MUSCULOSKELETAL: No joint pain or swelling; No muscle, back, or extremity pain  PSYCHIATRIC: No depression, anxiety, mood swings, or difficulty sleeping  HEME/LYMPH: No easy bruising, or bleeding gums  ALLERGY AND IMMUNOLOGIC: No hives or eczema	    PHYSICAL EXAM:  T(C): 36.9 (10-23-17 @ 07:36), Max: 37.1 (10-22-17 @ 11:17)  HR: 82 (10-23-17 @ 07:36) (76 - 107)  BP: 134/46 (10-23-17 @ 07:36) (125/57 - 146/60)  RR: 18 (10-23-17 @ 07:36) (17 - 18)  SpO2: 98% (10-23-17 @ 07:36) (91% - 98%)    I&O's Summary    22 Oct 2017 07:01  -  23 Oct 2017 07:00  --------------------------------------------------------  IN: 670 mL / OUT: 500 mL / NET: 170 mL    23 Oct 2017 07:01  -  23 Oct 2017 09:43  --------------------------------------------------------  IN: 250 mL / OUT: 0 mL / NET: 250 mL        Appearance: Normal	  HEENT:   Normal oral mucosa, PERRL, EOMI	  Lymphatic: No lymphadenopathy  Cardiovascular: Normal S1 S2, No JVD, No murmurs, No edema  Respiratory: Lungs clear to auscultation	  Gastrointestinal:  Soft, Non-tender, + BS	  Skin: No rashes, No ecchymoses, No cyanosis	  Extremities: Normal range of motion, No clubbing, cyanosis or edema  Vascular: Peripheral pulses palpable 2+ bilaterally    MEDICATIONS  (STANDING):  ALBUTerol    90 MICROgram(s) HFA Inhaler 1 Puff(s) Inhalation every 4 hours  ALBUTerol/ipratropium for Nebulization 3 milliLiter(s) Nebulizer every 6 hours  amLODIPine   Tablet 2.5 milliGRAM(s) Oral daily  aspirin enteric coated 81 milliGRAM(s) Oral daily  atorvastatin 40 milliGRAM(s) Oral at bedtime  BACItracin   Ointment 1 Application(s) Topical daily  ciprofloxacin     Tablet 500 milliGRAM(s) Oral every 12 hours  clopidogrel Tablet 75 milliGRAM(s) Oral daily  heparin  Injectable 5000 Unit(s) SubCutaneous every 12 hours  hydrALAZINE 25 milliGRAM(s) Oral three times a day  metoprolol 25 milliGRAM(s) Oral two times a day  mirtazapine 7.5 milliGRAM(s) Oral daily  pantoprazole    Tablet 40 milliGRAM(s) Oral before breakfast  PARoxetine 20 milliGRAM(s) Oral daily  phenazopyridine 100 milliGRAM(s) Oral every 8 hours  tiotropium 18 MICROgram(s) Capsule 1 Capsule(s) Inhalation daily  	  	  LABS:	 	                       11.7   13.1  )-----------( 267      ( 23 Oct 2017 06:21 )             38.1     10-23    139  |  102  |  41<H>  ----------------------------<  116<H>  4.0   |  29  |  1.21    Ca    9.1      23 Oct 2017 06:21  Phos  2.9     10-23  Mg     2.3     10-23    TPro  7.4  /  Alb  2.7<L>  /  TBili  0.5  /  DBili  x   /  AST  25  /  ALT  23  /  AlkPhos  112  10-23    Lipid Profile: Cholesterol 98  LDL 52  HDL 31  TG 76      EXAM:  BRAIN MRI                            PROCEDURE DATE:  10/20/2017          INTERPRETATION:  MRI BRAIN WITHOUT CONTRAST     HISTORY: stroke.    COMPARISON: CT head 10/18/2017.    TECHNIQUE: Multiplanar MR imaging of the brain without contrast was   performed.    FINDINGS:  The ventricles and cortical sulci are prominent reflecting parenchymal   volume loss. There is no evidence of acute infarct, intracranial   hemorrhage, mass effect or midline shift. The basal cisterns are patent.    Scattered foci of T2/FLAIR hyperintensity are noted in the   periventricular and subcortical white matter, nonspecific but likely   sequela of small vessel ischemic disease.    The major intracranial flow voids at the skull base are preserved. Small   mucus retention cyst/polyp right maxillary sinus, mild mucosal thickening   right maxillary sinus and bilateral anterior ethmoid air cells. Trace   fluid signal in the right inferior mastoid air cells. The native ocular   lenses are surgically absent.    IMPRESSION:  No evidence of acute infarct.

## 2017-10-23 NOTE — PROGRESS NOTE ADULT - ASSESSMENT
89M PMHx CAD (s/p PCI x 5), Asthma, HTN, Laryngeal CA, CKD Stage III, CHF, OA admitted for multiple falls and syncope evaluation

## 2017-10-23 NOTE — PROGRESS NOTE ADULT - SUBJECTIVE AND OBJECTIVE BOX
_________________________________________________________________________________________  ========>>  M E D I C A L   A T T E N D I N G    F O L L O W  U P  N O T E  <<=========  -----------------------------------------------------------------------------------------------------    - Patient seen and examined by me approximately thirty minutes ago.  - In summary, patient is a 89y year old man who originally presented post syncope and fall at home  - pain and burning with urination now much better, pain in the left wrist better, pt today with increasing left knee pain, unable to stand..    ==================>> MEDICATIONS <<====================    ALBUTerol    90 MICROgram(s) HFA Inhaler 1 Puff(s) Inhalation every 4 hours  ALBUTerol/ipratropium for Nebulization 3 milliLiter(s) Nebulizer every 6 hours  amLODIPine   Tablet 2.5 milliGRAM(s) Oral daily  aspirin enteric coated 81 milliGRAM(s) Oral daily  atorvastatin 40 milliGRAM(s) Oral at bedtime  BACItracin   Ointment 1 Application(s) Topical daily  ciprofloxacin     Tablet 500 milliGRAM(s) Oral every 12 hours  clopidogrel Tablet 75 milliGRAM(s) Oral daily  heparin  Injectable 5000 Unit(s) SubCutaneous every 12 hours  hydrALAZINE 25 milliGRAM(s) Oral three times a day  metoprolol 25 milliGRAM(s) Oral two times a day  mirtazapine 7.5 milliGRAM(s) Oral daily  pantoprazole    Tablet 40 milliGRAM(s) Oral before breakfast  PARoxetine 20 milliGRAM(s) Oral daily  phenazopyridine 100 milliGRAM(s) Oral every 8 hours  tiotropium 18 MICROgram(s) Capsule 1 Capsule(s) Inhalation daily    MEDICATIONS  (PRN):  acetaminophen   Tablet. 650 milliGRAM(s) Oral every 6 hours PRN Moderate Pain (4 - 6)  traMADol 25 milliGRAM(s) Oral every 6 hours PRN Moderate Pain (4 - 6)    ==================>> REVIEW OF SYSTEM <<=================    GEN: no fever, no chills, + pain as above  RESP: no SOB, no cough, no sputum  CVS: no chest pain, no palpitations, no edema  GI: no abdominal pain, no nausea, no constipation, no diarrhea  : + dysuria as above, no frequency, no hematuria  Neuro: no headache, no dizziness  Derm : no itching, no rash    ==================>> VITAL SIGNS <<==================    Vital Signs Last 24 Hrs  T(C): 37.6 (10-23-17 @ 15:50)  T(F): 99.6 (10-23-17 @ 15:50), Max: 99.6 (10-23-17 @ 15:50)  HR: 90 (10-23-17 @ 15:50) (62 - 90)  BP: 134/57 (10-23-17 @ 15:50)  BP(mean): --  RR: 17 (10-23-17 @ 15:50) (17 - 18)  SpO2: 96% (10-23-17 @ 15:50) (91% - 98%)      ==================>> PHYSICAL EXAM <<=================    GEN: A&O X 3 , NAD , comfortable  HEENT: NCAT, PERRL, MMM, hearing intact  Neck: supple , no JVD  CVS: S1S2 , regular , No M/R/G appreciated  PULM: CTA B/L,  no W/R/R appreciated  ABD.: soft. non tender, non distended,  bowel sounds present  Extrem: intact pulses , no edema, + scattered echymosis, + left hand wound wrapped   PSYCH : normal mood,  not anxious     ==================>> LAB AND IMAGING <<==================                                         11.7   13.1  )-----------( 267      ( 23 Oct 2017 06:21 )             38.1        10-23    139  |  102  |  41<H>  ----------------------------<  116<H>  4.0   |  29  |  1.21    Ca    9.1      23 Oct 2017 06:21  Phos  2.9     10-23  Mg     2.3     10-23    TPro  7.4  /  Alb  2.7<L>  /  TBili  0.5  /  DBili  x   /  AST  25  /  ALT  23  /  AlkPhos  112  10-23    < from: Xray Hand 2 Views, Left (10.21.17 @ 11:05) >  IMPRESSION:   Osteopenia with vascular arterial calcifications. No gross   fracture deformity seen.  < end of copied text >                 < from: MRI Head w/o Cont (10.20.17 @ 15:16) >  IMPRESSION:  No evidence of acute infarct.  < end of copied text >    _______________________  C U L T U R E S :    Culture - Urine (collected 19 Oct 2017 23:21)  Source: .Urine Clean Catch (Midstream)  Final Report (21 Oct 2017 21:37):    50,000 - 99,000 CFU/mL Enterococcus faecalis  Organism: Enterococcus faecalis (21 Oct 2017 21:37)  Organism: Enterococcus faecalis (21 Oct 2017 21:37)    Sensitivities:      -  Ampicillin: S <=2      -  Ciprofloxacin: S <=1      -  Nitrofurantoin: S <=32      -  Tetra/Doxy: R >8      -  Vancomycin: S 2      Method Type: JUAN    ___________________________________________________________________________________  ===============>>  A S S E S S M E N T   A N D   P L A N <<===============  ------------------------------------------------------------------------------------------    · Assessment		  Patient is a 89 y/o M pt with PMHx of arthritis, asthma, CAD, 5 stents,  HTN, laryngeal CA, recent admission in July for CHF exacerbation ( needing BiPaP and ICU admission) presents to ED c/o syncopal episode x today.    Problem/Plan - 1:  ·  Problem: Syncope, unclear etiology     so far no significant findings to explain syncope  Could be 2/2 to orthostatic hypotension   workup as above: mild one sided KARSON would not explain syncope..  Cardio f/u noted  apply bacitracin to skin laceration: no fracture of hand / wrist  knee pain likely OA on exam, pending xray and ortho eval per family request    Problem/Plan - 2:  ·  Problem: Essential hypertension  Continue Current medications and monitor.   DASH diet    Problem/Plan - 3:  ·  Problem: Coronary artery disease involving native coronary artery of native heart without angina pectoris  Patient with Known H/o CAD, no CP, EKG: Poor baseline but no ischemic changes  No signs of fluid overload  monitor on TELE.     Problem/Plan - 4:  ·  Problem: Asthma.  Plan: Nebs PRN  O2 2L via NC.     Problem/Plan - 5:  ·  Problem: Acute cystitis with dysurea,   cultures as above: finish treatment with four more days of abx with Cipro as ordered  continue pyriduum as above    -GI/DVT Prophylaxis.  Incentive spirometry for atelectesis    -PT, Fall precautions, home care arrangement / DC planing home with visiting nurse , PT pending above  --------------------------------------------  Case discussed with Dtr, pt, RN, HS  Education given on as above, fall precautions  ___________________________  H. KARLA Rich.  Pager: [phone]

## 2017-10-23 NOTE — PROGRESS NOTE ADULT - PROBLEM SELECTOR PLAN 6
Patient endorses severe pain in left knee due to arthroedema  Continue with Tylenol and tramadol   Ortho Dr. Penny Patient endorses severe pain in left knee due to arthroedema  Continue with Tylenol and tramadol   F/u knee radiographs  Ortho Dr. Penny

## 2017-10-23 NOTE — PROGRESS NOTE ADULT - PROBLEM SELECTOR PLAN 1
Potentially due to orthostasis given history, but patient’s orthostatics negative in ED  EKG without any ST or T wave changes  CT head negative for acute pathology; MRI negative for any stroke  Carotid US revealed moderate stenosis of Right carotid artery  Physical therapy recommends home PT Potentially due to orthostasis given history, but patient’s orthostatics negative in ED  EKG without any ST or T wave changes  CT head negative for acute pathology; MRI negative for any stroke  Carotid US revealed moderate stenosis of Right carotid artery  Physical therapy recommends home PT; discharge pending setup of visiting nurse

## 2017-10-24 PROCEDURE — 73564 X-RAY EXAM KNEE 4 OR MORE: CPT | Mod: 26,LT

## 2017-10-24 RX ORDER — LIDOCAINE HCL 20 MG/ML
10 VIAL (ML) INJECTION ONCE
Qty: 0 | Refills: 0 | Status: DISCONTINUED | OUTPATIENT
Start: 2017-10-24 | End: 2017-10-28

## 2017-10-24 RX ADMIN — AMLODIPINE BESYLATE 2.5 MILLIGRAM(S): 2.5 TABLET ORAL at 07:12

## 2017-10-24 RX ADMIN — Medication 100 MILLIGRAM(S): at 16:33

## 2017-10-24 RX ADMIN — Medication 3 MILLILITER(S): at 08:56

## 2017-10-24 RX ADMIN — Medication 25 MILLIGRAM(S): at 14:05

## 2017-10-24 RX ADMIN — CLOPIDOGREL BISULFATE 75 MILLIGRAM(S): 75 TABLET, FILM COATED ORAL at 12:08

## 2017-10-24 RX ADMIN — Medication 3 MILLILITER(S): at 14:18

## 2017-10-24 RX ADMIN — Medication 650 MILLIGRAM(S): at 17:21

## 2017-10-24 RX ADMIN — Medication 20 MILLIGRAM(S): at 12:08

## 2017-10-24 RX ADMIN — Medication 1 APPLICATION(S): at 12:08

## 2017-10-24 RX ADMIN — HEPARIN SODIUM 5000 UNIT(S): 5000 INJECTION INTRAVENOUS; SUBCUTANEOUS at 07:13

## 2017-10-24 RX ADMIN — PANTOPRAZOLE SODIUM 40 MILLIGRAM(S): 20 TABLET, DELAYED RELEASE ORAL at 07:13

## 2017-10-24 RX ADMIN — Medication 25 MILLIGRAM(S): at 19:05

## 2017-10-24 RX ADMIN — Medication 25 MILLIGRAM(S): at 07:13

## 2017-10-24 RX ADMIN — Medication 500 MILLIGRAM(S): at 19:05

## 2017-10-24 RX ADMIN — Medication 81 MILLIGRAM(S): at 12:08

## 2017-10-24 RX ADMIN — ATORVASTATIN CALCIUM 40 MILLIGRAM(S): 80 TABLET, FILM COATED ORAL at 22:43

## 2017-10-24 RX ADMIN — Medication 650 MILLIGRAM(S): at 16:34

## 2017-10-24 RX ADMIN — Medication 25 MILLIGRAM(S): at 22:43

## 2017-10-24 RX ADMIN — Medication 500 MILLIGRAM(S): at 07:12

## 2017-10-24 RX ADMIN — MIRTAZAPINE 7.5 MILLIGRAM(S): 45 TABLET, ORALLY DISINTEGRATING ORAL at 12:08

## 2017-10-24 RX ADMIN — HEPARIN SODIUM 5000 UNIT(S): 5000 INJECTION INTRAVENOUS; SUBCUTANEOUS at 19:05

## 2017-10-24 NOTE — PROGRESS NOTE ADULT - PROBLEM SELECTOR PLAN 7
S/p suture insertion for left hand laceration  Continue with local wound care and topical bacitracin

## 2017-10-24 NOTE — PROGRESS NOTE ADULT - PROBLEM SELECTOR PLAN 1
Potentially due to orthostasis given history, but patient’s orthostatics negative in ED  EKG without any ST or T wave changes  CT head negative for acute pathology; MRI negative for any stroke  Carotid US revealed moderate stenosis of Right carotid artery  Physical therapy recommends home PT; discharge pending setup of visiting nurse

## 2017-10-24 NOTE — PROGRESS NOTE ADULT - SUBJECTIVE AND OBJECTIVE BOX
_________________________________________________________________________________________  ========>>  M E D I C A L   A T T E N D I N G    F O L L O W  U P  N O T E  <<=========  -----------------------------------------------------------------------------------------------------    - Patient seen and examined by me earlier today.  - In summary, patient is a 89y year old man who originally presented post syncope and fall at home  - pain in the left wrist better, ++ left knee pain, unable to stand..    ==================>> MEDICATIONS <<====================    ALBUTerol    90 MICROgram(s) HFA Inhaler 1 Puff(s) Inhalation every 4 hours  ALBUTerol/ipratropium for Nebulization 3 milliLiter(s) Nebulizer every 6 hours  amLODIPine   Tablet 2.5 milliGRAM(s) Oral daily  aspirin enteric coated 81 milliGRAM(s) Oral daily  atorvastatin 40 milliGRAM(s) Oral at bedtime  BACItracin   Ointment 1 Application(s) Topical daily  ciprofloxacin     Tablet 500 milliGRAM(s) Oral every 12 hours  clopidogrel Tablet 75 milliGRAM(s) Oral daily  heparin  Injectable 5000 Unit(s) SubCutaneous every 12 hours  hydrALAZINE 25 milliGRAM(s) Oral three times a day  lidocaine 1% Injectable 10 milliLiter(s) Local Injection once  methylPREDNISolone acetate Injectable 80 milliGRAM(s) IntraArticular once  metoprolol 25 milliGRAM(s) Oral two times a day  mirtazapine 7.5 milliGRAM(s) Oral daily  pantoprazole    Tablet 40 milliGRAM(s) Oral before breakfast  PARoxetine 20 milliGRAM(s) Oral daily  tiotropium 18 MICROgram(s) Capsule 1 Capsule(s) Inhalation daily    MEDICATIONS  (PRN):  acetaminophen   Tablet. 650 milliGRAM(s) Oral every 6 hours PRN Moderate Pain (4 - 6)  traMADol 25 milliGRAM(s) Oral every 6 hours PRN Moderate Pain (4 - 6)    ==================>> REVIEW OF SYSTEM <<=================    GEN: no fever, no chills, + pain as above  RESP: no SOB, no cough, no sputum  CVS: no chest pain, no palpitations, no edema  GI: no abdominal pain, no nausea, no constipation, no diarrhea  : + dysuria as above, no frequency, no hematuria  Neuro: no headache, no dizziness  Derm : no itching, no rash    ==================>> VITAL SIGNS <<==================    Vital Signs Last 24 Hrs  T(C): 37 (10-24-17 @ 19:08)  T(F): 98.6 (10-24-17 @ 19:08), Max: 99.5 (10-23-17 @ 23:41)  HR: 100 (10-24-17 @ 19:08) (77 - 104)  BP: 113/59 (10-24-17 @ 19:08)  BP(mean): --  RR: 19 (10-24-17 @ 19:08) (17 - 19)  SpO2: 96% (10-24-17 @ 19:08) (92% - 96%)      ==================>> PHYSICAL EXAM <<=================    GEN: A&O X 3 , NAD , comfortable  HEENT: NCAT, PERRL, MMM, hearing intact  Neck: supple , no JVD  CVS: S1S2 , regular , No M/R/G appreciated  PULM: CTA B/L,  no W/R/R appreciated  ABD.: soft. non tender, non distended,  bowel sounds present  Extrem: intact pulses , no edema, + scattered echymosis, + left hand wound wrapped      stable left knee with arthritic changes and effusion + creppitus  PSYCH : normal mood,  not anxious     ==================>> LAB AND IMAGING <<==================                                                  11.7   13.1  )-----------( 267      ( 23 Oct 2017 06:21 )             38.1        10-23    139  |  102  |  41<H>  ----------------------------<  116<H>  4.0   |  29  |  1.21    Ca    9.1      23 Oct 2017 06:21  Phos  2.9     10-23  Mg     2.3     10-23    TPro  7.4  /  Alb  2.7<L>  /  TBili  0.5  /  DBili  x   /  AST  25  /  ALT  23  /  AlkPhos  112  10-23    < from: Xray Knee 4 Views, Left (10.24.17 @ 10:31) >  Impression: No evidence for an acute fracture or dislocation.  Mild patellofemoral osteoarthritis.  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  Stable mild suprapatellar joint effusion.  The osseous mineralization is within normal limits.  Stable vascular stent at the medial left thigh.  < end of copied text >    ___________________________________________________________________________________  ===============>>  A S S E S S M E N T   A N D   P L A N <<===============  ------------------------------------------------------------------------------------------    · Assessment		  Patient is a 89 y/o M pt with PMHx of arthritis, asthma, CAD, 5 stents,  HTN, laryngeal CA, recent admission in July for CHF exacerbation ( needing BiPaP and ICU admission) presents to ED c/o syncopal episode x today.    Problem/Plan - 1:  ·  Problem: Syncope, unclear etiology     so far no significant findings to explain syncope  Could be 2/2 to orthostatic hypotension   workup as above: mild one sided KARSON would not explain syncope..  Cardio f/u noted  apply bacitracin to skin laceration: no fracture of hand / wrist  knee pain likely OA and pseudogout as per Xray with effusion      ortho consulted and note appreciated, pt this afternoon had arthrocentesis and medrol injection       will follow  as per Ortho recom.  Problem/Plan - 2:  ·  Problem: Essential hypertension  Continue Current medications and monitor.   DASH diet    Problem/Plan - 3:  ·  Problem: Coronary artery disease involving native coronary artery of native heart without angina pectoris  Patient with Known H/o CAD, no CP, EKG: Poor baseline but no ischemic changes  No signs of fluid overload  monitor on TELE.     Problem/Plan - 4:  ·  Problem: Asthma.  Plan: Nebs PRN  O2 2L via NC.     Problem/Plan - 5:  ·  Problem: Acute cystitis with dysurea,   cultures as above: finish treatment with four more days of abx with Cipro as ordered  continue pyriduum as above    -GI/DVT Prophylaxis.  Incentive spirometry for atelectesis    -PT, Fall precautions, home care arrangement / DC planing home when ambulatory  --------------------------------------------  Case discussed with Dtr, pt, family, HS  Education given on as above, fall precautions  ___________________________  H. KARLA Rich.  Pager: [phone]

## 2017-10-24 NOTE — PROGRESS NOTE ADULT - SUBJECTIVE AND OBJECTIVE BOX
PGY 1 Note discussed with supervising resident and primary attending    Patient is a 89y old  Male who presents with a chief complaint of fall, possible syncope (22 Oct 2017 21:45)      INTERVAL HPI/OVERNIGHT EVENTS: offers no new complaints; current symptoms resolving    MEDICATIONS  (STANDING):  ALBUTerol    90 MICROgram(s) HFA Inhaler 1 Puff(s) Inhalation every 4 hours  ALBUTerol/ipratropium for Nebulization 3 milliLiter(s) Nebulizer every 6 hours  amLODIPine   Tablet 2.5 milliGRAM(s) Oral daily  aspirin enteric coated 81 milliGRAM(s) Oral daily  atorvastatin 40 milliGRAM(s) Oral at bedtime  BACItracin   Ointment 1 Application(s) Topical daily  ciprofloxacin     Tablet 500 milliGRAM(s) Oral every 12 hours  clopidogrel Tablet 75 milliGRAM(s) Oral daily  heparin  Injectable 5000 Unit(s) SubCutaneous every 12 hours  hydrALAZINE 25 milliGRAM(s) Oral three times a day  metoprolol 25 milliGRAM(s) Oral two times a day  mirtazapine 7.5 milliGRAM(s) Oral daily  pantoprazole    Tablet 40 milliGRAM(s) Oral before breakfast  PARoxetine 20 milliGRAM(s) Oral daily  phenazopyridine 100 milliGRAM(s) Oral every 8 hours  tiotropium 18 MICROgram(s) Capsule 1 Capsule(s) Inhalation daily    MEDICATIONS  (PRN):  acetaminophen   Tablet. 650 milliGRAM(s) Oral every 6 hours PRN Moderate Pain (4 - 6)  traMADol 25 milliGRAM(s) Oral every 6 hours PRN Moderate Pain (4 - 6)      __________________________________________________  REVIEW OF SYSTEMS:  CONSTITUTIONAL: No fever  NECK: No pain or stiffness  RESPIRATORY: No cough; No shortness of breath  CARDIOVASCULAR: No chest pain, no palpitations  GASTROINTESTINAL: No pain. No constipation, nausea or vomiting; No diarrhea   NEUROLOGICAL: No headache or numbness, no tremors  MUSCULOSKELETAL: No joint pain, no muscle pain  GENITOURINARY: no dysuria, no frequency, no hesitancy  PSYCHIATRY: no depression , no anxiety  ALL OTHER  ROS negative        Vital Signs Last 24 Hrs  T(C): 37.5 (23 Oct 2017 23:41), Max: 37.6 (23 Oct 2017 15:50)  T(F): 99.5 (23 Oct 2017 23:41), Max: 99.6 (23 Oct 2017 15:50)  HR: 77 (23 Oct 2017 23:41) (62 - 90)  BP: 126/47 (23 Oct 2017 23:41) (120/44 - 134/57)  BP(mean): --  RR: 18 (23 Oct 2017 23:41) (17 - 19)  SpO2: 93% (23 Oct 2017 23:41) (91% - 98%)    ________________________________________________  PHYSICAL EXAM:  GENERAL: NAD, lying in bed  HEENT: Normocephalic;  conjunctivae and sclerae clear; moist mucous membranes  NECK : supple, trachea midline, no JVD  CHEST/LUNG: Clear to auscultation bilaterally with good air entry   HEART: S1 S2,  regular rate and rhythm,; no murmurs, gallops or rubs  ABDOMEN: Soft, Nontender, Nondistended; Bowel sounds present  EXTREMITIES: no cyanosis; no edema; no calf tenderness  SKIN: warm and dry; no rash  NERVOUS SYSTEM:  AAOx3; no new focal deficits    _________________________________________________  LABS:                        11.7   13.1  )-----------( 267      ( 23 Oct 2017 06:21 )             38.1     10-23    139  |  102  |  41<H>  ----------------------------<  116<H>  4.0   |  29  |  1.21    Ca    9.1      23 Oct 2017 06:21  Phos  2.9     10-23  Mg     2.3     10-23    TPro  7.4  /  Alb  2.7<L>  /  TBili  0.5  /  DBili  x   /  AST  25  /  ALT  23  /  AlkPhos  112  10-23        CAPILLARY BLOOD GLUCOSE          RADIOLOGY & ADDITIONAL TESTS:    Imaging Personally Reviewed:  YES    Consultant(s) Notes Reviewed:   YES    Care Discussed with Consultants:   Infectious Disease [] Endocrinology [] Neurology [] ENT [] Cardiology [x] Electrophysiology [] Pulmonology [] Gastroenterology [] Nephrology [] Urology [] Orthopaedics [] Vascular Surgery [] Thoracic Surgery [] Plastic Surgery [] General Surgery [] Podiatry [] Psychiatry [] Hematology/Oncology [] Pain [] Palliative Care []    Plan of care was discussed with patient and/or primary care giver; all questions and concerns were addressed and care was aligned with patient's wishes. PGY 1 Note discussed with supervising resident and primary attending    Patient is a 89y old  Male who presents with a chief complaint of fall, possible syncope (22 Oct 2017 21:45)      INTERVAL HPI/OVERNIGHT EVENTS: patient still complains of left-sided knee pain. His son is very frustrated that his father has yet to undergo radiography nor orthopaedic evaluation. He wishes for his father to receive treatment, but is refusing any discharge to subacute rehab for further management.     MEDICATIONS  (STANDING):  ALBUTerol    90 MICROgram(s) HFA Inhaler 1 Puff(s) Inhalation every 4 hours  ALBUTerol/ipratropium for Nebulization 3 milliLiter(s) Nebulizer every 6 hours  amLODIPine   Tablet 2.5 milliGRAM(s) Oral daily  aspirin enteric coated 81 milliGRAM(s) Oral daily  atorvastatin 40 milliGRAM(s) Oral at bedtime  BACItracin   Ointment 1 Application(s) Topical daily  ciprofloxacin     Tablet 500 milliGRAM(s) Oral every 12 hours  clopidogrel Tablet 75 milliGRAM(s) Oral daily  heparin  Injectable 5000 Unit(s) SubCutaneous every 12 hours  hydrALAZINE 25 milliGRAM(s) Oral three times a day  metoprolol 25 milliGRAM(s) Oral two times a day  mirtazapine 7.5 milliGRAM(s) Oral daily  pantoprazole    Tablet 40 milliGRAM(s) Oral before breakfast  PARoxetine 20 milliGRAM(s) Oral daily  phenazopyridine 100 milliGRAM(s) Oral every 8 hours  tiotropium 18 MICROgram(s) Capsule 1 Capsule(s) Inhalation daily    MEDICATIONS  (PRN):  acetaminophen   Tablet. 650 milliGRAM(s) Oral every 6 hours PRN Moderate Pain (4 - 6)  traMADol 25 milliGRAM(s) Oral every 6 hours PRN Moderate Pain (4 - 6)      __________________________________________________  REVIEW OF SYSTEMS:  CONSTITUTIONAL: No fever  RESPIRATORY: No cough; No shortness of breath  CARDIOVASCULAR: No chest pain, no palpitations  GASTROINTESTINAL: No pain. No constipation, nausea or vomiting; No diarrhea   NEUROLOGICAL: No headache or numbness, no tremors  MUSCULOSKELETAL: reports left-sided knee pain   GENITOURINARY: no dysuria, no frequency, no hesitancy  ALL OTHER  ROS negative        Vital Signs Last 24 Hrs  T(C): 37.5 (23 Oct 2017 23:41), Max: 37.6 (23 Oct 2017 15:50)  T(F): 99.5 (23 Oct 2017 23:41), Max: 99.6 (23 Oct 2017 15:50)  HR: 77 (23 Oct 2017 23:41) (62 - 90)  BP: 126/47 (23 Oct 2017 23:41) (120/44 - 134/57)  BP(mean): --  RR: 18 (23 Oct 2017 23:41) (17 - 19)  SpO2: 93% (23 Oct 2017 23:41) (91% - 98%)    ________________________________________________  PHYSICAL EXAM:  GENERAL: NAD, lying in bed  HEENT: Normocephalic;  conjunctivae and sclerae clear; moist mucous membranes  NECK : supple, trachea midline, no JVD  CHEST/LUNG: Clear to auscultation bilaterally with good air entry   HEART: S1 S2,  regular rate and rhythm,; no murmurs, gallops or rubs  ABDOMEN: Soft, Nontender, Nondistended; Bowel sounds present  EXTREMITIES: no cyanosis; no edema; no calf tenderness  SKIN: warm and dry; no rash  NERVOUS SYSTEM:  AAOx3; no new focal deficits    _________________________________________________  LABS:                        11.7   13.1  )-----------( 267      ( 23 Oct 2017 06:21 )             38.1     10-23    139  |  102  |  41<H>  ----------------------------<  116<H>  4.0   |  29  |  1.21    Ca    9.1      23 Oct 2017 06:21  Phos  2.9     10-23  Mg     2.3     10-23    TPro  7.4  /  Alb  2.7<L>  /  TBili  0.5  /  DBili  x   /  AST  25  /  ALT  23  /  AlkPhos  112  10-23        CAPILLARY BLOOD GLUCOSE          RADIOLOGY & ADDITIONAL TESTS:    Imaging Personally Reviewed:  YES    Consultant(s) Notes Reviewed:   YES    Care Discussed with Consultants:   Infectious Disease [] Endocrinology [] Neurology [] ENT [] Cardiology [x] Electrophysiology [] Pulmonology [] Gastroenterology [] Nephrology [] Urology [] Orthopaedics [] Vascular Surgery [] Thoracic Surgery [] Plastic Surgery [] General Surgery [] Podiatry [] Psychiatry [] Hematology/Oncology [] Pain [] Palliative Care []    Plan of care was discussed with patient and/or primary care giver; all questions and concerns were addressed and care was aligned with patient's wishes.

## 2017-10-24 NOTE — PROGRESS NOTE ADULT - SUBJECTIVE AND OBJECTIVE BOX
CHIEF COMPLAINT:Patient is a 89y old  Male who presents with a chief complaint of fall, possible syncope. Pt appears comfortable.    	  REVIEW OF SYSTEMS:  [ X] Unable to obtain    PHYSICAL EXAM:  T(C): 36.9 (10-24-17 @ 07:39), Max: 37.6 (10-23-17 @ 15:50)  HR: 96 (10-24-17 @ 07:39) (62 - 96)  BP: 150/62 (10-24-17 @ 07:39) (119/53 - 150/62)  RR: 18 (10-24-17 @ 07:39) (17 - 19)  SpO2: 93% (10-24-17 @ 07:39) (91% - 96%)  Wt(kg): --  I&O's Summary    23 Oct 2017 07:01  -  24 Oct 2017 07:00  --------------------------------------------------------  IN: 650 mL / OUT: 1100 mL / NET: -450 mL        Appearance: Normal	  HEENT:   Normal oral mucosa, PERRL, EOMI	  Lymphatic: No lymphadenopathy  Cardiovascular: Normal S1 S2, No JVD, No murmurs, No edema  Respiratory: Lungs clear to auscultation	  Psychiatry: A & O x 3, Mood & affect appropriate  Gastrointestinal:  Soft, Non-tender, + BS	  Skin: No rashes, No ecchymoses, No cyanosis	  Neurologic: Non-focal  Extremities: Normal range of motion, No clubbing, cyanosis or edema  Vascular: Peripheral pulses palpable 2+ bilaterally    MEDICATIONS  (STANDING):  ALBUTerol    90 MICROgram(s) HFA Inhaler 1 Puff(s) Inhalation every 4 hours  ALBUTerol/ipratropium for Nebulization 3 milliLiter(s) Nebulizer every 6 hours  amLODIPine   Tablet 2.5 milliGRAM(s) Oral daily  aspirin enteric coated 81 milliGRAM(s) Oral daily  atorvastatin 40 milliGRAM(s) Oral at bedtime  BACItracin   Ointment 1 Application(s) Topical daily  ciprofloxacin     Tablet 500 milliGRAM(s) Oral every 12 hours  clopidogrel Tablet 75 milliGRAM(s) Oral daily  heparin  Injectable 5000 Unit(s) SubCutaneous every 12 hours  hydrALAZINE 25 milliGRAM(s) Oral three times a day  metoprolol 25 milliGRAM(s) Oral two times a day  mirtazapine 7.5 milliGRAM(s) Oral daily  pantoprazole    Tablet 40 milliGRAM(s) Oral before breakfast  PARoxetine 20 milliGRAM(s) Oral daily  phenazopyridine 100 milliGRAM(s) Oral every 8 hours  tiotropium 18 MICROgram(s) Capsule 1 Capsule(s) Inhalation daily      	  LABS:	 	                         11.7   13.1  )-----------( 267      ( 23 Oct 2017 06:21 )             38.1     10-23    139  |  102  |  41<H>  ----------------------------<  116<H>  4.0   |  29  |  1.21    Ca    9.1      23 Oct 2017 06:21  Phos  2.9     10-23  Mg     2.3     10-23    TPro  7.4  /  Alb  2.7<L>  /  TBili  0.5  /  DBili  x   /  AST  25  /  ALT  23  /  AlkPhos  112  10-23      Lipid Profile: Cholesterol 98  LDL 52  HDL 31  TG 76      TSH:

## 2017-10-24 NOTE — PROGRESS NOTE ADULT - ASSESSMENT
Patient is a 89 yr old Male pt with PMHx of arthritis, asthma, CAD, 5 stents,  HTN, laryngeal CA, CKD stage III recent admission in July for CHF exacerbation ( needing BiPaP and ICU admission) presents to ED c/o syncopal episode x today.   1.Tele monitoring.  2.CAD - cont lopressor 25 mg bid,asa,statin.  3.Pulm HTN- norvasc 2.5mg qd.  4.Continue rest of medication.  5.GI and DVT prophylaxis.

## 2017-10-24 NOTE — CONSULT NOTE ADULT - SUBJECTIVE AND OBJECTIVE BOX
HPI: Patient is a 89 y/o M who is admitted for medical work up. Patient was noted to complain of left knee pain and swelling.     PAST MEDICAL & SURGICAL HISTORY:  Coronary artery disease involving native coronary artery of native heart without angina pectoris  Laryngeal cancer  Essential hypertension  Asthma  Arthritis  Degenerative joint disease  No significant past surgical history      Review of systems: Non Contributory    MEDICATIONS  (STANDING):  ALBUTerol    90 MICROgram(s) HFA Inhaler 1 Puff(s) Inhalation every 4 hours  ALBUTerol/ipratropium for Nebulization 3 milliLiter(s) Nebulizer every 6 hours  amLODIPine   Tablet 2.5 milliGRAM(s) Oral daily  aspirin enteric coated 81 milliGRAM(s) Oral daily  atorvastatin 40 milliGRAM(s) Oral at bedtime  BACItracin   Ointment 1 Application(s) Topical daily  ciprofloxacin     Tablet 500 milliGRAM(s) Oral every 12 hours  clopidogrel Tablet 75 milliGRAM(s) Oral daily  heparin  Injectable 5000 Unit(s) SubCutaneous every 12 hours  hydrALAZINE 25 milliGRAM(s) Oral three times a day  lidocaine 1% Injectable 10 milliLiter(s) Local Injection once  methylPREDNISolone acetate Injectable 80 milliGRAM(s) IntraArticular once  metoprolol 25 milliGRAM(s) Oral two times a day  mirtazapine 7.5 milliGRAM(s) Oral daily  pantoprazole    Tablet 40 milliGRAM(s) Oral before breakfast  PARoxetine 20 milliGRAM(s) Oral daily  tiotropium 18 MICROgram(s) Capsule 1 Capsule(s) Inhalation daily      Allergies: No known Allergies    Vital Signs Last 24 Hrs  T(C): 36.9 (24 Oct 2017 15:36), Max: 37.5 (23 Oct 2017 19:47)  T(F): 98.5 (24 Oct 2017 15:36), Max: 99.5 (23 Oct 2017 19:47)  HR: 104 (24 Oct 2017 15:36) (77 - 104)  BP: 134/54 (24 Oct 2017 15:36) (119/53 - 150/62)  BP(mean): --  RR: 19 (24 Oct 2017 15:36) (17 - 19)  SpO2: 92% (24 Oct 2017 15:36) (92% - 93%)    Physical Examination:    Musculoskeletal:   Left knee: Positive tenderness to palpation of joint line, 1 + effusion, pain on flexion of the knee, decreased range of motion,     Neurovascularly Intact    LABS:                        11.7   13.1  )-----------( 267      ( 23 Oct 2017 06:21 )             38.1     10-23    139  |  102  |  41<H>  ----------------------------<  116<H>  4.0   |  29  |  1.21    Ca    9.1      23 Oct 2017 06:21  Phos  2.9     10-23  Mg     2.3     10-23    TPro  7.4  /  Alb  2.7<L>  /  TBili  0.5  /  DBili  x   /  AST  25  /  ALT  23  /  AlkPhos  112  10-23      RADIOLOGY & ADDITIONAL STUDIES: X ray:  Left knee DJD     ASSESSMENT: Left knee DJD,  swelling    PLAN/RECOMMENDATION: Under sterile condition, left knee was aspirated and about 10 CC of yellowish fluid was obtained, and was injected with 80 mg of Depomedrol and 5 CC of lidocaine was injected. Patient tolerated procedure well. A long leg splint immobilizer was applied. Cane was given.     FOLLOW UP: With office after discharge

## 2017-10-24 NOTE — PROGRESS NOTE ADULT - PROBLEM SELECTOR PLAN 6
Patient endorses severe pain in left knee with crepitus   Continue with Tylenol and tramadol   F/u knee radiographs  Ortho Dr. Penny Patient endorses severe pain in left knee with crepitus   Continue with Tylenol and tramadol   Knee radiography revealed no evidence of fracture or dislocation, but mild patellofemoral osteoarthritis and chondrocalcinosis in addition to mild supra-patellar joint effusion   Ortho Dr. Hull Patient endorses severe pain in left knee with crepitus   Continue with Tylenol and tramadol   Knee radiography revealed no evidence of fracture or dislocation, but mild patellofemoral osteoarthritis and chondrocalcinosis in addition to mild supra-patellar joint effusion   Ortho Dr. Hull aspirated 10 CC fluid and injected 80 mg of Depomedrol with 5 CC of lidocaine into joint 10/24

## 2017-10-25 DIAGNOSIS — I61.9 NONTRAUMATIC INTRACEREBRAL HEMORRHAGE, UNSPECIFIED: ICD-10-CM

## 2017-10-25 DIAGNOSIS — I61.0 NONTRAUMATIC INTRACEREBRAL HEMORRHAGE IN HEMISPHERE, SUBCORTICAL: ICD-10-CM

## 2017-10-25 DIAGNOSIS — G93.40 ENCEPHALOPATHY, UNSPECIFIED: ICD-10-CM

## 2017-10-25 LAB
ABO RH CONFIRMATION: SIGNIFICANT CHANGE UP
ANION GAP SERPL CALC-SCNC: 10 MMOL/L — SIGNIFICANT CHANGE UP (ref 5–17)
ANION GAP SERPL CALC-SCNC: 9 MMOL/L — SIGNIFICANT CHANGE UP (ref 5–17)
BASE EXCESS BLDA CALC-SCNC: -5.6 MMOL/L — LOW (ref -2–2)
BLOOD GAS COMMENTS ARTERIAL: SIGNIFICANT CHANGE UP
BUN SERPL-MCNC: 51 MG/DL — HIGH (ref 7–18)
BUN SERPL-MCNC: 58 MG/DL — HIGH (ref 7–18)
CALCIUM SERPL-MCNC: 9.3 MG/DL — SIGNIFICANT CHANGE UP (ref 8.4–10.5)
CALCIUM SERPL-MCNC: 9.7 MG/DL — SIGNIFICANT CHANGE UP (ref 8.4–10.5)
CHLORIDE SERPL-SCNC: 102 MMOL/L — SIGNIFICANT CHANGE UP (ref 96–108)
CHLORIDE SERPL-SCNC: 99 MMOL/L — SIGNIFICANT CHANGE UP (ref 96–108)
CK MB BLD-MCNC: 3.5 % — SIGNIFICANT CHANGE UP (ref 0–3.5)
CK MB CFR SERPL CALC: 2.8 NG/ML — SIGNIFICANT CHANGE UP (ref 0–3.6)
CK SERPL-CCNC: 79 U/L — SIGNIFICANT CHANGE UP (ref 35–232)
CO2 SERPL-SCNC: 26 MMOL/L — SIGNIFICANT CHANGE UP (ref 22–31)
CO2 SERPL-SCNC: 28 MMOL/L — SIGNIFICANT CHANGE UP (ref 22–31)
CREAT SERPL-MCNC: 1.19 MG/DL — SIGNIFICANT CHANGE UP (ref 0.5–1.3)
CREAT SERPL-MCNC: 1.23 MG/DL — SIGNIFICANT CHANGE UP (ref 0.5–1.3)
GLUCOSE SERPL-MCNC: 177 MG/DL — HIGH (ref 70–99)
GLUCOSE SERPL-MCNC: 195 MG/DL — HIGH (ref 70–99)
HCO3 BLDA-SCNC: 22 MMOL/L — LOW (ref 23–27)
HCT VFR BLD CALC: 34.8 % — LOW (ref 39–50)
HGB BLD-MCNC: 11.3 G/DL — LOW (ref 13–17)
HOROWITZ INDEX BLDA+IHG-RTO: 95 — SIGNIFICANT CHANGE UP
LACTATE SERPL-SCNC: 1.9 MMOL/L — SIGNIFICANT CHANGE UP (ref 0.7–2)
MCHC RBC-ENTMCNC: 30.1 PG — SIGNIFICANT CHANGE UP (ref 27–34)
MCHC RBC-ENTMCNC: 32.5 GM/DL — SIGNIFICANT CHANGE UP (ref 32–36)
MCV RBC AUTO: 92.7 FL — SIGNIFICANT CHANGE UP (ref 80–100)
PCO2 BLDA: 57 MMHG — HIGH (ref 32–46)
PH BLDA: 7.21 — LOW (ref 7.35–7.45)
PLATELET # BLD AUTO: 297 K/UL — SIGNIFICANT CHANGE UP (ref 150–400)
PO2 BLDA: 110 MMHG — HIGH (ref 74–108)
POTASSIUM SERPL-MCNC: 4.2 MMOL/L — SIGNIFICANT CHANGE UP (ref 3.5–5.3)
POTASSIUM SERPL-MCNC: 4.3 MMOL/L — SIGNIFICANT CHANGE UP (ref 3.5–5.3)
POTASSIUM SERPL-SCNC: 4.2 MMOL/L — SIGNIFICANT CHANGE UP (ref 3.5–5.3)
POTASSIUM SERPL-SCNC: 4.3 MMOL/L — SIGNIFICANT CHANGE UP (ref 3.5–5.3)
RBC # BLD: 3.75 M/UL — LOW (ref 4.2–5.8)
RBC # FLD: 14.1 % — SIGNIFICANT CHANGE UP (ref 10.3–14.5)
SAO2 % BLDA: 96 % — SIGNIFICANT CHANGE UP (ref 92–96)
SODIUM SERPL-SCNC: 136 MMOL/L — SIGNIFICANT CHANGE UP (ref 135–145)
SODIUM SERPL-SCNC: 138 MMOL/L — SIGNIFICANT CHANGE UP (ref 135–145)
TROPONIN I SERPL-MCNC: 0.11 NG/ML — HIGH (ref 0–0.04)
WBC # BLD: 12 K/UL — HIGH (ref 3.8–10.5)
WBC # FLD AUTO: 12 K/UL — HIGH (ref 3.8–10.5)

## 2017-10-25 PROCEDURE — 99291 CRITICAL CARE FIRST HOUR: CPT

## 2017-10-25 PROCEDURE — 70450 CT HEAD/BRAIN W/O DYE: CPT | Mod: 26

## 2017-10-25 PROCEDURE — 70450 CT HEAD/BRAIN W/O DYE: CPT | Mod: 26,77

## 2017-10-25 RX ORDER — ACETAMINOPHEN 500 MG
650 TABLET ORAL EVERY 6 HOURS
Qty: 0 | Refills: 0 | Status: DISCONTINUED | OUTPATIENT
Start: 2017-10-25 | End: 2017-10-28

## 2017-10-25 RX ORDER — SODIUM CHLORIDE 5 G/100ML
500 INJECTION, SOLUTION INTRAVENOUS
Qty: 0 | Refills: 0 | Status: DISCONTINUED | OUTPATIENT
Start: 2017-10-25 | End: 2017-10-26

## 2017-10-25 RX ORDER — SODIUM CHLORIDE 9 MG/ML
1000 INJECTION, SOLUTION INTRAVENOUS
Qty: 0 | Refills: 0 | COMMUNITY
Start: 2017-10-25

## 2017-10-25 RX ORDER — CIPROFLOXACIN LACTATE 400MG/40ML
400 VIAL (ML) INTRAVENOUS EVERY 12 HOURS
Qty: 0 | Refills: 0 | Status: COMPLETED | OUTPATIENT
Start: 2017-10-25 | End: 2017-10-26

## 2017-10-25 RX ORDER — PANTOPRAZOLE SODIUM 20 MG/1
40 TABLET, DELAYED RELEASE ORAL DAILY
Qty: 0 | Refills: 0 | Status: DISCONTINUED | OUTPATIENT
Start: 2017-10-25 | End: 2017-10-28

## 2017-10-25 RX ORDER — ACETAMINOPHEN 500 MG
1000 TABLET ORAL ONCE
Qty: 0 | Refills: 0 | Status: COMPLETED | OUTPATIENT
Start: 2017-10-25 | End: 2017-10-25

## 2017-10-25 RX ORDER — SODIUM CHLORIDE 9 MG/ML
1000 INJECTION, SOLUTION INTRAVENOUS
Qty: 0 | Refills: 0 | Status: DISCONTINUED | OUTPATIENT
Start: 2017-10-25 | End: 2017-10-25

## 2017-10-25 RX ORDER — LEVETIRACETAM 250 MG/1
500 TABLET, FILM COATED ORAL EVERY 12 HOURS
Qty: 0 | Refills: 0 | Status: DISCONTINUED | OUTPATIENT
Start: 2017-10-25 | End: 2017-10-28

## 2017-10-25 RX ORDER — METOPROLOL TARTRATE 50 MG
1 TABLET ORAL
Qty: 0 | Refills: 0 | COMMUNITY
Start: 2017-10-25

## 2017-10-25 RX ORDER — METOPROLOL TARTRATE 50 MG
2.5 TABLET ORAL EVERY 12 HOURS
Qty: 0 | Refills: 0 | Status: DISCONTINUED | OUTPATIENT
Start: 2017-10-25 | End: 2017-10-26

## 2017-10-25 RX ORDER — METOPROLOL TARTRATE 50 MG
2.5 TABLET ORAL EVERY 6 HOURS
Qty: 0 | Refills: 0 | Status: DISCONTINUED | OUTPATIENT
Start: 2017-10-25 | End: 2017-10-25

## 2017-10-25 RX ADMIN — SODIUM CHLORIDE 30 MILLILITER(S): 5 INJECTION, SOLUTION INTRAVENOUS at 20:29

## 2017-10-25 RX ADMIN — Medication 25 MILLIGRAM(S): at 07:48

## 2017-10-25 RX ADMIN — CLOPIDOGREL BISULFATE 75 MILLIGRAM(S): 75 TABLET, FILM COATED ORAL at 12:03

## 2017-10-25 RX ADMIN — LEVETIRACETAM 420 MILLIGRAM(S): 250 TABLET, FILM COATED ORAL at 17:42

## 2017-10-25 RX ADMIN — Medication 3 MILLILITER(S): at 08:45

## 2017-10-25 RX ADMIN — Medication 81 MILLIGRAM(S): at 12:03

## 2017-10-25 RX ADMIN — AMLODIPINE BESYLATE 2.5 MILLIGRAM(S): 2.5 TABLET ORAL at 07:49

## 2017-10-25 RX ADMIN — MIRTAZAPINE 7.5 MILLIGRAM(S): 45 TABLET, ORALLY DISINTEGRATING ORAL at 12:03

## 2017-10-25 RX ADMIN — Medication 20 MILLIGRAM(S): at 12:03

## 2017-10-25 RX ADMIN — Medication 3 MILLILITER(S): at 21:58

## 2017-10-25 RX ADMIN — Medication 1000 MILLIGRAM(S): at 19:00

## 2017-10-25 RX ADMIN — Medication 500 MILLIGRAM(S): at 07:49

## 2017-10-25 RX ADMIN — Medication 25 MILLIGRAM(S): at 07:49

## 2017-10-25 RX ADMIN — Medication 400 MILLIGRAM(S): at 18:02

## 2017-10-25 RX ADMIN — Medication 1 APPLICATION(S): at 12:02

## 2017-10-25 RX ADMIN — HEPARIN SODIUM 5000 UNIT(S): 5000 INJECTION INTRAVENOUS; SUBCUTANEOUS at 08:01

## 2017-10-25 RX ADMIN — PANTOPRAZOLE SODIUM 40 MILLIGRAM(S): 20 TABLET, DELAYED RELEASE ORAL at 07:49

## 2017-10-25 RX ADMIN — Medication 200 MILLIGRAM(S): at 17:42

## 2017-10-25 NOTE — CHART NOTE - NSCHARTNOTEFT_GEN_A_CORE
English  ID #[id number]    Spoke with patient's son with regards to the CT finding of the acute several centimeter right-sided basal ganglia hemorrhage. Explained to son that patient would need to be evaluated by neurology before potentially initiating transfer to [facility] for neurosurgery management of his hemorrhage (if appropriate).   Son was questioning what may have caused his father's hemorrhage and I explained that he was receiving aspirin and plavix for his history of cardiac stents, in addition to heparin for DVT chemoprophylaxis. He also wondered why his initial CT scan was negative for any hemorrhage. I explained that the bleed may have initially been insignificant and taken time to progress.   Also informed son that due to location and size of bleed, patient may need emergent treatment including transfusion, intubation, and CPR. Son wishes for his father to be full code and transfusion consent was signed and placed in chart.  Will follow up with neurologist recommendation and discontinue patient's anticoagulation medication. Cardiology informed.

## 2017-10-25 NOTE — PROGRESS NOTE ADULT - PROBLEM SELECTOR PROBLEM 4
Acute cystitis without hematuria Coronary artery disease involving native coronary artery of native heart without angina pectoris

## 2017-10-25 NOTE — PROGRESS NOTE ADULT - PROBLEM SELECTOR PLAN 7
S/p suture insertion for left hand laceration  Continue with local wound care and topical bacitracin Patient endorses severe pain in left knee with crepitus   Continue with Tylenol and tramadol   Knee radiography revealed no evidence of fracture or dislocation, but mild patellofemoral osteoarthritis and chondrocalcinosis in addition to mild supra-patellar joint effusion   Ortho Dr. Hull aspirated 10 CC fluid and injected 80 mg of Depomedrol with 5 CC of lidocaine into joint 10/24

## 2017-10-25 NOTE — PROGRESS NOTE ADULT - PROBLEM SELECTOR PLAN 2
Continue with metoprolol and amlodipine within parameters  Continue to monitor BP Potentially due to orthostasis given history, but patient’s orthostatics negative in ED  EKG without any ST or T wave changes  CT head negative for acute pathology; MRI negative for any stroke  Carotid US revealed moderate stenosis of Right carotid artery  Physical therapy recommends home PT; discharge pending setup of visiting nurse

## 2017-10-25 NOTE — PROGRESS NOTE ADULT - PROBLEM SELECTOR PLAN 9
Improve VTE score = 2  Heparin for DVT chemoprophylaxis   Protonix for GI prophylaxis Improve VTE score = 2  SCDs for DVT chemoprophylaxis in presence of acute intracranial hemorrhage  Protonix for GI prophylaxis

## 2017-10-25 NOTE — CONSULT NOTE ADULT - PROBLEM SELECTOR RECOMMENDATION 9
likely hypertensive related, with possible amyloid angiopathy  pt with waxing waning mentation  c/w BP control <140, etCO2, neuro checks, repeat CT head in 6 hours  neurology d/w neurosurgery for transfer to NSICU at [facility]  d/w family regarding advanced directives and they are still deciding on DNR/DNI

## 2017-10-25 NOTE — PROGRESS NOTE ADULT - PROBLEM SELECTOR PLAN 8
Improve VTE score = 2  Heparin for DVT chemoprophylaxis   Protonix for GI prophylaxis S/p suture insertion for left hand laceration  Continue with local wound care and topical bacitracin

## 2017-10-25 NOTE — CHART NOTE - NSCHARTNOTEFT_GEN_A_CORE
Pt seen and examined for ICH new onset.  CT scan shows large BG bleed    noted pt on asa/plavix plts ordered.    case d/w transfer center already and noted awaiting bed for potential transfer. furthermore there is no acute surgical intervention available.    pt not hypertensive  pt alert, verbal, speaking farsi and oriented.   on exam:  follows commands intermitently  keeps right eye closed, does not let evaluate eye, does not moves left eyes for EOMI - ? intentionally not following commands, left eye reactive  appears to have left facial droop.  Moves all 4 ext, left HAND weakness, diffusely weak    Case d/w Family son - at this time he is considering DNR/I, No surgical intervention and wants pt to stay in this hospital.      at this point he has not made final decision  risks/benefits/alternatives explained    assessment  Acute ICH - Right Basal ganglian      Plan  transfer to ICU  transfuse plts  neuro checks q 1 hrs  repeat CT in 6 hrs from previous one  NPO  Continue GOC discussion with family.  will give seizure ppx with keppra.   BP controll avoid hypertensive episodes  case d/w family  case d/w Dr. Rich

## 2017-10-25 NOTE — PROGRESS NOTE ADULT - SUBJECTIVE AND OBJECTIVE BOX
PGY 1 Note discussed with supervising resident and primary attending    Patient is a 89y old  Male who presents with a chief complaint of fall, possible syncope (22 Oct 2017 21:45)      INTERVAL HPI/OVERNIGHT EVENTS: offers no new complaints; current symptoms resolving    MEDICATIONS  (STANDING):  ALBUTerol    90 MICROgram(s) HFA Inhaler 1 Puff(s) Inhalation every 4 hours  ALBUTerol/ipratropium for Nebulization 3 milliLiter(s) Nebulizer every 6 hours  amLODIPine   Tablet 2.5 milliGRAM(s) Oral daily  aspirin enteric coated 81 milliGRAM(s) Oral daily  atorvastatin 40 milliGRAM(s) Oral at bedtime  BACItracin   Ointment 1 Application(s) Topical daily  ciprofloxacin     Tablet 500 milliGRAM(s) Oral every 12 hours  clopidogrel Tablet 75 milliGRAM(s) Oral daily  heparin  Injectable 5000 Unit(s) SubCutaneous every 12 hours  hydrALAZINE 25 milliGRAM(s) Oral three times a day  lidocaine 1% Injectable 10 milliLiter(s) Local Injection once  methylPREDNISolone acetate Injectable 80 milliGRAM(s) IntraArticular once  metoprolol 25 milliGRAM(s) Oral two times a day  mirtazapine 7.5 milliGRAM(s) Oral daily  pantoprazole    Tablet 40 milliGRAM(s) Oral before breakfast  PARoxetine 20 milliGRAM(s) Oral daily  tiotropium 18 MICROgram(s) Capsule 1 Capsule(s) Inhalation daily    MEDICATIONS  (PRN):  acetaminophen   Tablet. 650 milliGRAM(s) Oral every 6 hours PRN Moderate Pain (4 - 6)  traMADol 25 milliGRAM(s) Oral every 6 hours PRN Moderate Pain (4 - 6)      __________________________________________________  REVIEW OF SYSTEMS:  CONSTITUTIONAL: No fever  NECK: No pain or stiffness  RESPIRATORY: No cough; No shortness of breath  CARDIOVASCULAR: No chest pain, no palpitations  GASTROINTESTINAL: No pain. No constipation, nausea or vomiting; No diarrhea   NEUROLOGICAL: No headache or numbness, no tremors  MUSCULOSKELETAL: No joint pain, no muscle pain  GENITOURINARY: no dysuria, no frequency, no hesitancy  PSYCHIATRY: no depression , no anxiety  ALL OTHER  ROS negative        Vital Signs Last 24 Hrs  T(C): 36.5 (24 Oct 2017 23:28), Max: 37 (24 Oct 2017 05:13)  T(F): 97.7 (24 Oct 2017 23:28), Max: 98.6 (24 Oct 2017 05:13)  HR: 79 (24 Oct 2017 23:28) (79 - 104)  BP: 117/47 (24 Oct 2017 23:28) (113/59 - 150/62)  BP(mean): --  RR: 17 (24 Oct 2017 23:28) (17 - 19)  SpO2: 96% (24 Oct 2017 23:28) (92% - 96%)    ________________________________________________  PHYSICAL EXAM:  GENERAL: NAD, lying in bed  HEENT: Normocephalic;  conjunctivae and sclerae clear; moist mucous membranes  NECK : supple, trachea midline, no JVD  CHEST/LUNG: Clear to auscultation bilaterally with good air entry   HEART: S1 S2,  regular rate and rhythm,; no murmurs, gallops or rubs  ABDOMEN: Soft, Nontender, Nondistended; Bowel sounds present  EXTREMITIES: no cyanosis; no edema; no calf tenderness  SKIN: warm and dry; no rash  NERVOUS SYSTEM:  AAOx3; no new focal deficits    _________________________________________________  LABS:                        11.7   13.1  )-----------( 267      ( 23 Oct 2017 06:21 )             38.1     10-23    139  |  102  |  41<H>  ----------------------------<  116<H>  4.0   |  29  |  1.21    Ca    9.1      23 Oct 2017 06:21  Phos  2.9     10-23  Mg     2.3     10-23    TPro  7.4  /  Alb  2.7<L>  /  TBili  0.5  /  DBili  x   /  AST  25  /  ALT  23  /  AlkPhos  112  10-23        CAPILLARY BLOOD GLUCOSE          RADIOLOGY & ADDITIONAL TESTS:    Imaging Personally Reviewed:  YES    Consultant(s) Notes Reviewed:   YES    Care Discussed with Consultants:   Infectious Disease [] Endocrinology [] Neurology [] ENT [] Cardiology [] Electrophysiology [] Pulmonology [] Gastroenterology [] Nephrology [] Urology [] Orthopaedics [] Vascular Surgery [] Thoracic Surgery [] Plastic Surgery [] General Surgery [] Podiatry [] Psychiatry [] Hematology/Oncology [] Pain [] Palliative Care []    Plan of care was discussed with patient and/or primary care giver; all questions and concerns were addressed and care was aligned with patient's wishes. PGY 1 Note discussed with supervising resident and primary attending    Patient is a 89y old  Male who presents with a chief complaint of fall, possible syncope (22 Oct 2017 21:45)      INTERVAL HPI/OVERNIGHT EVENTS: offers no new complaints; current symptoms resolving    MEDICATIONS  (STANDING):  ALBUTerol    90 MICROgram(s) HFA Inhaler 1 Puff(s) Inhalation every 4 hours  ALBUTerol/ipratropium for Nebulization 3 milliLiter(s) Nebulizer every 6 hours  amLODIPine   Tablet 2.5 milliGRAM(s) Oral daily  aspirin enteric coated 81 milliGRAM(s) Oral daily  atorvastatin 40 milliGRAM(s) Oral at bedtime  BACItracin   Ointment 1 Application(s) Topical daily  ciprofloxacin     Tablet 500 milliGRAM(s) Oral every 12 hours  clopidogrel Tablet 75 milliGRAM(s) Oral daily  heparin  Injectable 5000 Unit(s) SubCutaneous every 12 hours  hydrALAZINE 25 milliGRAM(s) Oral three times a day  lidocaine 1% Injectable 10 milliLiter(s) Local Injection once  methylPREDNISolone acetate Injectable 80 milliGRAM(s) IntraArticular once  metoprolol 25 milliGRAM(s) Oral two times a day  mirtazapine 7.5 milliGRAM(s) Oral daily  pantoprazole    Tablet 40 milliGRAM(s) Oral before breakfast  PARoxetine 20 milliGRAM(s) Oral daily  tiotropium 18 MICROgram(s) Capsule 1 Capsule(s) Inhalation daily    MEDICATIONS  (PRN):  acetaminophen   Tablet. 650 milliGRAM(s) Oral every 6 hours PRN Moderate Pain (4 - 6)  traMADol 25 milliGRAM(s) Oral every 6 hours PRN Moderate Pain (4 - 6)      __________________________________________________  REVIEW OF SYSTEMS:  CONSTITUTIONAL: No fever  RESPIRATORY: No cough; No shortness of breath  CARDIOVASCULAR: No chest pain, no palpitations  GASTROINTESTINAL: No pain. No constipation, nausea or vomiting; No diarrhea   NEUROLOGICAL: No headache or numbness, no tremors  MUSCULOSKELETAL: still complaining of left knee pain, but mildly improved   ALL OTHER  ROS negative        Vital Signs Last 24 Hrs  T(C): 36.5 (24 Oct 2017 23:28), Max: 37 (24 Oct 2017 05:13)  T(F): 97.7 (24 Oct 2017 23:28), Max: 98.6 (24 Oct 2017 05:13)  HR: 79 (24 Oct 2017 23:28) (79 - 104)  BP: 117/47 (24 Oct 2017 23:28) (113/59 - 150/62)  BP(mean): --  RR: 17 (24 Oct 2017 23:28) (17 - 19)  SpO2: 96% (24 Oct 2017 23:28) (92% - 96%)    ________________________________________________  PHYSICAL EXAM:  GENERAL: NAD, lying in bed  CHEST/LUNG: Clear to auscultation bilaterally with good air entry   HEART: S1 S2,  regular rate and rhythm,; systolic murmur   ABDOMEN: Soft, Nontender, Nondistended; Bowel sounds present  EXTREMITIES: no cyanosis; no edema; no calf tenderness. Bilateral knee bony enlargement without erythema or appreciable effusion. Tender to palpation over joints  SKIN: warm and dry; no rash  NERVOUS SYSTEM:  AAOx3; no new focal deficits    _________________________________________________  LABS:                        11.7   13.1  )-----------( 267      ( 23 Oct 2017 06:21 )             38.1     10-23    139  |  102  |  41<H>  ----------------------------<  116<H>  4.0   |  29  |  1.21    Ca    9.1      23 Oct 2017 06:21  Phos  2.9     10-23  Mg     2.3     10-23    TPro  7.4  /  Alb  2.7<L>  /  TBili  0.5  /  DBili  x   /  AST  25  /  ALT  23  /  AlkPhos  112  10-23        CAPILLARY BLOOD GLUCOSE          RADIOLOGY & ADDITIONAL TESTS:    Imaging Personally Reviewed:  YES    Consultant(s) Notes Reviewed:   YES    Care Discussed with Consultants:   Infectious Disease [] Endocrinology [] Neurology [] ENT [] Cardiology [] Electrophysiology [] Pulmonology [] Gastroenterology [] Nephrology [] Urology [] Orthopaedics [] Vascular Surgery [] Thoracic Surgery [] Plastic Surgery [] General Surgery [] Podiatry [] Psychiatry [] Hematology/Oncology [] Pain [] Palliative Care []    Plan of care was discussed with patient and/or primary care giver; all questions and concerns were addressed and care was aligned with patient's wishes. PGY 1 Note discussed with supervising resident and primary attending    Patient is a 89y old  Male who presents with a chief complaint of fall, possible syncope (22 Oct 2017 21:45)      INTERVAL HPI/OVERNIGHT EVENTS: patient less responsive this morning. No new medications were administered overnight except for the knee aspiration and local injection into the left joint.    MEDICATIONS  (STANDING):  ALBUTerol    90 MICROgram(s) HFA Inhaler 1 Puff(s) Inhalation every 4 hours  ALBUTerol/ipratropium for Nebulization 3 milliLiter(s) Nebulizer every 6 hours  amLODIPine   Tablet 2.5 milliGRAM(s) Oral daily  aspirin enteric coated 81 milliGRAM(s) Oral daily  atorvastatin 40 milliGRAM(s) Oral at bedtime  BACItracin   Ointment 1 Application(s) Topical daily  ciprofloxacin     Tablet 500 milliGRAM(s) Oral every 12 hours  clopidogrel Tablet 75 milliGRAM(s) Oral daily  heparin  Injectable 5000 Unit(s) SubCutaneous every 12 hours  hydrALAZINE 25 milliGRAM(s) Oral three times a day  lidocaine 1% Injectable 10 milliLiter(s) Local Injection once  methylPREDNISolone acetate Injectable 80 milliGRAM(s) IntraArticular once  metoprolol 25 milliGRAM(s) Oral two times a day  mirtazapine 7.5 milliGRAM(s) Oral daily  pantoprazole    Tablet 40 milliGRAM(s) Oral before breakfast  PARoxetine 20 milliGRAM(s) Oral daily  tiotropium 18 MICROgram(s) Capsule 1 Capsule(s) Inhalation daily    MEDICATIONS  (PRN):  acetaminophen   Tablet. 650 milliGRAM(s) Oral every 6 hours PRN Moderate Pain (4 - 6)  traMADol 25 milliGRAM(s) Oral every 6 hours PRN Moderate Pain (4 - 6)      __________________________________________________  REVIEW OF SYSTEMS: Difficult to obtain as patient is less responsive   MUSCULOSKELETAL: still complaining of left knee pain, but mildly improved       Vital Signs Last 24 Hrs  T(C): 36.5 (24 Oct 2017 23:28), Max: 37 (24 Oct 2017 05:13)  T(F): 97.7 (24 Oct 2017 23:28), Max: 98.6 (24 Oct 2017 05:13)  HR: 79 (24 Oct 2017 23:28) (79 - 104)  BP: 117/47 (24 Oct 2017 23:28) (113/59 - 150/62)  BP(mean): --  RR: 17 (24 Oct 2017 23:28) (17 - 19)  SpO2: 96% (24 Oct 2017 23:28) (92% - 96%)    ________________________________________________  PHYSICAL EXAM:  GENERAL: NAD, lying in bed  CHEST/LUNG: Clear to auscultation bilaterally with good air entry   HEART: S1 S2,  regular rate and rhythm,; systolic murmur   ABDOMEN: Soft, Nontender, Nondistended; Bowel sounds present  EXTREMITIES: no cyanosis; no edema; no calf tenderness. Bilateral knee bony enlargement without erythema or appreciable effusion. Tender to palpation over joints  SKIN: warm and dry; no rash  NERVOUS SYSTEM:  minimally responsive, only to painful stimuli. Patient moves all 4 extremities.     _________________________________________________  LABS:                        11.7   13.1  )-----------( 267      ( 23 Oct 2017 06:21 )             38.1     10-23    139  |  102  |  41<H>  ----------------------------<  116<H>  4.0   |  29  |  1.21    Ca    9.1      23 Oct 2017 06:21  Phos  2.9     10-23  Mg     2.3     10-23    TPro  7.4  /  Alb  2.7<L>  /  TBili  0.5  /  DBili  x   /  AST  25  /  ALT  23  /  AlkPhos  112  10-23        CAPILLARY BLOOD GLUCOSE          RADIOLOGY & ADDITIONAL TESTS:    Imaging Personally Reviewed:  YES    Consultant(s) Notes Reviewed:   YES    Care Discussed with Consultants:   Infectious Disease [] Endocrinology [] Neurology [] ENT [] Cardiology [] Electrophysiology [] Pulmonology [] Gastroenterology [] Nephrology [] Urology [] Orthopaedics [] Vascular Surgery [] Thoracic Surgery [] Plastic Surgery [] General Surgery [] Podiatry [] Psychiatry [] Hematology/Oncology [] Pain [] Palliative Care []    Plan of care was discussed with patient and/or primary care giver; all questions and concerns were addressed and care was aligned with patient's wishes. PGY 1 Note discussed with supervising resident and primary attending    Patient is a 89y old  Male who presents with a chief complaint of fall, possible syncope (22 Oct 2017 21:45)      INTERVAL HPI/OVERNIGHT EVENTS: patient less responsive this morning. No new medications were administered overnight except for the knee aspiration and local injection into the left joint.    MEDICATIONS  (STANDING):  ALBUTerol    90 MICROgram(s) HFA Inhaler 1 Puff(s) Inhalation every 4 hours  ALBUTerol/ipratropium for Nebulization 3 milliLiter(s) Nebulizer every 6 hours  amLODIPine   Tablet 2.5 milliGRAM(s) Oral daily  aspirin enteric coated 81 milliGRAM(s) Oral daily  atorvastatin 40 milliGRAM(s) Oral at bedtime  BACItracin   Ointment 1 Application(s) Topical daily  ciprofloxacin     Tablet 500 milliGRAM(s) Oral every 12 hours  clopidogrel Tablet 75 milliGRAM(s) Oral daily  heparin  Injectable 5000 Unit(s) SubCutaneous every 12 hours  hydrALAZINE 25 milliGRAM(s) Oral three times a day  lidocaine 1% Injectable 10 milliLiter(s) Local Injection once  methylPREDNISolone acetate Injectable 80 milliGRAM(s) IntraArticular once  metoprolol 25 milliGRAM(s) Oral two times a day  mirtazapine 7.5 milliGRAM(s) Oral daily  pantoprazole    Tablet 40 milliGRAM(s) Oral before breakfast  PARoxetine 20 milliGRAM(s) Oral daily  tiotropium 18 MICROgram(s) Capsule 1 Capsule(s) Inhalation daily    MEDICATIONS  (PRN):  acetaminophen   Tablet. 650 milliGRAM(s) Oral every 6 hours PRN Moderate Pain (4 - 6)  traMADol 25 milliGRAM(s) Oral every 6 hours PRN Moderate Pain (4 - 6)      __________________________________________________  REVIEW OF SYSTEMS: Difficult to obtain as patient is less responsive   MUSCULOSKELETAL: still complaining of left knee pain, but mildly improved       Vital Signs Last 24 Hrs  T(C): 36.5 (24 Oct 2017 23:28), Max: 37 (24 Oct 2017 05:13)  T(F): 97.7 (24 Oct 2017 23:28), Max: 98.6 (24 Oct 2017 05:13)  HR: 79 (24 Oct 2017 23:28) (79 - 104)  BP: 117/47 (24 Oct 2017 23:28) (113/59 - 150/62)  BP(mean): --  RR: 17 (24 Oct 2017 23:28) (17 - 19)  SpO2: 96% (24 Oct 2017 23:28) (92% - 96%)    ________________________________________________  PHYSICAL EXAM:  GENERAL: NAD, lying in bed  CHEST/LUNG: Clear to auscultation bilaterally with good air entry   HEART: S1 S2,  regular rate and rhythm,; systolic murmur   ABDOMEN: Soft, Nontender, Nondistended; Bowel sounds present  EXTREMITIES: no cyanosis; no edema; no calf tenderness. Bilateral knee bony enlargement without erythema or appreciable effusion. Tender to palpation over joints  SKIN: warm and dry; no rash  NERVOUS SYSTEM:  minimally responsive, only to painful stimuli. Patient moves all 4 extremities.     _________________________________________________  LABS:                        11.7   13.1  )-----------( 267      ( 23 Oct 2017 06:21 )             38.1     10-23    139  |  102  |  41<H>  ----------------------------<  116<H>  4.0   |  29  |  1.21    Ca    9.1      23 Oct 2017 06:21  Phos  2.9     10-23  Mg     2.3     10-23    TPro  7.4  /  Alb  2.7<L>  /  TBili  0.5  /  DBili  x   /  AST  25  /  ALT  23  /  AlkPhos  112  10-23        CAPILLARY BLOOD GLUCOSE          RADIOLOGY & ADDITIONAL TESTS:    Imaging Personally Reviewed:  YES    Consultant(s) Notes Reviewed:   YES    Care Discussed with Consultants:   Infectious Disease [] Endocrinology [] Neurology [] ENT [] Cardiology [x] Electrophysiology [] Pulmonology [] Gastroenterology [] Nephrology [] Urology [] Orthopaedics [] Vascular Surgery [] Thoracic Surgery [] Plastic Surgery [] General Surgery [] Podiatry [] Psychiatry [] Hematology/Oncology [] Pain [] Palliative Care []    Plan of care was discussed with patient and/or primary care giver; all questions and concerns were addressed and care was aligned with patient's wishes.

## 2017-10-25 NOTE — PROGRESS NOTE ADULT - ASSESSMENT
Patient is a 89 yr old Male pt with PMHx of arthritis, asthma, CAD, 5 stents,  HTN, laryngeal CA, CKD stage III recent admission in July for CHF exacerbation ( needing BiPaP and ICU admission) presents to ED c/o syncopal episode x today.   1.Off Tele monitoring.  2.CAD - cont lopressor 25 mg bid,asa,statin.  3.Pulm HTN- norvasc 2.5mg qd.  4.Continue rest of medication.  5.GI and DVT prophylaxis.

## 2017-10-25 NOTE — CONSULT NOTE ADULT - ATTENDING COMMENTS
89 year old male with h/o CAD s/p PCI, h/o laryngeal ca, CKD 3, HTN who p/w syncope/fall. hospital course complicated new ams and left facial droop today noted to have large ICH  Pt seen and examined for ICH new onset.  CT scan shows large BG bleed    noted pt on asa/plavix plts ordered.    case d/w transfer center already and noted awaiting bed for potential transfer. furthermore there is no acute surgical intervention available.    pt not hypertensive  pt alert, verbal, speaking farsi and oriented.   on exam:  follows commands intermitently  keeps right eye closed, does not let evaluate eye, does not moves left eyes for EOMI - ? intentionally not following commands, left eye reactive  appears to have left facial droop.  Moves all 4 ext, left HAND weakness, diffusely weak    Case d/w Family son - at this time he is considering DNR/I, No surgical intervention and wants pt to stay in this hospital.      at this point he has not made final decision  risks/benefits/alternatives explained    assessment  Acute ICH - Right Basal ganglian      Plan  transfer to ICU  transfuse plts  neuro checks q 1 hrs  repeat CT in 6 hrs from previous one  NPO  Continue GOC discussion with family.  will give seizure ppx with keppra.   BP controll avoid hypertensive episodes  case d/w family  case d/w Dr. Rich.      Addendum shortly after in ICU family state DNR/I

## 2017-10-25 NOTE — PROGRESS NOTE ADULT - PROBLEM SELECTOR PLAN 1
Potentially due to orthostasis given history, but patient’s orthostatics negative in ED  EKG without any ST or T wave changes  CT head negative for acute pathology; MRI negative for any stroke  Carotid US revealed moderate stenosis of Right carotid artery  Physical therapy recommends home PT; discharge pending setup of visiting nurse Patient less responsive this morning  Son reports that he was at baseline last evening around 7PM  No new medications were given overnight  Patient refused AM labs; ABG revealed acidosis with pH 7.21, CO2 57 on RA  Will repeat CT head without contrast Patient less responsive this morning  Son reports that he was at baseline last evening around 7PM  No new medications were given overnight  Patient refused AM labs; ABG revealed acidosis with pH 7.21, CO2 57 on RA  Lactate WNL  Will repeat CT head without contrast Patient less responsive this morning  Son reports that he was at baseline last evening around 7PM  No new medications were given overnight  Patient refused AM labs; ABG revealed acidosis with pH 7.21, CO2 57 on RA  Lactate WNL  CT head reveals acute large intraparenchymal hemorrhage in the right basal ganglia  Neurology Dr. Ramon; will see if patient is eligible for transfer for neurosurgery

## 2017-10-25 NOTE — CONSULT NOTE ADULT - SUBJECTIVE AND OBJECTIVE BOX
Patient is a 89y old  Male who presents with a chief complaint of fall, possible syncope (22 Oct 2017 21:45)      Initial HPI on admission:  HPI:  Patient is a 89 y/o M pt with PMHx of arthritis, asthma, CAD, 5 stents,  HTN, laryngeal CA, CKD stage III recent admission in July for CHF exacerbation ( needing BiPaP and ICU admission) presents to ED c/o syncopal episode x today. Pt reports he is unable to recall the history of events, no LOC or head trauma, fall was unwitnessed. As per son at bedside, pt had 5 falls in the past 2 months. In the ED, pt is c/o pain to the laceration site on the left hand, and pain to the left rib. Son says that patient's medications have been modified since the last visit and he does not remember the list. Patient admits to feeling dizzy when getting up from bed and walking and son has noted that patient's BP drops when he stands up from sitting position   Pt denies fever, chills, shortness of breath, cough, chest pain, palpitations, nausea, vomiting, diarrhea, abd pain, melena, numbness, tingling, weakness, or any other complaints. NKDA.  Surgical hx of cardiac stents, quit smoking/drinking 30-40 years ago, FHx unknown.    Discussed GOC with son at bedside: Full Code (19 Oct 2017 02:12)      BRIEF HOSPITAL COURSE: Pt developed recent onset altered sensorium and decreased responsiveness. Pt was to be DC today after syncope w/u and given change in mentation, primary team called for MICU consult. Neurology consult informed Neurosurgery at [facility] for transfer- now awaiting bed.  Pt seen at bedside and seen to be with altered sensorium only able to follow some simple commands. Vitals stable and pt protecting airway at this time.     PAST MEDICAL & SURGICAL HISTORY:  Coronary artery disease involving native coronary artery of native heart without angina pectoris  Laryngeal cancer  Essential hypertension  Asthma  Arthritis  Degenerative joint disease  No significant past surgical history    Allergies    No Known Allergies    Intolerances      FAMILY HISTORY:    Social history reviewed: ***    Review of Systems: unable to provide       Medications:  acetaminophen   Tablet. 650 milliGRAM(s) Oral every 6 hours PRN  ALBUTerol    90 MICROgram(s) HFA Inhaler 1 Puff(s) Inhalation every 4 hours  ALBUTerol/ipratropium for Nebulization 3 milliLiter(s) Nebulizer every 6 hours  amLODIPine   Tablet 2.5 milliGRAM(s) Oral daily  atorvastatin 40 milliGRAM(s) Oral at bedtime  BACItracin   Ointment 1 Application(s) Topical daily  ciprofloxacin     Tablet 500 milliGRAM(s) Oral every 12 hours  dextrose 5% + sodium chloride 0.45%. 1000 milliLiter(s) IV Continuous <Continuous>  hydrALAZINE 25 milliGRAM(s) Oral three times a day  lidocaine 1% Injectable 10 milliLiter(s) Local Injection once  methylPREDNISolone acetate Injectable 80 milliGRAM(s) IntraArticular once  metoprolol 25 milliGRAM(s) Oral two times a day  mirtazapine 7.5 milliGRAM(s) Oral daily  pantoprazole    Tablet 40 milliGRAM(s) Oral before breakfast  PARoxetine 20 milliGRAM(s) Oral daily  tiotropium 18 MICROgram(s) Capsule 1 Capsule(s) Inhalation daily  traMADol 25 milliGRAM(s) Oral every 6 hours PRN      vent settings      Vital Signs Last 24 Hrs  T(C): 37.1 (25 Oct 2017 11:13), Max: 37.1 (25 Oct 2017 11:13)  T(F): 98.7 (25 Oct 2017 11:13), Max: 98.7 (25 Oct 2017 11:13)  HR: 74 (25 Oct 2017 11:13) (65 - 100)  BP: 131/53 (25 Oct 2017 11:13) (113/59 - 142/58)  BP(mean): --  RR: 18 (25 Oct 2017 11:13) (17 - 96)  SpO2: 98% (25 Oct 2017 11:13) (96% - 98%)    ABG - ( 25 Oct 2017 10:23 )  pH: 7.21  /  pCO2: 57    /  pO2: 110   / HCO3: 22    / Base Excess: -5.6  /  SaO2: 96                    10-24 @ 07:01  -  10-25 @ 07:00  --------------------------------------------------------  IN: 336 mL / OUT: 1100 mL / NET: -764 mL          LABS:                        11.3   12.0  )-----------( 297      ( 25 Oct 2017 10:58 )             34.8     10-25    138  |  102  |  51<H>  ----------------------------<  177<H>  4.2   |  26  |  1.23    Ca    9.7      25 Oct 2017 10:58            CAPILLARY BLOOD GLUCOSE            CULTURES:  Culture Results:   50,000 - 99,000 CFU/mL Enterococcus faecalis (10-19 @ 23:21)        Physical Examination:    General: delirious, somnolent, waxing and waning mentation    HEENT: Pupils unequal, nonreactive, R eye cataract    PULM: BLAE, no adventitious BS    CVS: Regular rate and rhythm, no murmurs, rubs, or gallops    ABD: Soft, nondistended, nontender, normoactive bowel sounds, no masses    EXT: No edema, nontender    SKIN: Warm and well perfused, no rashes noted.    NEURO: awake, somnolent with waxing mentation    RADIOLOGY REVIEWED ***    CRITICAL CARE TIME SPENT: 35 minutes

## 2017-10-25 NOTE — CONSULT NOTE ADULT - ASSESSMENT
Acute large BG hemorrhage  no AC, ASA, NSAIDs on any blood thining meds  BP goal of normal  frequent neuro check and stat CTH if change of mental status  neurosurgery consult    Fall Acute large BG hemorrhage  no AC, ASA, NSAIDs on any blood thining meds  BP goal of normal  frequent neuro check and stat CTH if change of mental status, pending ICU consult  called transfer center and spoke with neurology and neurosurgery, patient accepted to neurology, pending available bed.  harry with resident and Dr. Rich

## 2017-10-25 NOTE — GOALS OF CARE CONVERSATION - PERSONAL ADVANCE DIRECTIVE - CONVERSATION DETAILS
engaged in GOC d/w family at bedside including son who is decision maker.  Extent of pt illness and acuity with possible intervention that would be needed discussed with them  They do not wish for intubation of resuscitation if need be  pt to be DNR/DNI

## 2017-10-25 NOTE — PROGRESS NOTE ADULT - PROBLEM SELECTOR PLAN 6
Patient endorses severe pain in left knee with crepitus   Continue with Tylenol and tramadol   Knee radiography revealed no evidence of fracture or dislocation, but mild patellofemoral osteoarthritis and chondrocalcinosis in addition to mild supra-patellar joint effusion   Ortho Dr. Hull aspirated 10 CC fluid and injected 80 mg of Depomedrol with 5 CC of lidocaine into joint 10/24 Not currently in exacerbation   Continue with duoneb PRN and oxygen supplementation

## 2017-10-25 NOTE — CONSULT NOTE ADULT - SUBJECTIVE AND OBJECTIVE BOX
Patient is a 89y old  Male who presents with a chief complaint of fall, possible syncope (22 Oct 2017 21:45)      HPI:  Patient is a 89 y/o M pt with PMHx of arthritis, asthma, CAD, 5 stents,  HTN, laryngeal CA, CKD stage III recent admission in July for CHF exacerbation ( needing BiPaP and ICU admission) presents to ED c/o syncopal episode x today. Pt reports he is unable to recall the history of events, no LOC or head trauma, fall was unwitnessed. As per son at bedside, pt had 5 falls in the past 2 months. In the ED, pt is c/o pain to the laceration site on the left hand, and pain to the left rib. Son says that patient's medications have been modified since the last visit and he does not remember the list. Patient admits to feeling dizzy when getting up from bed and walking and son has noted that patient's BP drops when he stands up from sitting position   Pt denies fever, chills, shortness of breath, cough, chest pain, palpitations, nausea, vomiting, diarrhea, abd pain, melena, numbness, tingling, weakness, or any other complaints. NKDA.  Surgical hx of cardiac stents, quit smoking/drinking 30-40 years ago, FHx unknown.    Discussed GOC with son at bedside: Full Code (19 Oct 2017 02:12)         Neurological Review of Systems:  No difficulty with language.  No vision loss or double vision.  No dizziness, vertigo or new hearing loss.  No difficulty with speech or swallowing.  No focal weakness.  No focal sensory changes.  No numbness or tingling in the bilateral lower extremities.  No difficulty with balance.  No difficulty with ambulation.        MEDICATIONS  (STANDING):  ALBUTerol    90 MICROgram(s) HFA Inhaler 1 Puff(s) Inhalation every 4 hours  ALBUTerol/ipratropium for Nebulization 3 milliLiter(s) Nebulizer every 6 hours  amLODIPine   Tablet 2.5 milliGRAM(s) Oral daily  atorvastatin 40 milliGRAM(s) Oral at bedtime  BACItracin   Ointment 1 Application(s) Topical daily  ciprofloxacin     Tablet 500 milliGRAM(s) Oral every 12 hours  dextrose 5% + sodium chloride 0.45%. 1000 milliLiter(s) (75 mL/Hr) IV Continuous <Continuous>  hydrALAZINE 25 milliGRAM(s) Oral three times a day  lidocaine 1% Injectable 10 milliLiter(s) Local Injection once  methylPREDNISolone acetate Injectable 80 milliGRAM(s) IntraArticular once  metoprolol 25 milliGRAM(s) Oral two times a day  mirtazapine 7.5 milliGRAM(s) Oral daily  pantoprazole    Tablet 40 milliGRAM(s) Oral before breakfast  PARoxetine 20 milliGRAM(s) Oral daily  tiotropium 18 MICROgram(s) Capsule 1 Capsule(s) Inhalation daily    MEDICATIONS  (PRN):  acetaminophen   Tablet. 650 milliGRAM(s) Oral every 6 hours PRN Moderate Pain (4 - 6)  traMADol 25 milliGRAM(s) Oral every 6 hours PRN Moderate Pain (4 - 6)    Allergies    No Known Allergies    Intolerances      PAST MEDICAL & SURGICAL HISTORY:  Coronary artery disease involving native coronary artery of native heart without angina pectoris  Laryngeal cancer  Essential hypertension  Asthma  Arthritis  Degenerative joint disease  No significant past surgical history    FAMILY HISTORY:    SOCIAL HISTORY: non smoker/ former smoker/ active smoker    Review of Systems:  Constitutional: No generalized weakness. No fevers or chills.                    Eyes, Ears, Mouth, Throat: No vision loss   Respiratory: No shortness of breath or cough.                                Cardiovascular: No chest pain or palpitations  Gastrointestinal: No nausea or vomiting.                                         Genitourinary: No urinary incontinence or burning on urination.  Musculoskeletal: No joint pain.                                                           Dermatologic: No rash.  Neurological: as per HPI                                                                      Psychiatric: No behavioral problems.  Endocrine: No known hypoglycemia.               Hematologic/Lymphatic: No easy bleeding.    O:  Vital Signs Last 24 Hrs  T(C): 37.1 (25 Oct 2017 11:13), Max: 37.1 (25 Oct 2017 11:13)  T(F): 98.7 (25 Oct 2017 11:13), Max: 98.7 (25 Oct 2017 11:13)  HR: 74 (25 Oct 2017 11:13) (65 - 104)  BP: 131/53 (25 Oct 2017 11:13) (113/59 - 146/61)  BP(mean): --  RR: 18 (25 Oct 2017 11:13) (17 - 96)  SpO2: 98% (25 Oct 2017 11:13) (92% - 98%)    General Exam:   General appearance: No acute distress                 Cardiovascular: Pedal dorsalis pulses intact bilaterally    Mental Status: Orientated to self, date and place.  Attention intact.  No dysarthria, aphasia or neglect.  Knowledge intact.  Registration intact.  Short and long term memory grossly intact.      Cranial Nerves: CN I - not tested.  PERRL, EOMI, VFF, no nystagmus or diplopia.  No APD.  Fundi not visualized.  CN V1-3 intact to light touch and pinprick.  No facial asymmetry.  Hearing intact to finger rub bilaterally.  Tongue, uvula and palate midline.  Sternocleidomastoid and Trapezius intact bilaterally.    Motor:   Tone: normal.                  Strength intact throughout  No pronator drift bilaterally                      No dysmetria on finger-nose-finger or heel-shin-heel  No truncal ataxia.  No resting, postural or action tremor.  No myoclonus.    Sensation: intact to light touch, pinprick, vibration and proprioception    Deep Tendon Reflexes: 1+ bilateral biceps, triceps, brachioradialis, knee and ankle  Toes flexor bilaterally    Gait: normal and stable.  Rhomberg -kerri.    Other:     LABS:                        11.3   12.0  )-----------( 297      ( 25 Oct 2017 10:58 )             34.8     10-25    138  |  102  |  51<H>  ----------------------------<  177<H>  4.2   |  26  |  1.23    Ca    9.7      25 Oct 2017 10:58              RADIOLOGY & ADDITIONAL STUDIES:    EKG: < from: 12 Lead ECG (10.19.17 @ 01:30) >  Diagnosis Line Normal sinus rhythm  Minimal voltage criteria for LVH, may be normal variant  Nonspecific T wave abnormality  Prolonged QT  Abnormal ECG  Confirmed by SLY MOSHER, JOANNE ([id number]) on 22-Oct-2017 11:54:55    < end of copied text >      CTH ([facility] rev[facility]wed): < from: CT Head No Cont (10.25.17 @ 11:40) >  IMPRESSION:   Acute large intraparenchymal hemorrhage in the right basal ganglia.    Findings were discussed with Dr. Lin on 10/25/2017 12:16 PM with   readback.    < end of copied text > HPI:  Patient is a 89 y/o M pt with PMHx of arthritis, asthma, CAD, 5 stents,  HTN, laryngeal CA, CKD stage III recent admission in July for CHF exacerbation ( needing BiPaP and ICU admission) presents to ED c/o syncopal episode and fall.  The patient was noted to be normal around 9am today by son but later on he became altered.  CTH was done and showed intercranial hemorrhage.  Neurology was consulted for AMS and imaging findings.  The patient has no complains and wants to be left alone.         Neurological Review of Systems from son:  No dizziness, vertigo; chronic hearing loss.  Has difficulty with speech.  New focal weakness and sensory loss.        MEDICATIONS  (STANDING):  ALBUTerol    90 MICROgram(s) HFA Inhaler 1 Puff(s) Inhalation every 4 hours  ALBUTerol/ipratropium for Nebulization 3 milliLiter(s) Nebulizer every 6 hours  amLODIPine   Tablet 2.5 milliGRAM(s) Oral daily  atorvastatin 40 milliGRAM(s) Oral at bedtime  BACItracin   Ointment 1 Application(s) Topical daily  ciprofloxacin     Tablet 500 milliGRAM(s) Oral every 12 hours  dextrose 5% + sodium chloride 0.45%. 1000 milliLiter(s) (75 mL/Hr) IV Continuous <Continuous>  hydrALAZINE 25 milliGRAM(s) Oral three times a day  lidocaine 1% Injectable 10 milliLiter(s) Local Injection once  methylPREDNISolone acetate Injectable 80 milliGRAM(s) IntraArticular once  metoprolol 25 milliGRAM(s) Oral two times a day  mirtazapine 7.5 milliGRAM(s) Oral daily  pantoprazole    Tablet 40 milliGRAM(s) Oral before breakfast  PARoxetine 20 milliGRAM(s) Oral daily  tiotropium 18 MICROgram(s) Capsule 1 Capsule(s) Inhalation daily    MEDICATIONS  (PRN):  acetaminophen   Tablet. 650 milliGRAM(s) Oral every 6 hours PRN Moderate Pain (4 - 6)  traMADol 25 milliGRAM(s) Oral every 6 hours PRN Moderate Pain (4 - 6)    Allergies    No Known Allergies    Intolerances      PAST MEDICAL & SURGICAL HISTORY:  Coronary artery disease involving native coronary artery of native heart without angina pectoris  Laryngeal cancer  Essential hypertension  Asthma  Arthritis  Degenerative joint disease  No significant past surgical history    FAMILY HISTORY: reviewed NC    SOCIAL HISTORY: non smoker    Review of Systems:  Constitutional: No fevers or chills.                    Eyes, Ears, Mouth, Throat: No known vision loss   Respiratory: No known  shortness of breath or cough.                                Cardiovascular: No known  chest pain or palpitations  Gastrointestinal: No known vomiting.                                         Genitourinary: No known  burning on urination.  Musculoskeletal: No known  joint pain.                                                           Dermatologic: No rash.  Neurological: as per HPI                                                                      Psychiatric: No known behavioral problems.  Endocrine: No known hypoglycemia.               Hematologic/Lymphatic: + easy bruising.    O:  Vital Signs Last 24 Hrs  T(C): 37.1 (25 Oct 2017 11:13), Max: 37.1 (25 Oct 2017 11:13)  T(F): 98.7 (25 Oct 2017 11:13), Max: 98.7 (25 Oct 2017 11:13)  HR: 74 (25 Oct 2017 11:13) (65 - 104)  BP: 131/53 (25 Oct 2017 11:13) (113/59 - 146/61)  BP(mean): --  RR: 18 (25 Oct 2017 11:13) (17 - 96)  SpO2: 98% (25 Oct 2017 11:13) (92% - 98%)    General Exam:   General appearance: No acute distress                 Cardiovascular: Pedal dorsalis pulses intact bilaterally    Mental Status: Orientated to self but not to date and place.  Attention decreased.  Has dysarthria and aphasia but no neglect.  Unable to assess Knowledge, registration and memory.    Cranial Nerves: CN I - not tested.  PERRL, EOMI, VFF grossly, no nystagmus or diplopia.  No APD.  Fundi not visualized.  CN V1-3 grossly intact to pinprick.  +left facial weakness.  Hearing decreased to finger rub bilaterally.  Tongue, uvula and palate midline.  Sternocleidomastoid and Trapezius intact bilaterally.    Motor:   Tone: decreased on the left.                  Strength decreased left arm and leg                 Unable to assess for dysmetria as patient not following requests    Sensation: decreased to pain on the left    Deep Tendon Reflexes: trace bilateral biceps, triceps, brachioradialis, knee  Toes flexor bilaterally    Gait: unable to assess due to mental status    Other:     LABS:                        11.3   12.0  )-----------( 297      ( 25 Oct 2017 10:58 )             34.8     10-25    138  |  102  |  51<H>  ----------------------------<  177<H>  4.2   |  26  |  1.23    Ca    9.7      25 Oct 2017 10:58              RADIOLOGY & ADDITIONAL STUDIES:    EKG: < from: 12 Lead ECG (10.19.17 @ 01:30) >  Diagnosis Line Normal sinus rhythm  Minimal voltage criteria for LVH, may be normal variant  Nonspecific T wave abnormality  Prolonged QT  Abnormal ECG  Confirmed by SLY MOSHER, JOANNE ([id number]) on 22-Oct-2017 11:54:55    < end of copied text >      CTH ([facility] rev[facility]): < from: CT Head No Cont (10.25.17 @ 11:40) >  IMPRESSION:   Acute large intraparenchymal hemorrhage in the right basal ganglia.    Findings were discussed with Dr. Lin on 10/25/2017 12:16 PM with   readback.    < end of copied text >

## 2017-10-25 NOTE — PROGRESS NOTE ADULT - SUBJECTIVE AND OBJECTIVE BOX
CHIEF COMPLAINT:Patient is a 89y old  Male who presents with a chief complaint of fall, possible syncope .Pt appears comfortable.    	  REVIEW OF SYSTEMS:  [ X] Unable to obtain    PHYSICAL EXAM:  T(C): 36.9 (10-25-17 @ 07:40), Max: 37 (10-24-17 @ 19:08)  HR: 71 (10-25-17 @ 07:40) (65 - 104)  BP: 142/58 (10-25-17 @ 07:40) (113/59 - 146/61)  RR: 18 (10-25-17 @ 07:40) (17 - 96)  SpO2: 98% (10-25-17 @ 07:40) (92% - 98%)  Wt(kg): --  I&O's Summary    24 Oct 2017 07:01  -  25 Oct 2017 07:00  --------------------------------------------------------  IN: 336 mL / OUT: 1100 mL / NET: -764 mL        Appearance: Normal	  HEENT:   Normal oral mucosa, PERRL, EOMI	  Lymphatic: No lymphadenopathy  Cardiovascular: Normal S1 S2, No JVD, No murmurs, No edema  Respiratory: Lungs clear to auscultation	  Gastrointestinal:  Soft, Non-tender, + BS	  Skin: No rashes, No ecchymoses, No cyanosis	  Extremities: Normal range of motion, No clubbing, cyanosis or edema  Vascular: Peripheral pulses palpable 2+ bilaterally    MEDICATIONS  (STANDING):  ALBUTerol    90 MICROgram(s) HFA Inhaler 1 Puff(s) Inhalation every 4 hours  ALBUTerol/ipratropium for Nebulization 3 milliLiter(s) Nebulizer every 6 hours  amLODIPine   Tablet 2.5 milliGRAM(s) Oral daily  aspirin enteric coated 81 milliGRAM(s) Oral daily  atorvastatin 40 milliGRAM(s) Oral at bedtime  BACItracin   Ointment 1 Application(s) Topical daily  ciprofloxacin     Tablet 500 milliGRAM(s) Oral every 12 hours  clopidogrel Tablet 75 milliGRAM(s) Oral daily  heparin  Injectable 5000 Unit(s) SubCutaneous every 12 hours  hydrALAZINE 25 milliGRAM(s) Oral three times a day  lidocaine 1% Injectable 10 milliLiter(s) Local Injection once  methylPREDNISolone acetate Injectable 80 milliGRAM(s) IntraArticular once  metoprolol 25 milliGRAM(s) Oral two times a day  mirtazapine 7.5 milliGRAM(s) Oral daily  pantoprazole    Tablet 40 milliGRAM(s) Oral before breakfast  PARoxetine 20 milliGRAM(s) Oral daily  tiotropium 18 MICROgram(s) Capsule 1 Capsule(s) Inhalation daily      	  LABS:	 	    Lipid Profile: Cholesterol 98  LDL 52  HDL 31  TG 76

## 2017-10-25 NOTE — PROGRESS NOTE ADULT - PROBLEM SELECTOR PLAN 5
Not currently in exacerbation   Continue with duoneb PRN and oxygen supplementation UA positive; Urine culture revealed E. faecalis  Continue with ciprofloxacin and pyridium for dysuria UA positive; Urine culture revealed E. faecalis  Continue with ciprofloxacin, last dose 10/26

## 2017-10-25 NOTE — PROGRESS NOTE ADULT - PROBLEM SELECTOR PLAN 4
UA positive; Urine culture revealed E. faecalis  Continue with ciprofloxacin and pyridium for dysuria S/p PCI x 5  Continue with aspirin, Plavix, beta blocker, and statin S/p PCI x 5  Continue with beta blocker and statin  Plavix and aspirin held in evidence of intracranial hemorrhage

## 2017-10-25 NOTE — PROGRESS NOTE ADULT - SUBJECTIVE AND OBJECTIVE BOX
_________________________________________________________________________________________  ========>>  M E D I C A L   A T T E N D I N G    F O L L O W  U P  N O T E  <<=========  -----------------------------------------------------------------------------------------------------    - Patient seen and examined by me earlier today.   I have been in close contact with multiple specialties (including cardio, neuro, ICU), RN, house staff, and pt's family since this morning as pt had change in mental status this morning more sleepy, and CT found to heve an acute hemorrhage in the brain.  Pt is transferred now to the ICU and arrangements made to transfer to [facility].  - pt had injection of left knee pain with arthrocenthesis last PM as noted..     ==================>> MEDICATIONS <<====================    ALBUTerol    90 MICROgram(s) HFA Inhaler 1 Puff(s) Inhalation every 4 hours  ALBUTerol/ipratropium for Nebulization 3 milliLiter(s) Nebulizer every 6 hours  BACItracin   Ointment 1 Application(s) Topical daily  ciprofloxacin   IVPB 400 milliGRAM(s) IV Intermittent every 12 hours  levETIRAcetam  IVPB 500 milliGRAM(s) IV Intermittent every 12 hours  lidocaine 1% Injectable 10 milliLiter(s) Local Injection once  metoprolol    tartrate Injectable 2.5 milliGRAM(s) IV Push every 6 hours  pantoprazole  Injectable 40 milliGRAM(s) IV Push daily  tiotropium 18 MICROgram(s) Capsule 1 Capsule(s) Inhalation daily    MEDICATIONS  (PRN):  acetaminophen  Suppository. 650 milliGRAM(s) Rectal every 6 hours PRN Moderate Pain (4 - 6)    ==================>> REVIEW OF SYSTEM <<=================    limited as less arrousable    ==================>> VITAL SIGNS <<==================    Vital Signs Last 24 Hrs  T(C): 35.9 (10-25-17 @ 16:08)  T(F): 96.7 (10-25-17 @ 16:08), Max: 98.7 (10-25-17 @ 11:13)  HR: 72 (10-25-17 @ 16:08) (65 - 100)  BP: 148/70 (10-25-17 @ 16:08)  BP(mean): 89 (10-25-17 @ 16:08) (89 - 89)  RR: 20 (10-25-17 @ 16:08) (15 - 96)  SpO2: 99% (10-25-17 @ 16:08) (96% - 100%)      ==================>> PHYSICAL EXAM <<=================    GEN: Sleepy but responsive to stimuli, spontaneously moved extremities , NAD , comfortable  HEENT: NCAT, PERRL, MMM, hearing intact  Neck: supple , no JVD  CVS: S1S2 , regular , No M/R/G appreciated  PULM: CTA B/L,  no W/R/R appreciated  ABD.: soft. non tender, non distended,  bowel sounds present  Extrem: intact pulses , no edema, + scattered echymosis, + left hand wound wrapped      stable left knee with arthritic changes improved     ==================>> LAB AND IMAGING <<==================                                      11.3   12.0  )-----------( 297      ( 25 Oct 2017 10:58 )             34.8        10-25    138  |  102  |  51<H>  ----------------------------<  177<H>  4.2   |  26  |  1.23    Ca    9.7      25 Oct 2017 10:58    < from: CT Head No Cont (10.25.17 @ 11:40) >  IMPRESSION:   Acute large intraparenchymal hemorrhage in the right basal ganglia.  Findings were discussed with Dr. Lin on 10/25/2017 12:16 PM with   readback.  < end of copied text >  ___________________________________________________________________________________  ===============>>  A S S E S S M E N T   A N D   P L A N <<===============  ------------------------------------------------------------------------------------------    · Assessment		  Patient is a 89 y/o M pt with PMHx of arthritis, asthma, CAD, 5 stents,  HTN, laryngeal CA, recent admission in July for CHF exacerbation ( needing BiPaP and ICU admission) presents to ED c/o syncopal episode x today.    Problem/Plan - 1:  ·  Problem: Acute hemorrhagic stroke with metabolic encephalopathy   antiplatelets stopped  platelet transfusion ordered by ICU  BP goals per neuro  family considering transferring to [place] (though pt not a surgical candidate/ need, only for observation)  Neurology and ICU consults appreciated  Cardio f/u    Problem/Plan - 2:  ·  Problem: Essential hypertension  Continue Current medications and monitor.   DASH diet    Problem/Plan - 3:  ·  Problem: Coronary artery disease involving native coronary artery of native heart without angina pectoris  Patient with Known H/o CAD, no CP, EKG: Poor baseline but no ischemic changes  No signs of fluid overload  monitor on TELE.     Problem/Plan - 4:  ·  Problem: Asthma.  Plan: Nebs PRN  O2 2L via NC.     Problem/Plan - 5:  ·  Problem: Acute cystitis with dysurea,   cultures as above: finish treatment with four more days of abx with Cipro as ordered    -GI/DVT Prophylaxis.  -goals of care discussed by me, house staff, and ICU team.. pt full code but son expresses does not want father to suffer.. to further discuss as situation changes//.    --------------------------------------------  Case discussed with son, HS, and specialists.  Education given on as above, fall precautions  ___________________________  H. KARLA Rich.  Pager: [phone]

## 2017-10-25 NOTE — PROGRESS NOTE ADULT - PROBLEM SELECTOR PLAN 3
S/p PCI x 5  Continue with aspirin, Plavix, beta blocker, and statin Continue with metoprolol and amlodipine within parameters  Continue to monitor BP

## 2017-10-26 DIAGNOSIS — G93.40 ENCEPHALOPATHY, UNSPECIFIED: ICD-10-CM

## 2017-10-26 DIAGNOSIS — Z51.5 ENCOUNTER FOR PALLIATIVE CARE: ICD-10-CM

## 2017-10-26 DIAGNOSIS — I61.9 NONTRAUMATIC INTRACEREBRAL HEMORRHAGE, UNSPECIFIED: ICD-10-CM

## 2017-10-26 DIAGNOSIS — R53.81 OTHER MALAISE: ICD-10-CM

## 2017-10-26 LAB
ALBUMIN SERPL ELPH-MCNC: 2.7 G/DL — LOW (ref 3.5–5)
ALP SERPL-CCNC: 100 U/L — SIGNIFICANT CHANGE UP (ref 40–120)
ALT FLD-CCNC: 81 U/L DA — HIGH (ref 10–60)
ANION GAP SERPL CALC-SCNC: 6 MMOL/L — SIGNIFICANT CHANGE UP (ref 5–17)
ANION GAP SERPL CALC-SCNC: 7 MMOL/L — SIGNIFICANT CHANGE UP (ref 5–17)
ANION GAP SERPL CALC-SCNC: 9 MMOL/L — SIGNIFICANT CHANGE UP (ref 5–17)
AST SERPL-CCNC: 100 U/L — HIGH (ref 10–40)
BASOPHILS # BLD AUTO: 0 K/UL — SIGNIFICANT CHANGE UP (ref 0–0.2)
BASOPHILS NFR BLD AUTO: 0.1 % — SIGNIFICANT CHANGE UP (ref 0–2)
BILIRUB SERPL-MCNC: 0.4 MG/DL — SIGNIFICANT CHANGE UP (ref 0.2–1.2)
BUN SERPL-MCNC: 51 MG/DL — HIGH (ref 7–18)
BUN SERPL-MCNC: 57 MG/DL — HIGH (ref 7–18)
BUN SERPL-MCNC: 58 MG/DL — HIGH (ref 7–18)
CALCIUM SERPL-MCNC: 9.4 MG/DL — SIGNIFICANT CHANGE UP (ref 8.4–10.5)
CALCIUM SERPL-MCNC: 9.5 MG/DL — SIGNIFICANT CHANGE UP (ref 8.4–10.5)
CALCIUM SERPL-MCNC: 9.5 MG/DL — SIGNIFICANT CHANGE UP (ref 8.4–10.5)
CHLORIDE SERPL-SCNC: 103 MMOL/L — SIGNIFICANT CHANGE UP (ref 96–108)
CHLORIDE SERPL-SCNC: 106 MMOL/L — SIGNIFICANT CHANGE UP (ref 96–108)
CHLORIDE SERPL-SCNC: 106 MMOL/L — SIGNIFICANT CHANGE UP (ref 96–108)
CO2 SERPL-SCNC: 28 MMOL/L — SIGNIFICANT CHANGE UP (ref 22–31)
CO2 SERPL-SCNC: 29 MMOL/L — SIGNIFICANT CHANGE UP (ref 22–31)
CO2 SERPL-SCNC: 30 MMOL/L — SIGNIFICANT CHANGE UP (ref 22–31)
CREAT SERPL-MCNC: 1.2 MG/DL — SIGNIFICANT CHANGE UP (ref 0.5–1.3)
CREAT SERPL-MCNC: 1.2 MG/DL — SIGNIFICANT CHANGE UP (ref 0.5–1.3)
CREAT SERPL-MCNC: 1.22 MG/DL — SIGNIFICANT CHANGE UP (ref 0.5–1.3)
EOSINOPHIL # BLD AUTO: 0 K/UL — SIGNIFICANT CHANGE UP (ref 0–0.5)
EOSINOPHIL NFR BLD AUTO: 0 % — SIGNIFICANT CHANGE UP (ref 0–6)
GLUCOSE SERPL-MCNC: 142 MG/DL — HIGH (ref 70–99)
GLUCOSE SERPL-MCNC: 161 MG/DL — HIGH (ref 70–99)
GLUCOSE SERPL-MCNC: 177 MG/DL — HIGH (ref 70–99)
HCT VFR BLD CALC: 34.6 % — LOW (ref 39–50)
HGB BLD-MCNC: 11 G/DL — LOW (ref 13–17)
INR BLD: 1.15 RATIO — SIGNIFICANT CHANGE UP (ref 0.88–1.16)
LYMPHOCYTES # BLD AUTO: 1.7 K/UL — SIGNIFICANT CHANGE UP (ref 1–3.3)
LYMPHOCYTES # BLD AUTO: 14 % — SIGNIFICANT CHANGE UP (ref 13–44)
MAGNESIUM SERPL-MCNC: 2.7 MG/DL — HIGH (ref 1.6–2.6)
MCHC RBC-ENTMCNC: 29.8 PG — SIGNIFICANT CHANGE UP (ref 27–34)
MCHC RBC-ENTMCNC: 32 GM/DL — SIGNIFICANT CHANGE UP (ref 32–36)
MCV RBC AUTO: 93.1 FL — SIGNIFICANT CHANGE UP (ref 80–100)
MONOCYTES # BLD AUTO: 0.5 K/UL — SIGNIFICANT CHANGE UP (ref 0–0.9)
MONOCYTES NFR BLD AUTO: 4.4 % — SIGNIFICANT CHANGE UP (ref 2–14)
NEUTROPHILS # BLD AUTO: 9.8 K/UL — HIGH (ref 1.8–7.4)
NEUTROPHILS NFR BLD AUTO: 81.4 % — HIGH (ref 43–77)
PHOSPHATE SERPL-MCNC: 4.3 MG/DL — SIGNIFICANT CHANGE UP (ref 2.5–4.5)
PLATELET # BLD AUTO: 387 K/UL — SIGNIFICANT CHANGE UP (ref 150–400)
POTASSIUM SERPL-MCNC: 4 MMOL/L — SIGNIFICANT CHANGE UP (ref 3.5–5.3)
POTASSIUM SERPL-MCNC: 4.1 MMOL/L — SIGNIFICANT CHANGE UP (ref 3.5–5.3)
POTASSIUM SERPL-MCNC: 4.4 MMOL/L — SIGNIFICANT CHANGE UP (ref 3.5–5.3)
POTASSIUM SERPL-SCNC: 4 MMOL/L — SIGNIFICANT CHANGE UP (ref 3.5–5.3)
POTASSIUM SERPL-SCNC: 4.1 MMOL/L — SIGNIFICANT CHANGE UP (ref 3.5–5.3)
POTASSIUM SERPL-SCNC: 4.4 MMOL/L — SIGNIFICANT CHANGE UP (ref 3.5–5.3)
PROT SERPL-MCNC: 7.7 G/DL — SIGNIFICANT CHANGE UP (ref 6–8.3)
PROTHROM AB SERPL-ACNC: 12.6 SEC — SIGNIFICANT CHANGE UP (ref 9.8–12.7)
RBC # BLD: 3.71 M/UL — LOW (ref 4.2–5.8)
RBC # FLD: 14.2 % — SIGNIFICANT CHANGE UP (ref 10.3–14.5)
SODIUM SERPL-SCNC: 139 MMOL/L — SIGNIFICANT CHANGE UP (ref 135–145)
SODIUM SERPL-SCNC: 142 MMOL/L — SIGNIFICANT CHANGE UP (ref 135–145)
SODIUM SERPL-SCNC: 143 MMOL/L — SIGNIFICANT CHANGE UP (ref 135–145)
WBC # BLD: 12.1 K/UL — HIGH (ref 3.8–10.5)
WBC # FLD AUTO: 12.1 K/UL — HIGH (ref 3.8–10.5)

## 2017-10-26 PROCEDURE — 99291 CRITICAL CARE FIRST HOUR: CPT

## 2017-10-26 PROCEDURE — 99223 1ST HOSP IP/OBS HIGH 75: CPT

## 2017-10-26 RX ORDER — MORPHINE SULFATE 50 MG/1
1 CAPSULE, EXTENDED RELEASE ORAL ONCE
Qty: 0 | Refills: 0 | Status: DISCONTINUED | OUTPATIENT
Start: 2017-10-26 | End: 2017-10-26

## 2017-10-26 RX ORDER — METOPROLOL TARTRATE 50 MG
2.5 TABLET ORAL ONCE
Qty: 0 | Refills: 0 | Status: COMPLETED | OUTPATIENT
Start: 2017-10-26 | End: 2017-10-26

## 2017-10-26 RX ORDER — HALOPERIDOL DECANOATE 100 MG/ML
0.5 INJECTION INTRAMUSCULAR ONCE
Qty: 0 | Refills: 0 | Status: COMPLETED | OUTPATIENT
Start: 2017-10-26 | End: 2017-10-26

## 2017-10-26 RX ORDER — METOPROLOL TARTRATE 50 MG
2.5 TABLET ORAL EVERY 6 HOURS
Qty: 0 | Refills: 0 | Status: DISCONTINUED | OUTPATIENT
Start: 2017-10-26 | End: 2017-10-26

## 2017-10-26 RX ORDER — ROBINUL 0.2 MG/ML
1 INJECTION INTRAMUSCULAR; INTRAVENOUS
Qty: 0 | Refills: 0 | Status: DISCONTINUED | OUTPATIENT
Start: 2017-10-26 | End: 2017-10-28

## 2017-10-26 RX ORDER — SODIUM CHLORIDE 9 MG/ML
1000 INJECTION, SOLUTION INTRAVENOUS
Qty: 0 | Refills: 0 | Status: DISCONTINUED | OUTPATIENT
Start: 2017-10-26 | End: 2017-10-28

## 2017-10-26 RX ORDER — ACETAMINOPHEN 500 MG
1000 TABLET ORAL ONCE
Qty: 0 | Refills: 0 | Status: COMPLETED | OUTPATIENT
Start: 2017-10-26 | End: 2017-10-26

## 2017-10-26 RX ORDER — METOPROLOL TARTRATE 50 MG
5 TABLET ORAL EVERY 6 HOURS
Qty: 0 | Refills: 0 | Status: DISCONTINUED | OUTPATIENT
Start: 2017-10-26 | End: 2017-10-26

## 2017-10-26 RX ORDER — MORPHINE SULFATE 50 MG/1
0.5 CAPSULE, EXTENDED RELEASE ORAL EVERY 4 HOURS
Qty: 0 | Refills: 0 | Status: DISCONTINUED | OUTPATIENT
Start: 2017-10-26 | End: 2017-10-28

## 2017-10-26 RX ORDER — SODIUM CHLORIDE 9 MG/ML
1000 INJECTION INTRAMUSCULAR; INTRAVENOUS; SUBCUTANEOUS
Qty: 0 | Refills: 0 | Status: DISCONTINUED | OUTPATIENT
Start: 2017-10-26 | End: 2017-10-26

## 2017-10-26 RX ORDER — METOPROLOL TARTRATE 50 MG
2.5 TABLET ORAL EVERY 4 HOURS
Qty: 0 | Refills: 0 | Status: DISCONTINUED | OUTPATIENT
Start: 2017-10-26 | End: 2017-10-28

## 2017-10-26 RX ORDER — KETOROLAC TROMETHAMINE 30 MG/ML
15 SYRINGE (ML) INJECTION ONCE
Qty: 0 | Refills: 0 | Status: DISCONTINUED | OUTPATIENT
Start: 2017-10-26 | End: 2017-10-26

## 2017-10-26 RX ADMIN — HALOPERIDOL DECANOATE 0.5 MILLIGRAM(S): 100 INJECTION INTRAMUSCULAR at 04:19

## 2017-10-26 RX ADMIN — MORPHINE SULFATE 1 MILLIGRAM(S): 50 CAPSULE, EXTENDED RELEASE ORAL at 06:13

## 2017-10-26 RX ADMIN — Medication 2.5 MILLIGRAM(S): at 12:00

## 2017-10-26 RX ADMIN — Medication 650 MILLIGRAM(S): at 01:44

## 2017-10-26 RX ADMIN — Medication 200 MILLIGRAM(S): at 05:03

## 2017-10-26 RX ADMIN — MORPHINE SULFATE 0.5 MILLIGRAM(S): 50 CAPSULE, EXTENDED RELEASE ORAL at 23:03

## 2017-10-26 RX ADMIN — MORPHINE SULFATE 1 MILLIGRAM(S): 50 CAPSULE, EXTENDED RELEASE ORAL at 06:43

## 2017-10-26 RX ADMIN — Medication 3 MILLILITER(S): at 15:25

## 2017-10-26 RX ADMIN — Medication 400 MILLIGRAM(S): at 21:43

## 2017-10-26 RX ADMIN — Medication 2.5 MILLIGRAM(S): at 05:04

## 2017-10-26 RX ADMIN — LEVETIRACETAM 420 MILLIGRAM(S): 250 TABLET, FILM COATED ORAL at 17:32

## 2017-10-26 RX ADMIN — Medication 2.5 MILLIGRAM(S): at 14:41

## 2017-10-26 RX ADMIN — Medication 2.5 MILLIGRAM(S): at 17:32

## 2017-10-26 RX ADMIN — Medication 3 MILLILITER(S): at 08:34

## 2017-10-26 RX ADMIN — Medication 1 APPLICATION(S): at 11:55

## 2017-10-26 RX ADMIN — Medication 3 MILLILITER(S): at 03:57

## 2017-10-26 RX ADMIN — Medication 650 MILLIGRAM(S): at 20:36

## 2017-10-26 RX ADMIN — Medication 2.5 MILLIGRAM(S): at 13:10

## 2017-10-26 RX ADMIN — Medication 2.5 MILLIGRAM(S): at 21:24

## 2017-10-26 RX ADMIN — LEVETIRACETAM 420 MILLIGRAM(S): 250 TABLET, FILM COATED ORAL at 05:03

## 2017-10-26 RX ADMIN — Medication 2.5 MILLIGRAM(S): at 07:27

## 2017-10-26 RX ADMIN — PANTOPRAZOLE SODIUM 40 MILLIGRAM(S): 20 TABLET, DELAYED RELEASE ORAL at 11:06

## 2017-10-26 RX ADMIN — Medication 650 MILLIGRAM(S): at 01:14

## 2017-10-26 RX ADMIN — Medication 650 MILLIGRAM(S): at 20:21

## 2017-10-26 RX ADMIN — MORPHINE SULFATE 0.5 MILLIGRAM(S): 50 CAPSULE, EXTENDED RELEASE ORAL at 23:16

## 2017-10-26 RX ADMIN — Medication 2.5 MILLIGRAM(S): at 11:07

## 2017-10-26 RX ADMIN — Medication 3 MILLILITER(S): at 21:21

## 2017-10-26 NOTE — PROGRESS NOTE ADULT - PROBLEM SELECTOR PLAN 4
S/p PCI x 5 stents  Continue with beta blocker  Plavix and aspirin held in evidence of intracranial hemorrhage

## 2017-10-26 NOTE — PROGRESS NOTE ADULT - SUBJECTIVE AND OBJECTIVE BOX
INTERVAL HPI/OVERNIGHT EVENTS: 89 year old male came in for syncope. After admission got a Large Rt Basal ganglia Hemorrhage which is enlarging in recent head CT. Overnight pt was complaining of severe headache which is mildly responsive to IV tylenols      PRESSORS: [ ] YES [x] NO    ANTIBIOTICS:   None     Cardiovascular:  metoprolol    tartrate Injectable 2.5 milliGRAM(s) IV Push every 12 hours  metoprolol    tartrate Injectable 2.5 milliGRAM(s) IV Push once    Pulmonary:  ALBUTerol    90 MICROgram(s) HFA Inhaler 1 Puff(s) Inhalation every 4 hours  ALBUTerol/ipratropium for Nebulization 3 milliLiter(s) Nebulizer every 6 hours  tiotropium 18 MICROgram(s) Capsule 1 Capsule(s) Inhalation daily    Other:  acetaminophen  Suppository. 650 milliGRAM(s) Rectal every 6 hours PRN  BACItracin   Ointment 1 Application(s) Topical daily  levETIRAcetam  IVPB 500 milliGRAM(s) IV Intermittent every 12 hours  lidocaine 1% Injectable 10 milliLiter(s) Local Injection once  pantoprazole  Injectable 40 milliGRAM(s) IV Push daily    acetaminophen  Suppository. 650 milliGRAM(s) Rectal every 6 hours PRN  ALBUTerol    90 MICROgram(s) HFA Inhaler 1 Puff(s) Inhalation every 4 hours  ALBUTerol/ipratropium for Nebulization 3 milliLiter(s) Nebulizer every 6 hours  BACItracin   Ointment 1 Application(s) Topical daily  levETIRAcetam  IVPB 500 milliGRAM(s) IV Intermittent every 12 hours  lidocaine 1% Injectable 10 milliLiter(s) Local Injection once  metoprolol    tartrate Injectable 2.5 milliGRAM(s) IV Push every 12 hours  metoprolol    tartrate Injectable 2.5 milliGRAM(s) IV Push once  pantoprazole  Injectable 40 milliGRAM(s) IV Push daily  tiotropium 18 MICROgram(s) Capsule 1 Capsule(s) Inhalation daily    Drug Dosing Weight  Height (cm): 167.64 (18 Oct 2017 22:12)  Weight (kg): 53.8 (25 Oct 2017 16:30)  BMI (kg/m2): 19.1 (25 Oct 2017 16:30)  BSA (m2): 1.6 (25 Oct 2017 16:30)    CENTRAL LINE: [ ] YES [ ] NO  LOCATION:   DATE INSERTED:  REMOVE: [ ] YES [ ] NO  EXPLAIN:    JOHNSON: [ ] YES [ ] NO    DATE INSERTED:  REMOVE:  [ ] YES [ ] NO  EXPLAIN:    A-LINE:  [ ] YES [ ] NO  LOCATION:   DATE INSERTED:  REMOVE:  [ ] YES [ ] NO  EXPLAIN:    PMH -reviewed admission note, no change since admission    ICU Vital Signs Last 24 Hrs  T(C): 35.6 (26 Oct 2017 05:30), Max: 37.1 (25 Oct 2017 11:13)  T(F): 96.1 (26 Oct 2017 05:30), Max: 98.7 (25 Oct 2017 11:13)  HR: 94 (26 Oct 2017 06:30) (58 - 105)  BP: 162/70 (26 Oct 2017 06:00) (92/41 - 174/59)  BP(mean): 93 (26 Oct 2017 06:00) (53 - 127)  ABP: --  ABP(mean): --  RR: 17 (26 Oct 2017 06:30) (14 - 31)  SpO2: 97% (26 Oct 2017 06:30) (90% - 100%)      ABG - ( 25 Oct 2017 10:23 )  pH: 7.21  /  pCO2: 57    /  pO2: 110   / HCO3: 22    / Base Excess: -5.6  /  SaO2: 96                    10-25 @ 07:01  -  10-26 @ 07:00  --------------------------------------------------------  IN: 691 mL / OUT: 600 mL / NET: 91 mL            PHYSICAL EXAM:    GENERAL: [ ]NAD, [ ]well-groomed, [ ]well-developed  HEAD:  [ ]Atraumatic, [ ]Normocephalic  EYES: [ ]EOMI, [ ]PERRLA, [ ]conjunctiva and sclera clear  ENMT: [ ]No tonsillar erythema, exudates, or enlargement; [ ]Moist mucous membranes, [ ]Good dentition, [ ]No lesions  NECK: [ ]Supple, normal appearance, [ ]No JVD; [ ]Normal thyroid; [ ]Trachea midline  NERVOUS SYSTEM:  [ ]Alert & Oriented X3, [ ]Good concentration; [ ]Motor Strength 5/5 B/L upper and lower extremities; [ ]DTRs 2+ intact and symmetric  CHEST/LUNG: [ ]No chest deformity; [ ]Normal percussion bilaterally; [ ]No rales, rhonchi, wheezing   HEART: [ ]Regular rate and rhythm; [ ]No murmurs, rubs, or gallops  ABDOMEN: [ ]Soft, Nontender, Nondistended; [ ]Bowel sounds present  EXTREMITIES:  [ ]2+ Peripheral Pulses, [ ]No clubbing, cyanosis, or edema  LYMPH: [ ]No lymphadenopathy noted  SKIN: [ ]No rashes or lesions; [ ]Good capillary refill      LABS:  CBC Full  -  ( 26 Oct 2017 04:32 )  WBC Count : 12.1 K/uL  Hemoglobin : 11.0 g/dL  Hematocrit : 34.6 %  Platelet Count - Automated : 387 K/uL  Mean Cell Volume : 93.1 fl  Mean Cell Hemoglobin : 29.8 pg  Mean Cell Hemoglobin Concentration : 32.0 gm/dL  Auto Neutrophil # : 9.8 K/uL  Auto Lymphocyte # : 1.7 K/uL  Auto Monocyte # : 0.5 K/uL  Auto Eosinophil # : 0.0 K/uL  Auto Basophil # : 0.0 K/uL  Auto Neutrophil % : 81.4 %  Auto Lymphocyte % : 14.0 %  Auto Monocyte % : 4.4 %  Auto Eosinophil % : 0.0 %  Auto Basophil % : 0.1 %    10-26    142  |  106  |  57<H>  ----------------------------<  161<H>  4.1   |  30  |  1.20    Ca    9.4      26 Oct 2017 04:32  Phos  4.3     10-26  Mg     2.7     10-26    TPro  7.7  /  Alb  2.7<L>  /  TBili  0.4  /  DBili  x   /  AST  100<H>  /  ALT  81<H>  /  AlkPhos  100  10-26    PT/INR - ( 26 Oct 2017 04:32 )   PT: 12.6 sec;   INR: 1.15 ratio                 RADIOLOGY & ADDITIONAL STUDIES REVIEWED:  Head CT scan: There is moderate diffuse parenchymal volume loss. There are areas of low   attenuation in the periventricular white matter likely related to mild   chronic microvascular ischemic changes.    Acute intraparenchymal hemorrhage centered in the right basal ganglia   increased in size measuring 8.7 x 3.3 cm, previously measuring 7.2 x 3.0   cm. There is surrounding vasogenic edema with mass effect on the adjacent   brain parenchyma and right lateral ventricle. There is increased mass   effect on the right lateral ventricle with right to left midline shift of   7 mm, previously about 6 mm. No hydrocephalus      [ ]GOALS OF CARE DISCUSSION WITH PATIENT/FAMILY/PROXY: DNR, DNI    CRITICAL CARE TIME SPENT: 35 minutes INTERVAL HPI/OVERNIGHT EVENTS: 89 year old male came in for syncope. After admission got a Large Rt Basal ganglia Hemorrhage which is enlarging in recent head CT. Overnight pt was complaining of severe headache which is controlled with morphine and tylenol.       PRESSORS: [ ] YES [x] NO    ANTIBIOTICS:   None     Cardiovascular:  metoprolol    tartrate Injectable 2.5 milliGRAM(s) IV Push every 12 hours  metoprolol    tartrate Injectable 2.5 milliGRAM(s) IV Push once    Pulmonary:  ALBUTerol    90 MICROgram(s) HFA Inhaler 1 Puff(s) Inhalation every 4 hours  ALBUTerol/ipratropium for Nebulization 3 milliLiter(s) Nebulizer every 6 hours  tiotropium 18 MICROgram(s) Capsule 1 Capsule(s) Inhalation daily    Other:  acetaminophen  Suppository. 650 milliGRAM(s) Rectal every 6 hours PRN  BACItracin   Ointment 1 Application(s) Topical daily  levETIRAcetam  IVPB 500 milliGRAM(s) IV Intermittent every 12 hours  lidocaine 1% Injectable 10 milliLiter(s) Local Injection once  pantoprazole  Injectable 40 milliGRAM(s) IV Push daily    acetaminophen  Suppository. 650 milliGRAM(s) Rectal every 6 hours PRN  ALBUTerol    90 MICROgram(s) HFA Inhaler 1 Puff(s) Inhalation every 4 hours  ALBUTerol/ipratropium for Nebulization 3 milliLiter(s) Nebulizer every 6 hours  BACItracin   Ointment 1 Application(s) Topical daily  levETIRAcetam  IVPB 500 milliGRAM(s) IV Intermittent every 12 hours  lidocaine 1% Injectable 10 milliLiter(s) Local Injection once  metoprolol    tartrate Injectable 2.5 milliGRAM(s) IV Push every 12 hours  metoprolol    tartrate Injectable 2.5 milliGRAM(s) IV Push once  pantoprazole  Injectable 40 milliGRAM(s) IV Push daily  tiotropium 18 MICROgram(s) Capsule 1 Capsule(s) Inhalation daily    Drug Dosing Weight  Height (cm): 167.64 (18 Oct 2017 22:12)  Weight (kg): 53.8 (25 Oct 2017 16:30)  BMI (kg/m2): 19.1 (25 Oct 2017 16:30)  BSA (m2): 1.6 (25 Oct 2017 16:30)    CENTRAL LINE: [ ] YES [x] NO  JOHNSON: [x] YES [ ] NO  A-LINE:  [ ] YES [x] NO    PMH -reviewed admission note, no change since admission    ICU Vital Signs Last 24 Hrs  T(C): 35.6 (26 Oct 2017 05:30), Max: 37.1 (25 Oct 2017 11:13)  T(F): 96.1 (26 Oct 2017 05:30), Max: 98.7 (25 Oct 2017 11:13)  HR: 94 (26 Oct 2017 06:30) (58 - 105)  BP: 162/70 (26 Oct 2017 06:00) (92/41 - 174/59)  BP(mean): 93 (26 Oct 2017 06:00) (53 - 127)  ABP: --  ABP(mean): --  RR: 17 (26 Oct 2017 06:30) (14 - 31)  SpO2: 97% (26 Oct 2017 06:30) (90% - 100%)      ABG - ( 25 Oct 2017 10:23 )  pH: 7.21  /  pCO2: 57    /  pO2: 110   / HCO3: 22    / Base Excess: -5.6  /  SaO2: 96                    10-25 @ 07:01  -  10-26 @ 07:00  --------------------------------------------------------  IN: 691 mL / OUT: 600 mL / NET: 91 mL            PHYSICAL EXAM:    GENERAL: Well developed white male, Sleepy and not responding to commands, On nasal canula  HEENT:  Normocephalic/Atraumatic, non reactive light reflexes (fixed dilated), dry mucous membranes, Rt eye lid closed mostly   NECK: Supple, no JVD  RESP: Symmetric movement of the chest, clear to auscultation bilaterally, no wheeze, no rhonchi, no crackles  CVS: + irregular tachy rate, S1 and S2 audible, no murmur, rubs or gallops noted  GI: Normal active bowel sounds present, abdomen soft, non tender, non distended, + Johnson  EXTREMITIES: Bruising of both arms, Wrapped Left hand, Rt arm tremors at baseline   MSK: 0/5 strength bilateral upper and lower extremities  PSYCH: Normal mood, normal affect observed  NEURO: alert and oriented x 0, Babinski +ve, Light reflexes Negative          LABS:  CBC Full  -  ( 26 Oct 2017 04:32 )  WBC Count : 12.1 K/uL  Hemoglobin : 11.0 g/dL  Hematocrit : 34.6 %  Platelet Count - Automated : 387 K/uL  Mean Cell Volume : 93.1 fl  Mean Cell Hemoglobin : 29.8 pg  Mean Cell Hemoglobin Concentration : 32.0 gm/dL  Auto Neutrophil # : 9.8 K/uL  Auto Lymphocyte # : 1.7 K/uL  Auto Monocyte # : 0.5 K/uL  Auto Eosinophil # : 0.0 K/uL  Auto Basophil # : 0.0 K/uL  Auto Neutrophil % : 81.4 %  Auto Lymphocyte % : 14.0 %  Auto Monocyte % : 4.4 %  Auto Eosinophil % : 0.0 %  Auto Basophil % : 0.1 %    10-26    142  |  106  |  57<H>  ----------------------------<  161<H>  4.1   |  30  |  1.20    Ca    9.4      26 Oct 2017 04:32  Phos  4.3     10-26  Mg     2.7     10-26    TPro  7.7  /  Alb  2.7<L>  /  TBili  0.4  /  DBili  x   /  AST  100<H>  /  ALT  81<H>  /  AlkPhos  100  10-26    PT/INR - ( 26 Oct 2017 04:32 )   PT: 12.6 sec;   INR: 1.15 ratio                 RADIOLOGY & ADDITIONAL STUDIES REVIEWED:  Head CT scan: There is moderate diffuse parenchymal volume loss. There are areas of low   attenuation in the periventricular white matter likely related to mild   chronic microvascular ischemic changes.    Acute intraparenchymal hemorrhage centered in the right basal ganglia   increased in size measuring 8.7 x 3.3 cm, previously measuring 7.2 x 3.0   cm. There is surrounding vasogenic edema with mass effect on the adjacent   brain parenchyma and right lateral ventricle. There is increased mass   effect on the right lateral ventricle with right to left midline shift of   7 mm, previously about 6 mm. No hydrocephalus      [ ]GOALS OF CARE DISCUSSION WITH PATIENT/FAMILY/PROXY: DNR, DNI    CRITICAL CARE TIME SPENT: 35 minutes

## 2017-10-26 NOTE — PROGRESS NOTE ADULT - PROBLEM SELECTOR PLAN 9
Improve VTE score = 2  SCDs for DVT chemoprophylaxis in presence of acute intracranial hemorrhage  Protonix for GI prophylaxis

## 2017-10-26 NOTE — CONSULT NOTE ADULT - CONSULT REASON
fall  intercranial hemorrhage
ICH with AMS
IPU eval
Left knee DJD, pain, swelling
Syncope
carotid stenosis

## 2017-10-26 NOTE — PROGRESS NOTE ADULT - PROBLEM SELECTOR PLAN 7
Resolved   Continue with Tylenol   Knee Xray: Mild patellofemoral osteoarthritis and chondrocalcinosis in addition to mild supra-patellar joint effusion   Ortho Dr. Hull aspirated 10 CC fluid and injected 80 mg of Depomedrol with 5 CC of lidocaine into joint 10/24

## 2017-10-26 NOTE — CONSULT NOTE ADULT - PROBLEM SELECTOR RECOMMENDATION 9
Acute intraparenchymal hemorrhage centered in the right basal ganglia,   increased in size per CT. Family requesting comfort measures. If patient becomes symptomatic, he may benefit from IPU. Poor prognosis. DNR / DNI on file.

## 2017-10-26 NOTE — PROGRESS NOTE ADULT - SUBJECTIVE AND OBJECTIVE BOX
CHIEF COMPLAINT:Patient is a 89y old  Male who presents with a chief complaint of fall, possible syncope .Events noted, s/p ICB on CT scan transfered to ICU.    	  REVIEW OF SYSTEMS:  [X ] Unable to obtain    PHYSICAL EXAM:  T(C): 36.7 (10-26-17 @ 08:00), Max: 36.7 (10-26-17 @ 08:00)  HR: 110 (10-26-17 @ 12:00) (58 - 114)  BP: 164/69 (10-26-17 @ 12:00) (92/41 - 178/74)  RR: 21 (10-26-17 @ 12:00) (14 - 31)  SpO2: 98% (10-26-17 @ 12:00) (90% - 100%)    I&O's Summary    25 Oct 2017 07:01  -  26 Oct 2017 07:00  --------------------------------------------------------  IN: 691 mL / OUT: 600 mL / NET: 91 mL    26 Oct 2017 07:01  -  26 Oct 2017 12:28  --------------------------------------------------------  IN: 0 mL / OUT: 500 mL / NET: -500 mL        Appearance: Normal	  HEENT:   Normal oral mucosa, PERRL, EOMI	  Lymphatic: No lymphadenopathy  Cardiovascular: Normal S1 S2, No JVD, No murmurs, No edema  Respiratory: Lungs clear to auscultation	  Gastrointestinal:  Soft, Non-tender, + BS	  Skin: No rashes, No ecchymoses, No cyanosis	  Extremities: Normal range of motion, No clubbing, cyanosis or edema  Vascular: Peripheral pulses palpable 2+ bilaterally    MEDICATIONS  (STANDING):  ALBUTerol    90 MICROgram(s) HFA Inhaler 1 Puff(s) Inhalation every 4 hours  ALBUTerol/ipratropium for Nebulization 3 milliLiter(s) Nebulizer every 6 hours  BACItracin   Ointment 1 Application(s) Topical daily  levETIRAcetam  IVPB 500 milliGRAM(s) IV Intermittent every 12 hours  lidocaine 1% Injectable 10 milliLiter(s) Local Injection once  metoprolol    tartrate Injectable 2.5 milliGRAM(s) IV Push every 4 hours  pantoprazole  Injectable 40 milliGRAM(s) IV Push daily  tiotropium 18 MICROgram(s) Capsule 1 Capsule(s) Inhalation daily      	  LABS:	 	    CARDIAC MARKERS:  CARDIAC MARKERS ( 25 Oct 2017 17:45 )  0.106 ng/mL / x     / 79 U/L / x     / 2.8 ng/mL                          11.0   12.1  )-----------( 387      ( 26 Oct 2017 04:32 )             34.6     10-26    143  |  106  |  51<H>  ----------------------------<  177<H>  4.0   |  28  |  1.22    Ca    9.5      26 Oct 2017 08:50  Phos  4.3     10-26  Mg     2.7     10-26    TPro  7.7  /  Alb  2.7<L>  /  TBili  0.4  /  DBili  x   /  AST  100<H>  /  ALT  81<H>  /  AlkPhos  100  10-26       Lipid Profile: Cholesterol 104  LDL 65  HDL 18    Cholesterol 98  LDL 52  HDL 31  TG 76      EXAM:  CT BRAIN                            PROCEDURE DATE:  10/25/2017          INTERPRETATION:  CLINICAL STATEMENT: Follow-up hemorrhage    TECHNIQUE: CT of the head was performed without IV contrast.    COMPARISON: CT head 10/25/2017 at 11:35 AM    FINDINGS:  There is moderate diffuse parenchymal volume loss. There are areas of low   attenuation in the periventricular white matter likely related to mild   chronic microvascular ischemic changes.    Acute intraparenchymal hemorrhage centered inthe right basal ganglia   increased in size measuring 8.7 x 3.3 cm, previously measuring 7.2 x 3.0   cm. There is surrounding vasogenic edema with mass effect on the adjacent   brain parenchyma and right lateral ventricle. There is increased mass   effect on the right lateral ventricle with right to left midline shift of   7 mm, previously about 6 mm. No hydrocephalus    Nonspecific mild prominence of bilateral optic nerve sheaths, incidental   finding.    The cranium is intact.     Small retention cyst/polyp sphenoid sinus.    IMPRESSION:  Acute intraparenchymal hemorrhage centered in the right basal ganglia,   increased in size as described above.

## 2017-10-26 NOTE — PROGRESS NOTE ADULT - PROBLEM SELECTOR PLAN 1
Pt admitted for fall and syncope  Initial Head CT scan was normal  Last night due to change in mental status repeat Head CT showed Rt basal Ganglia bleed; ABG showed Resp Acidosis  Severe headache  CT head reveals acute large intraparenchymal hemorrhage in the right basal ganglia which is enlarging   Family is refusing Neurosurgery  Seizure prophylaxis with Keppra   Started 3% NaCl; Na 136 --> 142  Pain control with Tylenol suppository  Goal Na 145-150  Neurology Dr. Ramon Pt admitted for fall and syncope  Initial Head CT scan was normal  Last night due to change in mental status repeat Head CT showed Rt basal Ganglia bleed; ABG showed Resp Acidosis  Severe headache  CT head reveals acute large intraparenchymal hemorrhage in the right basal ganglia which is enlarging   Family is refusing Neurosurgery  NPO; aspiration risk (0.9 NaCl for maintenance)  Seizure prophylaxis with Keppra   Can start Ativan prn for seizures  Initially Started 3% NaCl; Na 136 --> 142  Pain control with Tylenol suppository  Neurology Dr. Ramon

## 2017-10-26 NOTE — PROGRESS NOTE ADULT - ASSESSMENT
Patient is a 89 yr old Male pt with PMHx of arthritis, asthma, CAD, 5 stents,  HTN, laryngeal CA, CKD stage III recent admission in July for CHF exacerbation ( needing BiPaP and ICU admission) presents to ED c/o syncopal episode, now ICB.   1.ICU monitoring.  2.CAD - cont lopressor.  3.Off ASA and Plavix, s/p PLT  4.Supportive care.

## 2017-10-26 NOTE — CONSULT NOTE ADULT - SUBJECTIVE AND OBJECTIVE BOX
HPI:  89 y/o M presented to ED c/o syncopal episode. During hospital course, patient with AMS; CTH was done and showed intercranial hemorrhage. Per report, family doesn't want any intervention; comfort measures. DNR/DNI on file.        PAST MEDICAL & SURGICAL HISTORY:  Coronary artery disease involving native coronary artery of native heart without angina pectoris  Laryngeal cancer  Essential hypertension  Asthma  Arthritis  Degenerative joint disease  No significant past surgical history      SOCIAL HISTORY:    Admitted from:  home assisted living LEIF   Congregation:                                    Preferred Language: Malagasy    Surrogate/HCP/Guardian:            Phone#:    FAMILY HISTORY:    Baseline ADLs (prior to admission):    Allergies    No Known Allergies    Intolerances      Present Symptoms:   Dyspnea:   Nausea/Vomiting:   Anxiety:  Depressed   Fatigue:  Loss of appetite:   Pain:                                location:          Review of Systems: [All others negative or Unable to obtain due to poor mentation]    MEDICATIONS  (STANDING):  ALBUTerol    90 MICROgram(s) HFA Inhaler 1 Puff(s) Inhalation every 4 hours  ALBUTerol/ipratropium for Nebulization 3 milliLiter(s) Nebulizer every 6 hours  BACItracin   Ointment 1 Application(s) Topical daily  levETIRAcetam  IVPB 500 milliGRAM(s) IV Intermittent every 12 hours  lidocaine 1% Injectable 10 milliLiter(s) Local Injection once  metoprolol    tartrate Injectable 2.5 milliGRAM(s) IV Push every 4 hours  pantoprazole  Injectable 40 milliGRAM(s) IV Push daily  tiotropium 18 MICROgram(s) Capsule 1 Capsule(s) Inhalation daily    MEDICATIONS  (PRN):  acetaminophen  Suppository. 650 milliGRAM(s) Rectal every 6 hours PRN Moderate Pain (4 - 6)      PHYSICAL EXAM:    Vital Signs Last 24 Hrs  T(C): 37.2 (26 Oct 2017 12:00), Max: 37.2 (26 Oct 2017 12:00)  T(F): 99 (26 Oct 2017 12:00), Max: 99 (26 Oct 2017 12:00)  HR: 96 (26 Oct 2017 14:00) (58 - 135)  BP: 158/61 (26 Oct 2017 14:00) (92/41 - 185/78)  BP(mean): 87 (26 Oct 2017 14:00) (53 - 127)  RR: 19 (26 Oct 2017 14:00) (14 - 31)  SpO2: 99% (26 Oct 2017 14:00) (90% - 100%)    General: alert  oriented x ____    [lethargic distressed cachexia  nonverbal  unarousable verbal]  Karnofsky Performance Score/Palliative Performance Status Version2:     %    HEENT: normal  dry mouth  ET tube/trach oral lesions:  Lungs: comfortable tachypnea/labored breathing  excessive secretions  CV: normal  tachycardia  GI: normal  distended  tender  incontinent               PEG/NG/OG tube  constipation  last BM:   : normal  incontinent  oliguria/anuria  sandoval  Musculoskeletal: normal  weakness  edema             ambulatory  bedbound/wheelchair bound  Skin: normal  pressure ulcers: stage: edema: other:  Neuro: no deficits cognitive impairment dsyphagia/dysarthria paresis: other:  Oral intake ability: unable/only mouth care [minimal moderate full capability]  Diet: [NPO]    LABS:                        11.0   12.1  )-----------( 387      ( 26 Oct 2017 04:32 )             34.6     10-26    143  |  106  |  51<H>  ----------------------------<  177<H>  4.0   |  28  |  1.22    Ca    9.5      26 Oct 2017 08:50  Phos  4.3     10-26  Mg     2.7     10-26    TPro  7.7  /  Alb  2.7<L>  /  TBili  0.4  /  DBili  x   /  AST  100<H>  /  ALT  81<H>  /  AlkPhos  100  10-26        RADIOLOGY & ADDITIONAL STUDIES:    ADVANCE DIRECTIVES:   Advanced Care Planning discussion total time spent: HPI:  89 y/o M presented to ED c/o syncopal episode. During hospital course, patient with AMS; CTH was done and showed intercranial hemorrhage. Per report, family doesn't want any intervention; comfort measures. DNR/DNI on file.        PAST MEDICAL & SURGICAL HISTORY:  Coronary artery disease involving native coronary artery of native heart without angina pectoris  Laryngeal cancer  Essential hypertension  Asthma  Arthritis  Degenerative joint disease  No significant past surgical history      SOCIAL HISTORY:    Admitted from:  home, A  [place]:            Alevism                        Preferred Language: Algerian    Surrogate/HCP/Guardian: Thom Otto (son) [phone]    FAMILY HISTORY: noncontributory to current condition    Baseline ADLs (prior to admission): assist with ADLs except feeding; ambulatory with assistance/RW    No Known Allergies    Present Symptoms:   Review of Systems:  Unable to obtain due to poor mentation    MEDICATIONS  (STANDING):  ALBUTerol    90 MICROgram(s) HFA Inhaler 1 Puff(s) Inhalation every 4 hours  ALBUTerol/ipratropium for Nebulization 3 milliLiter(s) Nebulizer every 6 hours  BACItracin   Ointment 1 Application(s) Topical daily  levETIRAcetam  IVPB 500 milliGRAM(s) IV Intermittent every 12 hours  lidocaine 1% Injectable 10 milliLiter(s) Local Injection once  metoprolol    tartrate Injectable 2.5 milliGRAM(s) IV Push every 4 hours  pantoprazole  Injectable 40 milliGRAM(s) IV Push daily  tiotropium 18 MICROgram(s) Capsule 1 Capsule(s) Inhalation daily    MEDICATIONS  (PRN):  acetaminophen  Suppository. 650 milliGRAM(s) Rectal every 6 hours PRN Moderate Pain (4 - 6)      PHYSICAL EXAM:    Vital Signs Last 24 Hrs  T(C): 37.2 (26 Oct 2017 12:00), Max: 37.2 (26 Oct 2017 12:00)  T(F): 99 (26 Oct 2017 12:00), Max: 99 (26 Oct 2017 12:00)  HR: 96 (26 Oct 2017 14:00) (58 - 135)  BP: 158/61 (26 Oct 2017 14:00) (92/41 - 185/78)  BP(mean): 87 (26 Oct 2017 14:00) (53 - 127)  RR: 19 (26 Oct 2017 14:00) (14 - 31)  SpO2: 99% (26 Oct 2017 14:00) (90% - 100%)    General: not alert/oriented; minimally responsive to tactile stimuli, nonverbal   Karnofsky Performance Score/Palliative Performance Status Version2:    10 %    HEENT: dry mouth  Lungs: comfortable, on nasal canula  CV: mild tachycardia  GI:  incontinent  : catheter  Musculoskeletal: bedbound, unable to follow commands. R hand tremors - baseline  Skin: ecchymosis BUE  Neuro:  cognitive impairment, not alert/oriented, minimally responsive to tactile stimuli. Unable to follow commands; nonverbal  Oral intake ability: unable/only mouth care   Diet: NPO    LABS:                        11.0   12.1  )-----------( 387      ( 26 Oct 2017 04:32 )             34.6     10-26    143  |  106  |  51<H>  ----------------------------<  177<H>  4.0   |  28  |  1.22    Ca    9.5      26 Oct 2017 08:50  Phos  4.3     10-26  Mg     2.7     10-26    TPro  7.7  /  Alb  2.7<L>  /  TBili  0.4  /  DBili  x   /  AST  100<H>  /  ALT  81<H>  /  AlkPhos  100  10-26        RADIOLOGY & ADDITIONAL STUDIES:  < from: CT Head No Cont (10.25.17 @ 18:41) >  EXAM:  CT BRAIN                        PROCEDURE DATE:  10/25/2017    INTERPRETATION:  CLINICAL STATEMENT: Follow-up hemorrhage  TECHNIQUE: CT of the head was performed without IV contrast.  COMPARISON: CT head 10/25/2017 at 11:35 AM  FINDINGS:  There is moderate diffuse parenchymal volume loss. There are areas of low   attenuation in the periventricular white matter likely related to mild   chronic microvascular ischemic changes.  Acute intraparenchymal hemorrhage centered inthe right basal ganglia   increased in size measuring 8.7 x 3.3 cm, previously measuring 7.2 x 3.0   cm. There is surrounding vasogenic edema with mass effect on the adjacent   brain parenchyma and right lateral ventricle. There is increased mass   effect on the right lateral ventricle with right to left midline shift of   7 mm, previously about 6 mm. No hydrocephalus    Nonspecific mild prominence of bilateral optic nerve sheaths, incidental   finding.    The cranium is intact.     Small retention cyst/polyp sphenoid sinus.    IMPRESSION:  Acute intraparenchymal hemorrhage centered in the right basal ganglia,   increased in size as described above.      < end of copied text >      ADVANCE DIRECTIVES: DNR / DNI HPI:  89 y/o M presented to ED c/o syncopal episode. During hospital course, patient with AMS; CTH was done and showed intercranial hemorrhage. Per report, family doesn't want any intervention; comfort measures. DNR/DNI on file.        PAST MEDICAL & SURGICAL HISTORY:  Coronary artery disease involving native coronary artery of native heart without angina pectoris  Laryngeal cancer  Essential hypertension  Asthma  Arthritis  Degenerative joint disease  No significant past surgical history      SOCIAL HISTORY:    Admitted from:  home, A  Yazidi:            Sabianism                        Preferred Language: Canadian    Surrogate/HCP/Guardian: Thom Otto (son) [phone]    FAMILY HISTORY: noncontributory to current condition    Baseline ADLs (prior to admission): assist with ADLs except feeding; ambulatory with assistance/RW    No Known Allergies    Present Symptoms:   Review of Systems:  Unable to obtain due to poor mentation    MEDICATIONS  (STANDING):  ALBUTerol    90 MICROgram(s) HFA Inhaler 1 Puff(s) Inhalation every 4 hours  ALBUTerol/ipratropium for Nebulization 3 milliLiter(s) Nebulizer every 6 hours  BACItracin   Ointment 1 Application(s) Topical daily  levETIRAcetam  IVPB 500 milliGRAM(s) IV Intermittent every 12 hours  lidocaine 1% Injectable 10 milliLiter(s) Local Injection once  metoprolol    tartrate Injectable 2.5 milliGRAM(s) IV Push every 4 hours  pantoprazole  Injectable 40 milliGRAM(s) IV Push daily  tiotropium 18 MICROgram(s) Capsule 1 Capsule(s) Inhalation daily    MEDICATIONS  (PRN):  acetaminophen  Suppository. 650 milliGRAM(s) Rectal every 6 hours PRN Moderate Pain (4 - 6)      PHYSICAL EXAM:    Vital Signs Last 24 Hrs  T(C): 37.2 (26 Oct 2017 12:00), Max: 37.2 (26 Oct 2017 12:00)  T(F): 99 (26 Oct 2017 12:00), Max: 99 (26 Oct 2017 12:00)  HR: 96 (26 Oct 2017 14:00) (58 - 135)  BP: 158/61 (26 Oct 2017 14:00) (92/41 - 185/78)  BP(mean): 87 (26 Oct 2017 14:00) (53 - 127)  RR: 19 (26 Oct 2017 14:00) (14 - 31)  SpO2: 99% (26 Oct 2017 14:00) (90% - 100%)    General: not alert/oriented; minimally responsive to tactile stimuli, nonverbal   Karnofsky Performance Score/Palliative Performance Status Version2:    10 %    HEENT: dry mouth  Lungs: comfortable, on nasal canula  CV: mild tachycardia  GI:  incontinent  : catheter  Musculoskeletal: bedbound, unable to follow commands. R hand tremors - baseline  Skin: ecchymosis BUE  Neuro:  cognitive impairment, not alert/oriented, minimally responsive to tactile stimuli. Unable to follow commands; nonverbal  Oral intake ability: unable/only mouth care   Diet: NPO    LABS:                        11.0   12.1  )-----------( 387      ( 26 Oct 2017 04:32 )             34.6     10-26    143  |  106  |  51<H>  ----------------------------<  177<H>  4.0   |  28  |  1.22    Ca    9.5      26 Oct 2017 08:50  Phos  4.3     10-26  Mg     2.7     10-26    TPro  7.7  /  Alb  2.7<L>  /  TBili  0.4  /  DBili  x   /  AST  100<H>  /  ALT  81<H>  /  AlkPhos  100  10-26        RADIOLOGY & ADDITIONAL STUDIES:  < from: CT Head No Cont (10.25.17 @ 18:41) >  EXAM:  CT BRAIN                        PROCEDURE DATE:  10/25/2017    INTERPRETATION:  CLINICAL STATEMENT: Follow-up hemorrhage  TECHNIQUE: CT of the head was performed without IV contrast.  COMPARISON: CT head 10/25/2017 at 11:35 AM  FINDINGS:  There is moderate diffuse parenchymal volume loss. There are areas of low   attenuation in the periventricular white matter likely related to mild   chronic microvascular ischemic changes.  Acute intraparenchymal hemorrhage centered inthe right basal ganglia   increased in size measuring 8.7 x 3.3 cm, previously measuring 7.2 x 3.0   cm. There is surrounding vasogenic edema with mass effect on the adjacent   brain parenchyma and right lateral ventricle. There is increased mass   effect on the right lateral ventricle with right to left midline shift of   7 mm, previously about 6 mm. No hydrocephalus    Nonspecific mild prominence of bilateral optic nerve sheaths, incidental   finding.    The cranium is intact.     Small retention cyst/polyp sphenoid sinus.    IMPRESSION:  Acute intraparenchymal hemorrhage centered in the right basal ganglia,   increased in size as described above.      < end of copied text >      ADVANCE DIRECTIVES: DNR / DNI

## 2017-10-26 NOTE — PROGRESS NOTE ADULT - SUBJECTIVE AND OBJECTIVE BOX
Neurology Follow up note    Name  ORTIZ GUPTA    Subjective:  patient transferred to ICU, pupil noted to be non reactive on the right.  Has repeat CTH.  Family says patient was in pain last night, would like him to be comfortable at this time and currently decline transfer or aggressive measures and interested in palliative measures only    Review of Systems:  + pain   no fever  no cough      MEDICATIONS  (STANDING):  ALBUTerol    90 MICROgram(s) HFA Inhaler 1 Puff(s) Inhalation every 4 hours  ALBUTerol/ipratropium for Nebulization 3 milliLiter(s) Nebulizer every 6 hours  BACItracin   Ointment 1 Application(s) Topical daily  levETIRAcetam  IVPB 500 milliGRAM(s) IV Intermittent every 12 hours  lidocaine 1% Injectable 10 milliLiter(s) Local Injection once  metoprolol    tartrate Injectable 2.5 milliGRAM(s) IV Push every 4 hours  metoprolol    tartrate Injectable 2.5 milliGRAM(s) IV Push once  metoprolol    tartrate Injectable 2.5 milliGRAM(s) IV Push once  pantoprazole  Injectable 40 milliGRAM(s) IV Push daily  sodium chloride 0.9%. 1000 milliLiter(s) (50 mL/Hr) IV Continuous <Continuous>  tiotropium 18 MICROgram(s) Capsule 1 Capsule(s) Inhalation daily    MEDICATIONS  (PRN):  acetaminophen  Suppository. 650 milliGRAM(s) Rectal every 6 hours PRN Moderate Pain (4 - 6)      Allergies    No Known Allergies    Intolerances        Objective:   Vital Signs Last 24 Hrs  T(C): 36.7 (26 Oct 2017 08:00), Max: 36.7 (26 Oct 2017 08:00)  T(F): 98.1 (26 Oct 2017 08:00), Max: 98.1 (26 Oct 2017 08:00)  HR: 135 (26 Oct 2017 13:00) (58 - 135)  BP: 185/78 (26 Oct 2017 13:00) (92/41 - 185/78)  BP(mean): 107 (26 Oct 2017 13:00) (53 - 127)  RR: 17 (26 Oct 2017 13:00) (14 - 31)  SpO2: 97% (26 Oct 2017 13:00) (90% - 100%)    General Exam:   General appearance: No acute distress                 Cardiovascular: Pedal dorsalis pulses intact bilaterally    Neurological Exam:  Eyes closed, no response to verbal stimuli/ LT or deep sternal rub.  Non verbal.  Does not follow requests.  right pupil 2.5->NR, left pupil surgical and NR.  no gross facial asymmetry.  Tone increased in the left arm.  Right arm shaking.  No active movements or withdraw to deep pain at all 4 ext.      Labs:  10-26    143  |  106  |  51<H>  ----------------------------<  177<H>  4.0   |  28  |  1.22    Ca    9.5      26 Oct 2017 08:50  Phos  4.3     10-26  Mg     2.7     10-26    TPro  7.7  /  Alb  2.7<L>  /  TBili  0.4  /  DBili  x   /  AST  100<H>  /  ALT  81<H>  /  AlkPhos  100  10-26    10-26    143  |  106  |  51<H>  ----------------------------<  177<H>  4.0   |  28  |  1.22    Ca    9.5      26 Oct 2017 08:50  Phos  4.3     10-26  Mg     2.7     10-26    TPro  7.7  /  Alb  2.7<L>  /  TBili  0.4  /  DBili  x   /  AST  100<H>  /  ALT  81<H>  /  AlkPhos  100  10-26    LIVER FUNCTIONS - ( 26 Oct 2017 04:32 )  Alb: 2.7 g/dL / Pro: 7.7 g/dL / ALK PHOS: 100 U/L / ALT: 81 U/L DA / AST: 100 U/L / GGT: x             Radiology    CTH (images reviwed): < from: CT Head No Cont (10.25.17 @ 18:41) >  IMPRESSION:  Acute intraparenchymal hemorrhage centered in the right basal ganglia,   increased in size as described above.    < end of copied text >

## 2017-10-26 NOTE — PROGRESS NOTE ADULT - PROBLEM SELECTOR PLAN 2
Presented with syncope  Orthostasis -ve  EKG without any ST or T wave changes  Mildly elevated trops 0.106  Carotid US: Moderate stenosis of Right carotid artery Presented with syncope  Orthostasis -ve  EKG without any ST or T wave changes  Mildly elevated trops 0.106  Echo 55% With G2DD  Carotid US: Moderate stenosis of Right carotid artery

## 2017-10-26 NOTE — PROGRESS NOTE ADULT - PROBLEM SELECTOR PLAN 3
Continue with metoprolol  Can start home med Amlodipine if uncontrol Continue with metoprolol 2.5 mg q4  Can start home med Amlodipine if uncontrol Continue with metoprolol 2.5 mg q4  Can start Vasotec if needed   Goal SBP <130

## 2017-10-26 NOTE — PROGRESS NOTE ADULT - ASSESSMENT
Right basal ganglia hemorrhage, worsening with concerns of brain herniation.  Family would like palliative measures and declines aggressive measures including mannitol and hypertonic saline (spoke with son and daughter  No AC, ASA, NSAIDs.  BP goal of normal. Pain control as per palliative care.  Right arm shaking, concerning for focal seizure with possible secondary generalization.  Given Ativan 2 x 3max.  Can consider increasing Keppra to 1gram bid.  EEG, if family desires this test, may be helpful to evaluate for seizures.  Sz and fall ppx.

## 2017-10-26 NOTE — PROGRESS NOTE ADULT - ASSESSMENT
89M PMHx CAD (s/p PCI x 5), Asthma, HTN, Laryngeal CA, CKD Stage III, CHF, OA admitted for multiple falls and syncope evaluation 89M PMHx CAD (s/p PCI x 5), Asthma, HTN, Laryngeal CA, CKD Stage III, CHF, OA admitted for multiple falls and syncope evaluation. CT head showing Enlarging Rt basal ganglia bleed. Palliative and comfort care.  Spoke to the family at beside. Explained the consequences of enlarging bleed and possible outcomes. Family doesn't want any intervention and want to keep the patient in ICU for now for comfort cares.

## 2017-10-26 NOTE — CONSULT NOTE ADULT - PROBLEM SELECTOR RECOMMENDATION 2
s/t intraparenchymal hemorrhage. Patient not alert/oriented; nonverbal; unable to follow commands. Comfort measures.

## 2017-10-26 NOTE — CONSULT NOTE ADULT - PROBLEM SELECTOR RECOMMENDATION 3
s/t intraparenchymal hemorrhage. Patient now bedbound, dependent on all ADLs, nonverbal; unable to follow commands. Patient requires 24 hr care.

## 2017-10-26 NOTE — PROGRESS NOTE ADULT - SUBJECTIVE AND OBJECTIVE BOX
_________________________________________________________________________________________  ========>>  M E D I C A L   A T T E N D I N G    F O L L O W  U P  N O T E  <<=========  -----------------------------------------------------------------------------------------------------    - Patient seen and examined by me earlier today.        Pt was reportedly very agitated overnight per Dtr, now calm and comfortable sleeping post morphine for headache    ==================>> MEDICATIONS <<====================    ALBUTerol    90 MICROgram(s) HFA Inhaler 1 Puff(s) Inhalation every 4 hours  ALBUTerol/ipratropium for Nebulization 3 milliLiter(s) Nebulizer every 6 hours  BACItracin   Ointment 1 Application(s) Topical daily  levETIRAcetam  IVPB 500 milliGRAM(s) IV Intermittent every 12 hours  lidocaine 1% Injectable 10 milliLiter(s) Local Injection once  metoprolol    tartrate Injectable 2.5 milliGRAM(s) IV Push every 4 hours  pantoprazole  Injectable 40 milliGRAM(s) IV Push daily  tiotropium 18 MICROgram(s) Capsule 1 Capsule(s) Inhalation daily    MEDICATIONS  (PRN):  acetaminophen  Suppository. 650 milliGRAM(s) Rectal every 6 hours PRN Moderate Pain (4 - 6)    ==================>> REVIEW OF SYSTEM <<=================    limited as less arrousable    ==================>> VITAL SIGNS <<==================    Vital Signs Last 24 Hrs  T(C): 36.7 (10-26-17 @ 08:00)  T(F): 98.1 (10-26-17 @ 08:00), Max: 98.1 (10-26-17 @ 08:00)  HR: 135 (10-26-17 @ 13:00) (58 - 135)  BP: 185/78 (10-26-17 @ 13:00)  BP(mean): 107 (10-26-17 @ 13:00) (53 - 127)  RR: 17 (10-26-17 @ 13:00) (14 - 31)  SpO2: 97% (10-26-17 @ 13:00) (90% - 100%)    CAPILLARY BLOOD GLUCOSE        ==================>> PHYSICAL EXAM <<=================    GEN: Sleepy  NAD , comfortable  HEENT: NCAT, MMM  Neck: supple , no JVD  CVS: S1S2 , regular , No M/R/G appreciated  PULM: CTA B/L,  no W/R/R appreciated  ABD.: soft. non tender, non distended,  bowel sounds present  Extrem: intact pulses , no edema, + scattered echymosis, + left hand wound wrapped      stable left knee with arthritic changes improved     ==================>> LAB AND IMAGING <<==================                                               11.0   12.1  )-----------( 387      ( 26 Oct 2017 04:32 )             34.6        10-26    143  |  106  |  51<H>  ----------------------------<  177<H>  4.0   |  28  |  1.22    Ca    9.5      26 Oct 2017 08:50  Phos  4.3     10-26  Mg     2.7     10-26    TPro  7.7  /  Alb  2.7<L>  /  TBili  0.4  /  DBili  x   /  AST  100<H>  /  ALT  81<H>  /  AlkPhos  100  10-26    < from: CT Head No Cont (10.25.17 @ 18:41) >  Acute intraparenchymal hemorrhage centered inthe right basal ganglia   increased in size measuring 8.7 x 3.3 cm, previously measuring 7.2 x 3.0   cm. There is surrounding vasogenic edema with mass effect on the adjacent   brain parenchyma and right lateral ventricle. There is increased mass   effect on the right lateral ventricle with right to left midline shift of   7 mm, previously about 6 mm. No hydrocephalus    < end of copied text >    ___________________________________________________________________________________  ===============>>  A S S E S S M E N T   A N D   P L A N <<===============  ------------------------------------------------------------------------------------------    · Assessment		  Patient is a 89 y/o M pt with PMHx of arthritis, asthma, CAD, 5 stents,  HTN, laryngeal CA, recent admission in July for CHF exacerbation ( needing BiPaP and ICU admission) presents to ED c/o syncopal episode x today.    Problem/Plan - 1:  ·  Problem: Acute hemorrhagic stroke with metabolic encephalopathy, worsening  ICU care and mgmt appreciated  pt now on Keppra and hypertonic salin given vasogenic edema and midline shift  family now considering supportive / conservative / palliative measures, pt is now DNR  Neurology f/u    Problem/Plan - 2:  ·  Problem: Essential hypertension  BP goals per neuro recom   DASH diet    Problem/Plan - 3:  ·  Problem: Coronary artery disease involving native coronary artery of native heart without angina pectoris  monitor vitals    Problem/Plan - 4:  ·  Problem: Asthma.  Plan: Nebs PRN  O2 2L via NC.     -GI/DVT Prophylaxis.  --------------------------------------------  Case discussed with Dtrs  ___________________________  H. KARLA Rich.  Pager: [phone]

## 2017-10-26 NOTE — CONSULT NOTE ADULT - CONSULT REQUESTED DATE/TIME
19-Oct-2017 10:17
24-Oct-2017 19:02
25-Oct-2017 15:32
26-Oct-2017 14:27
20-Oct-2017 17:50
25-Oct-2017 15:53

## 2017-10-26 NOTE — CONSULT NOTE ADULT - PROBLEM SELECTOR RECOMMENDATION 4
Met with patient's 2 daughters at bedside - they report their brother, Thom, is the decision-maker. Aware if patient's symptoms worsen, he may benefit from inpatient hospice. Comfort care; DNR / DNI on file. Met with patient's 2 daughters at bedside - they report their brother, Thom, is the decision-maker. Aware if patient's symptoms worsen, he may benefit from inpatient hospice. DNR / DNI on file.

## 2017-10-27 DIAGNOSIS — R50.9 FEVER, UNSPECIFIED: ICD-10-CM

## 2017-10-27 RX ORDER — LABETALOL HCL 100 MG
10 TABLET ORAL ONCE
Qty: 0 | Refills: 0 | Status: COMPLETED | OUTPATIENT
Start: 2017-10-27 | End: 2017-10-27

## 2017-10-27 RX ADMIN — Medication 2.5 MILLIGRAM(S): at 13:31

## 2017-10-27 RX ADMIN — MORPHINE SULFATE 0.5 MILLIGRAM(S): 50 CAPSULE, EXTENDED RELEASE ORAL at 12:22

## 2017-10-27 RX ADMIN — LEVETIRACETAM 420 MILLIGRAM(S): 250 TABLET, FILM COATED ORAL at 18:10

## 2017-10-27 RX ADMIN — LEVETIRACETAM 420 MILLIGRAM(S): 250 TABLET, FILM COATED ORAL at 05:22

## 2017-10-27 RX ADMIN — Medication 2.5 MILLIGRAM(S): at 02:00

## 2017-10-27 RX ADMIN — Medication 3 MILLILITER(S): at 15:21

## 2017-10-27 RX ADMIN — Medication 2.5 MILLIGRAM(S): at 11:36

## 2017-10-27 RX ADMIN — Medication 2.5 MILLIGRAM(S): at 17:35

## 2017-10-27 RX ADMIN — MORPHINE SULFATE 0.5 MILLIGRAM(S): 50 CAPSULE, EXTENDED RELEASE ORAL at 11:52

## 2017-10-27 RX ADMIN — Medication 2.5 MILLIGRAM(S): at 21:13

## 2017-10-27 RX ADMIN — Medication 3 MILLILITER(S): at 09:33

## 2017-10-27 RX ADMIN — Medication 10 MILLIGRAM(S): at 22:00

## 2017-10-27 RX ADMIN — Medication 0.62 MILLIGRAM(S): at 18:22

## 2017-10-27 RX ADMIN — Medication 10 MILLIGRAM(S): at 07:20

## 2017-10-27 RX ADMIN — Medication 2.5 MILLIGRAM(S): at 05:21

## 2017-10-27 RX ADMIN — PANTOPRAZOLE SODIUM 40 MILLIGRAM(S): 20 TABLET, DELAYED RELEASE ORAL at 05:21

## 2017-10-27 RX ADMIN — Medication 3 MILLILITER(S): at 02:32

## 2017-10-27 RX ADMIN — Medication 1 APPLICATION(S): at 13:31

## 2017-10-27 RX ADMIN — Medication 0.62 MILLIGRAM(S): at 12:52

## 2017-10-27 RX ADMIN — SODIUM CHLORIDE 70 MILLILITER(S): 9 INJECTION, SOLUTION INTRAVENOUS at 13:30

## 2017-10-27 RX ADMIN — Medication 3 MILLILITER(S): at 21:16

## 2017-10-27 NOTE — PROGRESS NOTE ADULT - PROBLEM SELECTOR PLAN 3
Presented with syncope  Orthostasis -ve  EKG without any ST or T wave changes  Mildly elevated trops 0.106  Echo 55% With G2DD  Carotid US: Moderate stenosis of Right carotid artery Tmax of 101 over night  WBC of 12  May be secondary to stroke and Hypothalamic compression   IV Tylenol prn

## 2017-10-27 NOTE — PROGRESS NOTE ADULT - ATTENDING COMMENTS
89M PMHx CAD (s/p PCI x 5), Asthma, HTN, Laryngeal CA, CKD Stage III, CHF, OA admitted for multiple falls and syncope evaluation. CT head showing Enlarging Rt basal ganglia bleed. Palliative and comfort care.  Spoke to the family at beside. Explained the consequences of enlarging bleed and possible outcomes. Family doesn't want any intervention and want to keep the patient in ICU for now for comfort cares.            Problem/Plan - 1:  ·  Problem: Stroke, hemorrhagic.  Plan: Pt admitted for fall and syncope  Initial Head CT scan was normal  Last night due to change in mental status repeat Head CT showed Rt basal Ganglia bleed; ABG showed Resp Acidosis  Severe headache  CT head reveals acute large intraparenchymal hemorrhage in the right basal ganglia which is enlarging   Family is refusing Neurosurgery  NPO; aspiration risk (0.9 NaCl for maintenance)  Seizure prophylaxis with Keppra   Can start Ativan prn for seizures  Initially Started 3% NaCl; Na 136 --> 142  Pain control with Tylenol suppository  Neurology Dr. Ramon.      Problem/Plan - 2:  ·  Problem: Syncope, unspecified syncope type.  Plan: Presented with syncope  Orthostasis -ve  EKG without any ST or T wave changes  Mildly elevated trops 0.106  Echo 55% With G2DD  Carotid US: Moderate stenosis of Right carotid artery.      Problem/Plan - 3:  ·  Problem: Essential hypertension.  Plan: Continue with metoprolol 2.5 mg q4  Can start Vasotec if needed   Goal SBP <130.     Problem/Plan - 4:  ·  Problem: Coronary artery disease involving native coronary artery of native heart without angina pectoris.  Plan: S/p PCI x 5 stents  Continue with beta blocker  Plavix and aspirin held in evidence of intracranial hemorrhage.     Encephalopathy.. due to large ICH. family refused intubation and wish for palliative care
-mental status deteriorating, not responsive  -family wish for conservative care  -palliative f/u  -spoke with daughters at bedside

## 2017-10-27 NOTE — PROGRESS NOTE ADULT - PROBLEM SELECTOR PLAN 6
Resolved  UA positive; Urine culture revealed E. faecalis  Completed Cipro S/p PCI x 5 stents  Continue with beta blocker  Plavix and aspirin held in evidence of intracranial hemorrhage

## 2017-10-27 NOTE — PROGRESS NOTE ADULT - PROBLEM SELECTOR PLAN 1
Pt admitted for fall and syncope  Head CT showed Rt basal Ganglia bleed + vasogenic edema  Severe headache; Pain control with morphine  Worsening Neuro exam  Family is refusing Neurosurgery  NPO; aspiration risk (0.9 NaCl+D5 for maintenance)  Seizure prophylaxis with Keppra   Can start Ativan prn for seizures  Initially Started 3% NaCl; Na 136 --> 142  Neurology Dr. Ramon

## 2017-10-27 NOTE — PROGRESS NOTE ADULT - SUBJECTIVE AND OBJECTIVE BOX
CHIEF COMPLAINT:Patient is a 89y old  Male who presents with a chief complaint of fall, possible syncope. Pt appears comfortable.    	  REVIEW OF SYSTEMS:    [X ] Unable to obtain    PHYSICAL EXAM:  T(C): 36.2 (10-27-17 @ 09:00), Max: 38.3 (10-26-17 @ 19:00)  HR: 78 (10-27-17 @ 10:00) (67 - 135)  BP: 168/63 (10-27-17 @ 10:00) (129/64 - 186/76)  RR: 18 (10-27-17 @ 10:00) (15 - 28)  SpO2: 98% (10-27-17 @ 10:00) (97% - 100%)  Wt(kg): --  I&O's Summary    26 Oct 2017 07:01  -  27 Oct 2017 07:00  --------------------------------------------------------  IN: 1235 mL / OUT: 1415 mL / NET: -180 mL    27 Oct 2017 07:01  -  27 Oct 2017 11:22  --------------------------------------------------------  IN: 210 mL / OUT: 150 mL / NET: 60 mL        Appearance: Normal	  HEENT:   Normal oral mucosa, PERRL, EOMI	  Lymphatic: No lymphadenopathy  Cardiovascular: Normal S1 S2, No JVD, No murmurs, No edema  Respiratory: Lungs clear to auscultation	  Gastrointestinal:  Soft, Non-tender, + BS	  Skin: No rashes, No ecchymoses, No cyanosis	  Extremities: Normal range of motion, No clubbing, cyanosis or edema  Vascular: Peripheral pulses palpable 2+ bilaterally    MEDICATIONS  (STANDING):  ALBUTerol    90 MICROgram(s) HFA Inhaler 1 Puff(s) Inhalation every 4 hours  ALBUTerol/ipratropium for Nebulization 3 milliLiter(s) Nebulizer every 6 hours  BACItracin   Ointment 1 Application(s) Topical daily  dextrose 5% + sodium chloride 0.9%. 1000 milliLiter(s) (70 mL/Hr) IV Continuous <Continuous>  enalaprilat Injectable 0.625 milliGRAM(s) IV Push once  levETIRAcetam  IVPB 500 milliGRAM(s) IV Intermittent every 12 hours  lidocaine 1% Injectable 10 milliLiter(s) Local Injection once  metoprolol    tartrate Injectable 2.5 milliGRAM(s) IV Push every 4 hours  pantoprazole  Injectable 40 milliGRAM(s) IV Push daily        	  LABS:	 	    CARDIAC MARKERS:  CARDIAC MARKERS ( 25 Oct 2017 17:45 )  0.106 ng/mL / x     / 79 U/L / x     / 2.8 ng/mL                        11.0   12.1  )-----------( 387      ( 26 Oct 2017 04:32 )             34.6     10-26    143  |  106  |  51<H>  ----------------------------<  177<H>  4.0   |  28  |  1.22    Ca    9.5      26 Oct 2017 08:50  Phos  4.3     10-26  Mg     2.7     10-26    TPro  7.7  /  Alb  2.7<L>  /  TBili  0.4  /  DBili  x   /  AST  100<H>  /  ALT  81<H>  /  AlkPhos  100  10-26     Lipid Profile: Cholesterol 104  LDL 65  HDL 18    Cholesterol 98  LDL 52  HDL 31  TG 76

## 2017-10-27 NOTE — PROGRESS NOTE ADULT - ASSESSMENT
Patient is a 89 yr old Male pt with PMHx of arthritis, asthma, CAD, 5 stents,  HTN, laryngeal CA, CKD stage III recent admission in July for CHF exacerbation ( needing BiPaP and ICU admission) presents to ED c/o syncopal episode, now ICB.   1.Supportive care.  2.Inpatient hospice.

## 2017-10-27 NOTE — PROGRESS NOTE ADULT - PROBLEM SELECTOR PLAN 8
Resolved   Continue with Tylenol   Knee Xray: Mild patellofemoral osteoarthritis and chondrocalcinosis in addition to mild supra-patellar joint effusion   Ortho Dr. Hull aspirated 10 CC fluid and injected 80 mg of Depomedrol with 5 CC of lidocaine into joint 10/24 Not currently in exacerbation   Continue with duoneb PRN and oxygen supplementation

## 2017-10-27 NOTE — PROGRESS NOTE ADULT - PROBLEM SELECTOR PLAN 9
Secondary to fall at home  S/p suture insertion for left hand laceration  Continue with local wound care and topical bacitracin Resolved   Continue with Tylenol   Knee Xray: Mild patellofemoral osteoarthritis and chondrocalcinosis in addition to mild supra-patellar joint effusion   Ortho Dr. Hull aspirated 10 CC fluid and injected 80 mg of Depomedrol with 5 CC of lidocaine into joint 10/24

## 2017-10-27 NOTE — PROGRESS NOTE ADULT - SUBJECTIVE AND OBJECTIVE BOX
_________________________________________________________________________________________  ========>>  M E D I C A L   A T T E N D I N G    F O L L O W  U P  N O T E  <<=========  -----------------------------------------------------------------------------------------------------    - Patient seen and examined by me earlier today.        Pt has not improved since yesterday per son, pt now without much responsiveness, pt is  DNR and declining    ==================>> MEDICATIONS <<====================    ALBUTerol    90 MICROgram(s) HFA Inhaler 1 Puff(s) Inhalation every 4 hours  ALBUTerol/ipratropium for Nebulization 3 milliLiter(s) Nebulizer every 6 hours  BACItracin   Ointment 1 Application(s) Topical daily  dextrose 5% + sodium chloride 0.9%. 1000 milliLiter(s) IV Continuous <Continuous>  levETIRAcetam  IVPB 500 milliGRAM(s) IV Intermittent every 12 hours  lidocaine 1% Injectable 10 milliLiter(s) Local Injection once  metoprolol    tartrate Injectable 2.5 milliGRAM(s) IV Push every 4 hours  pantoprazole  Injectable 40 milliGRAM(s) IV Push daily    MEDICATIONS  (PRN):  acetaminophen  Suppository. 650 milliGRAM(s) Rectal every 6 hours PRN Moderate Pain (4 - 6)  enalaprilat Injectable 0.625 milliGRAM(s) IV Push every 6 hours PRN SBP > 150  glycopyrrolate 1 milliGRAM(s) Oral two times a day PRN Cough and secretions  morphine  - Injectable 0.5 milliGRAM(s) IV Push every 4 hours PRN Severe Pain (7 - 10)    ==================>> REVIEW OF SYSTEM <<=================    limited as not arrousable    ==================>> VITAL SIGNS <<==================    Vital Signs Last 24 Hrs  T(C): 36.1 (10-27-17 @ 16:01)  T(F): 97 (10-27-17 @ 16:01), Max: 100 (10-27-17 @ 05:00)  HR: 66 (10-27-17 @ 18:00) (63 - 119)  BP: 174/71 (10-27-17 @ 18:00)  BP(mean): 98 (10-27-17 @ 18:00) (78 - 106)  RR: 19 (10-27-17 @ 18:00) (15 - 28)  SpO2: 99% (10-27-17 @ 18:00) (96% - 100%)      ==================>> PHYSICAL EXAM <<=================    GEN: not responsive, NAD , comfortable  HEENT: NCAT, MMM  Neck: supple , no JVD  CVS: S1S2 , regular , No M/R/G appreciated  PULM: CTA B/L,  no W/R/R appreciated  ABD.: soft. non tender, non distended,  bowel sounds present  Extrem: intact pulses , no edema, + scattered echymosis, + left hand wound wrapped      stable left knee with arthritic changes improved     ==================>> LAB AND IMAGING <<==================                                                        11.0   12.1  )-----------( 387      ( 26 Oct 2017 04:32 )             34.6        10-26    143  |  106  |  51<H>  ----------------------------<  177<H>  4.0   |  28  |  1.22    Ca    9.5      26 Oct 2017 08:50  Phos  4.3     10-26  Mg     2.7     10-26    TPro  7.7  /  Alb  2.7<L>  /  TBili  0.4  /  DBili  x   /  AST  100<H>  /  ALT  81<H>  /  AlkPhos  100  10-26    < from: CT Head No Cont (10.25.17 @ 18:41) >  Acute intraparenchymal hemorrhage centered inthe right basal ganglia   increased in size measuring 8.7 x 3.3 cm, previously measuring 7.2 x 3.0   cm. There is surrounding vasogenic edema with mass effect on the adjacent   brain parenchyma and right lateral ventricle. There is increased mass   effect on the right lateral ventricle with right to left midline shift of   7 mm, previously about 6 mm. No hydrocephalus    < end of copied text >    ___________________________________________________________________________________  ===============>>  A S S E S S M E N T   A N D   P L A N <<===============  ------------------------------------------------------------------------------------------    · Assessment		  Patient is a 89 y/o M pt with PMHx of arthritis, asthma, CAD, 5 stents,  HTN, laryngeal CA, recent admission in July for CHF exacerbation ( needing BiPaP and ICU admission) presents to ED c/o syncopal episode x today.    Problem/Plan - 1:  ·  Problem: Acute hemorrhagic stroke with metabolic encephalopathy, worsening  ICU care and mgmt appreciated  medical management as above  family decided on supportive / conservative / palliative measures, pt is DNR  palliative follow up    Problem/Plan - 2:  ·  Problem: Essential hypertension  BP goals per neuro recom     Problem/Plan - 3:  ·  Problem: Coronary artery disease involving native coronary artery of native heart without angina pectoris  monitor vitals    Problem/Plan - 4:  ·  Problem: Asthma.  Plan: Nebs PRN  O2 2L via NC.     --------------------------------------------  Case discussed with son, Neuro  family aware of grave prognosis  ___________________________  H. KARLA Rich.  Pager: [phone]

## 2017-10-27 NOTE — PROGRESS NOTE ADULT - SUBJECTIVE AND OBJECTIVE BOX
INTERVAL HPI/OVERNIGHT EVENTS: ***    PRESSORS: [ ] YES [x] NO    Cardiovascular:  metoprolol    tartrate Injectable 2.5 milliGRAM(s) IV Push every 4 hours    Pulmonary:  ALBUTerol    90 MICROgram(s) HFA Inhaler 1 Puff(s) Inhalation every 4 hours  ALBUTerol/ipratropium for Nebulization 3 milliLiter(s) Nebulizer every 6 hours    Other:  acetaminophen  Suppository. 650 milliGRAM(s) Rectal every 6 hours PRN  BACItracin   Ointment 1 Application(s) Topical daily  dextrose 5% + sodium chloride 0.9%. 1000 milliLiter(s) IV Continuous <Continuous>  glycopyrrolate 1 milliGRAM(s) Oral two times a day PRN  levETIRAcetam  IVPB 500 milliGRAM(s) IV Intermittent every 12 hours  lidocaine 1% Injectable 10 milliLiter(s) Local Injection once  morphine  - Injectable 0.5 milliGRAM(s) IV Push every 4 hours PRN  pantoprazole  Injectable 40 milliGRAM(s) IV Push daily    acetaminophen  Suppository. 650 milliGRAM(s) Rectal every 6 hours PRN  ALBUTerol    90 MICROgram(s) HFA Inhaler 1 Puff(s) Inhalation every 4 hours  ALBUTerol/ipratropium for Nebulization 3 milliLiter(s) Nebulizer every 6 hours  BACItracin   Ointment 1 Application(s) Topical daily  dextrose 5% + sodium chloride 0.9%. 1000 milliLiter(s) IV Continuous <Continuous>  glycopyrrolate 1 milliGRAM(s) Oral two times a day PRN  levETIRAcetam  IVPB 500 milliGRAM(s) IV Intermittent every 12 hours  lidocaine 1% Injectable 10 milliLiter(s) Local Injection once  metoprolol    tartrate Injectable 2.5 milliGRAM(s) IV Push every 4 hours  morphine  - Injectable 0.5 milliGRAM(s) IV Push every 4 hours PRN  pantoprazole  Injectable 40 milliGRAM(s) IV Push daily    Drug Dosing Weight  Height (cm): 167.64 (18 Oct 2017 22:12)  Weight (kg): 53.8 (25 Oct 2017 16:30)  BMI (kg/m2): 19.1 (25 Oct 2017 16:30)  BSA (m2): 1.6 (25 Oct 2017 16:30)    CENTRAL LINE: [ ] YES [x] NO  JOHNSON: [x] YES [ ] NO  A-LINE:  [ ] YES [x] NO    PMH -reviewed admission note, no change since admission    ICU Vital Signs Last 24 Hrs  T(C): 37.8 (27 Oct 2017 05:00), Max: 38.3 (26 Oct 2017 19:00)  T(F): 100 (27 Oct 2017 05:00), Max: 101 (26 Oct 2017 19:00)  HR: 90 (27 Oct 2017 05:00) (85 - 135)  BP: 157/63 (27 Oct 2017 05:00) (129/64 - 185/78)  BP(mean): 86 (27 Oct 2017 05:00) (78 - 107)  ABP: --  ABP(mean): --  RR: 23 (27 Oct 2017 05:00) (15 - 28)  SpO2: 99% (27 Oct 2017 05:00) (97% - 100%)      ABG - ( 25 Oct 2017 10:23 )  pH: 7.21  /  pCO2: 57    /  pO2: 110   / HCO3: 22    / Base Excess: -5.6  /  SaO2: 96                    10-25 @ 07:01  -  10-26 @ 07:00  --------------------------------------------------------  IN: 691 mL / OUT: 600 mL / NET: 91 mL            PHYSICAL EXAM:    GENERAL: Well developed white male, Sleepy and not responding to commands, On nasal canula  HEENT:  Normocephalic/Atraumatic, non reactive light reflexes (fixed dilated), dry mucous membranes, Rt eye lid closed mostly   NECK: Supple, no JVD  RESP: Symmetric movement of the chest, clear to auscultation bilaterally, no wheeze, no rhonchi, no crackles  CVS: + irregular tachy rate, S1 and S2 audible, no murmur, rubs or gallops noted  GI: Normal active bowel sounds present, abdomen soft, non tender, non distended, + Johnson  EXTREMITIES: Bruising of both arms, Wrapped Left hand, Rt arm tremors at baseline   MSK: 0/5 strength bilateral upper and lower extremities  PSYCH: Normal mood, normal affect observed  NEURO: alert and oriented x 0, Babinski +ve, Light reflexes Negative       LABS:  CBC Full  -  ( 26 Oct 2017 04:32 )  WBC Count : 12.1 K/uL  Hemoglobin : 11.0 g/dL  Hematocrit : 34.6 %  Platelet Count - Automated : 387 K/uL  Mean Cell Volume : 93.1 fl  Mean Cell Hemoglobin : 29.8 pg  Mean Cell Hemoglobin Concentration : 32.0 gm/dL  Auto Neutrophil # : 9.8 K/uL  Auto Lymphocyte # : 1.7 K/uL  Auto Monocyte # : 0.5 K/uL  Auto Eosinophil # : 0.0 K/uL  Auto Basophil # : 0.0 K/uL  Auto Neutrophil % : 81.4 %  Auto Lymphocyte % : 14.0 %  Auto Monocyte % : 4.4 %  Auto Eosinophil % : 0.0 %  Auto Basophil % : 0.1 %    10-26    143  |  106  |  51<H>  ----------------------------<  177<H>  4.0   |  28  |  1.22    Ca    9.5      26 Oct 2017 08:50  Phos  4.3     10-26  Mg     2.7     10-26    TPro  7.7  /  Alb  2.7<L>  /  TBili  0.4  /  DBili  x   /  AST  100<H>  /  ALT  81<H>  /  AlkPhos  100  10-26    PT/INR - ( 26 Oct 2017 04:32 )   PT: 12.6 sec;   INR: 1.15 ratio               [ ]GOALS OF CARE DISCUSSION WITH PATIENT/FAMILY/PROXY: DNR, DNI, Comfort Care    CRITICAL CARE TIME SPENT: 35 minutes INTERVAL HPI/OVERNIGHT EVENTS: Pt spiked a fever of 101 over night could be central due to compression of hypothalamus.     PRESSORS: [ ] YES [x] NO    Cardiovascular:  metoprolol    tartrate Injectable 2.5 milliGRAM(s) IV Push every 4 hours    Pulmonary:  ALBUTerol    90 MICROgram(s) HFA Inhaler 1 Puff(s) Inhalation every 4 hours  ALBUTerol/ipratropium for Nebulization 3 milliLiter(s) Nebulizer every 6 hours    Other:  acetaminophen  Suppository. 650 milliGRAM(s) Rectal every 6 hours PRN  BACItracin   Ointment 1 Application(s) Topical daily  dextrose 5% + sodium chloride 0.9%. 1000 milliLiter(s) IV Continuous <Continuous>  glycopyrrolate 1 milliGRAM(s) Oral two times a day PRN  levETIRAcetam  IVPB 500 milliGRAM(s) IV Intermittent every 12 hours  lidocaine 1% Injectable 10 milliLiter(s) Local Injection once  morphine  - Injectable 0.5 milliGRAM(s) IV Push every 4 hours PRN  pantoprazole  Injectable 40 milliGRAM(s) IV Push daily    acetaminophen  Suppository. 650 milliGRAM(s) Rectal every 6 hours PRN  ALBUTerol    90 MICROgram(s) HFA Inhaler 1 Puff(s) Inhalation every 4 hours  ALBUTerol/ipratropium for Nebulization 3 milliLiter(s) Nebulizer every 6 hours  BACItracin   Ointment 1 Application(s) Topical daily  dextrose 5% + sodium chloride 0.9%. 1000 milliLiter(s) IV Continuous <Continuous>  glycopyrrolate 1 milliGRAM(s) Oral two times a day PRN  levETIRAcetam  IVPB 500 milliGRAM(s) IV Intermittent every 12 hours  lidocaine 1% Injectable 10 milliLiter(s) Local Injection once  metoprolol    tartrate Injectable 2.5 milliGRAM(s) IV Push every 4 hours  morphine  - Injectable 0.5 milliGRAM(s) IV Push every 4 hours PRN  pantoprazole  Injectable 40 milliGRAM(s) IV Push daily    Drug Dosing Weight  Height (cm): 167.64 (18 Oct 2017 22:12)  Weight (kg): 53.8 (25 Oct 2017 16:30)  BMI (kg/m2): 19.1 (25 Oct 2017 16:30)  BSA (m2): 1.6 (25 Oct 2017 16:30)    CENTRAL LINE: [ ] YES [x] NO  JOHNSON: [x] YES [ ] NO  A-LINE:  [ ] YES [x] NO    PMH -reviewed admission note, no change since admission    ICU Vital Signs Last 24 Hrs  T(C): 37.8 (27 Oct 2017 05:00), Max: 38.3 (26 Oct 2017 19:00)  T(F): 100 (27 Oct 2017 05:00), Max: 101 (26 Oct 2017 19:00)  HR: 90 (27 Oct 2017 05:00) (85 - 135)  BP: 157/63 (27 Oct 2017 05:00) (129/64 - 185/78)  BP(mean): 86 (27 Oct 2017 05:00) (78 - 107)  ABP: --  ABP(mean): --  RR: 23 (27 Oct 2017 05:00) (15 - 28)  SpO2: 99% (27 Oct 2017 05:00) (97% - 100%)      ABG - ( 25 Oct 2017 10:23 )  pH: 7.21  /  pCO2: 57    /  pO2: 110   / HCO3: 22    / Base Excess: -5.6  /  SaO2: 96                    10-25 @ 07:01  -  10-26 @ 07:00  --------------------------------------------------------  IN: 691 mL / OUT: 600 mL / NET: 91 mL            PHYSICAL EXAM:    GENERAL: Well developed white male, Sleepy and not responding to commands, On nasal canula  HEENT:  Normocephalic/Atraumatic, non reactive light reflexes (fixed dilated), dry mucous membranes, Rt eye lid closed mostly   NECK: Supple, no JVD  RESP: Symmetric movement of the chest, clear to auscultation bilaterally, no wheeze, no rhonchi, no crackles  CVS: + irregular tachy rate, S1 and S2 audible, no murmur, rubs or gallops noted  GI: Normal active bowel sounds present, abdomen soft, non tender, non distended, + Johnson  EXTREMITIES: Bruising of both arms, Wrapped Left hand, Rt arm tremors at baseline   MSK: 0/5 strength bilateral upper and lower extremities  PSYCH: Normal mood, normal affect observed  NEURO: alert and oriented x 0, Babinski +ve, Light reflexes Negative       LABS:  CBC Full  -  ( 26 Oct 2017 04:32 )  WBC Count : 12.1 K/uL  Hemoglobin : 11.0 g/dL  Hematocrit : 34.6 %  Platelet Count - Automated : 387 K/uL  Mean Cell Volume : 93.1 fl  Mean Cell Hemoglobin : 29.8 pg  Mean Cell Hemoglobin Concentration : 32.0 gm/dL  Auto Neutrophil # : 9.8 K/uL  Auto Lymphocyte # : 1.7 K/uL  Auto Monocyte # : 0.5 K/uL  Auto Eosinophil # : 0.0 K/uL  Auto Basophil # : 0.0 K/uL  Auto Neutrophil % : 81.4 %  Auto Lymphocyte % : 14.0 %  Auto Monocyte % : 4.4 %  Auto Eosinophil % : 0.0 %  Auto Basophil % : 0.1 %    10-26    143  |  106  |  51<H>  ----------------------------<  177<H>  4.0   |  28  |  1.22    Ca    9.5      26 Oct 2017 08:50  Phos  4.3     10-26  Mg     2.7     10-26    TPro  7.7  /  Alb  2.7<L>  /  TBili  0.4  /  DBili  x   /  AST  100<H>  /  ALT  81<H>  /  AlkPhos  100  10-26    PT/INR - ( 26 Oct 2017 04:32 )   PT: 12.6 sec;   INR: 1.15 ratio               [ ]GOALS OF CARE DISCUSSION WITH PATIENT/FAMILY/PROXY: DNR, DNI, Comfort Care    CRITICAL CARE TIME SPENT: 35 minutes INTERVAL HPI/OVERNIGHT EVENTS: Pt spiked a fever of 101 over night could be central due to compression of hypothalamus.     PRESSORS: [ ] YES [x] NO    Cardiovascular:  metoprolol    tartrate Injectable 2.5 milliGRAM(s) IV Push every 4 hours    Pulmonary:  ALBUTerol    90 MICROgram(s) HFA Inhaler 1 Puff(s) Inhalation every 4 hours  ALBUTerol/ipratropium for Nebulization 3 milliLiter(s) Nebulizer every 6 hours    Other:  acetaminophen  Suppository. 650 milliGRAM(s) Rectal every 6 hours PRN  BACItracin   Ointment 1 Application(s) Topical daily  dextrose 5% + sodium chloride 0.9%. 1000 milliLiter(s) IV Continuous <Continuous>  glycopyrrolate 1 milliGRAM(s) Oral two times a day PRN  levETIRAcetam  IVPB 500 milliGRAM(s) IV Intermittent every 12 hours  lidocaine 1% Injectable 10 milliLiter(s) Local Injection once  morphine  - Injectable 0.5 milliGRAM(s) IV Push every 4 hours PRN  pantoprazole  Injectable 40 milliGRAM(s) IV Push daily    acetaminophen  Suppository. 650 milliGRAM(s) Rectal every 6 hours PRN  ALBUTerol    90 MICROgram(s) HFA Inhaler 1 Puff(s) Inhalation every 4 hours  ALBUTerol/ipratropium for Nebulization 3 milliLiter(s) Nebulizer every 6 hours  BACItracin   Ointment 1 Application(s) Topical daily  dextrose 5% + sodium chloride 0.9%. 1000 milliLiter(s) IV Continuous <Continuous>  glycopyrrolate 1 milliGRAM(s) Oral two times a day PRN  levETIRAcetam  IVPB 500 milliGRAM(s) IV Intermittent every 12 hours  lidocaine 1% Injectable 10 milliLiter(s) Local Injection once  metoprolol    tartrate Injectable 2.5 milliGRAM(s) IV Push every 4 hours  morphine  - Injectable 0.5 milliGRAM(s) IV Push every 4 hours PRN  pantoprazole  Injectable 40 milliGRAM(s) IV Push daily    Drug Dosing Weight  Height (cm): 167.64 (18 Oct 2017 22:12)  Weight (kg): 53.8 (25 Oct 2017 16:30)  BMI (kg/m2): 19.1 (25 Oct 2017 16:30)  BSA (m2): 1.6 (25 Oct 2017 16:30)    CENTRAL LINE: [ ] YES [x] NO  JOHNSON: [x] YES [ ] NO  A-LINE:  [ ] YES [x] NO    PMH -reviewed admission note, no change since admission    ICU Vital Signs Last 24 Hrs  T(C): 37.8 (27 Oct 2017 05:00), Max: 38.3 (26 Oct 2017 19:00)  T(F): 100 (27 Oct 2017 05:00), Max: 101 (26 Oct 2017 19:00)  HR: 90 (27 Oct 2017 05:00) (85 - 135)  BP: 157/63 (27 Oct 2017 05:00) (129/64 - 185/78)  BP(mean): 86 (27 Oct 2017 05:00) (78 - 107)  ABP: --  ABP(mean): --  RR: 23 (27 Oct 2017 05:00) (15 - 28)  SpO2: 99% (27 Oct 2017 05:00) (97% - 100%)      ABG - ( 25 Oct 2017 10:23 )  pH: 7.21  /  pCO2: 57    /  pO2: 110   / HCO3: 22    / Base Excess: -5.6  /  SaO2: 96                    10-25 @ 07:01  -  10-26 @ 07:00  --------------------------------------------------------  IN: 691 mL / OUT: 600 mL / NET: 91 mL            PHYSICAL EXAM:    GENERAL: Well developed white male, Sleepy and not responding to commands, On nasal canula  HEENT:  Normocephalic/Atraumatic, non reactive light reflexes (fixed dilated), dry mucous membranes, Rt eye lid closed mostly   NECK: Supple, no JVD  RESP: Symmetric movement of the chest, clear to auscultation bilaterally, no wheeze, no rhonchi, no crackles  CVS: + irregular tachy rate, S1 and S2 audible, no murmur, rubs or gallops noted  GI: Normal active bowel sounds present, abdomen soft, non tender, non distended, + Johnson  EXTREMITIES: Bruising of both arms, Wrapped Left hand, Rt arm tremors at baseline   MSK: 0/5 strength bilateral upper and lower extremities  PSYCH: Normal mood, normal affect observed  NEURO: alert and oriented x 0, Pinpoint Pupils, Non-reactive to light, No response to pain, No corneal reflex, Babinski +ve      LABS:  CBC Full  -  ( 26 Oct 2017 04:32 )  WBC Count : 12.1 K/uL  Hemoglobin : 11.0 g/dL  Hematocrit : 34.6 %  Platelet Count - Automated : 387 K/uL  Mean Cell Volume : 93.1 fl  Mean Cell Hemoglobin : 29.8 pg  Mean Cell Hemoglobin Concentration : 32.0 gm/dL  Auto Neutrophil # : 9.8 K/uL  Auto Lymphocyte # : 1.7 K/uL  Auto Monocyte # : 0.5 K/uL  Auto Eosinophil # : 0.0 K/uL  Auto Basophil # : 0.0 K/uL  Auto Neutrophil % : 81.4 %  Auto Lymphocyte % : 14.0 %  Auto Monocyte % : 4.4 %  Auto Eosinophil % : 0.0 %  Auto Basophil % : 0.1 %    10-26    143  |  106  |  51<H>  ----------------------------<  177<H>  4.0   |  28  |  1.22    Ca    9.5      26 Oct 2017 08:50  Phos  4.3     10-26  Mg     2.7     10-26    TPro  7.7  /  Alb  2.7<L>  /  TBili  0.4  /  DBili  x   /  AST  100<H>  /  ALT  81<H>  /  AlkPhos  100  10-26    PT/INR - ( 26 Oct 2017 04:32 )   PT: 12.6 sec;   INR: 1.15 ratio               [ ]GOALS OF CARE DISCUSSION WITH PATIENT/FAMILY/PROXY: DNR, DNI, Comfort Care    CRITICAL CARE TIME SPENT: 35 minutes

## 2017-10-27 NOTE — PROGRESS NOTE ADULT - PROBLEM SELECTOR PLAN 7
Not currently in exacerbation   Continue with duoneb PRN and oxygen supplementation Resolved  UA positive; Urine culture revealed E. faecalis  Completed Cipro

## 2017-10-27 NOTE — PROGRESS NOTE ADULT - ASSESSMENT
89M PMHx CAD (s/p PCI x 5), Asthma, HTN, Laryngeal CA, CKD Stage III, CHF, OA admitted for multiple falls and syncope evaluation. CT head showing Enlarging Rt basal ganglia bleed with Compression of surrounding area due to vasogenic edema. Palliative and comfort care.  Spoke to the family at beside. Explained the consequences of enlarging bleed and possible outcomes. Family doesn't want any intervention and want to keep the patient in ICU for now for comfort cares.

## 2017-10-27 NOTE — PROGRESS NOTE ADULT - PROBLEM SELECTOR PLAN 4
Continue with metoprolol 2.5 mg q4  Can start Vasotec if needed   Goal SBP <130 Presented with syncope  Orthostasis -ve  EKG without any ST or T wave changes  Mildly elevated trops 0.106  Echo 55% With G2DD  Carotid US: Moderate stenosis of Right carotid artery

## 2017-10-27 NOTE — PROGRESS NOTE ADULT - PROBLEM SELECTOR PLAN 5
S/p PCI x 5 stents  Continue with beta blocker  Plavix and aspirin held in evidence of intracranial hemorrhage Continue with metoprolol 2.5 mg q4  Can start Vasotec if needed   Goal SBP <130

## 2017-10-28 RX ORDER — METOPROLOL TARTRATE 50 MG
5 TABLET ORAL ONCE
Qty: 0 | Refills: 0 | Status: COMPLETED | OUTPATIENT
Start: 2017-10-28 | End: 2017-10-28

## 2017-10-28 RX ORDER — METOPROLOL TARTRATE 50 MG
2.5 TABLET ORAL ONCE
Qty: 0 | Refills: 0 | Status: COMPLETED | OUTPATIENT
Start: 2017-10-28 | End: 2017-10-28

## 2017-10-28 RX ORDER — MORPHINE SULFATE 50 MG/1
2 CAPSULE, EXTENDED RELEASE ORAL ONCE
Qty: 0 | Refills: 0 | Status: DISCONTINUED | OUTPATIENT
Start: 2017-10-28 | End: 2017-10-28

## 2017-10-28 RX ADMIN — Medication 2.5 MILLIGRAM(S): at 08:43

## 2017-10-28 RX ADMIN — Medication 3 MILLILITER(S): at 02:16

## 2017-10-28 RX ADMIN — Medication 2.5 MILLIGRAM(S): at 05:17

## 2017-10-28 RX ADMIN — Medication 5 MILLIGRAM(S): at 01:55

## 2017-10-28 RX ADMIN — MORPHINE SULFATE 2 MILLIGRAM(S): 50 CAPSULE, EXTENDED RELEASE ORAL at 01:57

## 2017-10-28 RX ADMIN — MORPHINE SULFATE 0.5 MILLIGRAM(S): 50 CAPSULE, EXTENDED RELEASE ORAL at 08:43

## 2017-10-28 RX ADMIN — LEVETIRACETAM 420 MILLIGRAM(S): 250 TABLET, FILM COATED ORAL at 05:17

## 2017-10-28 RX ADMIN — MORPHINE SULFATE 2 MILLIGRAM(S): 50 CAPSULE, EXTENDED RELEASE ORAL at 01:55

## 2017-10-28 NOTE — PROGRESS NOTE ADULT - PROBLEM SELECTOR PLAN 3
Tmax of 101 over night  WBC of 12  May be secondary to stroke and Hypothalamic compression   IV Tylenol prn

## 2017-10-28 NOTE — PROGRESS NOTE ADULT - SUBJECTIVE AND OBJECTIVE BOX
INTERVAL HPI/OVERNIGHT EVENTS: ***    PRESSORS: [ ] YES [ ] NO  WHICH:    ANTIBIOTICS:                  DATE STARTED:  ANTIBIOTICS:                  DATE STARTED:  ANTIBIOTICS:                  DATE STARTED:    Antimicrobial:    Cardiovascular:  enalaprilat Injectable 0.625 milliGRAM(s) IV Push every 6 hours PRN  metoprolol    tartrate Injectable 2.5 milliGRAM(s) IV Push every 4 hours  metoprolol    tartrate Injectable 5 milliGRAM(s) IV Push once    Pulmonary:  ALBUTerol    90 MICROgram(s) HFA Inhaler 1 Puff(s) Inhalation every 4 hours  ALBUTerol/ipratropium for Nebulization 3 milliLiter(s) Nebulizer every 6 hours    Hematalogic:    Other:  acetaminophen  Suppository. 650 milliGRAM(s) Rectal every 6 hours PRN  BACItracin   Ointment 1 Application(s) Topical daily  dextrose 5% + sodium chloride 0.9%. 1000 milliLiter(s) IV Continuous <Continuous>  glycopyrrolate 1 milliGRAM(s) Oral two times a day PRN  levETIRAcetam  IVPB 500 milliGRAM(s) IV Intermittent every 12 hours  lidocaine 1% Injectable 10 milliLiter(s) Local Injection once  morphine  - Injectable 2 milliGRAM(s) IV Push once  morphine  - Injectable 0.5 milliGRAM(s) IV Push every 4 hours PRN  pantoprazole  Injectable 40 milliGRAM(s) IV Push daily    acetaminophen  Suppository. 650 milliGRAM(s) Rectal every 6 hours PRN  ALBUTerol    90 MICROgram(s) HFA Inhaler 1 Puff(s) Inhalation every 4 hours  ALBUTerol/ipratropium for Nebulization 3 milliLiter(s) Nebulizer every 6 hours  BACItracin   Ointment 1 Application(s) Topical daily  dextrose 5% + sodium chloride 0.9%. 1000 milliLiter(s) IV Continuous <Continuous>  enalaprilat Injectable 0.625 milliGRAM(s) IV Push every 6 hours PRN  glycopyrrolate 1 milliGRAM(s) Oral two times a day PRN  levETIRAcetam  IVPB 500 milliGRAM(s) IV Intermittent every 12 hours  lidocaine 1% Injectable 10 milliLiter(s) Local Injection once  metoprolol    tartrate Injectable 2.5 milliGRAM(s) IV Push every 4 hours  metoprolol    tartrate Injectable 5 milliGRAM(s) IV Push once  morphine  - Injectable 2 milliGRAM(s) IV Push once  morphine  - Injectable 0.5 milliGRAM(s) IV Push every 4 hours PRN  pantoprazole  Injectable 40 milliGRAM(s) IV Push daily    Drug Dosing Weight  Height (cm): 167.64 (18 Oct 2017 22:12)  Weight (kg): 53.8 (25 Oct 2017 16:30)  BMI (kg/m2): 19.1 (25 Oct 2017 16:30)  BSA (m2): 1.6 (25 Oct 2017 16:30)    CENTRAL LINE: [ ] YES [ ] NO  LOCATION:   DATE INSERTED:  REMOVE: [ ] YES [ ] NO  EXPLAIN:    SANDOVAL: [ ] YES [ ] NO    DATE INSERTED:  REMOVE:  [ ] YES [ ] NO  EXPLAIN:    A-LINE:  [ ] YES [ ] NO  LOCATION:   DATE INSERTED:  REMOVE:  [ ] YES [ ] NO  EXPLAIN:    PMH -reviewed admission note, no change since admission  Heart faliure: acute [ ] chronic [ ] acute or chronic [ ] diastolic [ ] systolic [ ] combied systolic and diastolic[ ]  DAVID: ATN[ ] renal medullary necrosis [ ] CKD I [ ]CKDII [ ]CKD III [ ]CKD IV [ ]CKD V [ ]Other pathological lesions [ ]  Abdominal Nutrition Status: malnutrition [ ] cachexia [ ] morbid obesity/BMI=40 [ ] Supplement ordered [___________]     ICU Vital Signs Last 24 Hrs  T(C): 36.9 (28 Oct 2017 00:00), Max: 37.8 (27 Oct 2017 05:00)  T(F): 98.4 (28 Oct 2017 00:00), Max: 100 (27 Oct 2017 05:00)  HR: 126 (28 Oct 2017 01:00) (63 - 126)  BP: 161/84 (28 Oct 2017 01:00) (134/62 - 195/91)  BP(mean): 103 (28 Oct 2017 01:00) (79 - 117)  ABP: --  ABP(mean): --  RR: 12 (28 Oct 2017 01:00) (12 - 26)  SpO2: 96% (28 Oct 2017 01:00) (96% - 100%)            10-26 @ 07:01  -  10-27 @ 07:00  --------------------------------------------------------  IN: 1235 mL / OUT: 1415 mL / NET: -180 mL            PHYSICAL EXAM:    GENERAL:   HEAD: atraumatic, normocephalic   EYES: PERRLA, white sclera.   ENMT: nasal mucosa- Oral cavity-   NECK: supple, JVD/ no JVD, thyroid-   LYMPH: no palpable lymph nodes     SKIN: warm, dry   CHEST/LUNG: ET tube: size        cm at lip. No Chest deformity , no chest tenderness. bilateral breath sounds,no  adventitious sounds  HEART: RRR, no m/r/g   ABDOMEN: soft, nontender, nondistended; bowel sounds.  :sandoval catheter.  EXTREMITIES: +1 non-pitting edema, no cyanosis, no clubbing.  NEURO: AA0X , mood/ affect-, no focal neuro deficits        LABS:  CBC Full  -  ( 26 Oct 2017 04:32 )  WBC Count : 12.1 K/uL  Hemoglobin : 11.0 g/dL  Hematocrit : 34.6 %  Platelet Count - Automated : 387 K/uL  Mean Cell Volume : 93.1 fl  Mean Cell Hemoglobin : 29.8 pg  Mean Cell Hemoglobin Concentration : 32.0 gm/dL  Auto Neutrophil # : 9.8 K/uL  Auto Lymphocyte # : 1.7 K/uL  Auto Monocyte # : 0.5 K/uL  Auto Eosinophil # : 0.0 K/uL  Auto Basophil # : 0.0 K/uL  Auto Neutrophil % : 81.4 %  Auto Lymphocyte % : 14.0 %  Auto Monocyte % : 4.4 %  Auto Eosinophil % : 0.0 %  Auto Basophil % : 0.1 %    10-26    143  |  106  |  51<H>  ----------------------------<  177<H>  4.0   |  28  |  1.22    Ca    9.5      26 Oct 2017 08:50  Phos  4.3     10-26  Mg     2.7     10-26    TPro  7.7  /  Alb  2.7<L>  /  TBili  0.4  /  DBili  x   /  AST  100<H>  /  ALT  81<H>  /  AlkPhos  100  10-26    PT/INR - ( 26 Oct 2017 04:32 )   PT: 12.6 sec;   INR: 1.15 ratio                 RADIOLOGY & ADDITIONAL STUDIES REVIEWED:  ***    [ ]GOALS OF CARE DISCUSSION WITH PATIENT/FAMILY/PROXY:    CRITICAL CARE TIME SPENT: 35 minutes INTERVAL HPI/OVERNIGHT EVENTS: s/p  metoprolol 5 mg  i.v push x once and s/p Labetalol 10 mg i.v push x once     PRESSORS: [ ] YES [x ] NO  WHICH:    Antimicrobial:    Cardiovascular:  enalaprilat Injectable 0.625 milliGRAM(s) IV Push every 6 hours PRN  metoprolol    tartrate Injectable 2.5 milliGRAM(s) IV Push every 4 hours  metoprolol    tartrate Injectable 5 milliGRAM(s) IV Push once    Pulmonary:  ALBUTerol    90 MICROgram(s) HFA Inhaler 1 Puff(s) Inhalation every 4 hours  ALBUTerol/ipratropium for Nebulization 3 milliLiter(s) Nebulizer every 6 hours    Hematalogic:    Other:  acetaminophen  Suppository. 650 milliGRAM(s) Rectal every 6 hours PRN  BACItracin   Ointment 1 Application(s) Topical daily  dextrose 5% + sodium chloride 0.9%. 1000 milliLiter(s) IV Continuous <Continuous>  glycopyrrolate 1 milliGRAM(s) Oral two times a day PRN  levETIRAcetam  IVPB 500 milliGRAM(s) IV Intermittent every 12 hours  lidocaine 1% Injectable 10 milliLiter(s) Local Injection once  morphine  - Injectable 2 milliGRAM(s) IV Push once  morphine  - Injectable 0.5 milliGRAM(s) IV Push every 4 hours PRN  pantoprazole  Injectable 40 milliGRAM(s) IV Push daily    acetaminophen  Suppository. 650 milliGRAM(s) Rectal every 6 hours PRN  ALBUTerol    90 MICROgram(s) HFA Inhaler 1 Puff(s) Inhalation every 4 hours  ALBUTerol/ipratropium for Nebulization 3 milliLiter(s) Nebulizer every 6 hours  BACItracin   Ointment 1 Application(s) Topical daily  dextrose 5% + sodium chloride 0.9%. 1000 milliLiter(s) IV Continuous <Continuous>  enalaprilat Injectable 0.625 milliGRAM(s) IV Push every 6 hours PRN  glycopyrrolate 1 milliGRAM(s) Oral two times a day PRN  levETIRAcetam  IVPB 500 milliGRAM(s) IV Intermittent every 12 hours  lidocaine 1% Injectable 10 milliLiter(s) Local Injection once  metoprolol    tartrate Injectable 2.5 milliGRAM(s) IV Push every 4 hours  metoprolol    tartrate Injectable 5 milliGRAM(s) IV Push once  morphine  - Injectable 2 milliGRAM(s) IV Push once  morphine  - Injectable 0.5 milliGRAM(s) IV Push every 4 hours PRN  pantoprazole  Injectable 40 milliGRAM(s) IV Push daily    Drug Dosing Weight  Height (cm): 167.64 (18 Oct 2017 22:12)  Weight (kg): 53.8 (25 Oct 2017 16:30)  BMI (kg/m2): 19.1 (25 Oct 2017 16:30)  BSA (m2): 1.6 (25 Oct 2017 16:30)    CENTRAL LINE: [ ] YES [x ] NO  LOCATION:   DATE INSERTED:  REMOVE: [ ] YES [ ] NO  EXPLAIN:    JOHNSON: [ ] YES [x ] NO    DATE INSERTED:  REMOVE:  [ ] YES [ ] NO  EXPLAIN:    A-LINE:  [ ] YES [ x] NO  LOCATION:   DATE INSERTED:  REMOVE:  [ ] YES [ ] NO  EXPLAIN:    PMH -reviewed admission note, no change since admission  ICU Vital Signs Last 24 Hrs  T(C): 36.9 (28 Oct 2017 00:00), Max: 37.8 (27 Oct 2017 05:00)  T(F): 98.4 (28 Oct 2017 00:00), Max: 100 (27 Oct 2017 05:00)  HR: 126 (28 Oct 2017 01:00) (63 - 126)  BP: 161/84 (28 Oct 2017 01:00) (134/62 - 195/91)  BP(mean): 103 (28 Oct 2017 01:00) (79 - 117)  ABP: --  ABP(mean): --  RR: 12 (28 Oct 2017 01:00) (12 - 26)  SpO2: 96% (28 Oct 2017 01:00) (96% - 100%)            10-26 @ 07:01  -  10-27 @ 07:00  --------------------------------------------------------  IN: 1235 mL / OUT: 1415 mL / NET: -180 mL            PHYSICAL EXAM:  GENERAL: Well developed white male, Sleepy and not responding to commands, On nasal canula  HEENT:  Normocephalic/Atraumatic, non reactive light reflexes (fixed dilated), dry mucous membranes, Rt eye lid closed mostly   NECK: Supple, no JVD  RESP: Symmetric movement of the chest, clear to auscultation bilaterally, no wheeze, no rhonchi, no crackles  CVS: + irregular tachy rate, S1 and S2 audible, no murmur, rubs or gallops noted  GI: Normal active bowel sounds present, abdomen soft, non tender, non distended, + Johnson  EXTREMITIES: Bruising of both arms, Wrapped Left hand, Rt arm tremors at baseline   MSK: 0/5 strength bilateral upper and lower extremities  PSYCH: Normal mood, normal affect observed  NEURO: alert and oriented x 0, Pinpoint Pupils, Non-reactive to light, No response to pain, No corneal reflex, Babinski +ve          LABS:  CBC Full  -  ( 26 Oct 2017 04:32 )  WBC Count : 12.1 K/uL  Hemoglobin : 11.0 g/dL  Hematocrit : 34.6 %  Platelet Count - Automated : 387 K/uL  Mean Cell Volume : 93.1 fl  Mean Cell Hemoglobin : 29.8 pg  Mean Cell Hemoglobin Concentration : 32.0 gm/dL  Auto Neutrophil # : 9.8 K/uL  Auto Lymphocyte # : 1.7 K/uL  Auto Monocyte # : 0.5 K/uL  Auto Eosinophil # : 0.0 K/uL  Auto Basophil # : 0.0 K/uL  Auto Neutrophil % : 81.4 %  Auto Lymphocyte % : 14.0 %  Auto Monocyte % : 4.4 %  Auto Eosinophil % : 0.0 %  Auto Basophil % : 0.1 %    10-26    143  |  106  |  51<H>  ----------------------------<  177<H>  4.0   |  28  |  1.22    Ca    9.5      26 Oct 2017 08:50  Phos  4.3     10-26  Mg     2.7     10-26    TPro  7.7  /  Alb  2.7<L>  /  TBili  0.4  /  DBili  x   /  AST  100<H>  /  ALT  81<H>  /  AlkPhos  100  10-26    PT/INR - ( 26 Oct 2017 04:32 )   PT: 12.6 sec;   INR: 1.15 ratio                 RADIOLOGY & ADDITIONAL STUDIES REVIEWED:  ***    [ ]GOALS OF CARE DISCUSSION WITH PATIENT/FAMILY/PROXY:    CRITICAL CARE TIME SPENT: 35 minutes

## 2017-10-28 NOTE — PROGRESS NOTE ADULT - PROBLEM SELECTOR PLAN 4
Presented with syncope  Orthostasis -ve  EKG without any ST or T wave changes  Mildly elevated trops 0.106  Echo 55% With G2DD  Carotid US: Moderate stenosis of Right carotid artery

## 2017-10-28 NOTE — PROGRESS NOTE ADULT - PROVIDER SPECIALTY LIST ADULT
Cardiology
Critical Care
Critical Care
Internal Medicine
MICU
Neurology
Internal Medicine

## 2017-10-28 NOTE — PROGRESS NOTE ADULT - PROBLEM SELECTOR PLAN 10
Improve VTE score = 2  SCDs for DVT chemoprophylaxis in presence of acute intracranial hemorrhage  Protonix for GI prophylaxis
Improve VTE score = 2  SCDs for DVT chemoprophylaxis in presence of acute intracranial hemorrhage  Protonix for GI prophylaxis

## 2017-10-28 NOTE — DISCHARGE NOTE FOR THE EXPIRED PATIENT - HOSPITAL COURSE
HPI: Patient is a 89 y/o M pt with PMHx of arthritis, asthma, CAD, 5 stents,  HTN, laryngeal CA, CKD stage III recent admission in July for CHF exacerbation ( needing BiPaP and ICU admission) presents to ED c/o syncopal episode x today. Pt reports he is unable to recall the history of events, no LOC or head trauma, fall was unwitnessed. As per son at bedside, pt had 5 falls in the past 2 months. In the ED, pt is c/o pain to the laceration site on the left hand, and pain to the left rib. Son says that patient's medications have been modified since the last visit and he does not remember the list. Patient admits to feeling dizzy when getting up from bed and walking and son has noted that patient's BP drops when he stands up from sitting position   Pt denies fever, chills, shortness of breath, cough, chest pain, palpitations, nausea, vomiting, diarrhea, abd pain, melena, numbness, tingling, weakness, or any other complaints. NKDA.  Surgical hx of cardiac stents, quit smoking/drinking 30-40 years ago, FHx unknown.    ICU Course: Pt was admitted to ICU after Worsening Mental status. Head CT was repeated after the admission one, which showed Left basal ganglia hemorrhage. Repeat head CT after few hours showed worsenning of bleed with vasogenic edema and displacement. Initially family was inclined to transfer the patient to [place]. Later they discussion with the palliative team resulted in just Comfort care due to poor prognosis. Daily neuro checks were performed during the stay. Showed Graduall worsening of sigsn and loss of corticle reflexes. Pt was manged for Bp aggraesivley with Lopressor and Vasotec.   Family only wants comfort care and are aware of the poor prognosis.     Assesment:       Problem/Plan - 1:  ·  Problem: Stroke, hemorrhagic.  Plan: Pt admitted for fall and syncope  Head CT showed Rt basal Ganglia bleed + vasogenic edema  Severe headache; Pain control with morphine  Worsening Neuro exam  Family is refusing Neurosurgery  NPO; aspiration risk (0.9 NaCl+D5 for maintenance)  Seizure prophylaxis with Keppra   Can start Ativan prn for seizures  Neurology Dr. Ramon.      Problem/Plan - 2:  ·  Problem: Palliative care encounter.  Plan: DNR, DNI  Palliative on board, Family meeting today for comfort cares.      Problem/Plan - 3:  ·  Problem: Fever.  Plan: Tmax of 101 over night  WBC of 12  May be secondary to stroke and Hypothalamic compression   IV Tylenol prn.      Problem/Plan - 4:  ·  Problem: Syncope, unspecified syncope type.  Plan: Presented with syncope  Orthostasis -ve  EKG without any ST or T wave changes  Mildly elevated trops 0.106  Echo 55% With G2DD  Carotid US: Moderate stenosis of Right carotid artery.      Problem/Plan - 5:  ·  Problem: Essential hypertension.  Plan: Continue with metoprolol 2.5 mg q4  Can start Vasotec if needed   Goal SBP <130.      Problem/Plan - 6:  Problem: Coronary artery disease involving native coronary artery of native heart without angina pectoris. Plan: S/p PCI x 5 stents  Continue with beta blocker  Plavix and aspirin held in evidence of intracranial hemorrhage.     Problem/Plan - 7:  ·  Problem: Acute cystitis without hematuria.  Plan: Resolved  UA positive; Urine culture revealed E. faecalis  Completed Cipro.      Problem/Plan - 8:  ·  Problem: Asthma.  Plan: Not currently in exacerbation   Continue with duoneb PRN and oxygen supplementation.      Problem/Plan - 9:  ·  Problem: Degenerative joint disease.  Plan: Resolved   Continue with Tylenol   Knee Xray: Mild patellofemoral osteoarthritis and chondrocalcinosis in addition to mild supra-patellar joint effusion   Ortho Dr. Hull aspirated 10 CC fluid and injected 80 mg of Depomedrol with 5 CC of lidocaine into joint 10/24.      Problem/Plan - 10:  Problem: Prophylactic measure. Plan; Improve VTE score = 2  SCDs for DVT chemoprophylaxis in presence of acute intracranial hemorrhage  Protonix for GI prophylaxis.      Patient is pronounced at 9am on 10/28. Family at bedside. No autopsy. HPI: Patient is a 89 y/o M pt with PMHx of arthritis, asthma, CAD, 5 stents,  HTN, laryngeal CA, CKD stage III recent admission in July for CHF exacerbation ( needing BiPaP and ICU admission) presents to ED c/o syncopal episode x today. Pt reports he is unable to recall the history of events, no LOC or head trauma, fall was unwitnessed. As per son at bedside, pt had 5 falls in the past 2 months. In the ED, pt is c/o pain to the laceration site on the left hand, and pain to the left rib. Son says that patient's medications have been modified since the last visit and he does not remember the list. Patient admits to feeling dizzy when getting up from bed and walking and son has noted that patient's BP drops when he stands up from sitting position   Pt denies fever, chills, shortness of breath, cough, chest pain, palpitations, nausea, vomiting, diarrhea, abd pain, melena, numbness, tingling, weakness, or any other complaints. NKDA.  Surgical hx of cardiac stents, quit smoking/drinking 30-40 years ago, FHx unknown.    ICU Course: Pt was admitted to ICU after Worsening Mental status. Head CT was repeated after the admission one, which showed Left basal ganglia hemorrhage. Repeat head CT after few hours showed worsenning of bleed with vasogenic edema and displacement. Initially family was inclined to transfer the patient to [facility]. Later they discussion with the palliative team resulted in just Comfort care due to poor prognosis. Daily neuro checks were performed during the stay. Showed Graduall worsening of sigsn and loss of corticle reflexes. Pt was manged for Bp aggraesivley with Lopressor and Vasotec.   Family only wants comfort care and are aware of the poor prognosis.     Assesment:       Problem/Plan - 1:  ·  Problem: Stroke, hemorrhagic.  Plan: Pt admitted for fall and syncope  Head CT showed Rt basal Ganglia bleed + vasogenic edema  Severe headache; Pain control with morphine  Worsening Neuro exam  Family is refusing Neurosurgery  NPO; aspiration risk (0.9 NaCl+D5 for maintenance)  Seizure prophylaxis with Keppra   Can start Ativan prn for seizures  Neurology Dr. Ramon.      Problem/Plan - 2:  ·  Problem: Palliative care encounter.  Plan: DNR, DNI  Palliative on board, Family meeting today for comfort cares.      Problem/Plan - 3:  ·  Problem: Fever.  Plan: Tmax of 101 over night  WBC of 12  May be secondary to stroke and Hypothalamic compression   IV Tylenol prn.      Problem/Plan - 4:  ·  Problem: Syncope, unspecified syncope type.  Plan: Presented with syncope  Orthostasis -ve  EKG without any ST or T wave changes  Mildly elevated trops 0.106  Echo 55% With G2DD  Carotid US: Moderate stenosis of Right carotid artery.      Problem/Plan - 5:  ·  Problem: Essential hypertension.  Plan: Continue with metoprolol 2.5 mg q4  Can start Vasotec if needed   Goal SBP <130.      Problem/Plan - 6:  Problem: Coronary artery disease involving native coronary artery of native heart without angina pectoris. Plan: S/p PCI x 5 stents  Continue with beta blocker  Plavix and aspirin held in evidence of intracranial hemorrhage.     Problem/Plan - 7:  ·  Problem: Acute cystitis without hematuria.  Plan: Resolved  UA positive; Urine culture revealed E. faecalis  Completed Cipro.      Problem/Plan - 8:  ·  Problem: Asthma.  Plan: Not currently in exacerbation   Continue with duoneb PRN and oxygen supplementation.      Problem/Plan - 9:  ·  Problem: Degenerative joint disease.  Plan: Resolved   Continue with Tylenol   Knee Xray: Mild patellofemoral osteoarthritis and chondrocalcinosis in addition to mild supra-patellar joint effusion   Ortho Dr. Hull aspirated 10 CC fluid and injected 80 mg of Depomedrol with 5 CC of lidocaine into joint 10/24.      Problem/Plan - 10:  Problem: Prophylactic measure. Plan; Improve VTE score = 2  SCDs for DVT chemoprophylaxis in presence of acute intracranial hemorrhage  Protonix for GI prophylaxis.      Patient is pronounced at 9am on 10/28. Family at bedside. No autopsy. organ donation [id number], Patient is not a candidate due to age. HPI: Patient is a 89 y/o M pt with PMHx of arthritis, asthma, CAD, 5 stents,  HTN, laryngeal CA, CKD stage III recent admission in July for CHF exacerbation ( needing BiPaP and ICU admission) presents to ED c/o syncopal episode x today. Pt reports he is unable to recall the history of events, no LOC or head trauma, fall was unwitnessed. As per son at bedside, pt had 5 falls in the past 2 months. In the ED, pt is c/o pain to the laceration site on the left hand, and pain to the left rib. Son says that patient's medications have been modified since the last visit and he does not remember the list. Patient admits to feeling dizzy when getting up from bed and walking and son has noted that patient's BP drops when he stands up from sitting position   Pt denies fever, chills, shortness of breath, cough, chest pain, palpitations, nausea, vomiting, diarrhea, abd pain, melena, numbness, tingling, weakness, or any other complaints. NKDA.  Surgical hx of cardiac stents, quit smoking/drinking 30-40 years ago, FHx unknown.    ICU Course: Pt was admitted to ICU after Worsening Mental status. Head CT was repeated after the admission one, which showed Left basal ganglia hemorrhage. Repeat head CT after few hours showed worsenning of bleed with vasogenic edema and displacement. Initially family was inclined to transfer the patient to [place]. Later they discussion with the palliative team resulted in just Comfort care due to poor prognosis. Daily neuro checks were performed during the stay. Showed Graduall worsening of sigsn and loss of corticle reflexes. Pt was manged for Bp aggraesivley with Lopressor and Vasotec.   Family only wants comfort care and are aware of the poor prognosis.     Assesment:       Problem/Plan - 1:  ·  Problem: Stroke, hemorrhagic.  Plan: Pt admitted for fall and syncope  Head CT showed Rt basal Ganglia bleed + vasogenic edema  Severe headache; Pain control with morphine  Worsening Neuro exam  Family is refusing Neurosurgery  NPO; aspiration risk (0.9 NaCl+D5 for maintenance)  Seizure prophylaxis with Keppra   Can start Ativan prn for seizures  Neurology Dr. Ramon.      Problem/Plan - 2:  ·  Problem: Palliative care encounter.  Plan: DNR, DNI  Palliative on board, Family meeting today for comfort cares.      Problem/Plan - 3:  ·  Problem: Fever.  Plan: Tmax of 101 over night  WBC of 12  May be secondary to stroke and Hypothalamic compression   IV Tylenol prn.      Problem/Plan - 4:  ·  Problem: Syncope, unspecified syncope type.  Plan: Presented with syncope  Orthostasis -ve  EKG without any ST or T wave changes  Mildly elevated trops 0.106  Echo 55% With G2DD  Carotid US: Moderate stenosis of Right carotid artery.      Problem/Plan - 5:  ·  Problem: Essential hypertension.  Plan: Continue with metoprolol 2.5 mg q4  Can start Vasotec if needed   Goal SBP <130.      Problem/Plan - 6:  Problem: Coronary artery disease involving native coronary artery of native heart without angina pectoris. Plan: S/p PCI x 5 stents  Continue with beta blocker  Plavix and aspirin held in evidence of intracranial hemorrhage.     Problem/Plan - 7:  ·  Problem: Acute cystitis without hematuria.  Plan: Resolved  UA positive; Urine culture revealed E. faecalis  Completed Cipro.      Problem/Plan - 8:  ·  Problem: Asthma.  Plan: Not currently in exacerbation   Continue with duoneb PRN and oxygen supplementation.      Problem/Plan - 9:  ·  Problem: Degenerative joint disease.  Plan: Resolved   Continue with Tylenol   Knee Xray: Mild patellofemoral osteoarthritis and chondrocalcinosis in addition to mild supra-patellar joint effusion   Ortho Dr. Hull aspirated 10 CC fluid and injected 80 mg of Depomedrol with 5 CC of lidocaine into joint 10/24.      Problem/Plan - 10:  Problem: Prophylactic measure. Plan; Improve VTE score = 2  SCDs for DVT chemoprophylaxis in presence of acute intracranial hemorrhage  Protonix for GI prophylaxis.      Patient is pronounced at 9am on 10/28. Family at bedside. No autopsy. organ donation [id number], Patient is not a candidate due to age.    for a full account of hospital course please refer to actual medical records for this is a brief summery
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
## 2017-10-30 DIAGNOSIS — R50.9 FEVER, UNSPECIFIED: ICD-10-CM
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
## 2017-10-30 DIAGNOSIS — C32.9 MALIGNANT NEOPLASM OF LARYNX, UNSPECIFIED: ICD-10-CM

## 2017-10-30 DIAGNOSIS — I13.0 HYPERTENSIVE HEART AND CHRONIC KIDNEY DISEASE WITH HEART FAILURE AND STAGE 1 THROUGH STAGE 4 CHRONIC KIDNEY DISEASE, OR UNSPECIFIED CHRONIC KIDNEY DISEASE: ICD-10-CM

## 2017-10-30 DIAGNOSIS — Z95.5 PRESENCE OF CORONARY ANGIOPLASTY IMPLANT AND GRAFT: ICD-10-CM

## 2017-10-30 DIAGNOSIS — Z87.891 PERSONAL HISTORY OF NICOTINE DEPENDENCE: ICD-10-CM

## 2017-10-30 DIAGNOSIS — D72.829 ELEVATED WHITE BLOOD CELL COUNT, UNSPECIFIED: ICD-10-CM

## 2017-10-30 DIAGNOSIS — E86.0 DEHYDRATION: ICD-10-CM

## 2017-10-30 DIAGNOSIS — G93.6 CEREBRAL EDEMA: ICD-10-CM
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
## 2017-10-30 DIAGNOSIS — I61.0 NONTRAUMATIC INTRACEREBRAL HEMORRHAGE IN HEMISPHERE, SUBCORTICAL: ICD-10-CM

## 2017-10-30 DIAGNOSIS — N39.0 URINARY TRACT INFECTION, SITE NOT SPECIFIED: ICD-10-CM

## 2017-10-30 DIAGNOSIS — N18.3 CHRONIC KIDNEY DISEASE, STAGE 3 (MODERATE): ICD-10-CM

## 2017-10-30 DIAGNOSIS — I25.10 ATHEROSCLEROTIC HEART DISEASE OF NATIVE CORONARY ARTERY WITHOUT ANGINA PECTORIS: ICD-10-CM
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
## 2017-10-30 DIAGNOSIS — J45.909 UNSPECIFIED ASTHMA, UNCOMPLICATED: ICD-10-CM

## 2017-10-30 DIAGNOSIS — I65.21 OCCLUSION AND STENOSIS OF RIGHT CAROTID ARTERY: ICD-10-CM

## 2017-10-30 DIAGNOSIS — Z66 DO NOT RESUSCITATE: ICD-10-CM

## 2017-10-30 DIAGNOSIS — R55 SYNCOPE AND COLLAPSE: ICD-10-CM

## 2017-10-30 DIAGNOSIS — Z51.5 ENCOUNTER FOR PALLIATIVE CARE: ICD-10-CM

## 2017-10-30 DIAGNOSIS — M25.562 PAIN IN LEFT KNEE: ICD-10-CM

## 2017-10-30 DIAGNOSIS — S61.412A LACERATION WITHOUT FOREIGN BODY OF LEFT HAND, INITIAL ENCOUNTER: ICD-10-CM

## 2017-10-30 DIAGNOSIS — R41.82 ALTERED MENTAL STATUS, UNSPECIFIED: ICD-10-CM

## 2017-10-30 DIAGNOSIS — G93.41 METABOLIC ENCEPHALOPATHY: ICD-10-CM

## 2017-10-30 DIAGNOSIS — I50.9 HEART FAILURE, UNSPECIFIED: ICD-10-CM

## 2017-10-30 DIAGNOSIS — M19.90 UNSPECIFIED OSTEOARTHRITIS, UNSPECIFIED SITE: ICD-10-CM

## 2017-12-19 PROCEDURE — 86850 RBC ANTIBODY SCREEN: CPT

## 2017-12-19 PROCEDURE — 85027 COMPLETE CBC AUTOMATED: CPT

## 2017-12-19 PROCEDURE — 72170 X-RAY EXAM OF PELVIS: CPT

## 2017-12-19 PROCEDURE — 12002 RPR S/N/AX/GEN/TRNK2.6-7.5CM: CPT

## 2017-12-19 PROCEDURE — 73564 X-RAY EXAM KNEE 4 OR MORE: CPT

## 2017-12-19 PROCEDURE — 83605 ASSAY OF LACTIC ACID: CPT

## 2017-12-19 PROCEDURE — 70450 CT HEAD/BRAIN W/O DYE: CPT

## 2017-12-19 PROCEDURE — 93005 ELECTROCARDIOGRAM TRACING: CPT

## 2017-12-19 PROCEDURE — 73120 X-RAY EXAM OF HAND: CPT

## 2017-12-19 PROCEDURE — 70551 MRI BRAIN STEM W/O DYE: CPT

## 2017-12-19 PROCEDURE — 85610 PROTHROMBIN TIME: CPT

## 2017-12-19 PROCEDURE — 94640 AIRWAY INHALATION TREATMENT: CPT

## 2017-12-19 PROCEDURE — 80053 COMPREHEN METABOLIC PANEL: CPT

## 2017-12-19 PROCEDURE — 87086 URINE CULTURE/COLONY COUNT: CPT

## 2017-12-19 PROCEDURE — 36430 TRANSFUSION BLD/BLD COMPNT: CPT

## 2017-12-19 PROCEDURE — 83735 ASSAY OF MAGNESIUM: CPT

## 2017-12-19 PROCEDURE — 85730 THROMBOPLASTIN TIME PARTIAL: CPT

## 2017-12-19 PROCEDURE — 82746 ASSAY OF FOLIC ACID SERUM: CPT

## 2017-12-19 PROCEDURE — 97162 PT EVAL MOD COMPLEX 30 MIN: CPT

## 2017-12-19 PROCEDURE — 80061 LIPID PANEL: CPT

## 2017-12-19 PROCEDURE — 82306 VITAMIN D 25 HYDROXY: CPT

## 2017-12-19 PROCEDURE — 86901 BLOOD TYPING SEROLOGIC RH(D): CPT

## 2017-12-19 PROCEDURE — 82550 ASSAY OF CK (CPK): CPT

## 2017-12-19 PROCEDURE — 93880 EXTRACRANIAL BILAT STUDY: CPT

## 2017-12-19 PROCEDURE — 99285 EMERGENCY DEPT VISIT HI MDM: CPT | Mod: 25

## 2017-12-19 PROCEDURE — 86900 BLOOD TYPING SEROLOGIC ABO: CPT

## 2017-12-19 PROCEDURE — 81001 URINALYSIS AUTO W/SCOPE: CPT

## 2017-12-19 PROCEDURE — 82553 CREATINE MB FRACTION: CPT

## 2017-12-19 PROCEDURE — 71250 CT THORAX DX C-: CPT

## 2017-12-19 PROCEDURE — 72125 CT NECK SPINE W/O DYE: CPT

## 2017-12-19 PROCEDURE — 84484 ASSAY OF TROPONIN QUANT: CPT

## 2017-12-19 PROCEDURE — 80048 BASIC METABOLIC PNL TOTAL CA: CPT

## 2017-12-19 PROCEDURE — P9037: CPT

## 2017-12-19 PROCEDURE — 82803 BLOOD GASES ANY COMBINATION: CPT

## 2017-12-19 PROCEDURE — 87186 SC STD MICRODIL/AGAR DIL: CPT

## 2017-12-19 PROCEDURE — 84100 ASSAY OF PHOSPHORUS: CPT

## 2018-02-05 NOTE — PATIENT PROFILE ADULT. - ABILITY TO HEAR (WITH HEARING AID OR HEARING APPLIANCE IF NORMALLY USED):
Mildly to Moderately Impaired: difficulty hearing in some environments or speaker may need to increase volume or speak distinctly  delivery delivered

## 2019-08-29 NOTE — H&P ADULT - REASON FOR ADMISSION
Today you were contracted for safety. If you feel suicidal, please call 911.    Please start the medication today, half tablet each day.    You have an upcoming appointment with your new physician, Dr. Diaz; please be sure to attend.  See appointments within this paperwork.    Good luck senior year.        You may be receiving a patient experience survey by mail or e-mail from the Urgent Care Staff regarding your visit today.  We value your feedback and hope you can provide us your insight.    
shortness of breath

## 2020-01-11 NOTE — CONSULT NOTE ADULT - CONSULT REQUESTED BY NAME
Relevant Problems   No relevant active problems       Anesthetic History               Review of Systems / Medical History  Patient summary reviewed and pertinent labs reviewed    Pulmonary                   Neuro/Psych              Cardiovascular                  Exercise tolerance: >4 METS     GI/Hepatic/Renal         Renal disease: stones       Endo/Other        Arthritis     Other Findings              Physical Exam    Airway  Mallampati: II  TM Distance: > 6 cm  Neck ROM: normal range of motion   Mouth opening: Normal     Cardiovascular  Regular rate and rhythm,  S1 and S2 normal,  no murmur, click, rub, or gallop  Rhythm: regular  Rate: normal         Dental  No notable dental hx       Pulmonary  Breath sounds clear to auscultation               Abdominal         Other Findings            Anesthetic Plan    ASA: 2  Anesthesia type: general      Post-op pain plan if not by surgeon: peripheral nerve block single      Anesthetic plan and risks discussed with: Patient      Full sensation and Mobility has returned to RUE.  Mr. Morales states that he has had hiccups all morning
Dr Rich
Dr. Hernandez
Dr. Rich
Dr. Rich
Layla
MD Chelita

## 2020-10-20 NOTE — ED PROVIDER NOTE - CROS ED RESP ALL NEG
Medication refill:Warfarin 5 mg   Last INR:6-    Sent to Anticoagulation clinic for approval/denial     - - -

## 2021-01-05 NOTE — ED ADULT NURSE NOTE - CARDIO WDL
30 day refill given to patient as patient last seen 05/2019.  Replied to patient via [employer] reply to please call our office to schedule a video visit as this was also recommended to her in October 2020 and patient never scheduled.   Normal rate, regular rhythm, normal S1, S2 heart sounds heard.

## 2021-10-01 NOTE — H&P ADULT - PROBLEM/PLAN-2
Patient left voicemail message requesting Dr. Bowie prescribe an anxiety medication.   DISPLAY PLAN FREE TEXT

## 2021-10-25 NOTE — PROGRESS NOTE ADULT - PROBLEM SELECTOR PLAN 6
0932: Arrived to PACU from OR. Monitors applied, alarms on. Report obtained from [facility] and Domonique Jones CRNA.  4972: Medicated for right hip discomfort. Able to dorsiflex and plantarflex right foot. Urinal given per request.  1004: States hip still pretty sore. Re-medicated. Reassurance given. Ice chips per request.  4995: X-rays taken. Turned and repositioned in bed, warm blankets on, heels off bed. 1047: Transported to room 2112. DVT ppx, no need for gi ppx

## 2022-06-02 NOTE — PHYSICAL THERAPY INITIAL EVALUATION ADULT - SIT-TO-STAND BALANCE
fair balance Benzoyl Peroxide Counseling: Patient counseled that medicine may cause skin irritation and bleach clothing.  In the event of skin irritation, the patient was advised to reduce the amount of the drug applied or use it less frequently.   The patient verbalized understanding of the proper use and possible adverse effects of benzoyl peroxide.  All of the patient's questions and concerns were addressed.

## 2022-07-08 NOTE — PHYSICAL THERAPY INITIAL EVALUATION ADULT - RANGE OF MOTION EXAMINATION, REHAB EVAL
no ROM deficits were identified Isotretinoin Counseling: Patient should get monthly blood tests, not donate blood, not drive at night if vision affected, not share medication, and not undergo elective surgery for 6 months after tx completed. Side effects reviewed, pt to contact office should one occur.

## 2024-11-15 NOTE — ED PROVIDER NOTE - DURATION
[de-identified] : The patient is a well appearing 57 year old male of their stated age. Patient ambulates with a normal gait. Negative straight leg raise bilateral   Surgical site: Left knee    Incision sites: Well healed   Range of motion: 0-135 degrees    Motor Testin+ to 5-/5 quadriceps strength   Stability Testing: Stable ligamentous examination    Swelling/Effusion/Ecchymosis: None   Tenderness to palpation: Tender medial distal hamstring    Provocative testing: Negative    Right Calf: soft and nontender Left Calf: soft and nontender   Neurovascular Examination: Grossly intact, 2+ distal pulses Contralateral Extremity: Examination grossly benign  Assessment & Plan: The patient is approximately 8 months s/p left knee arthroscopic partial medial meniscectomy (DOS: 3/13/3024). The patient's post-op plan, protocol and activity modifications have been thoroughly discussed and the patient expressed understanding. He will continue physical therapy, HEP, and stretching- emphasized working on hamstring and quad strengthening. The patient will control pain as discussed & continue ice and elevation as needed. The patient otherwise may advance activity as discussed. Recommended use of Voltaren Gel. Continue previously prescribed Naproxen 500mg BID x 2 weeks, then PRN for pain management and inflammation - use as directed and take with food.  The patient will follow up on a PRN basis unless new symptoms arise.  The patient's current medication management of their orthopedic diagnosis was reviewed today:  (1) We discussed a comprehensive treatment plan that included possible pharmaceutical management involving the use of prescription strength medications including but not limited to options such as oral Naprosyn 500mg BID, once daily Meloxicam 15 mg, or 500-650 mg Tylenol versus over the counter oral medications and topical prescription NSAID Pennsaid vs over the counter Voltaren gel.  Based on our extensive discussion, the patient was prescribed Naprosyn 500mg BID for two weeks.  It will then be used PRN for pain, inflammation and discomfort.  (2) There is a moderate risk of morbidity with further treatment, especially from use of prescription strength medications and possible side effects of these medications which include upset stomach with oral medications, skin reactions to topical medications and cardiac/renal issues with long term use.  (3) I recommended that the patient follow-up with their medical physician to discuss any significant specific potential issues with long term medication use such as interactions with current medications or with exacerbation of underlying medical comorbidities.  (4) The benefits and risks associated with use of injectable, oral or topical, prescription and over the counter anti-inflammatory medications were discussed with the patient. The patient voiced understanding of the risks including but not limited to bleeding, stroke, kidney dysfunction, heart disease, and were referred to the black box warning label for further information.  Mirtha BAILON attest that this documentation has been prepared under the direction and in the presence of Provider Dr. Roberto Card.  The documentation recorded by the scribe accurately reflects the services IDr. Roberto, personally performed and the decisions made by me. today

## 2025-03-12 NOTE — ED PROVIDER NOTE - NSCAREINITIATED _GEN_ER
Jules Mcneal(Attending)
Render Risk Assessment In Note?: no
Other (Free Text): Neutragena/CeraVe/Vanicream/Amlactin OTC
Detail Level: Zone
Note Text (......Xxx Chief Complaint.): This diagnosis correlates with the